# Patient Record
Sex: MALE | Race: ASIAN | Employment: OTHER | URBAN - METROPOLITAN AREA
[De-identification: names, ages, dates, MRNs, and addresses within clinical notes are randomized per-mention and may not be internally consistent; named-entity substitution may affect disease eponyms.]

---

## 2020-06-03 NOTE — PROGRESS NOTES
Spoke with patient's wife. Patient wheelchair bound and on dialysis. Covid testing should be done 6/14/2020, Sunday. No wheelchair transportation available on Sundays. Patient has dialysis on Monday 6/15/2020. Patel Linares at Dr. Hortencia Gant office contacted by T. Lennox Moros NP and voice message left. Will call patient/wife back after speaking with Patel Linares.

## 2020-06-08 NOTE — PERIOP NOTES
TALIA Ibarra at Dr. Yolis Mack office that [place] swab done outside of [facility] would be accepted for this patient due to mobility issues. Noted that swab needed to be done 4 days prior to surgery and result would need to be received prior to surgery.

## 2020-06-09 ENCOUNTER — HOSPITAL ENCOUNTER (OUTPATIENT)
Dept: PREADMISSION TESTING | Age: 60
Discharge: HOME | End: 2020-06-09
Payer: COMMERCIAL

## 2020-06-09 ENCOUNTER — HOSPITAL ENCOUNTER (OUTPATIENT)
Dept: GENERAL RADIOLOGY | Age: 60
Discharge: HOME | End: 2020-06-09
Attending: UROLOGY
Payer: COMMERCIAL

## 2020-06-09 VITALS
RESPIRATION RATE: 16 BRPM | SYSTOLIC BLOOD PRESSURE: 159 MMHG | OXYGEN SATURATION: 100 % | HEIGHT: 70 IN | TEMPERATURE: 97.6 F | HEART RATE: 86 BPM | BODY MASS INDEX: 25.2 KG/M2 | WEIGHT: 176 LBS | DIASTOLIC BLOOD PRESSURE: 80 MMHG

## 2020-06-09 LAB
ALBUMIN SERPL-MCNC: 3.9 G/DL (ref 3.5–5)
ALBUMIN/GLOB SERPL: 0.9 {RATIO} (ref 1.1–2.2)
ALP SERPL-CCNC: 224 U/L (ref 45–117)
ALT SERPL-CCNC: 37 U/L (ref 12–78)
ANION GAP SERPL CALC-SCNC: 6 MMOL/L (ref 5–15)
AST SERPL-CCNC: 28 U/L (ref 15–37)
BASOPHILS # BLD: 0 K/UL (ref 0–0.1)
BASOPHILS NFR BLD: 1 % (ref 0–1)
BILIRUB SERPL-MCNC: 0.8 MG/DL (ref 0.2–1)
BUN SERPL-MCNC: 35 MG/DL (ref 6–20)
BUN/CREAT SERPL: 11 (ref 12–20)
CALCIUM SERPL-MCNC: 9.3 MG/DL (ref 8.5–10.1)
CHLORIDE SERPL-SCNC: 96 MMOL/L (ref 97–108)
CO2 SERPL-SCNC: 29 MMOL/L (ref 21–32)
CREAT SERPL-MCNC: 3.31 MG/DL (ref 0.7–1.3)
DIFFERENTIAL METHOD BLD: ABNORMAL
EOSINOPHIL # BLD: 0.8 K/UL (ref 0–0.4)
EOSINOPHIL NFR BLD: 21 % (ref 0–7)
ERYTHROCYTE [DISTWIDTH] IN BLOOD BY AUTOMATED COUNT: 14.7 % (ref 11.5–14.5)
GLOBULIN SER CALC-MCNC: 4.3 G/DL (ref 2–4)
GLUCOSE SERPL-MCNC: 112 MG/DL (ref 65–100)
HCT VFR BLD AUTO: 36.1 % (ref 36.6–50.3)
HGB BLD-MCNC: 12.2 G/DL (ref 12.1–17)
IMM GRANULOCYTES # BLD AUTO: 0 K/UL (ref 0–0.04)
IMM GRANULOCYTES NFR BLD AUTO: 1 % (ref 0–0.5)
LYMPHOCYTES # BLD: 0.7 K/UL (ref 0.8–3.5)
LYMPHOCYTES NFR BLD: 20 % (ref 12–49)
MCH RBC QN AUTO: 29.8 PG (ref 26–34)
MCHC RBC AUTO-ENTMCNC: 33.8 G/DL (ref 30–36.5)
MCV RBC AUTO: 88.3 FL (ref 80–99)
MONOCYTES # BLD: 0.3 K/UL (ref 0–1)
MONOCYTES NFR BLD: 7 % (ref 5–13)
NEUTS SEG # BLD: 1.8 K/UL (ref 1.8–8)
NEUTS SEG NFR BLD: 50 % (ref 32–75)
NRBC # BLD: 0 K/UL (ref 0–0.01)
NRBC BLD-RTO: 0 PER 100 WBC
PLATELET # BLD AUTO: 118 K/UL (ref 150–400)
PMV BLD AUTO: 9.6 FL (ref 8.9–12.9)
POTASSIUM SERPL-SCNC: 4.2 MMOL/L (ref 3.5–5.1)
PROT SERPL-MCNC: 8.2 G/DL (ref 6.4–8.2)
RBC # BLD AUTO: 4.09 M/UL (ref 4.1–5.7)
RBC MORPH BLD: ABNORMAL
SODIUM SERPL-SCNC: 131 MMOL/L (ref 136–145)
WBC # BLD AUTO: 3.6 K/UL (ref 4.1–11.1)

## 2020-06-09 PROCEDURE — 71046 X-RAY EXAM CHEST 2 VIEWS: CPT

## 2020-06-09 PROCEDURE — 36415 COLL VENOUS BLD VENIPUNCTURE: CPT

## 2020-06-09 PROCEDURE — 80053 COMPREHEN METABOLIC PANEL: CPT

## 2020-06-09 PROCEDURE — 85025 COMPLETE CBC W/AUTO DIFF WBC: CPT

## 2020-06-09 PROCEDURE — 93005 ELECTROCARDIOGRAM TRACING: CPT

## 2020-06-09 RX ORDER — TERAZOSIN 5 MG/1
5 CAPSULE ORAL 2 TIMES DAILY
COMMUNITY

## 2020-06-09 RX ORDER — ISOSORBIDE MONONITRATE 30 MG/1
30 TABLET, EXTENDED RELEASE ORAL DAILY
COMMUNITY

## 2020-06-09 RX ORDER — ETHYL ALCOHOL 70 %
SOLUTION, NON-ORAL TOPICAL AS NEEDED
COMMUNITY

## 2020-06-09 RX ORDER — ONDANSETRON 4 MG/1
4 TABLET, ORALLY DISINTEGRATING ORAL
COMMUNITY

## 2020-06-09 RX ORDER — ALBUTEROL SULFATE 1.25 MG/3ML
1.25 SOLUTION RESPIRATORY (INHALATION)
COMMUNITY

## 2020-06-09 RX ORDER — INSULIN ASPART 100 [IU]/ML
INJECTION, SOLUTION INTRAVENOUS; SUBCUTANEOUS 2 TIMES DAILY WITH MEALS
COMMUNITY

## 2020-06-09 RX ORDER — LOSARTAN POTASSIUM 25 MG/1
25 TABLET ORAL DAILY
COMMUNITY

## 2020-06-09 RX ORDER — AMLODIPINE BESYLATE 10 MG/1
10 TABLET ORAL EVERY EVENING
COMMUNITY

## 2020-06-09 RX ORDER — LACOSAMIDE 10 MG/ML
10 SOLUTION ORAL 2 TIMES DAILY
COMMUNITY

## 2020-06-09 RX ORDER — FACIAL-BODY WIPES
10 EACH TOPICAL
COMMUNITY

## 2020-06-09 RX ORDER — ACETAMINOPHEN 160 MG/5ML
320 LIQUID ORAL
COMMUNITY

## 2020-06-09 RX ORDER — SCOLOPAMINE TRANSDERMAL SYSTEM 1 MG/1
1 PATCH, EXTENDED RELEASE TRANSDERMAL
COMMUNITY

## 2020-06-09 RX ORDER — ASCORBIC ACID 500 MG
500 TABLET ORAL DAILY
COMMUNITY

## 2020-06-09 NOTE — PERIOP NOTES
[address] [address] [facility]  PRE-ADMISSION TESTING    [phone]     Surgery Date:   Thursday, 6/18/20    [facility] staff will call you between 3 and 7pm the day before your surgery with your arrival time. Call [phone] after 7pm Wednesday if you did not receive this call. INSTRUCTIONS BEFORE YOUR SURGERY   When You  Arrive Arrive at the [address]nd [address] [facility] on the day of your surgery  Have your insurance card, photo ID, and any copayment (if needed)   Food   and   Drink NO food or drink after midnight the night before surgery    This means NO water, gum, mints, coffee, juice, etc.  No alcohol (beer, wine, liquor) 24 hours before and after surgery   Medications to   TAKE   Morning of Surgery MEDICATIONS TO TAKE THE MORNING OF SURGERY WITH A SIP OF WATER:    Terazosin, vimpat, guaifen, & isosorbide   Tylenol/acetaminophen products may be taken for pain, if needed   Medications  To  STOP      7 days before surgery  Non-Steroidal anti-inflammatory Drugs (NSAID's): for example, Ibuprofen (Advil, Motrin), Naproxen (Aleve)   Aspirin, if taking for pain    Herbal supplements, vitamins, and fish oil     Financial Assistance  [phone]   Bathing Clothing  Jewelry  Valuables      If you shower the morning of surgery, please do not apply anything to your skin (lotions, powders, deodorant, or makeup, especially mascara)   Do not shave or trim anywhere 24 hours before surgery   Wear loose, comfortable clothes   Leave money, valuables, and jewelry, including body piercings, at home   [address] SAME-DAY SURGERY: You must have a responsible adult drive you home and stay with you 24 hours after surgery   Special Instructions If a situation occurs and you are delayed the day of surgery, call [phone]. If your physical condition changes (like a fever, cold, flu, etc.) call your surgeon.        Home medication(s) reviewed and verified verbally during PAT appointment. The patient and spouse was contacted  in person. The patient verbalizes understanding of all instructions and does not  need reinforcement.

## 2020-06-09 NOTE — PERIOP NOTES
It is now mandated that all surgical patients be tested for COVID-19 prior to surgery. Testing has to be exactly 4 days prior to surgery. Your COVID test date is Sunday, 6/14/20, between 8:00 am and 11:00 am.  
 
 
COVID testing will be performed curbside at the [facility]. There will be signs leading you to the testing site. You will need to bring a photo ID with you to be swabbed. Patients are advised to self-quarantine at home after testing and prior to your surgery date. You will be notified if your results are positive. What to watch for: ? Coronavirus (COVID-19) affects different people in different ways ? It also appears with a wide range of symptoms from mild to severe ? Signs usually appear 2-14 days after exposure ? If you develop any of the following, notify your doctor immediately: 
o Fever 
o Chills, with or without a shiver 
o Muscle pain 
o Headache 
o Sore throat 
o Dry cough 
o New loss of taste or smell 
o Tiredness ? If you develop any of the following, call 911: 
o Shortness of breath 
o Difficulty breathing 
o Chest pain 
o New confusion 
o Blueness of fingers and/or lips

## 2020-06-09 NOTE — H&P
Preoperative Evaluation                     History and Physical with Surgical Risk Stratification     6/9/2020    CC: Urinary retention  Surgery: Prostate ablation     HPI:   Shanta Bo is a 61 y.o. male referred for pre-operative evaluation by Dr. Tabares Reason for surgery on 6/18/20. Mr. Martin Luna comes today with his wife. He had a stroke in March of last year and his non-verbal, on a PEG tube and has a ortiz catheter. 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 He has a right arm fistula that he recently had surgery on for excessive bleeding. His wife answers most of the questions for him. He is non-ambulatory and uses a wheelchair. He also has ESRD and does dialysis three times a week. After his stroke he had urinary retention but is also able to void. He has a ortiz that is present. His wife notes he does not seem to be in pain. The patient was evaluated in the surgeon's office and it was determined that the most appropriate plan of care is to proceed with surgical intervention. Patient's PCP Nicole Louis MD    Review of Systems     Constitutional: Negative for chills and fever  HENT: Negative for congestion and sore throat  Eyes: negative for blurred vision and double vision. Stye on left eyelid  Respiratory: Negative for cough, shortness of breath and wheezing  Mouth: Negative for loose, broken or chipped teeth. Cardiovascular: Negative for chest pain and palpitations  Gastrointestinal: Negative for abdominal pain, constipation, diarrhea and nausea. PEG tube in place  Genitourinary: Ortiz in place  Musculoskeletal: Non-ambulatory  Skin: Negative for rash, open wounds. Negative for bruises easily  Neurological: Negative for dizziness, tremors and headaches  Psychiatric: Negative for depression. The patient is not nervous/anxious.     Inherent Risk of Surgery     Surgical risk: Low  Low:  EIntermediate:   High:    Patient Cardiac Risk Assessment Revised Cardiac Risk Index (RCRI)  DM requiring insulin therapy  Preoperative serum Cr >2.0 mg/dl  Rate if cardiac death, nonfatal MI, nonfatal cardiac arrest by number of risk factor- 2.4%    PATTIE/AHA 2007 Guidelines:   1) Surgery Emergency, Non-cardiac -> to surgery  2) If not, look at clinical predictors    Major Intermediate Minor   Diabetes  Renal Insufficieny  Hx of CVA   Blood Thinner: ASA    METS     Less than 4     Other Risk Factors:   Screening for ETOH use:  Done and low risk  Smoking status:  Never     Personal or FH of bleeding problems:  No  Personal or FH of blood clots:  Yes- both eyes  Personal or FH of anesthesia problems:   No    Pulmonary Risk:  Asthma or COPD:  No  Body mass index is 25.25 kg/m². Known LETI:  No  Albumin abnormal, BUN abnormal    Past Medical, Surgical, Social History     Allergies: Allergies   Allergen Reactions    Bee Venom Protein (Honey Bee) Anaphylaxis    Hydralazine Rash       Medication Documentation Review Audit     Reviewed by Doretha Edwards RN (Registered Nurse) on 06/09/20 at 5334    Medication Sig Documenting Provider Last Dose Status Taking?   acetaminophen (TYLENOL) 160 mg/5 mL liquid Take 15 mg/kg by mouth every six (6) hours as needed for Fever. Provider, Historical  Active Yes   albuterol (ACCUNEB) 1.25 mg/3 mL nebu 1.25 mg by Nebulization route every six (6) hours as needed for Wheezing. Provider, Historical  Active Yes   amLODIPine (NORVASC) 10 mg tablet Take  by mouth every evening. Provider, Historical  Active Yes   ascorbic acid, vitamin C, (VITAMIN C) 500 mg tablet Take  by mouth daily. Provider, Historical  Active Yes   aspirin delayed-release 81 mg tablet Take 1 Tab by mouth daily. Ryan Peterson MD 6/8/2020 1100 Active No   bisacodyL (DULCOLAX) 10 mg suppository Insert 10 mg into rectum daily as needed for Constipation. Provider, Historical  Active Yes   guaifen/dextromethorphan/pe (GUAIFEN DM PO) Take 10 mL by mouth two (2) times a day.  Provider, Historical  Active Yes   insulin aspart U-100 (NovoLOG Flexpen U-100 Insulin) 100 unit/mL (3 mL) inpn by SubCUTAneous route two (2) times a day. Sliding scale Provider, Historical  Active Yes   isosorbide mononitrate ER (IMDUR) 30 mg tablet Take  by mouth daily. Provider, Historical  Active Yes   lacosamide (VIMPAT PO) Take 10 mL by mouth two (2) times a day. Provider, Historical  Active Yes   losartan (COZAAR) 25 mg tablet Take  by mouth daily. Provider, Historical  Active Yes   ondansetron (ZOFRAN ODT) 4 mg disintegrating tablet Take 4 mg by mouth every eight (8) hours as needed for Nausea or Vomiting. Provider, Historical  Active Yes   scopolamine (TRANSDERM-SCOP) 1 mg over 3 days pt3d 1 Patch by TransDERmal route every seventy-two (72) hours. Provider, Historical  Active Yes   terazosin (HYTRIN) 5 mg capsule Take 5 mg by mouth two (2) times a day. Provider, Historical  Active Yes   vitamin a & d (A&D) ointment Apply  to affected area as needed for Skin Irritation.  Provider, Historical  Active Yes                Past Medical History:   Diagnosis Date    Diabetes ([address]     Dysphagia due to old cerebrovascular accident 03/2019    ESRD (end stage renal disease) on dialysis [facility])     MWF    Frias catheter in place     PEG (percutaneous endoscopic gastrostomy) status ([address] 3/2019 to present    NPO- comfort foods only    Right sided weakness 3/2019 to present    Seizure ([address] 03/2019    Stroke ([address] 03/2019    Urinary retention with incomplete bladder emptying      Past Surgical History:   Procedure Laterality Date    HX ARTERIOVENOUS FISTULA Right 03/2019    HX CYST INCISION AND DRAINAGE Right     2nd toe- Cleaned out infection    HX OTHER SURGICAL  03/2019    PEG tube insertion        Social History     Tobacco Use    Smoking status: Never Smoker    Smokeless tobacco: Never Used   Substance Use Topics    Alcohol use: Never     Frequency: Never     Comment: rare    Drug use: Never     Family History Problem Relation Age of Onset    Stroke Father     Stroke Sister 48    Deep Vein Thrombosis Neg Hx        Objective     Vitals:    06/09/20 0818   BP: 159/80   Pulse: 86   Resp: 16   Temp: 97.6 °F (36.4 °C)   SpO2: 100%   Weight: 79.8 kg (176 lb)   Height: 5' 10\" (1.778 m)       Constitutional:  Appears well,  No Acute Distress, Vitals noted  Psychiatric:   Responds to voice    Eyes:   Pupils equally round and reactive, EOMI, conjunctiva clear, eyelids normal. Stye to left eye  ENT:   External ears and nose normal, teeth normal, gums normal, TMs and Orophyarynx normal  Neck:   General inspection and Thyroid normal.  No abnormal cervical or supraclavicular nodes    Lungs:   Clear to auscultation, good respiratory effort  Heart: Ausculation normal.  Regular rhythm. No cardiac murmurs. No carotid bruits or palpable thrills  Chest wall normal  Musculoskeletal: Non-ambulatory  Extremities:   Without edema, good peripheral pulses  Skin:   Warm to palpation, without rashes, bruising, or suspicious lesions     Recent Results (from the past 72 hour(s))   CBC WITH AUTOMATED DIFF    Collection Time: 06/09/20  9:18 AM   Result Value Ref Range    WBC 3.6 (L) 4.1 - 11.1 K/uL    RBC 4.09 (L) 4.10 - 5.70 M/uL    HGB 12.2 12.1 - 17.0 g/dL    HCT 36.1 (L) 36.6 - 50.3 %    MCV 88.3 80.0 - 99.0 FL    MCH 29.8 26.0 - 34.0 PG    MCHC 33.8 30.0 - 36.5 g/dL    RDW 14.7 (H) 11.5 - 14.5 %    PLATELET 666 (L) 381 - 400 K/uL    MPV 9.6 8.9 - 12.9 FL    NRBC 0.0 0  WBC    ABSOLUTE NRBC 0.00 0.00 - 0.01 K/uL    NEUTROPHILS 50 32 - 75 %    LYMPHOCYTES 20 12 - 49 %    MONOCYTES 7 5 - 13 %    EOSINOPHILS 21 (H) 0 - 7 %    BASOPHILS 1 0 - 1 %    IMMATURE GRANULOCYTES 1 (H) 0.0 - 0.5 %    ABS. NEUTROPHILS 1.8 1.8 - 8.0 K/UL    ABS. LYMPHOCYTES 0.7 (L) 0.8 - 3.5 K/UL    ABS. MONOCYTES 0.3 0.0 - 1.0 K/UL    ABS. EOSINOPHILS 0.8 (H) 0.0 - 0.4 K/UL    ABS. BASOPHILS 0.0 0.0 - 0.1 K/UL    ABS. IMM.  GRANS. 0.0 0.00 - 0.04 K/UL    DF SMEAR SCANNED      RBC COMMENTS        RBC agglutination present  NORMOCYTIC, NORMOCHROMIC     METABOLIC PANEL, COMPREHENSIVE    Collection Time: 06/09/20  9:18 AM   Result Value Ref Range    Sodium 131 (L) 136 - 145 mmol/L    Potassium 4.2 3.5 - 5.1 mmol/L    Chloride 96 (L) 97 - 108 mmol/L    CO2 29 21 - 32 mmol/L    Anion gap 6 5 - 15 mmol/L    Glucose 112 (H) 65 - 100 mg/dL    BUN 35 (H) 6 - 20 MG/DL    Creatinine 3.31 (H) 0.70 - 1.30 MG/DL    BUN/Creatinine ratio 11 (L) 12 - 20      GFR est AA 23 (L) >60 ml/min/1.73m2    GFR est non-AA 19 (L) >60 ml/min/1.73m2    Calcium 9.3 8.5 - 10.1 MG/DL    Bilirubin, total 0.8 0.2 - 1.0 MG/DL    ALT (SGPT) 37 12 - 78 U/L    AST (SGOT) 28 15 - 37 U/L    Alk. phosphatase 224 (H) 45 - 117 U/L    Protein, total 8.2 6.4 - 8.2 g/dL    Albumin 3.9 3.5 - 5.0 g/dL    Globulin 4.3 (H) 2.0 - 4.0 g/dL    A-G Ratio 0.9 (L) 1.1 - 2.2     EKG, 12 LEAD, INITIAL    Collection Time: 06/09/20  9:36 AM   Result Value Ref Range    Ventricular Rate 84 BPM    Atrial Rate 84 BPM    P-R Interval 200 ms    QRS Duration 94 ms    Q-T Interval 364 ms    QTC Calculation (Bezet) 430 ms    Calculated P Axis 76 degrees    Calculated R Axis 0 degrees    Calculated T Axis 85 degrees    Diagnosis       Normal sinus rhythm with 1st degree AV block  Otherwise normal ECG  No previous ECGs available  Confirmed by Elmer White MD, [id number] ([id number]) on 6/10/2020 9:53:38 AM         Assessment and Plan     Assessment/Plan:   1) Urinary retention  2) Pre-Operative Evaluation    Labs, CXR and EKG reviewed. Urine could not be obtained r/t ortiz. Obtain DOS    Labs sent to surgeon for review    Preoperative Clearance  Per RCRI, the patient has a 2.4% risk of cardiac death, nonfatal MI, nonfatal cardiac arrest based on two risk factors. Per ACC/AHA guidelines, patient is intermediate risk for a(n) low risk surgery and may proceed to planned surgery with the above noted risk.     Anita Corona NP

## 2020-06-09 NOTE — H&P (VIEW-ONLY)
Preoperative Evaluation History and Physical with Surgical Risk Stratification 6/9/2020 CC: Urinary retention Surgery: Prostate ablation HPI:  
Ericka Oates is a 61 y.o. male referred for pre-operative evaluation by Dr. Елена Purcell for surgery on 6/18/20. Mr. Anna Huertas comes today with his wife. He had a stroke in March of last year and his non-verbal, on a PEG tube and has a ortiz catheter. 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 He has a right arm fistula that he recently had surgery on for excessive bleeding. His wife answers most of the questions for him. He is non-ambulatory and uses a wheelchair. He also has ESRD and does dialysis three times a week. After his stroke he had urinary retention but is also able to void. He has a ortiz that is present. His wife notes he does not seem to be in pain. The patient was evaluated in the surgeon's office and it was determined that the most appropriate plan of care is to proceed with surgical intervention. Patient's PCP Radu Marmolejo MD 
 
Review of Systems Constitutional: Negative for chills and fever HENT: Negative for congestion and sore throat Eyes: negative for blurred vision and double vision. Stye on left eyelid Respiratory: Negative for cough, shortness of breath and wheezing Mouth: Negative for loose, broken or chipped teeth. Cardiovascular: Negative for chest pain and palpitations Gastrointestinal: Negative for abdominal pain, constipation, diarrhea and nausea. PEG tube in place Genitourinary: Ortiz in place Musculoskeletal: Non-ambulatory Skin: Negative for rash, open wounds. Negative for bruises easily Neurological: Negative for dizziness, tremors and headaches Psychiatric: Negative for depression. The patient is not nervous/anxious. Inherent Risk of Surgery Surgical risk: Low Low:  EIntermediate:   High:   
Patient Cardiac Risk Assessment Revised Cardiac Risk Index (RCRI) 
DM requiring insulin therapy Preoperative serum Cr >2.0 mg/dl Rate if cardiac death, nonfatal MI, nonfatal cardiac arrest by number of risk factor- 2.4% PATTIE/AHA 2007 Guidelines:  
1) Surgery Emergency, Non-cardiac -> to surgery 2) If not, look at clinical predictors Major Intermediate Minor Diabetes  Renal Insufficieny  Hx of CVA Blood Thinner: ASA METS Less than 4 Other Risk Factors:  
Screening for ETOH use:  Done and low risk Smoking status:  Never Personal or FH of bleeding problems:  No 
Personal or FH of blood clots:  Yes- both eyes Personal or FH of anesthesia problems:   No 
 
Pulmonary Risk: 
Asthma or COPD:  No 
Body mass index is 25.25 kg/m². Known LETI:  No 
Albumin abnormal, BUN abnormal 
 
Past Medical, Surgical, Social History Allergies: Allergies Allergen Reactions  Bee Venom Protein (Honey Bee) Anaphylaxis  Hydralazine Rash Medication Documentation Review Audit Reviewed by Yung Rodgers RN (Registered Nurse) on 06/09/20 at 4629 Medication Sig Documenting Provider Last Dose Status Taking?  
acetaminophen (TYLENOL) 160 mg/5 mL liquid Take 15 mg/kg by mouth every six (6) hours as needed for Fever. Provider, Historical  Active Yes  
albuterol (ACCUNEB) 1.25 mg/3 mL nebu 1.25 mg by Nebulization route every six (6) hours as needed for Wheezing. Provider, Historical  Active Yes  
amLODIPine (NORVASC) 10 mg tablet Take  by mouth every evening. Provider, Historical  Active Yes  
ascorbic acid, vitamin C, (VITAMIN C) 500 mg tablet Take  by mouth daily. Provider, Historical  Active Yes  
aspirin delayed-release 81 mg tablet Take 1 Tab by mouth daily. Jak Vick MD 6/8/2020 1100 Active No  
bisacodyL (DULCOLAX) 10 mg suppository Insert 10 mg into rectum daily as needed for Constipation.  Provider, Historical  Active Yes  
guaifen/dextromethorphan/pe (GUAIFEN DM PO) Take 10 mL by mouth two (2) times a day. Provider, Historical  Active Yes  
insulin aspart U-100 (NovoLOG Flexpen U-100 Insulin) 100 unit/mL (3 mL) inpn by SubCUTAneous route two (2) times a day. Sliding scale Provider, Historical  Active Yes  
isosorbide mononitrate ER (IMDUR) 30 mg tablet Take  by mouth daily. Provider, Historical  Active Yes  
lacosamide (VIMPAT PO) Take 10 mL by mouth two (2) times a day. Provider, Historical  Active Yes  
losartan (COZAAR) 25 mg tablet Take  by mouth daily. Provider, Historical  Active Yes  
ondansetron (ZOFRAN ODT) 4 mg disintegrating tablet Take 4 mg by mouth every eight (8) hours as needed for Nausea or Vomiting. Provider, Historical  Active Yes  
scopolamine (TRANSDERM-SCOP) 1 mg over 3 days pt3d 1 Patch by TransDERmal route every seventy-two (72) hours. Provider, Historical  Active Yes  
terazosin (HYTRIN) 5 mg capsule Take 5 mg by mouth two (2) times a day. Provider, Historical  Active Yes  
vitamin a & d (A&D) ointment Apply  to affected area as needed for Skin Irritation. Provider, Historical  Active Yes Past Medical History:  
Diagnosis Date  Diabetes ([address]  Dysphagia due to old cerebrovascular accident 03/2019  ESRD (end stage renal disease) on dialysis ([address] MWF  
 Frias catheter in place  PEG (percutaneous endoscopic gastrostomy) status ([address] 3/2019 to present NPO- comfort foods only  Right sided weakness 3/2019 to present  [facility] 03/2019  Stroke [facility]) 03/2019  Urinary retention with incomplete bladder emptying Past Surgical History:  
Procedure Laterality Date  HX ARTERIOVENOUS FISTULA Right 03/2019  HX CYST INCISION AND DRAINAGE Right 2nd toe- Cleaned out infection  HX OTHER SURGICAL  03/2019 PEG tube insertion Social History Tobacco Use  Smoking status: Never Smoker  Smokeless tobacco: Never Used Substance Use Topics  Alcohol use: Never Frequency: Never Comment: rare  Drug use: Never Family History Problem Relation Age of Onset  Stroke Father  Stroke Sister 48  Deep Vein Thrombosis Neg Hx Objective Vitals:  
 06/09/20 0818 BP: 159/80 Pulse: 86 Resp: 16 Temp: 97.6 °F (36.4 °C) SpO2: 100% Weight: 79.8 kg (176 lb) Height: 5' 10\" (1.778 m) Constitutional:  Appears well,  No Acute Distress, Vitals noted Psychiatric:   Responds to voice Eyes:   Pupils equally round and reactive, EOMI, conjunctiva clear, eyelids normal. Stye to left eye ENT:   External ears and nose normal, teeth normal, gums normal, TMs and Orophyarynx normal 
Neck:   General inspection and Thyroid normal.  No abnormal cervical or supraclavicular nodes Lungs:   Clear to auscultation, good respiratory effort Heart: Ausculation normal.  Regular rhythm. No cardiac murmurs. No carotid bruits or palpable thrills Chest wall normal 
Musculoskeletal: Non-ambulatory Extremities:   Without edema, good peripheral pulses Skin:   Warm to palpation, without rashes, bruising, or suspicious lesions Recent Results (from the past 72 hour(s)) CBC WITH AUTOMATED DIFF Collection Time: 06/09/20  9:18 AM  
Result Value Ref Range WBC 3.6 (L) 4.1 - 11.1 K/uL  
 RBC 4.09 (L) 4.10 - 5.70 M/uL  
 HGB 12.2 12.1 - 17.0 g/dL HCT 36.1 (L) 36.6 - 50.3 % MCV 88.3 80.0 - 99.0 FL  
 MCH 29.8 26.0 - 34.0 PG  
 MCHC 33.8 30.0 - 36.5 g/dL  
 RDW 14.7 (H) 11.5 - 14.5 % PLATELET 047 (L) 848 - 400 K/uL MPV 9.6 8.9 - 12.9 FL  
 NRBC 0.0 0  WBC ABSOLUTE NRBC 0.00 0.00 - 0.01 K/uL NEUTROPHILS 50 32 - 75 % LYMPHOCYTES 20 12 - 49 % MONOCYTES 7 5 - 13 % EOSINOPHILS 21 (H) 0 - 7 % BASOPHILS 1 0 - 1 % IMMATURE GRANULOCYTES 1 (H) 0.0 - 0.5 % ABS. NEUTROPHILS 1.8 1.8 - 8.0 K/UL  
 ABS. LYMPHOCYTES 0.7 (L) 0.8 - 3.5 K/UL  
 ABS. MONOCYTES 0.3 0.0 - 1.0 K/UL  
 ABS. EOSINOPHILS 0.8 (H) 0.0 - 0.4 K/UL  
 ABS. BASOPHILS 0.0 0.0 - 0.1 K/UL ABS. IMM. GRANS. 0.0 0.00 - 0.04 K/UL  
 DF SMEAR SCANNED    
 RBC COMMENTS     
  RBC agglutination present NORMOCYTIC, NORMOCHROMIC METABOLIC PANEL, COMPREHENSIVE Collection Time: 06/09/20  9:18 AM  
Result Value Ref Range Sodium 131 (L) 136 - 145 mmol/L Potassium 4.2 3.5 - 5.1 mmol/L Chloride 96 (L) 97 - 108 mmol/L  
 CO2 29 21 - 32 mmol/L Anion gap 6 5 - 15 mmol/L Glucose 112 (H) 65 - 100 mg/dL BUN 35 (H) 6 - 20 MG/DL Creatinine 3.31 (H) 0.70 - 1.30 MG/DL  
 BUN/Creatinine ratio 11 (L) 12 - 20 GFR est AA 23 (L) >60 ml/min/1.73m2 GFR est non-AA 19 (L) >60 ml/min/1.73m2 Calcium 9.3 8.5 - 10.1 MG/DL Bilirubin, total 0.8 0.2 - 1.0 MG/DL  
 ALT (SGPT) 37 12 - 78 U/L  
 AST (SGOT) 28 15 - 37 U/L Alk. phosphatase 224 (H) 45 - 117 U/L Protein, total 8.2 6.4 - 8.2 g/dL Albumin 3.9 3.5 - 5.0 g/dL Globulin 4.3 (H) 2.0 - 4.0 g/dL A-G Ratio 0.9 (L) 1.1 - 2.2 EKG, 12 LEAD, INITIAL Collection Time: 06/09/20  9:36 AM  
Result Value Ref Range Ventricular Rate 84 BPM  
 Atrial Rate 84 BPM  
 P-R Interval 200 ms QRS Duration 94 ms Q-T Interval 364 ms QTC Calculation (Bezet) 430 ms Calculated P Axis 76 degrees Calculated R Axis 0 degrees Calculated T Axis 85 degrees Diagnosis Normal sinus rhythm with 1st degree AV block Otherwise normal ECG No previous ECGs available Confirmed by Anand Morales MD, [id number] ([id number]) on 6/10/2020 9:53:38 AM 
  
 
 
Assessment and Plan Assessment/Plan:  
1) Urinary retention 2) Pre-Operative Evaluation Labs, CXR and EKG reviewed. Urine could not be obtained r/t ortiz. Obtain DOS Labs sent to surgeon for review Preoperative Clearance Per RCRI, the patient has a 2.4% risk of cardiac death, nonfatal MI, nonfatal cardiac arrest based on two risk factors. Per ACC/AHA guidelines, patient is intermediate risk for a(n) low risk surgery and may proceed to planned surgery with the above noted risk. Harsha Mata, NP

## 2020-06-09 NOTE — PROGRESS NOTES
Spoke with Margaret Schumacher, Mr. Matilde Buenrostro caretaker at [phone]. Mr. Matilde Buenrostro will come to [facility] after dialysis on Friday, June 12, 2020 to get his COVID testing. She has arranged transport for him to arrive at [facility].

## 2020-06-09 NOTE — PERIOP NOTES
Called EKG secondary to EKG showing active, not completed for 4hrs. Left VM requesting they look into it & notify supervisor @ [phone] if the EKG was not completed.

## 2020-06-10 LAB
ATRIAL RATE: 84 BPM
CALCULATED P AXIS, ECG09: 76 DEGREES
CALCULATED R AXIS, ECG10: 0 DEGREES
CALCULATED T AXIS, ECG11: 85 DEGREES
DIAGNOSIS, 93000: NORMAL
P-R INTERVAL, ECG05: 200 MS
Q-T INTERVAL, ECG07: 364 MS
QRS DURATION, ECG06: 94 MS
QTC CALCULATION (BEZET), ECG08: 430 MS
VENTRICULAR RATE, ECG03: 84 BPM

## 2020-06-12 ENCOUNTER — HOSPITAL ENCOUNTER (OUTPATIENT)
Dept: PREADMISSION TESTING | Age: 60
Discharge: HOME | End: 2020-06-12
Payer: COMMERCIAL

## 2020-06-12 PROCEDURE — 87635 SARS-COV-2 COVID-19 AMP PRB: CPT

## 2020-06-13 LAB — SARS-COV-2, COV2NT: NOT DETECTED

## 2020-06-17 ENCOUNTER — ANESTHESIA EVENT (OUTPATIENT)
Dept: SURGERY | Age: 60
End: 2020-06-17
Payer: COMMERCIAL

## 2020-06-17 NOTE — PERIOP NOTES
Left a VM for Rosalinda Walker at Dr. Torey Forrest office requesting that the corrected pre-op antibiotic orders be faxed to the Main pre-op.   DOS: 6/18/2020

## 2020-06-17 NOTE — PROGRESS NOTES
Received call from Andreina Luna @ 11am re: verbal order to add gentamycin to pre-op orders and pharmacy to dose due to being a dialysis patient. Informed komal unable to take verbal orders in PAT dept. Advise to have MD place new order in computer with sign and hold or re-fax updated orders  with med change to [phone] or main pre-op [phone]. Facilitator jayne EVANS/ragini OLIVERA/lois EVANS informed or conversation with office.   DOS 6/18/20

## 2020-06-18 ENCOUNTER — ANESTHESIA (OUTPATIENT)
Dept: SURGERY | Age: 60
End: 2020-06-18
Payer: COMMERCIAL

## 2020-06-18 ENCOUNTER — HOSPITAL ENCOUNTER (OUTPATIENT)
Age: 60
Setting detail: OUTPATIENT SURGERY
Discharge: HOME | End: 2020-06-18
Attending: UROLOGY | Admitting: UROLOGY
Payer: COMMERCIAL

## 2020-06-18 VITALS
TEMPERATURE: 98.7 F | DIASTOLIC BLOOD PRESSURE: 75 MMHG | HEIGHT: 66 IN | OXYGEN SATURATION: 96 % | HEART RATE: 81 BPM | BODY MASS INDEX: 27.48 KG/M2 | SYSTOLIC BLOOD PRESSURE: 156 MMHG | WEIGHT: 171 LBS | RESPIRATION RATE: 14 BRPM

## 2020-06-18 LAB
ANION GAP SERPL CALC-SCNC: 6 MMOL/L (ref 5–15)
BUN SERPL-MCNC: 36 MG/DL (ref 6–20)
BUN/CREAT SERPL: 11 (ref 12–20)
CALCIUM SERPL-MCNC: 8.8 MG/DL (ref 8.5–10.1)
CHLORIDE SERPL-SCNC: 99 MMOL/L (ref 97–108)
CO2 SERPL-SCNC: 29 MMOL/L (ref 21–32)
CREAT SERPL-MCNC: 3.19 MG/DL (ref 0.7–1.3)
ERYTHROCYTE [DISTWIDTH] IN BLOOD BY AUTOMATED COUNT: 14.2 % (ref 11.5–14.5)
GLUCOSE BLD STRIP.AUTO-MCNC: 104 MG/DL (ref 65–100)
GLUCOSE BLD STRIP.AUTO-MCNC: 105 MG/DL (ref 65–100)
GLUCOSE SERPL-MCNC: 97 MG/DL (ref 65–100)
HCT VFR BLD AUTO: 29.5 % (ref 36.6–50.3)
HGB BLD-MCNC: 10.9 G/DL (ref 12.1–17)
MCH RBC QN AUTO: 33.5 PG (ref 26–34)
MCHC RBC AUTO-ENTMCNC: 36.9 G/DL (ref 30–36.5)
MCV RBC AUTO: 90.8 FL (ref 80–99)
NRBC # BLD: 0 K/UL (ref 0–0.01)
NRBC BLD-RTO: 0 PER 100 WBC
PLATELET # BLD AUTO: 147 K/UL (ref 150–400)
PMV BLD AUTO: 8.9 FL (ref 8.9–12.9)
POTASSIUM SERPL-SCNC: 4.3 MMOL/L (ref 3.5–5.1)
RBC # BLD AUTO: 3.25 M/UL (ref 4.1–5.7)
SERVICE CMNT-IMP: ABNORMAL
SERVICE CMNT-IMP: ABNORMAL
SODIUM SERPL-SCNC: 134 MMOL/L (ref 136–145)
WBC # BLD AUTO: 4.5 K/UL (ref 4.1–11.1)

## 2020-06-18 PROCEDURE — 77030040922 HC BLNKT HYPOTHRM STRY -A

## 2020-06-18 PROCEDURE — 74011250636 HC RX REV CODE- 250/636: Performed by: NURSE ANESTHETIST, CERTIFIED REGISTERED

## 2020-06-18 PROCEDURE — 77030008771 HC TU NG SALEM SUMP -A: Performed by: ANESTHESIOLOGY

## 2020-06-18 PROCEDURE — 77030040831 HC BAG URINE DRNG MDII -A: Performed by: UROLOGY

## 2020-06-18 PROCEDURE — 36415 COLL VENOUS BLD VENIPUNCTURE: CPT

## 2020-06-18 PROCEDURE — 82962 GLUCOSE BLOOD TEST: CPT

## 2020-06-18 PROCEDURE — 74011250636 HC RX REV CODE- 250/636: Performed by: ANESTHESIOLOGY

## 2020-06-18 PROCEDURE — 77030021163 HC TUBE CYSTO IRR ICUM -A: Performed by: UROLOGY

## 2020-06-18 PROCEDURE — 74011000250 HC RX REV CODE- 250: Performed by: NURSE ANESTHETIST, CERTIFIED REGISTERED

## 2020-06-18 PROCEDURE — 77030026438 HC STYL ET INTUB CARD -A: Performed by: NURSE ANESTHETIST, CERTIFIED REGISTERED

## 2020-06-18 PROCEDURE — 74011000258 HC RX REV CODE- 258: Performed by: UROLOGY

## 2020-06-18 PROCEDURE — 76060000033 HC ANESTHESIA 1 TO 1.5 HR: Performed by: UROLOGY

## 2020-06-18 PROCEDURE — 77030008684 HC TU ET CUF COVD -B: Performed by: NURSE ANESTHETIST, CERTIFIED REGISTERED

## 2020-06-18 PROCEDURE — 76010000161 HC OR TIME 1 TO 1.5 HR INTENSV-TIER 1: Performed by: UROLOGY

## 2020-06-18 PROCEDURE — 85027 COMPLETE CBC AUTOMATED: CPT

## 2020-06-18 PROCEDURE — 74011250636 HC RX REV CODE- 250/636: Performed by: UROLOGY

## 2020-06-18 PROCEDURE — 76210000006 HC OR PH I REC 0.5 TO 1 HR: Performed by: UROLOGY

## 2020-06-18 PROCEDURE — 80048 BASIC METABOLIC PNL TOTAL CA: CPT

## 2020-06-18 PROCEDURE — 77030018836 HC SOL IRR NACL ICUM -A: Performed by: UROLOGY

## 2020-06-18 PROCEDURE — 77030019908 HC STETH ESOPH SIMS -A: Performed by: NURSE ANESTHETIST, CERTIFIED REGISTERED

## 2020-06-18 PROCEDURE — 76210000021 HC REC RM PH II 0.5 TO 1 HR: Performed by: UROLOGY

## 2020-06-18 PROCEDURE — 77030040361 HC SLV COMPR DVT MDII -B

## 2020-06-18 PROCEDURE — 74011000250 HC RX REV CODE- 250: Performed by: UROLOGY

## 2020-06-18 RX ORDER — FLUMAZENIL 0.1 MG/ML
0.2 INJECTION INTRAVENOUS
Status: DISCONTINUED | OUTPATIENT
Start: 2020-06-18 | End: 2020-06-18 | Stop reason: HOSPADM

## 2020-06-18 RX ORDER — SODIUM CHLORIDE 0.9 % (FLUSH) 0.9 %
5-40 SYRINGE (ML) INJECTION AS NEEDED
Status: DISCONTINUED | OUTPATIENT
Start: 2020-06-18 | End: 2020-06-18 | Stop reason: HOSPADM

## 2020-06-18 RX ORDER — SODIUM CHLORIDE, SODIUM LACTATE, POTASSIUM CHLORIDE, CALCIUM CHLORIDE 600; 310; 30; 20 MG/100ML; MG/100ML; MG/100ML; MG/100ML
100 INJECTION, SOLUTION INTRAVENOUS CONTINUOUS
Status: DISCONTINUED | OUTPATIENT
Start: 2020-06-18 | End: 2020-06-18 | Stop reason: HOSPADM

## 2020-06-18 RX ORDER — ROCURONIUM BROMIDE 10 MG/ML
INJECTION, SOLUTION INTRAVENOUS AS NEEDED
Status: DISCONTINUED | OUTPATIENT
Start: 2020-06-18 | End: 2020-06-18 | Stop reason: HOSPADM

## 2020-06-18 RX ORDER — SODIUM CHLORIDE, SODIUM LACTATE, POTASSIUM CHLORIDE, CALCIUM CHLORIDE 600; 310; 30; 20 MG/100ML; MG/100ML; MG/100ML; MG/100ML
125 INJECTION, SOLUTION INTRAVENOUS CONTINUOUS
Status: DISCONTINUED | OUTPATIENT
Start: 2020-06-18 | End: 2020-06-18 | Stop reason: HOSPADM

## 2020-06-18 RX ORDER — EPHEDRINE SULFATE/0.9% NACL/PF 50 MG/5 ML
SYRINGE (ML) INTRAVENOUS AS NEEDED
Status: DISCONTINUED | OUTPATIENT
Start: 2020-06-18 | End: 2020-06-18 | Stop reason: HOSPADM

## 2020-06-18 RX ORDER — NALOXONE HYDROCHLORIDE 0.4 MG/ML
0.2 INJECTION, SOLUTION INTRAMUSCULAR; INTRAVENOUS; SUBCUTANEOUS
Status: DISCONTINUED | OUTPATIENT
Start: 2020-06-18 | End: 2020-06-18 | Stop reason: HOSPADM

## 2020-06-18 RX ORDER — CEPHALEXIN 500 MG/1
500 CAPSULE ORAL 4 TIMES DAILY
Qty: 12 CAP | Refills: 0 | Status: SHIPPED | OUTPATIENT
Start: 2020-06-18 | End: 2020-06-21

## 2020-06-18 RX ORDER — FENTANYL CITRATE 50 UG/ML
INJECTION, SOLUTION INTRAMUSCULAR; INTRAVENOUS AS NEEDED
Status: DISCONTINUED | OUTPATIENT
Start: 2020-06-18 | End: 2020-06-18 | Stop reason: HOSPADM

## 2020-06-18 RX ORDER — SODIUM CHLORIDE 9 MG/ML
25 INJECTION, SOLUTION INTRAVENOUS CONTINUOUS
Status: DISCONTINUED | OUTPATIENT
Start: 2020-06-18 | End: 2020-06-18 | Stop reason: HOSPADM

## 2020-06-18 RX ORDER — LIDOCAINE HYDROCHLORIDE 10 MG/ML
0.1 INJECTION, SOLUTION EPIDURAL; INFILTRATION; INTRACAUDAL; PERINEURAL AS NEEDED
Status: DISCONTINUED | OUTPATIENT
Start: 2020-06-18 | End: 2020-06-18 | Stop reason: HOSPADM

## 2020-06-18 RX ORDER — DEXAMETHASONE SODIUM PHOSPHATE 4 MG/ML
INJECTION, SOLUTION INTRA-ARTICULAR; INTRALESIONAL; INTRAMUSCULAR; INTRAVENOUS; SOFT TISSUE AS NEEDED
Status: DISCONTINUED | OUTPATIENT
Start: 2020-06-18 | End: 2020-06-18 | Stop reason: HOSPADM

## 2020-06-18 RX ORDER — PROPOFOL 10 MG/ML
INJECTION, EMULSION INTRAVENOUS AS NEEDED
Status: DISCONTINUED | OUTPATIENT
Start: 2020-06-18 | End: 2020-06-18 | Stop reason: HOSPADM

## 2020-06-18 RX ORDER — LIDOCAINE HCL/PF 100 MG/5ML
SYRINGE (ML) INTRAVENOUS AS NEEDED
Status: DISCONTINUED | OUTPATIENT
Start: 2020-06-18 | End: 2020-06-18 | Stop reason: HOSPADM

## 2020-06-18 RX ORDER — SODIUM CHLORIDE 0.9 % (FLUSH) 0.9 %
5-40 SYRINGE (ML) INJECTION EVERY 8 HOURS
Status: DISCONTINUED | OUTPATIENT
Start: 2020-06-18 | End: 2020-06-18 | Stop reason: HOSPADM

## 2020-06-18 RX ORDER — HYDROMORPHONE HYDROCHLORIDE 1 MG/ML
.5-1 INJECTION, SOLUTION INTRAMUSCULAR; INTRAVENOUS; SUBCUTANEOUS
Status: DISCONTINUED | OUTPATIENT
Start: 2020-06-18 | End: 2020-06-18 | Stop reason: HOSPADM

## 2020-06-18 RX ORDER — ONDANSETRON 2 MG/ML
4 INJECTION INTRAMUSCULAR; INTRAVENOUS AS NEEDED
Status: DISCONTINUED | OUTPATIENT
Start: 2020-06-18 | End: 2020-06-18 | Stop reason: HOSPADM

## 2020-06-18 RX ORDER — ONDANSETRON 2 MG/ML
INJECTION INTRAMUSCULAR; INTRAVENOUS AS NEEDED
Status: DISCONTINUED | OUTPATIENT
Start: 2020-06-18 | End: 2020-06-18 | Stop reason: HOSPADM

## 2020-06-18 RX ORDER — LIDOCAINE HYDROCHLORIDE 20 MG/ML
INJECTION, SOLUTION EPIDURAL; INFILTRATION; INTRACAUDAL; PERINEURAL AS NEEDED
Status: DISCONTINUED | OUTPATIENT
Start: 2020-06-18 | End: 2020-06-18 | Stop reason: HOSPADM

## 2020-06-18 RX ADMIN — SUGAMMADEX 200 MG: 100 INJECTION, SOLUTION INTRAVENOUS at 09:41

## 2020-06-18 RX ADMIN — SODIUM CHLORIDE 25 ML/HR: 9 INJECTION, SOLUTION INTRAVENOUS at 07:52

## 2020-06-18 RX ADMIN — PROPOFOL 20 MG: 10 INJECTION, EMULSION INTRAVENOUS at 08:45

## 2020-06-18 RX ADMIN — ONDANSETRON HYDROCHLORIDE 4 MG: 2 SOLUTION INTRAMUSCULAR; INTRAVENOUS at 09:36

## 2020-06-18 RX ADMIN — DEXAMETHASONE SODIUM PHOSPHATE 4 MG: 4 INJECTION, SOLUTION INTRAMUSCULAR; INTRAVENOUS at 08:57

## 2020-06-18 RX ADMIN — PROPOFOL 100 MG: 10 INJECTION, EMULSION INTRAVENOUS at 08:37

## 2020-06-18 RX ADMIN — FENTANYL CITRATE 50 MCG: 0.05 INJECTION, SOLUTION INTRAMUSCULAR; INTRAVENOUS at 08:40

## 2020-06-18 RX ADMIN — PROPOFOL 50 MG: 10 INJECTION, EMULSION INTRAVENOUS at 08:39

## 2020-06-18 RX ADMIN — ROCURONIUM BROMIDE 20 MG: 10 INJECTION, SOLUTION INTRAVENOUS at 08:38

## 2020-06-18 RX ADMIN — ROCURONIUM BROMIDE 10 MG: 10 INJECTION, SOLUTION INTRAVENOUS at 08:42

## 2020-06-18 RX ADMIN — CEFAZOLIN SODIUM 2 G: 1 POWDER, FOR SOLUTION INTRAMUSCULAR; INTRAVENOUS at 08:50

## 2020-06-18 RX ADMIN — FENTANYL CITRATE 25 MCG: 0.05 INJECTION, SOLUTION INTRAMUSCULAR; INTRAVENOUS at 08:32

## 2020-06-18 RX ADMIN — LIDOCAINE HYDROCHLORIDE 60 MG: 20 INJECTION, SOLUTION INTRAVENOUS at 08:37

## 2020-06-18 RX ADMIN — PROPOFOL 30 MG: 10 INJECTION, EMULSION INTRAVENOUS at 08:43

## 2020-06-18 RX ADMIN — ROCURONIUM BROMIDE 10 MG: 10 INJECTION, SOLUTION INTRAVENOUS at 09:03

## 2020-06-18 RX ADMIN — GENTAMICIN SULFATE 319.2 MG: 40 INJECTION, SOLUTION INTRAMUSCULAR; INTRAVENOUS at 08:48

## 2020-06-18 RX ADMIN — FLUCONAZOLE 50 MG: 200 INJECTION, SOLUTION INTRAVENOUS at 09:35

## 2020-06-18 RX ADMIN — Medication 10 MG: at 09:27

## 2020-06-18 RX ADMIN — Medication 10 MG: at 09:22

## 2020-06-18 RX ADMIN — SODIUM CHLORIDE: 9 INJECTION, SOLUTION INTRAVENOUS at 08:05

## 2020-06-18 RX ADMIN — FENTANYL CITRATE 50 MCG: 0.05 INJECTION, SOLUTION INTRAMUSCULAR; INTRAVENOUS at 08:37

## 2020-06-18 RX ADMIN — FENTANYL CITRATE 25 MCG: 0.05 INJECTION, SOLUTION INTRAMUSCULAR; INTRAVENOUS at 08:28

## 2020-06-18 NOTE — ANESTHESIA PREPROCEDURE EVALUATION
Anesthetic History   No history of anesthetic complications            Review of Systems / Medical History  Patient summary reviewed and pertinent labs reviewed    Pulmonary  Within defined limits                 Neuro/Psych       CVA      Comments: Stroke in 2019 with Right hemiparesis, dysphagia and dysarthria Cardiovascular    Hypertension                   GI/Hepatic/Renal         Renal disease: ESRD and dialysis       Endo/Other    Diabetes: poorly controlled         Other Findings            Physical Exam    Airway  Mallampati: II    Neck ROM: normal range of motion   Mouth opening: Normal     Cardiovascular    Rhythm: regular  Rate: normal         Dental    Dentition: Lower dentition intact and Upper dentition intact     Pulmonary  Breath sounds clear to auscultation               Abdominal  GI exam deferred       Other Findings            Anesthetic Plan    ASA: 4  Anesthesia type: general          Induction: Intravenous  Anesthetic plan and risks discussed with: Patient

## 2020-06-18 NOTE — DISCHARGE INSTRUCTIONS
DISCHARGE SUMMARY from your Nurse    The following personal items collected during your admission are returned to you:   Dental Appliance:    Vision: Visual Aid: Glasses, Other (comment)(with wife)  Hearing Aid:    Jewelry: Jewelry: None  Clothing: Clothing: Other (comment)(street clothes)  Other Valuables: Other Valuables: Eyeglasses  Valuables sent to safe:      PATIENT INSTRUCTIONS:    After general anesthesia or intravenous sedation, for 24 hours or while taking prescription Narcotics:  · Limit your activities  · Do not drive and operate hazardous machinery  · Do not make important personal or business decisions  · Do  not drink alcoholic beverages  · If you have not urinated within 8 hours after discharge, please contact your surgeon on call. Report the following to your surgeon:  · Excessive pain, swelling, redness or odor of or around the surgical area  · Temperature over 100.5  · Nausea and vomiting lasting longer than 4 hours or if unable to take medications  · Any signs of decreased circulation or nerve impairment to extremity: change in color, persistent  numbness, tingling, coldness or increase pain  · Any questions    COUGH AND DEEP BREATHE    Breathing deep and coughing are very important exercises to do after surgery. Deep breathing and coughing open the little air tubes and air sacks in your lungs. You take deep breaths every day. You may not even notice - it is just something you do when you sigh or yawn. It is a natural exercise you do to keep these air passages open. After surgery, take deep breaths and cough, on purpose. Coughing and deep breathing help prevent bronchitis and pneumonia after surgery. If you had chest or belly surgery, use a pillow as a \"hug buddy\" and hold it tightly to your chest or belly when you cough. DIRECTIONS:  · Take 10 to 15 slow deep breaths every hour while awake. · Breathe in deeply, and hold it for 2 seconds.   · Exhale slowly through puckered lips, like blowing up a balloon. · After every 4th or 5th deep breath, hug your pillow to your chest or belly and give a hard, deep cough. Yes, it will probably hurt. But doing this exercise is very important part of healing after surgery. Take your pain medicine to help you do this exercise without too much pain. IF YOU HAVE BEEN DIAGNOSED WITH SLEEP APNEA, PLEASE USE YOUR SLEEP APNEA DEVICE OR CPAP MACHINE WHEN YOU INTEND TO NAP AFTER TAKING PAIN MEDICATION. Ankle Pumps    Ankle pumps increase the circulation of oxygenated blood to your lower extremities and decrease your risk for circulation problems such as blood clots. They also stretch the muscles, tendons and ligaments in your foot and ankle, and prevent joint contracture in the ankle and foot, especially after surgeries on the legs. It is important to do ankle pump exercises regularly after surgery because immobility increases your risk for developing a blood clot. Your doctor may also have you take an Aspirin for the next few days as well. If your doctor did not ask you to take an Aspirin, consult with him before starting Aspirin therapy on your own. Slowly point your foot forward, feeling the muscles on the top of your lower leg stretch, and hold this position for 5 seconds. Next, pull your foot back toward you as far as possible, stretching the calf muscles, and hold that position for 5 seconds. Repeat with the other foot. Perform 10 repetitions every hour while awake for both ankles if possible (down and then up with the foot once is one repetition). You should feel gentle stretching of the muscles in your lower leg when doing this exercise. If you feel pain, or your range of motion is limited, don't  Push too hard. Only go the limit your joint and muscles will let you go.   If you have increasing pain, progressively worsening leg warmth or swelling, STOP the exercise and call your doctor. Below is information about the medications your doctor is prescribing after your visit:    Other information in your discharge envelope:  []     PRESCRIPTIONS  []     SCHOOL/WORK NOTE  []     INFECTION PREVENTION  []     [address]  []     REGIONAL NERVE BLOCK ON QUE PAMPHLET   []     EXPAREL  []     HANDICAP APPLICATION         These are general instructions for a healthy lifestyle:    *  Please give a list of your current medications to your Primary Care Provider. *  Please update this list whenever your medications are discontinued, doses are      changed, or new medications (including over-the-counter products) are added. *  Please carry medication information at all times in case of emergency situations. About Smoking  No smoking / No tobacco products / Avoid exposure to second hand smoke    Surgeon General's Warning:  Quitting smoking now greatly reduces serious risk to your health. Obesity, smoking, and sedentary lifestyle greatly increases your risk for illness and disease. A healthy diet, regular physical exercise & weight monitoring are important for maintaining a healthy lifestyle. Congestive Heart Failure  You may be retaining fluid if you have a history of heart failure or if you experience any of the following symptoms:  Weight gain of 3 pounds or more overnight or 5 pounds in a week, increased swelling in our hands or feet or shortness of breath while lying flat in bed. Please call your doctor as soon as you notice any of these symptoms; do not wait until your next office visit. Recognize signs and symptoms of STROKE:  F - face looks uneven  A - arms unable to move or move even  S - speech slurred or non-existent  T - time-call 911 as soon as signs and symptoms begin-DO NOT go         Back to bed or wait to see if you get better-TIME IS BRAIN. Warning signs of HEART ATTACK  Call 911 if you have these symptoms    · Chest discomfort.   Most heart attacks involve discomfort in the center of the chest that lasts more than a few minutes, or that goes away and comes back. It can feel like uncomfortable pressure, squeezing, fullness, or pain. · Discomfort in other areas of the upper body. Symptoms can include pain or discomfort in one or both        Arms, the back, neck, jaw, or stomach. ·  Shortness of breath with or without chest discomfort. · Other signs may include breaking out in a cold sweat, nausea, or lightheadedness    Don't wait more than five minutes to call 911 - MINUTES MATTER! Fast action can save your life. Calling 911 is almost always the fastest way to get lifesaving treatment. Emergency Medical Services staff can begin treatment when they arrive - up to an hour sooner than if someone gets to the hospital by car. CAROLINE HEART MEDICATION AND SIDE EFFECT GUIDE    The [employer] MEDICATION AND SIDE EFFECT GUIDE was provided to the PATIENT AND CARE PROVIDER.   Information provided includes instruction about drug purpose and common side effects for the following medications:    · Keflex

## 2020-06-18 NOTE — OP NOTES
[facility] Pr[facility] 79  OPERATIVE REPORT    Name:  Kate Jarrett  MR#:  [id number]  :  1960  ACCOUNT #:  [de-identified]  DATE OF SERVICE:  2020    PREOPERATIVE DIAGNOSES:  Urinary retention and history of a stroke and partial neurogenic bladder. POSTOPERATIVE DIAGNOSES:  Urinary retention and history of a stroke and partial neurogenic bladder. PROCEDURE PERFORMED:  Cystoscopy, ProTouch laser ablation of the prostate. SURGEON:  Eric Byrne MD    ASSISTANT:  None. ANESTHESIA:  General.    COMPLICATIONS:  None. SPECIMENS REMOVED:  None. IMPLANTS:  None. ESTIMATED BLOOD LOSS:  Minimal.    INDICATION:  A 75-year-old gentleman with urinary retention and failed voiding trials. He does have function of his bladder per urodynamics. He has an obstructive pattern and visual obstruction of his prostate on cystoscopy. Risks and benefits of laser ablation of the prostate were discussed and accepted. He wished to proceed. FINDINGS AT THE TIME OF THE PROCEDURE:  1. Some debris consistent with potential fungal infection in the bladder. 2.  Bilobar BPH and high bladder neck. 3.  Adequate resection. PROCEDURE:  After the patient was identified, proper time-out performed ,and consent obtained, he was taken to the operating room. After adequate anesthesia was obtained, he was prepped and draped in lithotomy position. His previous indwelling catheter was removed. He was then prepped and draped. Lidocaine jelly was instilled within the urethra. Then using the small resectoscope and the sheath, I entered the bladder under direct vision. We then drained out the bladder. There was some debris that looked like it could be a fungus ball. I irrigated all debris out of the bladder. We had given the patient Ancef, gentamicin, 50 mg of Diflucan. Again, all the debris was irrigated out of the bladder. No lesions noted in the bladder.   There is inflammation consistent with the Frias. I then began to ablate the prostate using the ProTouch laser. I started at the bladder neck and took that down to the level of the trigone as well as some of the lateral lobes out to the level of the veru but not beyond that. I did that bilaterally. I then ablated the intervening median tissue which was quite small. I started at 80 segura and increased to 100 segura after the first layer of the prostate had been taken down. I then began to ablate from the bladder neck all the way out to the veru on 100 segura taking the prostate down to the capsule on the lateral lobes as well as the anterior lobe as well. Meticulous hemostasis was obtained throughout. At the end of the case, we watched under low pressure. There was a little bit of anterior pillar tissue that was noted and that was ablated as well but not beyond the veru. At the end of the resection, I watched under low pressure. The prostate was wide open with no bleeding seen. I then reinspected the bladder. No injury to the trigone or ureteral orifices, both of which effluxed clear urine. The bladder was then left full and the scope withdrawn. Then using the catheter guide, I placed a 22-Chadian catheter. I placed 30 mL in the balloon and urine ran clear. He was then awakened from anesthesia and taken to recovery room in stable condition. PLAN:  We will leave his catheter in for several days. It is okay for him to restart his aspirin since he has history of stroke. We will give him a voiding trial early next week. We will keep him on some suppressive Keflex until then.       Andreina Keller MD [id number]/  D:  06/18/2020 9:52  T:  06/18/2020 10:57  JOB #:  [id number]

## 2020-06-18 NOTE — ANESTHESIA POSTPROCEDURE EVALUATION
Procedure(s):  PROTOUCH LASER ABLATION OF PROSTATE.    general    Anesthesia Post Evaluation      Multimodal analgesia: multimodal analgesia not used between 6 hours prior to anesthesia start to PACU discharge  Patient location during evaluation: PACU  Patient participation: complete - patient participated  Level of consciousness: awake  Pain management: adequate  Airway patency: patent  Anesthetic complications: no  Cardiovascular status: acceptable, blood pressure returned to baseline and hemodynamically stable  Respiratory status: acceptable  Hydration status: acceptable  Post anesthesia nausea and vomiting:  controlled      INITIAL Post-op Vital signs:   Vitals Value Taken Time   /75 6/18/2020 10:20 AM   Temp 36.4 °C (97.5 °F) 6/18/2020 10:01 AM   Pulse 80 6/18/2020 10:30 AM   Resp 10 6/18/2020 10:30 AM   SpO2 98 % 6/18/2020 10:30 AM   Vitals shown include unvalidated device data.

## 2020-06-18 NOTE — BRIEF OP NOTE
Brief Postoperative Note    Patient: Serena Blanchard  YOB: 1960  MRN: [id number]    Date of Procedure: 6/18/2020     Pre-Op Diagnosis: NEUROGENIC BLADDER, URINARY RETENTION    Post-Op Diagnosis: Same as preoperative diagnosis.       Procedure(s):  PROTOUCH LASER ABLATION OF PROSTATE    Surgeon(s):  Esha Guerrero MD    Surgical Assistant: None    Anesthesia: General     Estimated Blood Loss (mL): Minimal    Complications: None    Specimens: * No specimens in log *     Implants: * No implants in log *    Drains:   PEG/Gastrostomy Tube 03/18/19 (Active)   Site Assessment Clean, dry, & intact 6/18/2020  7:16 AM   Dressing Status Clean, dry, & intact 6/18/2020  7:16 AM       Findings: bph    Electronically Signed by Eric Byrne MD on 6/18/2020 at 9:45 AM

## 2020-06-18 NOTE — PERIOP NOTES
BMP drawn from pt's left wrist IV. 10ccs drawn and discarded prior to specimen collection and sent to lab.

## 2020-06-18 NOTE — INTERVAL H&P NOTE
Update History & Physical    The Patient's History and Physical of June 9, 2020 was reviewed with the patient and I examined the patient. There was no change. The surgical site was confirmed by the patient and me. Plan:  The risk, benefits, expected outcome, and alternative to the recommended procedure have been discussed with the patient. Patient understands and wants to proceed with the procedure.     K ok this am    Ancef and single dose gent ordered    Electronically signed by Andrey Vargas MD on 6/18/2020 at 8:16 AM

## 2020-06-30 ENCOUNTER — HOSPITAL ENCOUNTER (INPATIENT)
Age: 60
LOS: 3 days | Discharge: HOME | DRG: 696 | End: 2020-07-03
Attending: EMERGENCY MEDICINE | Admitting: INTERNAL MEDICINE
Payer: COMMERCIAL

## 2020-06-30 DIAGNOSIS — D62 ACUTE BLOOD LOSS ANEMIA: ICD-10-CM

## 2020-06-30 DIAGNOSIS — I10 ESSENTIAL HYPERTENSION: ICD-10-CM

## 2020-06-30 DIAGNOSIS — Z99.2 END STAGE RENAL DISEASE ON DIALYSIS (HCC): ICD-10-CM

## 2020-06-30 DIAGNOSIS — R31.0 GROSS HEMATURIA: ICD-10-CM

## 2020-06-30 DIAGNOSIS — R33.8 ACUTE URINARY RETENTION: Primary | ICD-10-CM

## 2020-06-30 DIAGNOSIS — N18.6 END STAGE RENAL DISEASE ON DIALYSIS (HCC): ICD-10-CM

## 2020-06-30 PROBLEM — R33.9 URINARY RETENTION: Status: ACTIVE | Noted: 2020-06-30

## 2020-06-30 PROBLEM — R31.9 HEMATURIA: Status: ACTIVE | Noted: 2020-06-30

## 2020-06-30 PROBLEM — I63.9 STROKE (CEREBRUM) (HCC): Status: ACTIVE | Noted: 2020-06-30

## 2020-06-30 LAB
ABO + RH BLD: NORMAL
ALBUMIN SERPL-MCNC: 3.5 G/DL (ref 3.5–5)
ALBUMIN/GLOB SERPL: 0.8 {RATIO} (ref 1.1–2.2)
ALP SERPL-CCNC: 181 U/L (ref 45–117)
ALT SERPL-CCNC: 20 U/L (ref 12–78)
ANION GAP SERPL CALC-SCNC: 7 MMOL/L (ref 5–15)
APTT PPP: 29.5 SEC (ref 22.1–32)
AST SERPL-CCNC: 24 U/L (ref 15–37)
BASOPHILS # BLD: 0.1 K/UL (ref 0–0.1)
BASOPHILS NFR BLD: 1 % (ref 0–1)
BILIRUB SERPL-MCNC: 0.6 MG/DL (ref 0.2–1)
BLOOD GROUP ANTIBODIES SERPL: NORMAL
BUN SERPL-MCNC: 40 MG/DL (ref 6–20)
BUN/CREAT SERPL: 11 (ref 12–20)
CALCIUM SERPL-MCNC: 8.8 MG/DL (ref 8.5–10.1)
CHLORIDE SERPL-SCNC: 99 MMOL/L (ref 97–108)
CO2 SERPL-SCNC: 28 MMOL/L (ref 21–32)
COMMENT, HOLDF: NORMAL
CREAT SERPL-MCNC: 3.48 MG/DL (ref 0.7–1.3)
DIFFERENTIAL METHOD BLD: ABNORMAL
EOSINOPHIL # BLD: 0.2 K/UL (ref 0–0.4)
EOSINOPHIL NFR BLD: 4 % (ref 0–7)
ERYTHROCYTE [DISTWIDTH] IN BLOOD BY AUTOMATED COUNT: 13.4 % (ref 11.5–14.5)
GLOBULIN SER CALC-MCNC: 4.2 G/DL (ref 2–4)
GLUCOSE BLD STRIP.AUTO-MCNC: 139 MG/DL (ref 65–100)
GLUCOSE BLD STRIP.AUTO-MCNC: 160 MG/DL (ref 65–100)
GLUCOSE SERPL-MCNC: 99 MG/DL (ref 65–100)
HCT VFR BLD AUTO: 22.6 % (ref 36.6–50.3)
HGB BLD-MCNC: 8.4 G/DL (ref 12.1–17)
HGB BLD-MCNC: 8.6 G/DL (ref 12.1–17)
IMM GRANULOCYTES # BLD AUTO: 0 K/UL (ref 0–0.04)
IMM GRANULOCYTES NFR BLD AUTO: 0 % (ref 0–0.5)
INR PPP: 1.1 (ref 0.9–1.1)
LYMPHOCYTES # BLD: 0.4 K/UL (ref 0.8–3.5)
LYMPHOCYTES NFR BLD: 6 % (ref 12–49)
MCH RBC QN AUTO: 33.9 PG (ref 26–34)
MCHC RBC AUTO-ENTMCNC: 37.2 G/DL (ref 30–36.5)
MCV RBC AUTO: 91.1 FL (ref 80–99)
MONOCYTES # BLD: 0.4 K/UL (ref 0–1)
MONOCYTES NFR BLD: 7 % (ref 5–13)
NEUTS SEG # BLD: 4.8 K/UL (ref 1.8–8)
NEUTS SEG NFR BLD: 82 % (ref 32–75)
NRBC # BLD: 0 K/UL (ref 0–0.01)
NRBC BLD-RTO: 0 PER 100 WBC
PLATELET # BLD AUTO: 140 K/UL (ref 150–400)
PMV BLD AUTO: 9.4 FL (ref 8.9–12.9)
POTASSIUM SERPL-SCNC: 3.9 MMOL/L (ref 3.5–5.1)
PROT SERPL-MCNC: 7.7 G/DL (ref 6.4–8.2)
PROTHROMBIN TIME: 11.4 SEC (ref 9–11.1)
RBC # BLD AUTO: 2.48 M/UL (ref 4.1–5.7)
RBC MORPH BLD: ABNORMAL
SAMPLES BEING HELD,HOLD: NORMAL
SERVICE CMNT-IMP: ABNORMAL
SERVICE CMNT-IMP: ABNORMAL
SODIUM SERPL-SCNC: 134 MMOL/L (ref 136–145)
SPECIMEN EXP DATE BLD: NORMAL
THERAPEUTIC RANGE,PTTT: NORMAL SECS (ref 58–77)
WBC # BLD AUTO: 5.9 K/UL (ref 4.1–11.1)

## 2020-06-30 PROCEDURE — 51700 IRRIGATION OF BLADDER: CPT

## 2020-06-30 PROCEDURE — 36415 COLL VENOUS BLD VENIPUNCTURE: CPT

## 2020-06-30 PROCEDURE — 85610 PROTHROMBIN TIME: CPT

## 2020-06-30 PROCEDURE — 85730 THROMBOPLASTIN TIME PARTIAL: CPT

## 2020-06-30 PROCEDURE — 82962 GLUCOSE BLOOD TEST: CPT

## 2020-06-30 PROCEDURE — 86900 BLOOD TYPING SEROLOGIC ABO: CPT

## 2020-06-30 PROCEDURE — 77030018836 HC SOL IRR NACL ICUM -A

## 2020-06-30 PROCEDURE — 51798 US URINE CAPACITY MEASURE: CPT

## 2020-06-30 PROCEDURE — 85025 COMPLETE CBC W/AUTO DIFF WBC: CPT

## 2020-06-30 PROCEDURE — 77030021163 HC TUBE CYSTO IRR ICUM -A

## 2020-06-30 PROCEDURE — 77030018798 HC PMP KT ENTRL FED COVD -A

## 2020-06-30 PROCEDURE — 85018 HEMOGLOBIN: CPT

## 2020-06-30 PROCEDURE — 74011636637 HC RX REV CODE- 636/637: Performed by: INTERNAL MEDICINE

## 2020-06-30 PROCEDURE — 80053 COMPREHEN METABOLIC PANEL: CPT

## 2020-06-30 PROCEDURE — 77030019905 HC CATH URETH INTMIT MDII -A

## 2020-06-30 PROCEDURE — 74011250636 HC RX REV CODE- 250/636: Performed by: INTERNAL MEDICINE

## 2020-06-30 PROCEDURE — 77030005549 HC CATH URETH FOL 3W COVD -A

## 2020-06-30 PROCEDURE — 94762 N-INVAS EAR/PLS OXIMTRY CONT: CPT

## 2020-06-30 PROCEDURE — 65270000029 HC RM PRIVATE

## 2020-06-30 PROCEDURE — 74011250637 HC RX REV CODE- 250/637: Performed by: INTERNAL MEDICINE

## 2020-06-30 PROCEDURE — 74011250636 HC RX REV CODE- 250/636: Performed by: UROLOGY

## 2020-06-30 PROCEDURE — 77030005513 HC CATH URETH FOL11 MDII -B

## 2020-06-30 PROCEDURE — 99285 EMERGENCY DEPT VISIT HI MDM: CPT

## 2020-06-30 RX ORDER — AMLODIPINE BESYLATE 5 MG/1
10 TABLET ORAL EVERY EVENING
Status: DISCONTINUED | OUTPATIENT
Start: 2020-06-30 | End: 2020-07-01

## 2020-06-30 RX ORDER — ACETAMINOPHEN 325 MG/1
650 TABLET ORAL
Status: DISCONTINUED | OUTPATIENT
Start: 2020-06-30 | End: 2020-07-03 | Stop reason: HOSPADM

## 2020-06-30 RX ORDER — SODIUM CHLORIDE 0.9 % (FLUSH) 0.9 %
5-40 SYRINGE (ML) INJECTION AS NEEDED
Status: DISCONTINUED | OUTPATIENT
Start: 2020-06-30 | End: 2020-07-03 | Stop reason: HOSPADM

## 2020-06-30 RX ORDER — LACOSAMIDE 10 MG/ML
100 SOLUTION ORAL 2 TIMES DAILY
Status: DISCONTINUED | OUTPATIENT
Start: 2020-06-30 | End: 2020-06-30 | Stop reason: CLARIF

## 2020-06-30 RX ORDER — MAGNESIUM SULFATE 100 %
4 CRYSTALS MISCELLANEOUS AS NEEDED
Status: DISCONTINUED | OUTPATIENT
Start: 2020-06-30 | End: 2020-07-03 | Stop reason: HOSPADM

## 2020-06-30 RX ORDER — ASCORBIC ACID 500 MG
500 TABLET ORAL DAILY
Status: DISCONTINUED | OUTPATIENT
Start: 2020-07-01 | End: 2020-07-01

## 2020-06-30 RX ORDER — ONDANSETRON 2 MG/ML
4 INJECTION INTRAMUSCULAR; INTRAVENOUS
Status: DISCONTINUED | OUTPATIENT
Start: 2020-06-30 | End: 2020-07-03 | Stop reason: HOSPADM

## 2020-06-30 RX ORDER — SODIUM CHLORIDE 9 MG/ML
250 INJECTION, SOLUTION INTRAVENOUS AS NEEDED
Status: DISCONTINUED | OUTPATIENT
Start: 2020-06-30 | End: 2020-07-03 | Stop reason: HOSPADM

## 2020-06-30 RX ORDER — NALOXONE HYDROCHLORIDE 0.4 MG/ML
0.4 INJECTION, SOLUTION INTRAMUSCULAR; INTRAVENOUS; SUBCUTANEOUS AS NEEDED
Status: DISCONTINUED | OUTPATIENT
Start: 2020-06-30 | End: 2020-07-03 | Stop reason: HOSPADM

## 2020-06-30 RX ORDER — LACOSAMIDE 100 MG/1
100 TABLET ORAL EVERY 12 HOURS
Status: DISCONTINUED | OUTPATIENT
Start: 2020-06-30 | End: 2020-07-01

## 2020-06-30 RX ORDER — SODIUM CHLORIDE 9 MG/ML
75 INJECTION, SOLUTION INTRAVENOUS CONTINUOUS
Status: DISCONTINUED | OUTPATIENT
Start: 2020-06-30 | End: 2020-06-30

## 2020-06-30 RX ORDER — INSULIN LISPRO 100 [IU]/ML
INJECTION, SOLUTION INTRAVENOUS; SUBCUTANEOUS
Status: DISCONTINUED | OUTPATIENT
Start: 2020-06-30 | End: 2020-07-03 | Stop reason: HOSPADM

## 2020-06-30 RX ORDER — TERAZOSIN 5 MG/1
5 CAPSULE ORAL 2 TIMES DAILY
Status: DISCONTINUED | OUTPATIENT
Start: 2020-06-30 | End: 2020-07-01

## 2020-06-30 RX ORDER — ISOSORBIDE MONONITRATE 30 MG/1
30 TABLET, EXTENDED RELEASE ORAL DAILY
Status: DISCONTINUED | OUTPATIENT
Start: 2020-07-01 | End: 2020-07-02

## 2020-06-30 RX ORDER — GUAIFENESIN/DEXTROMETHORPHAN 100-10MG/5
10 SYRUP ORAL
Status: DISCONTINUED | OUTPATIENT
Start: 2020-06-30 | End: 2020-07-03 | Stop reason: HOSPADM

## 2020-06-30 RX ORDER — SODIUM CHLORIDE 0.9 % (FLUSH) 0.9 %
5-40 SYRINGE (ML) INJECTION EVERY 8 HOURS
Status: DISCONTINUED | OUTPATIENT
Start: 2020-06-30 | End: 2020-07-03 | Stop reason: HOSPADM

## 2020-06-30 RX ORDER — ALBUTEROL SULFATE 1.25 MG/3ML
1.25 SOLUTION RESPIRATORY (INHALATION)
Status: DISCONTINUED | OUTPATIENT
Start: 2020-06-30 | End: 2020-07-03 | Stop reason: HOSPADM

## 2020-06-30 RX ORDER — LOSARTAN POTASSIUM 50 MG/1
25 TABLET ORAL DAILY
Status: DISCONTINUED | OUTPATIENT
Start: 2020-07-01 | End: 2020-07-01

## 2020-06-30 RX ORDER — FACIAL-BODY WIPES
10 EACH TOPICAL
Status: DISCONTINUED | OUTPATIENT
Start: 2020-06-30 | End: 2020-07-03 | Stop reason: HOSPADM

## 2020-06-30 RX ORDER — GENTAMICIN SULFATE 80 MG/100ML
80 INJECTION, SOLUTION INTRAVENOUS
Status: COMPLETED | OUTPATIENT
Start: 2020-06-30 | End: 2020-06-30

## 2020-06-30 RX ORDER — DEXTROSE 50 % IN WATER (D50W) INTRAVENOUS SYRINGE
25-50 AS NEEDED
Status: DISCONTINUED | OUTPATIENT
Start: 2020-06-30 | End: 2020-07-03 | Stop reason: HOSPADM

## 2020-06-30 RX ADMIN — Medication 10 ML: at 22:33

## 2020-06-30 RX ADMIN — GENTAMICIN SULFATE 80 MG: 80 INJECTION, SOLUTION INTRAVENOUS at 16:36

## 2020-06-30 RX ADMIN — Medication 10 ML: at 16:37

## 2020-06-30 RX ADMIN — LACOSAMIDE 100 MG: 100 TABLET, FILM COATED ORAL at 22:33

## 2020-06-30 RX ADMIN — TERAZOSIN HYDROCHLORIDE 5 MG: 5 CAPSULE ORAL at 22:33

## 2020-06-30 RX ADMIN — AMLODIPINE BESYLATE 10 MG: 5 TABLET ORAL at 18:04

## 2020-06-30 RX ADMIN — INSULIN LISPRO 2 UNITS: 100 INJECTION, SOLUTION INTRAVENOUS; SUBCUTANEOUS at 16:30

## 2020-06-30 RX ADMIN — SODIUM CHLORIDE 75 ML/HR: 900 INJECTION, SOLUTION INTRAVENOUS at 16:35

## 2020-06-30 NOTE — ED NOTES
Carlos Manuel Mitchell NP, of urology regarding patient's CBI. Awaiting call back. Patient manually irrigated with dark red output, and small clots. Patient placed on third bag of continual CBI.

## 2020-06-30 NOTE — ED PROVIDER NOTES
The patient is a 35-year-old male with a past medical history significant for diabetes, end-stage renal disease on hemodialysis dependent on Monday Wednesdays and Fridays, percutaneous endoscopic gastrotomy, seizure disorder, stroke, status post prostate surgery on June 18, 2010 with insertion of indwelling Frias catheter that was changed a week ago now presents to the ED by EMS for evaluation for hematuria and bleeding around his penis. Wife states that the patient reports approximately a liter of urine daily. However since yesterday he has had blood-tinged urine and the last time his water was approximately 12 hours ago. He has had increased pain and swelling with bleeding around his catheter that was controlled with pressure in the pedal area applied by the wife. The patient is a difficult historian due to the prior history of stroke with an expressive aphasia. Wife states that the patient is not on anticoagulant at this time. Last dialysis was performed yesterday.            Past Medical History:   Diagnosis Date    Diabetes ([address]     Dysphagia due to old cerebrovascular accident 03/2019    ESRD (end stage renal disease) on dialysis [facility])     MWF    Frias catheter in place     PEG (percutaneous endoscopic gastrostomy) status ([address] 3/2019 to present    NPO- comfort foods only    Right sided weakness 3/2019 to present    Seizure ([address] 03/2019    Stroke [address] 03/2019    Urinary retention with incomplete bladder emptying        Past Surgical History:   Procedure Laterality Date    HX ARTERIOVENOUS FISTULA Right 03/2019    HX CYST INCISION AND DRAINAGE Right     2nd toe- Cleaned out infection    HX OTHER SURGICAL  03/2019    PEG tube insertion         Family History:   Problem Relation Age of Onset    Stroke Father     Stroke Sister 48    Deep Vein Thrombosis Neg Hx        Social History     Socioeconomic History    Marital status:      Spouse name: Not on file    Number of children: Not on file    Years of education: Not on file    Highest education level: Not on file   Occupational History    Not on file   Social Needs    Financial resource strain: Not on file    Food insecurity     Worry: Not on file     Inability: Not on file    Transportation needs     Medical: Not on file     Non-medical: Not on file   Tobacco Use    Smoking status: Never Smoker    Smokeless tobacco: Never Used   Substance and Sexual Activity    Alcohol use: Never     Frequency: Never     Comment: rare    Drug use: Never    Sexual activity: Not on file   Lifestyle    Physical activity     Days per week: Not on file     Minutes per session: Not on file    Stress: Not on file   Relationships    Social connections     Talks on phone: Not on file     Gets together: Not on file     Attends [employer] service: Not on file     Active member of club or organization: Not on file     Attends meetings of clubs or organizations: Not on file     Relationship status: Not on file    Intimate partner violence     Fear of current or ex partner: Not on file     Emotionally abused: Not on file     Physically abused: Not on file     Forced sexual activity: Not on file   Other Topics Concern    Not on file   Social History Narrative    Not on file         ALLERGIES: Bee venom protein (honey bee) and Hydralazine    Review of Systems   All other systems reviewed and are negative. Vitals:    06/30/20 0540   BP: 168/82   Pulse: 96   Resp: 18   Temp: 97.8 °F (36.6 °C)   SpO2: 99%   Weight: 77.6 kg (171 lb)   Height: 5' 6\" (1.676 m)            Physical Exam  Vitals signs and nursing note reviewed. Exam conducted with a chaperone present. CONSTITUTIONAL: Well-appearing; well-nourished; in no apparent distress  HEAD: Normocephalic; atraumatic  EYES: PERRL; EOM intact; conjunctiva and sclera are clear bilaterally.   ENT: No rhinorrhea; normal pharynx with no tonsillar hypertrophy; mucous membranes pink/moist, no erythema, no exudate. NECK: Supple; non-tender; no cervical lymphadenopathy  CARD: Normal S1, S2; no murmurs, rubs, or gallops. Regular rate and rhythm. RESP: Normal respiratory effort; breath sounds clear and equal bilaterally; no wheezes, rhonchi, or rales. ABD: Normal bowel sounds; non-distended; suprapubic tenderness with bladder fullness; no palpable organomegaly, no masses, no bruits. Back Exam: Normal inspection; no vertebral point tenderness, no CVA tenderness. Normal range of motion. EXT: Normal ROM in all four extremities; non-tender to palpation; no swelling or deformity; distal pulses are normal, no edema.  exam: Circumcised with 12 Yi indwelling Frias catheter inserted and bloody return in the Frias bag that is empty. SKIN: Warm; dry; no rash. NEURO:Alert and oriented x self. , coherent, SAGE-XII grossly intact, sensory and motor are non-focal.        MDM  Number of Diagnoses or Management Options  Acute blood loss anemia:   Acute urinary retention:   End stage renal disease on dialysis [facility]):   Essential hypertension:   Gross hematuria:   Diagnosis management comments: Assessment: Acute urinary retention and hematuria status post prostate surgery. The patient had a bladder scan performed that revealed a void residual approximately 700 cc in his bladder. The patient remained hemodynamically stable although he is mildly hypertensive and in mild distress at this time. Plan: Manual irrigation of the bladder/lab/IV fluid/analgesia/serial exam/ Monitor and Reevaluate.          Amount and/or Complexity of Data Reviewed  Clinical lab tests: ordered and reviewed  Tests in the radiology section of CPT®: ordered and reviewed  Tests in the medicine section of CPT®: reviewed and ordered  Discussion of test results with the performing providers: yes  Decide to obtain previous medical records or to obtain history from someone other than the patient: yes  Obtain history from someone other than the patient: yes  Review and summarize past medical records: yes  Discuss the patient with other providers: yes  Independent visualization of images, tracings, or specimens: yes    Risk of Complications, Morbidity, and/or Mortality  Presenting problems: moderate  Diagnostic procedures: moderate  Management options: moderate    Critical Care  Total time providing critical care: (Total critical care time spent exclusive of procedures: 45 minutes)    Patient Progress  Patient progress: stable         Procedures    PROGRESS NOTE:    Pt has been reexamined by Noah Her MD the patient's bladder was manually irrigated with approximately 300 cc of saline. Several clots were removed and the patient was able to eventually emptied his bladder he had approximately 1200 cc of bloody urine. Will obtain lab reevaluate then irrigate bladder manually once more to clear. Written by Meredith Wyatt MD,  6:10 AM    PROGRESS NOTE:  Pt has been reexamined by Noah Her MD additional manner irrigation was performed by me with approximately 1500 cc of and bloody return with clots obtained equally and unable to clear urine. Will consult urology and change Frias catheter to a three-way [address] and initiate continue bladder irrigation  Written by Meredith Wyatt MD,  7:00 AM    .     CONSULT NOTE:  Noah Her MD spoke with Dr. Lucia Noriega of [facility] urology discussed patient's presentation, history, physical assessment, and available diagnostic results. Recommend stable continue irrigation and make sure that was going urine is coming out. Will have 1 of his partners constipation in the ED 07:10 AM    PROGRESS NOTE:  Pt has been reexamined by Noah Her MD all available results have been reviewed with pt and any available family. Pt understands sx, dx, and tx in ED. Care plan has been outlined and questions have been answered.  Pt and any available family understands and agrees to need for admission to hospital for further tx not available in ED. Pt is ready for admission. Written by Nabeel Jiménez MD,  7:35 AM    Hospitalist Perfect Serve for Admission  7:39 AM    ED Room Number: ER10/10  Patient Name and age: Cheyenne Campbell 61 y.o.  male  Working Diagnosis:   1. Acute urinary retention    2. Gross hematuria    3. Acute blood loss anemia    4. End stage renal disease on dialysis ([address]    5. Essential hypertension        COVID-19 Suspicion:  no    Code Status:  Full Code  Readmission: no  Isolation Requirements:  no  Recommended Level of Care:  med/surg  [facility] - [phone]  Other: The patient is currently on continuous bladder irrigation and awaiting urology evaluation this morning. He is hemodynamically stable. Last hemodialysis was performed yesterday and nephrology has been notified. CONSULT NOTE:  Catrina Cano MD spoke with Dr. Coty Severino of the adult hospitalist team. Discussed patient's presentation, history, physical assessment, and available diagnostic results. He will evaluate, write orders and admit the patient to the hospital. 7:41 AM        .   .

## 2020-06-30 NOTE — PROGRESS NOTES
6/30/2020  12:07 PM  Reason for Admission:   Hematuria                   RUR Score:          12% low           Plan for utilizing home health:      Patient has had through [facility] in the past. She liked their RN and SLP but was displeased with their PT department as they did not help her with what she felt she really needed help with. PCP: First and Last name:  Drew Barbosa MD   Name of Practice:    Are you a current patient: Yes/No:  Yes   Approximate date of last visit:  Telehealth at end of May   Can you participate in a virtual visit with your PCP: Yes                    Current Advanced Directive/Advance Care Plan:  Full code, no AD on file                         Transition of Care Plan:                    I completed the assessment with the patient's wife in the patient's room. Myself, the patient, and his wife were all wearing masks. Patient lives with his wife in a single story apartment with no steps to enter. Patient is dependent on wife for all ADLs following stroke last year. He does not drive and they depend on transportation for him (usually [facility]). He has DME at home including a [facility] lift, hospital bed, and a borrowed wheelchair. They have had home health through [facility] in the past but patient's wife was displeased with the PT aspect--she felt they were not helping her with what she felt she needed. She states that she was happy with the skilled nursing aspect though. Patient's PCP is Dr. Danielito Henderson and he has seen him last on May 27 for a telehealth visit. They usually get their prescriptions at [place] [address]. Patient's wife expressed many concerns:   Patient's wheelchair is only borrowed; the one they had before was not what they needed  They may need a new copy of a POA document. Patient probably could benefit from home health but patient's wife needs to be realistic about what they could help with.  Patient was at [address] [place] [address] for inpatient rehab following the stroke and it seems she expects the same level but at home. Patient's wife is willing to go outpatient for therapy as well. They have been approved for an aide through Medicaid but have not been able to get one yet due to most places not taking medicaid patients. Transition of Care Plan  1. PT/OT evals to help determine needs for equipment  2. May benefit from spiritual care consult to do a new POA? 3. May benefit from a home health list so that patient's wife can call around and find which service fits them best  4. May benefit from getting the list of home care aid providers (or at least the names of ones who take medicaid)  5. Will need transportation likely [facility] or [facility] to get home  6. Patient will need to follow up with PCP  7. CM will continue to follow    Care Management Interventions  PCP Verified by CM: Yes(Dr. Vinicius Kaur)  Mode of Transport at Discharge:  Other (see comment)(TBD)  Transition of Care Consult (CM Consult): Discharge Planning  MyChart Signup: No  Discharge Durable Medical Equipment: No  Physical Therapy Consult: Yes  Occupational Therapy Consult: Yes  Speech Therapy Consult: No  Current Support Network: Lives with Spouse  Confirm Follow Up Transport: Wheelchair Joanne Palmer  Discharge Location  Discharge Placement: Unable to determine at this time    VARUN Blackmon

## 2020-06-30 NOTE — ED NOTES
Dr. Arnel Foy at bedside to irrigate bladder. Total of 1500 mL irrigated. Blood clots present. Drainage is red tinged. Frias catheter draining at this time.

## 2020-06-30 NOTE — CONSULTS
..                              [address] [facility]  YOB: 1960          Assessment & Plan:   1. ESRD M/W/F  - hd in am  - no acute need at this time  2. Anemia  - drop  - hematuria  - Watch  - DAMIR on HD  3. Hematuria  4. DM  Other issues noted       Subjective:   CC: ESRD M/W/F  HPI: Patient seen in the ED. Called for provision for dialysis. Goes to [facility]. Last HD was yest. Sd Castro is his wife. Pt has an expressive aphasia and didn't provide any hx. Pt came in for hematuria. He is now on CBI. Normally he makes 1L per day per chart. ROS: Unable to obtain  Current Facility-Administered Medications   Medication Dose Route Frequency    0.9% sodium chloride infusion 250 mL  250 mL IntraVENous PRN     Current Outpatient Medications   Medication Sig    scopolamine (TRANSDERM-SCOP) 1 mg over 3 days pt3d 1 Patch by TransDERmal route every seventy-two (72) hours.  insulin aspart U-100 (NovoLOG Flexpen U-100 Insulin) 100 unit/mL (3 mL) inpn by SubCUTAneous route two (2) times a day. Sliding scale    vitamin a & d (A&D) ointment Apply  to affected area as needed for Skin Irritation.  terazosin (HYTRIN) 5 mg capsule Take 5 mg by mouth two (2) times a day.  ondansetron (ZOFRAN ODT) 4 mg disintegrating tablet Take 4 mg by mouth every eight (8) hours as needed for Nausea or Vomiting.  lacosamide (VIMPAT PO) Take 10 mL by mouth two (2) times a day.  isosorbide mononitrate ER (IMDUR) 30 mg tablet Take  by mouth daily.  guaifen/dextromethorphan/pe (GUAIFEN DM PO) Take 10 mL by mouth two (2) times a day.  bisacodyL (DULCOLAX) 10 mg suppository Insert 10 mg into rectum daily as needed for Constipation.  ascorbic acid, vitamin C, (VITAMIN C) 500 mg tablet Take  by mouth daily.  amLODIPine (NORVASC) 10 mg tablet Take  by mouth every evening.     albuterol (ACCUNEB) 1.25 mg/3 mL nebu 1.25 mg by Nebulization route every six (6) hours as needed for Wheezing.  acetaminophen (TYLENOL) 160 mg/5 mL liquid Take 15 mg/kg by mouth every six (6) hours as needed for Fever.  losartan (COZAAR) 25 mg tablet Take  by mouth daily.  aspirin delayed-release 81 mg tablet Take 1 Tab by mouth daily. Objective:     Vitals:  Blood pressure 164/72, pulse 87, temperature 97.8 °F (36.6 °C), resp. rate 14, height 5' 6\" (1.676 m), weight 77.6 kg (171 lb), SpO2 98 %. Temp (24hrs), Av.8 °F (36.6 °C), Min:97.8 °F (36.6 °C), Max:97.8 °F (36.6 °C)      Intake and Output:   07 -  1900  In: 40   Out: 40 [Urine:40]  No intake/output data recorded. Physical Exam:                Patient is intubated:  NO    Physical Examination:   GENERAL ASSESSMENT: NAD  HEENT:NC  CHEST: SYMMETRIC EXPANSION   HEART: RATE IN THE 80S  ABDOMEN: Soft,NT,  :Frias: Y  EXTREMITY: EDEMA -N  NEURO: AWAKE          ECG/rhythm:    Data Review      No results for input(s): TNIPOC in the last 72 hours. No lab exists for component: ITNL   No results for input(s): CPK, CKMB, TROIQ in the last 72 hours. Recent Labs     20  0626   *   K 3.9   CL 99   CO2 28   BUN 40*   CREA 3.48*   GLU 99   CA 8.8   ALB 3.5   WBC 5.9   HGB 8.4*   HCT 22.6*   *      Recent Labs     20  0626   INR 1.1   PTP 11.4*   APTT 29.5     Needs: urine analysis, urine sodium, protein and creatinine  No results found for: MARK ANDERSON      Discussed with:  PT'S WIFE, PT, DR. Donna Rodarte MD  2020        [facility]:  [email or web address]  Rajinder Rangel office:  [address] [place] [address], [address] [place] [address] [address]  [place], [address] [place] [address]  Phone: [phone]  Fax :     [phone]    [place] office:  [address] [facility]  [place], [address]  Phone - [phone]  Fax - [phone]

## 2020-06-30 NOTE — ED NOTES
Patient updated on plan of care, and given opportunity to ask questions. Patient is currently resting, no needs expressed at this time. Bed is in the low position, wheels are locked, and call bell is within reach. Will continue to assess and monitor.

## 2020-06-30 NOTE — ED NOTES
Verbal shift change report given to Curtis Rnagel (oncoming nurse) by Leticia Martinez RN  (offgoing nurse). Report included the following information SBAR, Kardex, ED Summary, [facility] and Recent Results.

## 2020-06-30 NOTE — PROGRESS NOTES
Bedside shift change report given to NATHAN Cade (oncoming nurse) by Raven Zimmer RN (offgoing nurse). Report included the following information SBAR, Kardex, MAR, Accordion, Recent Results and Med Rec Status.

## 2020-06-30 NOTE — ED NOTES
TRANSFER - OUT REPORT:    Verbal report given to Clavin Adame RN(name) on Chari Shipley  being transferred to 5th floor(unit) for routine progression of care       Report consisted of patients Situation, Background, Assessment and   Recommendations(SBAR). Information from the following report(s) SBAR, Kardex, ED Summary, [facility] and Recent Results was reviewed with the receiving nurse. Lines:   Peripheral IV 06/30/20 Left Antecubital (Active)   Site Assessment Clean, dry, & intact 6/30/2020  6:28 AM   Phlebitis Assessment 0 6/30/2020  6:28 AM   Infiltration Assessment 0 6/30/2020  6:28 AM   Dressing Status Clean, dry, & intact 6/30/2020  6:28 AM   Dressing Type Transparent;Tape 6/30/2020  6:28 AM   Hub Color/Line Status Patent; Flushed 6/30/2020  6:28 AM        Opportunity for questions and clarification was provided. RN updated on need for blood sugar check.      Patient transported with:   Monitor  Registered Nurse

## 2020-06-30 NOTE — PROGRESS NOTES
Admission Medication Reconciliation:     Information obtained from:    Spouse via phone call to ER room 10  RxQuery data available¹:  No    Comments/Recommendations:   Spouse denies any recent seizure. Patient able to confirm name, , allergies, and preferred pharmacy  Updated PTA medication list  Verified preferred pharmacy  Reviewed patient's allergies     ¹RxQuery pharmacy benefit data reflects medications filled and processed through the patient's insurance, however   this data does NOT capture whether the medication was picked up or is currently being taken by the patient. Prior to Admission Medications   Prescriptions Last Dose Informant Taking?   acetaminophen (TYLENOL) 160 mg/5 mL liquid 2020 at Unknown time  Yes   Sig: Take 320 mg by mouth every six (6) hours as needed for Fever. albuterol (ACCUNEB) 1.25 mg/3 mL nebu 2020 at Unknown time  Yes   Si.25 mg by Nebulization route every six (6) hours as needed for Wheezing. amLODIPine (NORVASC) 10 mg tablet 2020 at Unknown time  Yes   Sig: Take 10 mg by mouth every evening. ascorbic acid, vitamin C, (VITAMIN C) 500 mg tablet 2020 at Unknown time  Yes   Sig: Take 500 mg by mouth daily. aspirin delayed-release 81 mg tablet 2020 at Unknown time  Yes   Sig: Take 1 Tab by mouth daily. bisacodyL (DULCOLAX) 10 mg suppository   Yes   Sig: Insert 10 mg into rectum daily as needed for Constipation. guaifen/dextromethorphan/pe (GUAIFEN DM PO) 2020 at Unknown time  Yes   Sig: Take 10 mL by mouth two (2) times daily as needed (cough). insulin aspart U-100 (NovoLOG Flexpen U-100 Insulin) 100 unit/mL (3 mL) inpn 2020 at Unknown time  Yes   Sig: by SubCUTAneous route two (2) times daily (with meals). Sliding scale    isosorbide mononitrate ER (IMDUR) 30 mg tablet 2020 at Unknown time  Yes   Sig: Take 30 mg by mouth daily.    lacosamide (Vimpat) 10 mg/mL soln oral solution 2020 at Unknown time  Yes   Sig: Take 10 mL by mouth two (2) times a day. 10 ml = 100 mg   losartan (COZAAR) 25 mg tablet 2020 at Unknown time  Yes   Sig: Take 25 mg by mouth daily. ondansetron (ZOFRAN ODT) 4 mg disintegrating tablet 2020 at Unknown time  Yes   Sig: Take 4 mg by mouth every eight (8) hours as needed for Nausea or Vomiting.   scopolamine (TRANSDERM-SCOP) 1 mg over 3 days pt3d 2020  Yes   Si Patch by TransDERmal route every seventy-two (72) hours. terazosin (HYTRIN) 5 mg capsule 2020 at Unknown time  Yes   Sig: Take 5 mg by mouth two (2) times a day. vitamin a & d (A&D) ointment 2020 at Unknown time  Yes   Sig: Apply  to affected area as needed for Skin Irritation. Facility-Administered Medications: None         Please contact the main inpatient pharmacy with any questions or concerns at [phone] and we will direct you to the clinical pharmacist covering this patient's care while in-house.    Johnathan Finley, PharmD, BCPS

## 2020-06-30 NOTE — PROGRESS NOTES
[facility] Dosing Services: Antimicrobial Stewardship Progress Note    Consult for antibiotic dosing of gentamicin x 1 dose by Dr. Clif Wilcox (urology)  Pharmacist reviewed antibiotic appropriateness for 61year old , male  for indication of UTI in the setting of ESRD (HD on MWF)  Day of Therapy 1 of 1    Plan:  Gentamicin therapy:  Give gentamicin 80 mg IV (~1 mg/kg per protocol) x 1 dose per request by MD  Pharmacy to follow daily and will assess the need for further dosing if necessary    Other Antimicrobial  (not dosed by pharmacist)   none   Cultures     none   Serum Creatinine     Lab Results   Component Value Date/Time    Creatinine 3.48 (H) 06/30/2020 06:26 AM       Creatinine Clearance Estimated Creatinine Clearance: 22.1 mL/min (A) (based on SCr of 3.48 mg/dL (H)). Procalcitonin  No results found for: PCT     Temp   98.3 °F (36.8 °C)    WBC   Lab Results   Component Value Date/Time    WBC 5.9 06/30/2020 06:26 AM       H/H   Lab Results   Component Value Date/Time    HGB 8.4 (L) 06/30/2020 06:26 AM      Platelets Lab Results   Component Value Date/Time    PLATELET 214 (L) 09/27/2238 06:26 AM          Thank you for the consult,  Giorgi Potter

## 2020-06-30 NOTE — ED NOTES
Patient 3-way ortiz slowed, still continuous. Ortiz output pink. Patient updated on plan of care, and given opportunity to ask questions. Patient is currently resting, no needs expressed at this time. Bed is in the low position, wheels are locked, and call bell is within reach. Will continue to assess and monitor.

## 2020-06-30 NOTE — CONSULTS
New Urology Consult Note    Patient: Mary Red MRN: [id number]  SSN: [id number]    YOB: 1960  Age: 61 y.o. Sex: male            Assessment:     Mary Red is a 61 y.o. male with PMH diabetes, end-stage renal disease on hemodialysis dependent on Monday Wednesdays and Fridays, percutaneous endoscopic gastrotomy, seizure disorder, stroke. S/p TURP by Dr. Lonnie Gutierrez on 6/18/2020. He had office follow up on 6/23/2020 at which time he failed voiding trial and catheter was replaced. He was doing well until dialysis yesterday at which time his wife noted hematuria with clots and decreased urine output. Recommendations:     1. Ok to eat. No surgical intervention planned for today. 2. Continue CBI and manually titrate to keep clear to light pink. 3. Manually irrigate as needed. Thank you for this consult. Please contact [facility] with any further questions/concerns. Jovanna Jimenez NP [phone]    History of Present Illness:     Reason for Consult:  hematuria    Mary Red is seen in consultation for reasons noted above at the request of Heather Wayne MD.    This is a 61 y.o. male with a history of diabetes, end-stage renal disease on hemodialysis dependent on Monday Wednesdays and Fridays, percutaneous endoscopic gastrotomy, seizure disorder, stroke. S/p TURP by Dr. Lonnie Gutierrez on 6/18/2020. He had office follow up on 6/23/2020 at which time he failed voiding trial and catheter was replaced. He was doing well until dialysis yesterday at which time his wife noted hematuria with clots and decreased urine output. History obtained by wife. Hematuria noted yesterday during dialysis. Likely due to heparin. Had decreased UO, bleeding around penis, and abdominal pain and presented to ED. Manually irrigated for significant clot by ED staff. At the time of my exam, nursing placed 20fr 3-way and starting CBI. I irrigated for a small amount of clot.  Urine clear to light pink on CBI. Hgb 8.4 today from 10.9. VSS. Subjective     Past Medical History  Past Medical History:   Diagnosis Date    Diabetes ([facility]     Dysphagia due to old cerebrovascular accident 03/2019    ESRD (end stage renal disease) on dialysis [facility])     MWF    Frias catheter in place     PEG (percutaneous endoscopic gastrostomy) status ([facility] 3/2019 to present    NPO- comfort foods only    Right sided weakness 3/2019 to present    Seizure ([facility] 03/2019    Stroke ([facility] 03/2019    Urinary retention with incomplete bladder emptying        Past Surgical History:   Past Surgical History:   Procedure Laterality Date    HX ARTERIOVENOUS FISTULA Right 03/2019    HX CYST INCISION AND DRAINAGE Right     2nd toe- Cleaned out infection    HX OTHER SURGICAL  03/2019    PEG tube insertion       Medication:  Current Facility-Administered Medications   Medication Dose Route Frequency Provider Last Rate Last Dose    0.9% sodium chloride infusion 250 mL  250 mL IntraVENous PRN Mayra Stone MD         Current Outpatient Medications   Medication Sig Dispense Refill    scopolamine (TRANSDERM-SCOP) 1 mg over 3 days pt3d 1 Patch by TransDERmal route every seventy-two (72) hours.  insulin aspart U-100 (NovoLOG Flexpen U-100 Insulin) 100 unit/mL (3 mL) inpn by SubCUTAneous route two (2) times a day. Sliding scale      vitamin a & d (A&D) ointment Apply  to affected area as needed for Skin Irritation.  terazosin (HYTRIN) 5 mg capsule Take 5 mg by mouth two (2) times a day.  ondansetron (ZOFRAN ODT) 4 mg disintegrating tablet Take 4 mg by mouth every eight (8) hours as needed for Nausea or Vomiting.  lacosamide (VIMPAT PO) Take 10 mL by mouth two (2) times a day.  isosorbide mononitrate ER (IMDUR) 30 mg tablet Take  by mouth daily.  guaifen/dextromethorphan/pe (GUAIFEN DM PO) Take 10 mL by mouth two (2) times a day.       bisacodyL (DULCOLAX) 10 mg suppository Insert 10 mg into rectum daily as needed for Constipation.  ascorbic acid, vitamin C, (VITAMIN C) 500 mg tablet Take  by mouth daily.  amLODIPine (NORVASC) 10 mg tablet Take  by mouth every evening.  albuterol (ACCUNEB) 1.25 mg/3 mL nebu 1.25 mg by Nebulization route every six (6) hours as needed for Wheezing.  acetaminophen (TYLENOL) 160 mg/5 mL liquid Take 15 mg/kg by mouth every six (6) hours as needed for Fever.  losartan (COZAAR) 25 mg tablet Take  by mouth daily.  aspirin delayed-release 81 mg tablet Take 1 Tab by mouth daily. 30 Tab 1       Allergies:   Allergies   Allergen Reactions    Bee Venom Protein (Honey Bee) Anaphylaxis    Hydralazine Rash       Social History:  Social History     Tobacco Use    Smoking status: Never Smoker    Smokeless tobacco: Never Used   Substance Use Topics    Alcohol use: Never     Frequency: Never     Comment: rare    Drug use: Never       Family History  Family History   Problem Relation Age of Onset    Stroke Father     Stroke Sister 48    Deep Vein Thrombosis Neg Hx        Review of Systems  Unable to obtain due to aphasia    Objective:     Vital signs in last 24 hours:  Visit Vitals  /69   Pulse 87   Temp 97.8 °F (36.6 °C)   Resp 20   Ht 5' 6\" (1.676 m)   Wt 77.6 kg (171 lb)   SpO2 97%   BMI 27.60 kg/m²       Intake/Output last 3 shifts:  Date 06/29/20 0700 - 06/30/20 0659(Not Admitted) 06/30/20 0700 - 07/01/20 0659   Shift 6657-7236 0142-3004 24 Hour Total 0640-2824 3913-0633 24 Hour Total   INTAKE   Other    40  40     Irrigation Volume Input (mL) (Urinary Catheter 06/30/20 3- way)    40  40   Shift Total(mL/kg)    40(0.5)  40(0.5)   OUTPUT   Urine    40  40     Urine Output (mL) (Urinary Catheter 06/30/20 3- way)    40  40   Shift Total(mL/kg)    40(0.5)  40(0.5)   NET    0  0   Weight (kg)  77.6 77.6 77.6 77.6 77.6         Physical Exam  General: NAD    HEENT: atraumatic  Pulmonary: Normal work of breathing   Abdomen: soft, NTTP, nondistended  : currently on CBI with clear to light pink urine  Gait: not observed    Lab/Imaging Review:       Most Recent Labs:  Lab Results   Component Value Date/Time    WBC 5.9 06/30/2020 06:26 AM    HGB 8.4 (L) 06/30/2020 06:26 AM    HCT 22.6 (L) 06/30/2020 06:26 AM    PLATELET 710 (L) 33/01/3898 06:26 AM    MCV 91.1 06/30/2020 06:26 AM        Lab Results   Component Value Date/Time    Sodium 134 (L) 06/30/2020 06:26 AM    Potassium 3.9 06/30/2020 06:26 AM    Chloride 99 06/30/2020 06:26 AM    CO2 28 06/30/2020 06:26 AM    Anion gap 7 06/30/2020 06:26 AM    Glucose 99 06/30/2020 06:26 AM    BUN 40 (H) 06/30/2020 06:26 AM    Creatinine 3.48 (H) 06/30/2020 06:26 AM    BUN/Creatinine ratio 11 (L) 06/30/2020 06:26 AM    GFR est AA 22 (L) 06/30/2020 06:26 AM    GFR est non-AA 18 (L) 06/30/2020 06:26 AM    Calcium 8.8 06/30/2020 06:26 AM    Bilirubin, total 0.6 06/30/2020 06:26 AM    Alk. phosphatase 181 (H) 06/30/2020 06:26 AM    Protein, total 7.7 06/30/2020 06:26 AM    Albumin 3.5 06/30/2020 06:26 AM    Globulin 4.2 (H) 06/30/2020 06:26 AM    A-G Ratio 0.8 (L) 06/30/2020 06:26 AM    ALT (SGPT) 20 06/30/2020 06:26 AM        No results found for: PSA, PSA2, Vannie Ring, PSAR3, [id number], [id number], PSALT, 00484, PSAEXT     COAGS:    Lab Results   Component Value Date/Time    APTT 29.5 06/30/2020 06:26 AM    PTP 11.4 (H) 06/30/2020 06:26 AM    INR 1.1 06/30/2020 06:26 AM       Lab Results   Component Value Date/Time    Hemoglobin A1c 11.2 (H) 06/29/2016 03:53 PM        No results found for: CPK, RCK1, RCK2, RCK3, RCK4, CKMB, CKNDX, CKND1, TROPT, TROIQ, BNPP, BNP       Urine/Blood Cultures:  Results     ** No results found for the last 336 hours. **             IMAGING:  No results found.         Signed By: Clinton Ricci NP  - June 30, 2020

## 2020-07-01 LAB
ANION GAP SERPL CALC-SCNC: 5 MMOL/L (ref 5–15)
APPEARANCE UR: ABNORMAL
BACTERIA URNS QL MICRO: NEGATIVE /HPF
BASOPHILS # BLD: 0.1 K/UL (ref 0–0.1)
BASOPHILS NFR BLD: 1 % (ref 0–1)
BILIRUB UR QL CFM: ABNORMAL
BUN SERPL-MCNC: 54 MG/DL (ref 6–20)
BUN/CREAT SERPL: 12 (ref 12–20)
CALCIUM SERPL-MCNC: 8.7 MG/DL (ref 8.5–10.1)
CHLORIDE SERPL-SCNC: 101 MMOL/L (ref 97–108)
CO2 SERPL-SCNC: 29 MMOL/L (ref 21–32)
COLOR UR: ABNORMAL
CREAT SERPL-MCNC: 4.42 MG/DL (ref 0.7–1.3)
DIFFERENTIAL METHOD BLD: ABNORMAL
EOSINOPHIL # BLD: 0.7 K/UL (ref 0–0.4)
EOSINOPHIL NFR BLD: 14 % (ref 0–7)
EPITH CASTS URNS QL MICRO: ABNORMAL /LPF
ERYTHROCYTE [DISTWIDTH] IN BLOOD BY AUTOMATED COUNT: 13.3 % (ref 11.5–14.5)
GLUCOSE BLD STRIP.AUTO-MCNC: 113 MG/DL (ref 65–100)
GLUCOSE BLD STRIP.AUTO-MCNC: 126 MG/DL (ref 65–100)
GLUCOSE BLD STRIP.AUTO-MCNC: 157 MG/DL (ref 65–100)
GLUCOSE BLD STRIP.AUTO-MCNC: 189 MG/DL (ref 65–100)
GLUCOSE SERPL-MCNC: 157 MG/DL (ref 65–100)
GLUCOSE UR STRIP.AUTO-MCNC: 100 MG/DL
HBV SURFACE AB SER QL: NONREACTIVE
HBV SURFACE AB SER-ACNC: <3.1 MIU/ML
HBV SURFACE AG SER QL: <0.1 INDEX
HBV SURFACE AG SER QL: NEGATIVE
HCT VFR BLD AUTO: 23.8 % (ref 36.6–50.3)
HGB BLD-MCNC: 8 G/DL (ref 12.1–17)
HGB UR QL STRIP: ABNORMAL
IMM GRANULOCYTES # BLD AUTO: 0 K/UL (ref 0–0.04)
IMM GRANULOCYTES NFR BLD AUTO: 0 % (ref 0–0.5)
KETONES UR QL STRIP.AUTO: 15 MG/DL
LEUKOCYTE ESTERASE UR QL STRIP.AUTO: ABNORMAL
LYMPHOCYTES # BLD: 1.1 K/UL (ref 0.8–3.5)
LYMPHOCYTES NFR BLD: 23 % (ref 12–49)
MAGNESIUM SERPL-MCNC: 2.8 MG/DL (ref 1.6–2.4)
MCH RBC QN AUTO: 30.4 PG (ref 26–34)
MCHC RBC AUTO-ENTMCNC: 33.6 G/DL (ref 30–36.5)
MCV RBC AUTO: 90.5 FL (ref 80–99)
MONOCYTES # BLD: 0.4 K/UL (ref 0–1)
MONOCYTES NFR BLD: 8 % (ref 5–13)
NEUTS SEG # BLD: 2.7 K/UL (ref 1.8–8)
NEUTS SEG NFR BLD: 54 % (ref 32–75)
NITRITE UR QL STRIP.AUTO: POSITIVE
NRBC # BLD: 0 K/UL (ref 0–0.01)
NRBC BLD-RTO: 0 PER 100 WBC
PH UR STRIP: 7 [PH] (ref 5–8)
PLATELET # BLD AUTO: 147 K/UL (ref 150–400)
PMV BLD AUTO: 8.6 FL (ref 8.9–12.9)
POTASSIUM SERPL-SCNC: 4.2 MMOL/L (ref 3.5–5.1)
PROT UR STRIP-MCNC: >300 MG/DL
RBC # BLD AUTO: 2.63 M/UL (ref 4.1–5.7)
RBC #/AREA URNS HPF: >100 /HPF (ref 0–5)
SERVICE CMNT-IMP: ABNORMAL
SODIUM SERPL-SCNC: 135 MMOL/L (ref 136–145)
SP GR UR REFRACTOMETRY: 1.01 (ref 1–1.03)
UA: UC IF INDICATED,UAUC: ABNORMAL
UROBILINOGEN UR QL STRIP.AUTO: >8 EU/DL (ref 0.2–1)
WBC # BLD AUTO: 4.9 K/UL (ref 4.1–11.1)
WBC URNS QL MICRO: ABNORMAL /HPF (ref 0–4)

## 2020-07-01 PROCEDURE — 5A1D70Z PERFORMANCE OF URINARY FILTRATION, INTERMITTENT, LESS THAN 6 HOURS PER DAY: ICD-10-PCS | Performed by: INTERNAL MEDICINE

## 2020-07-01 PROCEDURE — 74011250636 HC RX REV CODE- 250/636: Performed by: INTERNAL MEDICINE

## 2020-07-01 PROCEDURE — 36415 COLL VENOUS BLD VENIPUNCTURE: CPT

## 2020-07-01 PROCEDURE — 82962 GLUCOSE BLOOD TEST: CPT

## 2020-07-01 PROCEDURE — 97165 OT EVAL LOW COMPLEX 30 MIN: CPT

## 2020-07-01 PROCEDURE — 74011636637 HC RX REV CODE- 636/637: Performed by: INTERNAL MEDICINE

## 2020-07-01 PROCEDURE — 80048 BASIC METABOLIC PNL TOTAL CA: CPT

## 2020-07-01 PROCEDURE — 74011250637 HC RX REV CODE- 250/637: Performed by: INTERNAL MEDICINE

## 2020-07-01 PROCEDURE — 90935 HEMODIALYSIS ONE EVALUATION: CPT

## 2020-07-01 PROCEDURE — 85025 COMPLETE CBC W/AUTO DIFF WBC: CPT

## 2020-07-01 PROCEDURE — 86706 HEP B SURFACE ANTIBODY: CPT

## 2020-07-01 PROCEDURE — 83735 ASSAY OF MAGNESIUM: CPT

## 2020-07-01 PROCEDURE — 82728 ASSAY OF FERRITIN: CPT

## 2020-07-01 PROCEDURE — 97535 SELF CARE MNGMENT TRAINING: CPT

## 2020-07-01 PROCEDURE — 97530 THERAPEUTIC ACTIVITIES: CPT

## 2020-07-01 PROCEDURE — 65270000029 HC RM PRIVATE

## 2020-07-01 PROCEDURE — 97163 PT EVAL HIGH COMPLEX 45 MIN: CPT

## 2020-07-01 PROCEDURE — 87340 HEPATITIS B SURFACE AG IA: CPT

## 2020-07-01 PROCEDURE — 81001 URINALYSIS AUTO W/SCOPE: CPT

## 2020-07-01 RX ORDER — TERAZOSIN 5 MG/1
5 CAPSULE ORAL 2 TIMES DAILY
Status: DISCONTINUED | OUTPATIENT
Start: 2020-07-01 | End: 2020-07-03 | Stop reason: HOSPADM

## 2020-07-01 RX ORDER — TERAZOSIN 5 MG/1
5 CAPSULE ORAL 2 TIMES DAILY
Status: DISCONTINUED | OUTPATIENT
Start: 2020-07-01 | End: 2020-07-01

## 2020-07-01 RX ORDER — LACOSAMIDE 10 MG/ML
100 SOLUTION ORAL 2 TIMES DAILY
Status: DISCONTINUED | OUTPATIENT
Start: 2020-07-01 | End: 2020-07-01

## 2020-07-01 RX ORDER — ASCORBIC ACID 500 MG
500 TABLET ORAL DAILY
Status: DISCONTINUED | OUTPATIENT
Start: 2020-07-01 | End: 2020-07-03 | Stop reason: HOSPADM

## 2020-07-01 RX ORDER — LOSARTAN POTASSIUM 50 MG/1
25 TABLET ORAL DAILY
Status: DISCONTINUED | OUTPATIENT
Start: 2020-07-02 | End: 2020-07-01

## 2020-07-01 RX ORDER — LACOSAMIDE 10 MG/ML
100 SOLUTION ORAL 2 TIMES DAILY
Status: DISCONTINUED | OUTPATIENT
Start: 2020-07-01 | End: 2020-07-03 | Stop reason: HOSPADM

## 2020-07-01 RX ORDER — AMLODIPINE BESYLATE 5 MG/1
10 TABLET ORAL EVERY EVENING
Status: DISCONTINUED | OUTPATIENT
Start: 2020-07-01 | End: 2020-07-03 | Stop reason: HOSPADM

## 2020-07-01 RX ORDER — LOSARTAN POTASSIUM 50 MG/1
25 TABLET ORAL DAILY
Status: DISCONTINUED | OUTPATIENT
Start: 2020-07-01 | End: 2020-07-03 | Stop reason: HOSPADM

## 2020-07-01 RX ORDER — ASCORBIC ACID 500 MG
500 TABLET ORAL DAILY
Status: DISCONTINUED | OUTPATIENT
Start: 2020-07-02 | End: 2020-07-01

## 2020-07-01 RX ADMIN — LACOSAMIDE 100 MG: 10 SOLUTION ORAL at 20:29

## 2020-07-01 RX ADMIN — IRON SUCROSE 100 MG: 20 INJECTION, SOLUTION INTRAVENOUS at 08:30

## 2020-07-01 RX ADMIN — TERAZOSIN HYDROCHLORIDE 5 MG: 5 CAPSULE ORAL at 08:29

## 2020-07-01 RX ADMIN — AMLODIPINE BESYLATE 10 MG: 5 TABLET ORAL at 17:39

## 2020-07-01 RX ADMIN — OXYCODONE HYDROCHLORIDE AND ACETAMINOPHEN 500 MG: 500 TABLET ORAL at 08:28

## 2020-07-01 RX ADMIN — ISOSORBIDE MONONITRATE 30 MG: 30 TABLET, EXTENDED RELEASE ORAL at 08:29

## 2020-07-01 RX ADMIN — EPOETIN ALFA-EPBX 10000 UNITS: 10000 INJECTION, SOLUTION INTRAVENOUS; SUBCUTANEOUS at 12:55

## 2020-07-01 RX ADMIN — Medication 10 ML: at 22:00

## 2020-07-01 RX ADMIN — TERAZOSIN HYDROCHLORIDE 5 MG: 5 CAPSULE ORAL at 20:29

## 2020-07-01 RX ADMIN — INSULIN LISPRO 2 UNITS: 100 INJECTION, SOLUTION INTRAVENOUS; SUBCUTANEOUS at 07:30

## 2020-07-01 RX ADMIN — LOSARTAN POTASSIUM 25 MG: 50 TABLET, FILM COATED ORAL at 08:29

## 2020-07-01 RX ADMIN — LACOSAMIDE 100 MG: 100 TABLET, FILM COATED ORAL at 08:29

## 2020-07-01 RX ADMIN — INSULIN LISPRO 2 UNITS: 100 INJECTION, SOLUTION INTRAVENOUS; SUBCUTANEOUS at 17:38

## 2020-07-01 RX ADMIN — Medication 20 ML: at 14:00

## 2020-07-01 NOTE — PROGRESS NOTES
Occupational therapy note:  Orders received, chart reviewed. Patient currently receiving dialysis at bedside. Will follow up with patient at later time for OT evaluation. Aurora Benitez MS OTR/L

## 2020-07-01 NOTE — PROGRESS NOTES
[facility]  YOB: 1960          Assessment & Plan:     1. ESRD : MWF,[place], Under my care  - RT AVG  - HD today  2. HTN  - Amlodipine, Losartan, Imdur  3. Anemia  - add epo  4. S/p TURP by Dr. Shireen Lo on   - Failed voiding trials 2020   - CBI gross hematuria        Subjective:   CC:esrd  HPI: Patient seen   HD starting  Hematuria: CBI  dw wife  ROS:no cps/ob  Current Facility-Administered Medications   Medication Dose Route Frequency    amLODIPine (NORVASC) tablet 10 mg  10 mg Per G Tube QPM    ascorbic acid (vitamin C) (VITAMIN C) tablet 500 mg  500 mg Per G Tube DAILY    losartan (COZAAR) tablet 25 mg  25 mg Per G Tube DAILY    terazosin (HYTRIN) capsule 5 mg  5 mg Per G Tube BID    0.9% sodium chloride infusion 250 mL  250 mL IntraVENous PRN    sodium chloride (NS) flush 5-40 mL  5-40 mL IntraVENous Q8H    sodium chloride (NS) flush 5-40 mL  5-40 mL IntraVENous PRN    acetaminophen (TYLENOL) tablet 650 mg  650 mg Oral Q4H PRN    naloxone (NARCAN) injection 0.4 mg  0.4 mg IntraVENous PRN    ondansetron (ZOFRAN) injection 4 mg  4 mg IntraVENous Q4H PRN    glucose chewable tablet 16 g  4 Tab Oral PRN    dextrose (D50W) injection syrg 12.5-25 g  25-50 mL IntraVENous PRN    glucagon (GLUCAGEN) injection 1 mg  1 mg IntraMUSCular PRN    insulin lispro (HUMALOG) injection   SubCUTAneous AC&HS    albuterol (ACCUNEB) nebulizer solution 1.25 mg  1.25 mg Nebulization Q6H PRN    bisacodyL (DULCOLAX) suppository 10 mg  10 mg Rectal DAILY PRN    isosorbide mononitrate ER (IMDUR) tablet 30 mg  30 mg Oral DAILY    guaiFENesin-dextromethorphan (ROBITUSSIN DM) 100-10 mg/5 mL syrup 10 mL  10 mL Oral BID PRN          Objective:     Vitals:  Blood pressure 145/79, pulse 80, temperature 98.3 °F (36.8 °C), resp. rate 16, height 5' 6\" (1.676 m), weight 77.6 kg (171 lb), SpO2 100 %.   Temp (24hrs), Av.3 °F (36.8 °C), Min:97.9 °F (36.6 °C), Max:99.1 °F (37.3 °C)      Intake and Output:  07/01 0701 - 07/01 1900  In: 3000   Out: 3100 [Urine:3100]  06/29 1901 - 07/01 0700  In: 18080 [P.O.:100]  Out: 25008 [Urine:75418]    Physical Exam:                Patient is intubated:  no    Physical Examination:   GENERAL ASSESSMENT: NAD  HEENT:Nontraumatic   CHEST: CTA  HEART: S1S2  ABDOMEN: Soft,NT,  :Frias:  yes  EXTREMITY: EDEMA  NEURO:old cva          ECG/rhythm:    Data Review      No results for input(s): TNIPOC in the last 72 hours. No lab exists for component: ITNL   No results for input(s): CPK, CKMB, TROIQ in the last 72 hours. Recent Labs     07/01/20  0403 06/30/20  1729 06/30/20  0626   *  --  134*   K 4.2  --  3.9     --  99   CO2 29  --  28   BUN 54*  --  40*   CREA 4.42*  --  3.48*   *  --  99   MG 2.8*  --   --    CA 8.7  --  8.8   ALB  --   --  3.5   WBC 4.9  --  5.9   HGB 8.0* 8.6* 8.4*   HCT 23.8*  --  22.6*   *  --  140*      Recent Labs     06/30/20  0626   INR 1.1   PTP 11.4*   APTT 29.5     Needs: urine analysis, urine sodium, protein and creatinine  No results found for: MARK ANDERSON      Discussed with:  Family    : oM Byrd MD  7/1/2020        [facility]:  [email or web address] [employer]  Electa Min[place] office:  [address] [place] [address] [address]  [place], [address] [facility]  Phone: [phone]  Fax :     [phone]    [place] office:  [address] [facility]  Phone - [phone]  Fax - [phone]

## 2020-07-01 NOTE — DIALYSIS
[facility] Acutes  [phone]    Vitals   Pre   Post   Assessment   Pre   Post     Temp  98.3      98.3 LOC  A&Ox3 A&Ox3   HR   78 78 Lungs   Clear, diminished, room air  clear, diminished, room air   B/P  143/76 171/90 Cardiac   Regular, denies chest pain  regular, denies chest pain   Resp   16 16 Skin   Warm/dry Warm, dry   Pain level  0 0 Edema  None noted     None noted   Orders:    Duration:   Start:   10:30 End:    1400 Total:   3.5 hours   Dialyzer:   Dialyzer/Set Up Inspection: Revaclear (07/01/20 1030)   K Bath:   Dialysate K (mEq/L): 3 (07/01/20 1030)   Ca Bath:   Dialysate CA (mEq/L): 2.5 (07/01/20 1030)   Na/Bicarb:   Dialysate NA (mEq/L): 138 (07/01/20 1030)   Target Fluid Removal:   Goal/Amount of Fluid to Remove (mL): 1000 mL (07/01/20 1030)   Access     Type & Location:   SARAH fistula. Cannulated with 15g needles without difficulty. +bruit, +thrill pre and post HD. Labs     Obtained/Reviewed   Critical Results Called   Date when labs were drawn-  Hgb-    HGB   Date Value Ref Range Status   07/01/2020 8.0 (L) 12.1 - 17.0 g/dL Final     K-    Potassium   Date Value Ref Range Status   07/01/2020 4.2 3.5 - 5.1 mmol/L Final     Ca-   Calcium   Date Value Ref Range Status   07/01/2020 8.7 8.5 - 10.1 MG/DL Final     Bun-   BUN   Date Value Ref Range Status   07/01/2020 54 (H) 6 - 20 MG/DL Final     Creat-   Creatinine   Date Value Ref Range Status   07/01/2020 4.42 (H) 0.70 - 1.30 MG/DL Final     Comment:     INVESTIGATED PER DELTA CHECK PROTOCOL        Medications/ Blood Products Given     Name   Dose   Route and Time     Retacrit 10,000 units IV 1300             Blood Volume Processed (BVP):    87 liters Net Fluid   Removed:  1,000 ml   Comments   Time Out Done: 10:25 Orders, consent, patient, hepatitis status and equipment verified. Primary Nurse Rpt Pre: Taniya Pitts  Primary Nurse Rpt Post:  Pt Education: Access care, diet, fluid intake, medications  Care Plan:   Tx Summary: Tolerated 3.5 hours HD without difficulty. Labs, consent, patient, hepatitis status and equipment verified. Removed 1 kg per order. After HD, needle sites held, hemostasis achieved < 10 minutes. Pt stable. Report given to RN at bedside. Admiting Diagnosis:  Pt's previous clinic- [facility]  Consent signed - Informed Consent Verified: Yes (07/01/20 1030)  [place] Consent - Obtained  Hepatitis Status- HBSAG negative 7/1/20  Machine #- Machine Number: [id number] (07/01/20 1030)  Telemetry status-None present  Pre-dialysis wt. -

## 2020-07-01 NOTE — PROGRESS NOTES
Problem: Self Care Deficits Care Plan (Adult)  Goal: *Acute Goals and Plan of Care (Insert Text)  Description:   FUNCTIONAL STATUS PRIOR TO ADMISSION: At baseline patient uses layne for OOB, and wife assists with all ADLs      HOME SUPPORT: The patient lived with spouse. Occupational Therapy Goals  Initiated 7/1/2020  1. Patient will perform grooming with minimal assistance/contact guard assist within 7 day(s). 2.  Patient will perform  upper body dressing with minimal assistance/contact guard assist within 7 day(s). 3.  Patient will perform lower body dressing with moderate assistance  within 7 day(s). 4.  Patient will participate in upper extremity therapeutic exercise/activities with minimal assistance/contact guard assist for 10 minutes within 7 day(s). Outcome: Progressing Towards Goal   OCCUPATIONAL THERAPY EVALUATION  Patient: Joshua Winn (20 y.o. male)  Date: 7/1/2020  Primary Diagnosis: Hematuria [R31.9]        Precautions: fall       ASSESSMENT  Based on the objective data described below, the patient presents with hospital admission secondary to hematuria. Patient with 2 CVAs in 2019 and has required assist of spouse for ADLs since that time. Patient has been home with wife and limited improvement in tasks secondary to difficulty with [employer]. Patient wife present and with extended stories regarding care. Patient agreeable to activity and able to roll with min assist and noted with bowel movement. Total assist for clean up. Patient moves into sitting with mod x 2. Once sitting patient able to maintain midline. R side weakness noted, increased tone to R arm, though therapist able to extend digits, elbow limited. Patient attempted stand and limited by flexion contractures to bilateral knees and ankles. Max assist x 2 for standing bed replaced with chair. Layne pad in chair.       Current Level of Function Impacting Discharge (ADLs/self-care): max assist for ADLs    Functional Outcome Measure: The patient scored 10/100 on the Barthel Index  outcome measure . Other factors to consider for discharge: lives with spouse     Patient will benefit from skilled therapy intervention to address the above noted impairments. PLAN :  Recommendations and Planned Interventions: self care training, functional mobility training, therapeutic exercise, balance training, therapeutic activities, endurance activities, patient education, home safety training, and family training/education    Frequency/Duration: Patient will be followed by occupational therapy 5 times a week to address goals. Recommendation for discharge: (in order for the patient to meet his/her long term goals)  To be determined: outpatient vs intensive HH      This discharge recommendation:  Has not yet been discussed the attending provider and/or case management    IF patient discharges home will need the following DME: TBD, patient wife with list of items needed       SUBJECTIVE:   Patient stated no.   When asked if dizzy  OBJECTIVE DATA SUMMARY:   HISTORY:   Past Medical History:   Diagnosis Date    Diabetes ([address]     Dysphagia due to old cerebrovascular accident 03/2019    ESRD (end stage renal disease) on dialysis [facility])     MWF    Frias catheter in place     PEG (percutaneous endoscopic gastrostomy) status ([address] 3/2019 to present    NPO- comfort foods only    Right sided weakness 3/2019 to present    Seizure ([address] 03/2019    Stroke ([address] 03/2019    Urinary retention with incomplete bladder emptying      Past Surgical History:   Procedure Laterality Date    HX ARTERIOVENOUS FISTULA Right 03/2019    HX CYST INCISION AND DRAINAGE Right     2nd toe- Cleaned out infection    HX OTHER SURGICAL  03/2019    PEG tube insertion       Expanded or extensive additional review of patient history:     Home Situation  Home Environment: Private residence  Current DME Used/Available at Home: [address]    Hand dominance: Right    EXAMINATION OF PERFORMANCE DEFICITS:  Cognitive/Behavioral Status:  Neurologic State: Alert  Orientation Level: Unable to verbalize  Cognition: Follows commands     Perseveration: No perseveration noted       Skin: breakdown to bottom, noted with hygine    Edema: none noted     Hearing:       Vision/Perceptual:                                     Range of Motion:  AROM: Generally decreased, functional(L >R)  PROM: Generally decreased, functional                      Strength:  Strength: Generally decreased, functional                Coordination:  Coordination: Generally decreased, functional  Fine Motor Skills-Upper: Left Intact; Right Impaired         Tone & Sensation:    Tone: Abnormal  Sensation: Intact                      Balance:  Sitting: High guard  Standing: Impaired; With support  Standing - Static: Constant support    Functional Mobility and Transfers for ADLs:  Bed Mobility:  Rolling: Minimum assistance  Supine to Sit: Assist x2; Moderate assistance  Scooting: Moderate assistance;Assist x2    Transfers:  Sit to Stand: Maximum assistance;Assist x2  Stand to Sit: Moderate assistance;Assist x2  Bed to Chair: Total assistance;Maximum assistance(move bed, replace with chair )    ADL Assessment:  Feeding: Moderate assistance(and has tube feedings)    Oral Facial Hygiene/Grooming: Maximum assistance    Bathing: Total assistance    Upper Body Dressing: Maximum assistance    Lower Body Dressing: Total assistance    Toileting: Total assistance                ADL Intervention and task modifications:                                          Therapeutic Exercise:     Functional Measure:  Barthel Index:    Bathin  Bladder: 0  Bowels: 0  Groomin  Dressin  Feedin  Mobility: 0  Stairs: 0  Toilet Use: 0  Transfer (Bed to Chair and Back): 5  Total: 10/100        The Barthel ADL Index: Guidelines  1.  The index should be used as a record of what a patient does, not as a record of what a patient could do. 2. The main aim is to establish degree of independence from any help, physical or verbal, however minor and for whatever reason. 3. The need for supervision renders the patient not independent. 4. A patient's performance should be established using the best available evidence. Asking the patient, friends/relatives and nurses are the usual sources, but direct observation and common sense are also important. However direct testing is not needed. 5. Usually the patient's performance over the preceding 24-48 hours is important, but occasionally longer periods will be relevant. 6. Middle categories imply that the patient supplies over 50 per cent of the effort. 7. Use of aids to be independent is allowed. Star Goss., Barthel, D.W. (1506). Functional evaluation: the Barthel Index. [facility] (14)2. RADHA Joe, Fracisco Lund., Tia Lopez., [place], [address] [place] [address] (1999). Measuring the change indisability after inpatient rehabilitation; comparison of the responsiveness of the Barthel Index and Functional [place] Measure. Journal of Neurology, Neurosurgery, and Psychiatry, 66(4), 106-118. Vito Ruiz, N.J.A, LENY Aponte, & Cora Wiseman, MKyA. (2004.) Assessment of post-stroke quality of life in cost-effectiveness studies: The usefulness of the Barthel Index and the EuroQoL-5D.  Quality of Life Research, 15, 051-43         Occupational Therapy Evaluation Charge Determination   History Examination Decision-Making   LOW Complexity : Brief history review  LOW Complexity : 1-3 performance deficits relating to physical, cognitive , or psychosocial skils that result in activity limitations and / or participation restrictions  LOW Complexity : No comorbidities that affect functional and no verbal or physical assistance needed to complete eval tasks       Based on the above components, the patient evaluation is determined to be of the following complexity level: LOW   Pain Rating:      Activity Tolerance:   Good  Please refer to the flowsheet for vital signs taken during this treatment. After treatment patient left in no apparent distress:    Sitting in chair, Call bell within reach, and Caregiver / family present alarm set    COMMUNICATION/EDUCATION:   The patients plan of care was discussed with: Physical therapist and Registered nurse. Home safety education was provided and the patient/caregiver indicated understanding., Patient/family have participated as able in goal setting and plan of care. , and Patient/family agree to work toward stated goals and plan of care. This patients plan of care is appropriate for delegation to [facility].     Thank you for this referral.  Girma Cano, OTR/L  Time Calculation: 62 mins

## 2020-07-01 NOTE — H&P
[place] [address]  [phone]    Admission History and Physical      NAME:  Ramila Alcantara   :   1960   MRN:  [id number]     PCP:  Katrinka Sever, MD     Date/Time:  2020         Subjective:     CHIEF COMPLAINT: \"I'm okay\"     HISTORY OF PRESENT ILLNESS:     Mr. Abigail Amanda is a 61 y.o. male from [facility] with PMH of DM, ESRD on HD MWF, CVA leading to dysphagia and PEG tube placement, seizures, urinary retention leading to indwelling ortiz placement and recent TURP on  with failed voiding trial and replacement of ortiz catheter. Pt's daughter noted in HD that he had bloody urine output with clots prompting presentation to the ED. Past Medical History:   Diagnosis Date    Diabetes ([address]     Dysphagia due to old cerebrovascular accident 2019    ESRD (end stage renal disease) on dialysis [facility])     MWF    Ortiz catheter in place     PEG (percutaneous endoscopic gastrostomy) status ([address] 3/2019 to present    NPO- comfort foods only    Right sided weakness 3/2019 to present    Seizure ([address] 2019    Stroke ([facility] 2019    Urinary retention with incomplete bladder emptying         Past Surgical History:   Procedure Laterality Date    HX ARTERIOVENOUS FISTULA Right 2019    HX CYST INCISION AND DRAINAGE Right     2nd toe- Cleaned out infection    HX OTHER SURGICAL  2019    PEG tube insertion       Social History     Tobacco Use    Smoking status: Never Smoker    Smokeless tobacco: Never Used   Substance Use Topics    Alcohol use: Never     Frequency: Never     Comment: rare        Family History   Problem Relation Age of Onset    Stroke Father     Stroke Sister 48    Deep Vein Thrombosis Neg Hx         Allergies   Allergen Reactions    Bee Venom Protein (Honey Bee) Anaphylaxis    Hydralazine Rash        Prior to Admission medications    Medication Sig Start Date End Date Taking?  Authorizing Provider   scopolamine (TRANSDERM-SCOP) 1 mg over 3 days pt3d 1 Patch by TransDERmal route every seventy-two (72) hours. Yes Provider, Historical   insulin aspart U-100 (NovoLOG Flexpen U-100 Insulin) 100 unit/mL (3 mL) inpn by SubCUTAneous route two (2) times daily (with meals). Sliding scale    Yes Provider, Historical   vitamin a & d (A&D) ointment Apply  to affected area as needed for Skin Irritation. Yes Provider, Historical   terazosin (HYTRIN) 5 mg capsule Take 5 mg by mouth two (2) times a day. Yes Provider, Historical   ondansetron (ZOFRAN ODT) 4 mg disintegrating tablet Take 4 mg by mouth every eight (8) hours as needed for Nausea or Vomiting. Yes Provider, Historical   lacosamide (Vimpat) 10 mg/mL soln oral solution Take 10 mL by mouth two (2) times a day. 10 ml = 100 mg   Yes Provider, Historical   isosorbide mononitrate ER (IMDUR) 30 mg tablet Take 30 mg by mouth daily. Yes Provider, Historical   guaifen/dextromethorphan/pe (GUAIFEN DM PO) Take 10 mL by mouth two (2) times daily as needed (cough). Yes Provider, Historical   bisacodyL (DULCOLAX) 10 mg suppository Insert 10 mg into rectum daily as needed for Constipation. Yes Provider, Historical   ascorbic acid, vitamin C, (VITAMIN C) 500 mg tablet Take 500 mg by mouth daily. Yes Provider, Historical   amLODIPine (NORVASC) 10 mg tablet Take 10 mg by mouth every evening. Yes Provider, Historical   albuterol (ACCUNEB) 1.25 mg/3 mL nebu 1.25 mg by Nebulization route every six (6) hours as needed for Wheezing. Yes Provider, Historical   acetaminophen (TYLENOL) 160 mg/5 mL liquid Take 320 mg by mouth every six (6) hours as needed for Fever. Yes Provider, Historical   losartan (COZAAR) 25 mg tablet Take 25 mg by mouth daily. Yes Provider, Historical   aspirin delayed-release 81 mg tablet Take 1 Tab by mouth daily.  7/1/16  Yes Marycarmen Hilliard MD         Review of Systems:  (bold if positive, if negative)    Gen:  Eyes:  ENT:  CVS:  Pulm:  GI:    :    MS:  Skin: Psych:  Endo:    Hem:  Renal:    Neuro:     Hematuria       Objective:      VITALS:    Vital signs reviewed; most recent are:    Visit Vitals  /75 (BP 1 Location: Left arm, BP Patient Position: At rest)   Pulse 82   Temp 99.1 °F (37.3 °C)   Resp 16   Ht 5' 6\" (1.676 m)   Wt 77.6 kg (171 lb)   SpO2 98%   BMI 27.60 kg/m²     SpO2 Readings from Last 6 Encounters:   06/30/20 98%   06/18/20 96%   06/09/20 100%   07/01/16 98%            Intake/Output Summary (Last 24 hours) at 6/30/2020 2033  Last data filed at 6/30/2020 1809  Gross per 24 hour   Intake 03132 ml   Output 09289 ml   Net -1100 ml            Exam:     Physical Exam:    Gen:  Well-developed, well-nourished, in no acute distress  HEENT:  Pink conjunctivae, PERRL, hearing intact to voice, moist mucous membranes  Neck:  Supple, without masses, thyroid non-tender  Resp:  No accessory muscle use, clear breath sounds without wheezes rales or rhonchi  Card:  No murmurs, normal S1, S2 without thrills, bruits or peripheral edema  Abd:  Soft, non-tender, non-distended, normoactive bowel sounds are present, no palpable organomegaly  Lymph:  No cervical adenopathy  Musc:  No cyanosis or clubbing  Skin:  No rashes or ulcers, skin turgor is good  Neuro: See below   Psych:  Alert with good insight. Oriented to person, place, and time  R sided hemiparesis. Very subtle facial asymmetry. CBI present with red tinged urine in ortiz  PEG tube C/D/I     Labs:    Recent Labs     06/30/20  1729 06/30/20  0626   WBC  --  5.9   HGB 8.6* 8.4*   HCT  --  22.6*   PLT  --  140*     Recent Labs     06/30/20  0626   *   K 3.9   CL 99   CO2 28   GLU 99   BUN 40*   CREA 3.48*   CA 8.8   ALB 3.5   ALT 20     No components found for: GLPOC  No results for input(s): PH, PCO2, PO2, HCO3, FIO2 in the last 72 hours.   Recent Labs     06/30/20  0626   INR 1.1          Assessment/Plan:      Urinary retention (6/30/2020)/hematuria - likely 2/2 clots leading to urinary obstruction -continue CBI as per urology   -unfortunately no UA obtained prior to initiation of CBI  -monitor hemoglobin   -urology on board   -hold ASA       Hematuria (6/30/2020)  -as above       Acute blood loss anemia (6/30/2020)  -serial CBC  -type and screen'ed in the event of transfusion      Stroke (cerebrum) ([facility] (6/30/2020)   -hold ASA for right now considering #1, 2 and 3.  Resume as soon as ok with urology  -PT/OT while in house   -comfort feeds, PEG tube feeding ordered       ESRD (end stage renal disease) ([facility] (6/30/2020) - on HD MWF   -nephrology consulted for HD       HTN (hypertension) (6/30/2020)  -continue amlodipine and losartan    Surrogate decision maker: Daughter     Total time spent with patient:  70 minutes                  Care Plan discussed with:  Patient, patient's daughter     Discussed: Care plan     Prophylaxis:  SCD's     Probable Disposition:  Home       ___________________________________________________    Attending Physician: Makenzie Godwin MD

## 2020-07-01 NOTE — PROGRESS NOTES
Urology Progress Note    Patient: Chari Shipley MRN: [id number]  SSN: [id number]    YOB: 1960  Age: 61 y.o. Sex: male            Assessment:     Chari Shipley is a 61 y.o. male with a history of  PMH diabetes, end-stage renal disease on hemodialysis dependent on Monday Wednesdays and Fridays, percutaneous endoscopic gastrotomy, seizure disorder, stroke. S/p TURP by Dr. Kathy Stahl on 6/18/2020. He had office follow up on 6/23/2020 at which time he failed voiding trial and catheter was replaced. Currently admitted for gross hematuria and anemia. Plan:     1. Stop CBI and voiding trial this morning. 2. Hold ASA for now. ** 9:54am - Patient now with merlot colored urine. Manually irrigated with 1000cc for moderate amount of clot until light pink. Resumed CBI. Continue CBI and manual irrigation as needed. NPO after midnight tonight. Subjective:     CBI currently off, light pink urine. Hgb down a little, 8.0 from 8.6 yesterday afternoon. Gent given yesterday. Plan for VT today.      Objective:     Visit Vitals  /79 (BP 1 Location: Left arm, BP Patient Position: At rest)   Pulse 80   Temp 98.3 °F (36.8 °C)   Resp 16   Ht 5' 6\" (1.676 m)   Wt 77.6 kg (171 lb)   SpO2 100%   BMI 27.60 kg/m²         Intake/Output Summary (Last 24 hours) at 7/1/2020 5897  Last data filed at 7/1/2020 0750  Gross per 24 hour   Intake 74814 ml   Output 72199 ml   Net -2650 ml       Physical Exam  General: NAD  HEENT: atraumatic  Respiratory: No distress  Abdomen: soft, NTTP, nondistended  : 3 way ortiz with clear, yellow urine      Recent Results (from the past 24 hour(s))   TYPE & SCREEN    Collection Time: 06/30/20  9:06 AM   Result Value Ref Range    Crossmatch Expiration 07/03/2020     ABO/Rh(D) B POSITIVE     Antibody screen NEG    GLUCOSE, POC    Collection Time: 06/30/20  4:45 PM   Result Value Ref Range    Glucose (POC) 160 (H) 65 - 100 mg/dL    Performed by [employer], [employer] and [employer] Lucrecia (PCT)    HEMOGLOBIN    Collection Time: 06/30/20  5:29 PM   Result Value Ref Range    HGB 8.6 (L) 12.1 - 17.0 g/dL   GLUCOSE, POC    Collection Time: 06/30/20  9:29 PM   Result Value Ref Range    Glucose (POC) 139 (H) 65 - 100 mg/dL    Performed by Mendoza Rodriguez (PCT)    URINALYSIS W/ REFLEX CULTURE    Collection Time: 07/01/20  4:03 AM   Result Value Ref Range    Color RED      Appearance BLOODY (A) CLEAR      Specific gravity 1.010 1.003 - 1.030      pH (UA) 7.0 5.0 - 8.0      Protein >300 (A) NEG mg/dL    Glucose 100 (A) NEG mg/dL    Ketone 15 (A) NEG mg/dL    Blood LARGE (A) NEG      Urobilinogen >8.0 (H) 0.2 - 1.0 EU/dL    Nitrites Positive (A) NEG      Leukocyte Esterase LARGE (A) NEG      Bilirubin UA, confirm INDETERMINATE DUE TO COLOR INTERFERENCE (A) NEG      WBC 0-4 0 - 4 /hpf    RBC >100 (H) 0 - 5 /hpf    Epithelial cells FEW FEW /lpf    Bacteria Negative NEG /hpf    UA:UC IF INDICATED CULTURE NOT INDICATED BY UA RESULT CNI     MAGNESIUM    Collection Time: 07/01/20  4:03 AM   Result Value Ref Range    Magnesium 2.8 (H) 1.6 - 2.4 mg/dL   METABOLIC PANEL, BASIC    Collection Time: 07/01/20  4:03 AM   Result Value Ref Range    Sodium 135 (L) 136 - 145 mmol/L    Potassium 4.2 3.5 - 5.1 mmol/L    Chloride 101 97 - 108 mmol/L    CO2 29 21 - 32 mmol/L    Anion gap 5 5 - 15 mmol/L    Glucose 157 (H) 65 - 100 mg/dL    BUN 54 (H) 6 - 20 MG/DL    Creatinine 4.42 (H) 0.70 - 1.30 MG/DL    BUN/Creatinine ratio 12 12 - 20      GFR est AA 17 (L) >60 ml/min/1.73m2    GFR est non-AA 14 (L) >60 ml/min/1.73m2    Calcium 8.7 8.5 - 10.1 MG/DL   CBC WITH AUTOMATED DIFF    Collection Time: 07/01/20  4:03 AM   Result Value Ref Range    WBC 4.9 4.1 - 11.1 K/uL    RBC 2.63 (L) 4.10 - 5.70 M/uL    HGB 8.0 (L) 12.1 - 17.0 g/dL    HCT 23.8 (L) 36.6 - 50.3 %    MCV 90.5 80.0 - 99.0 FL    MCH 30.4 26.0 - 34.0 PG    MCHC 33.6 30.0 - 36.5 g/dL    RDW 13.3 11.5 - 14.5 %    PLATELET 716 (L) 025 - 400 K/uL    MPV 8.6 (L) 8.9 - 12.9 FL    NRBC 0.0 0  WBC    ABSOLUTE NRBC 0.00 0.00 - 0.01 K/uL    NEUTROPHILS 54 32 - 75 %    LYMPHOCYTES 23 12 - 49 %    MONOCYTES 8 5 - 13 %    EOSINOPHILS 14 (H) 0 - 7 %    BASOPHILS 1 0 - 1 %    IMMATURE GRANULOCYTES 0 0.0 - 0.5 %    ABS. NEUTROPHILS 2.7 1.8 - 8.0 K/UL    ABS. LYMPHOCYTES 1.1 0.8 - 3.5 K/UL    ABS. MONOCYTES 0.4 0.0 - 1.0 K/UL    ABS. EOSINOPHILS 0.7 (H) 0.0 - 0.4 K/UL    ABS. BASOPHILS 0.1 0.0 - 0.1 K/UL    ABS. IMM.  GRANS. 0.0 0.00 - 0.04 K/UL    DF AUTOMATED     GLUCOSE, POC    Collection Time: 07/01/20  6:52 AM   Result Value Ref Range    Glucose (POC) 189 (H) 65 - 100 mg/dL    Performed by Jeevan Manzo (PCT)            Signed By: Edmond Maguire NP - July 1, 2020

## 2020-07-01 NOTE — PROGRESS NOTES
Darius Prasad [facility] [address]  [address] [facility], [place], [address] [place] [address]  [phone]      Medical Progress Note      NAME: [place] Pham   :  1960  MRM:  [id number]    Date/Time: 2020  11:12 AM         Subjective:     Chief Complaint:  \"I'm okay\"     Pt seen and examined. No complaints. CBI stopped but maroon urine noted. CBI now resumed     ROS:  (bold if positive, if negative)             Objective:       Vitals:          Last 24hrs VS reviewed since prior progress note. Most recent are:    Visit Vitals  /70   Pulse 80   Temp 98.4 °F (36.9 °C)   Resp 16   Ht 5' 6\" (1.676 m)   Wt 77.6 kg (171 lb)   SpO2 100%   BMI 27.60 kg/m²     SpO2 Readings from Last 6 Encounters:   20 100%   20 96%   20 100%   16 98%            Intake/Output Summary (Last 24 hours) at 2020 1112  Last data filed at 2020 1030  Gross per 24 hour   Intake 17006 ml   Output 74390 ml   Net -4850 ml          Exam:     Physical Exam:    Gen:  Well-developed, well-nourished, in no acute distress  HEENT:  Pink conjunctivae, PERRL, hearing intact to voice, moist mucous membranes  Neck:  Supple, without masses, thyroid non-tender  Resp:  No accessory muscle use, clear breath sounds without wheezes rales or rhonchi  Card:  No murmurs, normal S1, S2 without thrills, bruits or peripheral edema  Abd: PEG tube in place and C/D/I   Musc:  No cyanosis or clubbing   Skin:  No rashes or ulcers, skin turgor is good  Neuro: R sided hemiparesis. Facial asymmetry with droop R>L   Psych:  Moderate insight     Medications Reviewed: (see below)    Lab Data Reviewed: (see below)    ______________________________________________________________________    Medications:     Current Facility-Administered Medications   Medication Dose Route Frequency    amLODIPine (NORVASC) tablet 10 mg  10 mg Per G Tube QPM    ascorbic acid (vitamin C) (VITAMIN C) tablet 500 mg  500 mg Per G Tube DAILY    losartan (COZAAR) tablet 25 mg  25 mg Per G Tube DAILY    terazosin (HYTRIN) capsule 5 mg  5 mg Per G Tube BID    epoetin laura-epbx (RETACRIT) injection 10,000 Units  10,000 Units IntraVENous DIALYSIS MON, WED & FRI    0.9% sodium chloride infusion 250 mL  250 mL IntraVENous PRN    sodium chloride (NS) flush 5-40 mL  5-40 mL IntraVENous Q8H    sodium chloride (NS) flush 5-40 mL  5-40 mL IntraVENous PRN    acetaminophen (TYLENOL) tablet 650 mg  650 mg Oral Q4H PRN    naloxone (NARCAN) injection 0.4 mg  0.4 mg IntraVENous PRN    ondansetron (ZOFRAN) injection 4 mg  4 mg IntraVENous Q4H PRN    glucose chewable tablet 16 g  4 Tab Oral PRN    dextrose (D50W) injection syrg 12.5-25 g  25-50 mL IntraVENous PRN    glucagon (GLUCAGEN) injection 1 mg  1 mg IntraMUSCular PRN    insulin lispro (HUMALOG) injection   SubCUTAneous AC&HS    albuterol (ACCUNEB) nebulizer solution 1.25 mg  1.25 mg Nebulization Q6H PRN    bisacodyL (DULCOLAX) suppository 10 mg  10 mg Rectal DAILY PRN    isosorbide mononitrate ER (IMDUR) tablet 30 mg  30 mg Oral DAILY    guaiFENesin-dextromethorphan (ROBITUSSIN DM) 100-10 mg/5 mL syrup 10 mL  10 mL Oral BID PRN            Lab Review:     Recent Labs     07/01/20  0403 06/30/20  1729 06/30/20  0626   WBC 4.9  --  5.9   HGB 8.0* 8.6* 8.4*   HCT 23.8*  --  22.6*   *  --  140*     Recent Labs     07/01/20  0403 06/30/20  0626   * 134*   K 4.2 3.9    99   CO2 29 28   * 99   BUN 54* 40*   CREA 4.42* 3.48*   CA 8.7 8.8   MG 2.8*  --    ALB  --  3.5   ALT  --  20   INR  --  1.1     No components found for: [place]         Assessment / Plan:   Urinary retention (6/30/2020) - ortiz in place.  S/p recent TURP   -urology on board       Hematuria (6/30/2020)  -CBI as per urology  -hold ASA for now   -may need cysto if persists       ESRD (end stage renal disease) ([facility] [address] (6/30/2020)  -HD MWF      HTN (hypertension) (6/30/2020)  -continue ARB, Imdur and amlodipine as BP's tolerate      Acute blood loss anemia (6/30/2020) - 2/2 #2   -monitor   -type and screened in the event of transfusion      Stroke (cerebrum) ([facility] [address] (6/30/2020) - with R sided hemiparesis   -PT/OT eval   -continue G tube feeds. Comfort feeds PO.  Daughter   -resume ASA as soon as able to from a urology standpoint       DM (diabetes mellitus) ([facility] [address] (6/29/2016)  -ISS   -BG checks AC TID and qHS     Total time spent with patient: [address] discussed with: Patient, Consultant/Specialist and >50% of time spent in counseling and coordination of care    Discussed:  Code Status, Care Plan and D/C Planning    Prophylaxis:  SCD's    Disposition:  Home w/Family           ___________________________________________________    Attending Physician: Dino Alvarez MD

## 2020-07-01 NOTE — PROGRESS NOTES
Bedside and Verbal shift change report given to Fabian Giang rn  (oncoming nurse) by Vilma Dooley  (offgoing nurse). Report included the following information SBAR and Kardex.

## 2020-07-01 NOTE — PROGRESS NOTES
Problem: Mobility Impaired (Adult and Pediatric)  Goal: *Acute Goals and Plan of Care (Insert Text)  Description: FUNCTIONAL STATUS PRIOR TO ADMISSION: At baseline patient uses layne lift and used wheelchair. HOME SUPPORT PRIOR TO ADMISSION: The patient lived with wife who is sole caregiver. Physical Therapy Goals  Initiated 7/1/2020  1. Patient will move from supine to sit and sit to supine , scoot up and down and roll side to side in bed with maximal assistance within 7 day(s). 2.  Patient will transfer from bed to chair and chair to bed with maximal assistance using squat pivot method within 7 day(s). 3.  Patient will perform sit < > stand with moderate assistance x 2 within 7 day(s). Note:   PHYSICAL THERAPY EVALUATION  Patient: Zohreh Wilcox (43 y.o. male)  Date: 7/1/2020  Primary Diagnosis: Hematuria [R31.9]        Precautions: fall, expressive aphasia        ASSESSMENT  Based on the objective data described below, the patient presents with residual deficits from multiple CVAs in 2019. Patient has medically complex history and was admitted yesterday with hematuria. He has a ortiz catheter, PEG tube, and PIV lines. The ortiz fills quickly as he has a bladder flush running continuously. Patient with expressive aphasia, questionable receptive, RUE/LE impaired ROM and strength, with grossly 2/5 strength throughout. He has flexion contractures of R shoulder,elbow ,wrist, fingers, knee, and ankle. Patient with functional ROM and strength LUE/LE. Patient with trunk control for sitting balance -static and dynamic -fair unsupported within small excursions and small perturbations. Max x 2 to block feet and maintain standing balance with RW. He can only tolerate standing nearly a minute. He has a posterior lean due to R knee and R ankle PF contracture. Impaired anterior weight shift and coordination with increased tone throughout R side.      Wife gives detailed history but some aspects give PT/OT pause that perhaps there are missing components to her accounts abelardo related to patient's DME. Patient is a year out from inpatient rehab at [facility] and has no true wheelchair at home. She has a borrowed manual W/C. According to his wife he received used DME from [employer] but used insurance benefits? He has a used layne lift at home. Wife prefers a L[place] lift or EZ stand instead. He had received an electric wheelchair that did not fit through their doorways of their home and that patient fell out of multiple times as unsafe per wife. Wife stated she returned the chair to [address] [place] [address] but it is still billed through their insurance. She now states patient has medicaid as well as private ins. She reports being overwhelmed and shared with therapists her multiple dilemmas regarding patient's care. She wants him to use sliding board for transfers yet patient has decubitus sore. PT informed her that sliding board not advised due to risk of further breakdown from friction. Recommended layne for safety and work on squat pivot with A x 2 available. Patient was able to maintain sitting balance and needed max/mod x 2 to maintain standing with RW while blocking at feet. Hospital bed rolled away and chair placed behind. VSS with sitting up in chair and layne pad under patient for lift team to transfer back to bed later this pm. RN and PCT informed. Wife and patient are in need of assistance to find and set up a co evaluation by PT and OT in outpatient or [facility] setting that will do wheelchair prescription and a LMN and then follow through with attaining DME. Why this has not yet occurred a year since IPR stay, according to his wife, is due to lack of due diligence from previous therapists. Suspect something is amiss that she perhaps is not revealing. She stated she fired previous HHPT and OT because they were \"not doing anything\" meaningful with patient.      Suggested to wife to have insurance sort out previous claim of electric chair and she must get assistance for care of patient so that she can follow through and get this taken care of. Wife seems determined and wanting to be active advocate for patient however she seems to have unrealistic expectations and wants certain tasks done for her. Suggested that while sorting insurance regarding first electric wheelchair she also needs to work with a new DME vendor and Home health agency (since she fired previous [facility]). Wife mentioned their son helping occasionally and suggested she bring in either hired assistance to unburden her or son's assistance so that she can see to DME needs. Wife talks of an obstacle for every suggestion made by therapists. Will continue to attempt to work with patient and progress mobility as usama as well as assist his wife with accessing further PT/OT beyond hospital acute care. Recommend HH therapy or Outpatient follow up but needs to be as co-treatment with PT and OT as patient requires A x 2 for stance and transfers      Current Level of Function Impacting Discharge (mobility/balance): layne lift, max x 2, non ambulatory >year    Functional Outcome Measure: The patient scored 10/100 on the Barthel Index outcome measure. .      Other factors to consider for discharge: DME needs and CM assistance with insurance and finding vendor and therapy follow up     Patient will benefit from skilled therapy intervention to address the above noted impairments. PLAN :  Recommendations and Planned Interventions: bed mobility training, transfer training, gait training, therapeutic exercises, patient and family training/education, and therapeutic activities      Frequency/Duration: Patient will be followed by physical therapy:  5 times a week to address goals. Recommendation for discharge: (in order for the patient to meet his/her long term goals)  See above - Outpatient or HH for power wheelchair prescription and follow through.  Patient is assist x 2 - need PT clinic where A x 2 possible and experience with power wheelchair assessments or an even better option OT and PT co - evaluation for wheelchair assessment- either outpatient or [facility] with vendor present.     This discharge recommendation:  Has not yet been discussed the attending provider and/or case management    IF patient discharges home will need the following DME: to be determined (TBD) : POWER WHEELCHAIR         SUBJECTIVE:   Patient stated     OBJECTIVE DATA SUMMARY:   HISTORY:    Past Medical History:   Diagnosis Date    Diabetes ([facility] [address]     Dysphagia due to old cerebrovascular accident 03/2019    ESRD (end stage renal disease) on dialysis [facility])     MWF    Frias catheter in place     PEG (percutaneous endoscopic gastrostomy) status ([facility] [address] 3/2019 to present    NPO- comfort foods only    Right sided weakness 3/2019 to present    Seizure ([facility] [address] 03/2019    Stroke ([facility] [address] 03/2019    Urinary retention with incomplete bladder emptying      Past Surgical History:   Procedure Laterality Date    HX ARTERIOVENOUS FISTULA Right 03/2019    HX CYST INCISION AND DRAINAGE Right     2nd toe- Cleaned out infection    HX OTHER SURGICAL  03/2019    PEG tube insertion       Personal factors and/or comorbidities impacting plan of care:     Home Situation  Home Environment: Private residence  Current DME Used/Available at Home: [address]    EXAMINATION/PRESENTATION/DECISION MAKING:   Critical Behavior:  Neurologic State: Alert  Orientation Level: Unable to verbalize  Cognition: Follows commands    Range Of Motion:  AROM: Generally decreased, functional(L >R)           PROM: Generally decreased, functional           Strength:    Strength: Generally decreased, functional                    Tone & Sensation:   Tone: Abnormal              Sensation: Intact               Coordination:  Coordination: Generally decreased, functional  Vision:      Functional Mobility:  Bed Mobility:  Rolling: Minimum assistance  Supine to Sit: Assist x2; Moderate assistance     Scooting: Moderate assistance;Assist x2  Transfers:  Sit to Stand: Maximum assistance;Assist x2  Stand to Sit: Moderate assistance;Assist x2        Bed to Chair: Total assistance;Maximum assistance(move bed, replace with chair )              Balance:   Sitting: High guard  Standing: Impaired; With support  Standing - Static: Constant support  Ambulation/Gait Training:              Gait Description (WDL): (non ambulatory)      Functional Measure:  Barthel Index:    Bathin  Bladder: 0  Bowels: 0  Groomin  Dressin  Feedin  Mobility: 0  Stairs: 0  Toilet Use: 0  Transfer (Bed to Chair and Back): 5  Total: 10/100       The Barthel ADL Index: Guidelines  1. The index should be used as a record of what a patient does, not as a record of what a patient could do. 2. The main aim is to establish degree of independence from any help, physical or verbal, however minor and for whatever reason. 3. The need for supervision renders the patient not independent. 4. A patient's performance should be established using the best available evidence. Asking the patient, friends/relatives and nurses are the usual sources, but direct observation and common sense are also important. However direct testing is not needed. 5. Usually the patient's performance over the preceding 24-48 hours is important, but occasionally longer periods will be relevant. 6. Middle categories imply that the patient supplies over 50 per cent of the effort. 7. Use of aids to be independent is allowed. Shea Carrasco., Barthel, D.W. (0754). Functional evaluation: the Barthel Index. [address] [facility] (14)2. RADHA Jain, Karla Mariscal., [facility], [address] [place] [address] Ave (). Measuring the change indisability after inpatient rehabilitation; comparison of the responsiveness of the Barthel Index and Functional [place] Measure. Journal of Neurology, Neurosurgery, and Psychiatry, 66(4), 372-264.   Susan Ruiz YVETTE, LENY Aponte, & Lacho Cage M.A. (2004.) Assessment of post-stroke quality of life in cost-effectiveness studies: The usefulness of the Barthel Index and the EuroQoL-5D. Quality of Life Research, 15, 610-30           Physical Therapy Evaluation Charge Determination   History Examination Presentation Decision-Making   HIGH Complexity :3+ comorbidities / personal factors will impact the outcome/ POC  HIGH Complexity : 4+ Standardized tests and measures addressing body structure, function, activity limitation and / or participation in recreation  HIGH Complexity : Unstable and unpredictable characteristics  Other outcome measures Barthel Index  HIGH       Based on the above components, the patient evaluation is determined to be of the following complexity level: HIGH     Pain Rating:  NT    Activity Tolerance:   Poor and requires frequent rest breaks  Please refer to the flowsheet for vital signs taken during this treatment. After treatment patient left in no apparent distress:   Sitting in chair, Call bell within reach, Bed / chair alarm activated, and Caregiver / family present    COMMUNICATION/EDUCATION:   The patients plan of care was discussed with: Occupational therapist and Registered nurse. Fall prevention education was provided and the patient/caregiver indicated understanding. and Patient/family have participated as able in goal setting and plan of care.     Thank you for this referral.  Lenny Beasley, PT, DPT   Time Calculation: 47 mins

## 2020-07-01 NOTE — PROGRESS NOTES
7/1/2020   CARE MANAGEMENT NOTE:  CM reviewed EMR and handoff received from ER . Pt was admitted with hematuria, DM, sz, CVA, and s/p TURP on 6/18. Also ESRD on HD M,W,F at [facility]. Reportedly, pt resides with his wife, Adam Pierce ([phone]). RUR 14%    Transition Plan of Care:  1. Pt will return home with [facility] if needed (had [facility] in the past  2. Continuation of HD M,W,F at [facility]  3. [facility] w/c [facility]um[facility] ([phone]) or [facility] at discharge. CM will continue to follow pt until discharged.   Willem

## 2020-07-02 LAB
ALBUMIN SERPL-MCNC: 3.3 G/DL (ref 3.5–5)
ANION GAP SERPL CALC-SCNC: 3 MMOL/L (ref 5–15)
BUN SERPL-MCNC: 35 MG/DL (ref 6–20)
BUN/CREAT SERPL: 11 (ref 12–20)
CALCIUM SERPL-MCNC: 8.8 MG/DL (ref 8.5–10.1)
CHLORIDE SERPL-SCNC: 101 MMOL/L (ref 97–108)
CO2 SERPL-SCNC: 31 MMOL/L (ref 21–32)
CREAT SERPL-MCNC: 3.25 MG/DL (ref 0.7–1.3)
ERYTHROCYTE [DISTWIDTH] IN BLOOD BY AUTOMATED COUNT: 13.4 % (ref 11.5–14.5)
FERRITIN SERPL-MCNC: 130 NG/ML (ref 26–388)
GLUCOSE BLD STRIP.AUTO-MCNC: 121 MG/DL (ref 65–100)
GLUCOSE BLD STRIP.AUTO-MCNC: 128 MG/DL (ref 65–100)
GLUCOSE BLD STRIP.AUTO-MCNC: 144 MG/DL (ref 65–100)
GLUCOSE BLD STRIP.AUTO-MCNC: 157 MG/DL (ref 65–100)
GLUCOSE SERPL-MCNC: 124 MG/DL (ref 65–100)
HCT VFR BLD AUTO: 27.9 % (ref 36.6–50.3)
HGB BLD-MCNC: 9.1 G/DL (ref 12.1–17)
MCH RBC QN AUTO: 29.1 PG (ref 26–34)
MCHC RBC AUTO-ENTMCNC: 32.6 G/DL (ref 30–36.5)
MCV RBC AUTO: 89.1 FL (ref 80–99)
NRBC # BLD: 0 K/UL (ref 0–0.01)
NRBC BLD-RTO: 0 PER 100 WBC
PHOSPHATE SERPL-MCNC: 3.1 MG/DL (ref 2.6–4.7)
PLATELET # BLD AUTO: 173 K/UL (ref 150–400)
PMV BLD AUTO: 8.7 FL (ref 8.9–12.9)
POTASSIUM SERPL-SCNC: 4.1 MMOL/L (ref 3.5–5.1)
RBC # BLD AUTO: 3.13 M/UL (ref 4.1–5.7)
SERVICE CMNT-IMP: ABNORMAL
SODIUM SERPL-SCNC: 135 MMOL/L (ref 136–145)
WBC # BLD AUTO: 5.4 K/UL (ref 4.1–11.1)

## 2020-07-02 PROCEDURE — 77030018798 HC PMP KT ENTRL FED COVD -A

## 2020-07-02 PROCEDURE — 77030005513 HC CATH URETH FOL11 MDII -B

## 2020-07-02 PROCEDURE — 80069 RENAL FUNCTION PANEL: CPT

## 2020-07-02 PROCEDURE — 85027 COMPLETE CBC AUTOMATED: CPT

## 2020-07-02 PROCEDURE — 65270000029 HC RM PRIVATE

## 2020-07-02 PROCEDURE — 97110 THERAPEUTIC EXERCISES: CPT

## 2020-07-02 PROCEDURE — 97530 THERAPEUTIC ACTIVITIES: CPT

## 2020-07-02 PROCEDURE — 74011250637 HC RX REV CODE- 250/637: Performed by: INTERNAL MEDICINE

## 2020-07-02 PROCEDURE — 97535 SELF CARE MNGMENT TRAINING: CPT

## 2020-07-02 PROCEDURE — 51798 US URINE CAPACITY MEASURE: CPT

## 2020-07-02 PROCEDURE — 74011636637 HC RX REV CODE- 636/637: Performed by: INTERNAL MEDICINE

## 2020-07-02 PROCEDURE — 36415 COLL VENOUS BLD VENIPUNCTURE: CPT

## 2020-07-02 PROCEDURE — 82962 GLUCOSE BLOOD TEST: CPT

## 2020-07-02 RX ORDER — ISOSORBIDE MONONITRATE 30 MG/1
60 TABLET, EXTENDED RELEASE ORAL DAILY
Status: DISCONTINUED | OUTPATIENT
Start: 2020-07-03 | End: 2020-07-03

## 2020-07-02 RX ORDER — LABETALOL HCL 20 MG/4 ML
10 SYRINGE (ML) INTRAVENOUS
Status: DISCONTINUED | OUTPATIENT
Start: 2020-07-02 | End: 2020-07-03 | Stop reason: HOSPADM

## 2020-07-02 RX ADMIN — AMLODIPINE BESYLATE 10 MG: 5 TABLET ORAL at 17:33

## 2020-07-02 RX ADMIN — Medication 10 ML: at 14:21

## 2020-07-02 RX ADMIN — INSULIN LISPRO 2 UNITS: 100 INJECTION, SOLUTION INTRAVENOUS; SUBCUTANEOUS at 17:43

## 2020-07-02 RX ADMIN — LACOSAMIDE 100 MG: 10 SOLUTION ORAL at 22:24

## 2020-07-02 RX ADMIN — Medication 10 ML: at 22:25

## 2020-07-02 RX ADMIN — Medication 500 MG: at 08:58

## 2020-07-02 RX ADMIN — Medication 10 ML: at 05:46

## 2020-07-02 RX ADMIN — LACOSAMIDE 100 MG: 10 SOLUTION ORAL at 08:57

## 2020-07-02 RX ADMIN — LOSARTAN POTASSIUM 25 MG: 50 TABLET, FILM COATED ORAL at 08:58

## 2020-07-02 RX ADMIN — INSULIN LISPRO 2 UNITS: 100 INJECTION, SOLUTION INTRAVENOUS; SUBCUTANEOUS at 08:57

## 2020-07-02 RX ADMIN — TERAZOSIN HYDROCHLORIDE 5 MG: 5 CAPSULE ORAL at 22:25

## 2020-07-02 RX ADMIN — ISOSORBIDE MONONITRATE 30 MG: 30 TABLET, EXTENDED RELEASE ORAL at 08:58

## 2020-07-02 RX ADMIN — TERAZOSIN HYDROCHLORIDE 5 MG: 5 CAPSULE ORAL at 08:58

## 2020-07-02 NOTE — PROGRESS NOTES
[id number]  YOB: 1960          Assessment & Plan:     1. ESRD : MWF,[place], Under my care  - RT AVG  - HD tomorrow  2. HTN  - Amlodipine, Losartan, Imdur  3. Anemia  -   epo  4.  S/p TURP by Dr. Bakari Mandel on 6/18/202  - Failed voiding trials 6/23/2020   - CBI gross hematuria :better,s/p ortiz removal       Subjective:   CC:esrd  HPI: Patient seen   HD went ok  Ortiz removed  ROS:no cps/ob  Current Facility-Administered Medications   Medication Dose Route Frequency    amLODIPine (NORVASC) tablet 10 mg  10 mg Per G Tube QPM    ascorbic acid (vitamin C) (VITAMIN C) tablet 500 mg  500 mg Per G Tube DAILY    losartan (COZAAR) tablet 25 mg  25 mg Per G Tube DAILY    terazosin (HYTRIN) capsule 5 mg  5 mg Per G Tube BID    epoetin laura-epbx (RETACRIT) injection 10,000 Units  10,000 Units IntraVENous DIALYSIS MON, WED & FRI    lacosamide (VIMPAT) oral solution 100 mg  100 mg Per G Tube BID    0.9% sodium chloride infusion 250 mL  250 mL IntraVENous PRN    sodium chloride (NS) flush 5-40 mL  5-40 mL IntraVENous Q8H    sodium chloride (NS) flush 5-40 mL  5-40 mL IntraVENous PRN    acetaminophen (TYLENOL) tablet 650 mg  650 mg Oral Q4H PRN    naloxone (NARCAN) injection 0.4 mg  0.4 mg IntraVENous PRN    ondansetron (ZOFRAN) injection 4 mg  4 mg IntraVENous Q4H PRN    glucose chewable tablet 16 g  4 Tab Oral PRN    dextrose (D50W) injection syrg 12.5-25 g  25-50 mL IntraVENous PRN    glucagon (GLUCAGEN) injection 1 mg  1 mg IntraMUSCular PRN    insulin lispro (HUMALOG) injection   SubCUTAneous AC&HS    albuterol (ACCUNEB) nebulizer solution 1.25 mg  1.25 mg Nebulization Q6H PRN    bisacodyL (DULCOLAX) suppository 10 mg  10 mg Rectal DAILY PRN    isosorbide mononitrate ER (IMDUR) tablet 30 mg  30 mg Oral DAILY    guaiFENesin-dextromethorphan (ROBITUSSIN DM) 100-10 mg/5 mL syrup 10 mL  10 mL Oral BID PRN          Objective: Vitals:  Blood pressure 163/81, pulse 88, temperature 97.9 °F (36.6 °C), resp. rate 18, height 5' 6\" (1.676 m), weight 71.9 kg (158 lb 8.2 oz), SpO2 100 %. Temp (24hrs), Av.4 °F (36.9 °C), Min:97.9 °F (36.6 °C), Max:98.6 °F (37 °C)      Intake and Output:  701 - 1900  In: 3000   Out: 4283 [Urine:5850]  1901 -  07  In: 80 [P.O.:300]  Out: 95836 [Urine:77106]    Physical Exam:                Patient is intubated:  no    Physical Examination:   GENERAL ASSESSMENT: NAD  HEENT:Nontraumatic   CHEST: CTA  HEART: S1S2  ABDOMEN: Soft,NT,  :Frias:  yes  EXTREMITY: EDEMA  NEURO:old cva          ECG/rhythm:    Data Review      No results for input(s): TNIPOC in the last 72 hours. No lab exists for component: ITNL   No results for input(s): CPK, CKMB, TROIQ in the last 72 hours. Recent Labs     20  0938 20  0403 20  1729 20  0626   * 135*  --  134*   K 4.1 4.2  --  3.9    101  --  99   CO2 31 29  --  28   BUN 35* 54*  --  40*   CREA 3.25* 4.42*  --  3.48*   * 157*  --  99   PHOS 3.1  --   --   --    MG  --  2.8*  --   --    CA 8.8 8.7  --  8.8   ALB 3.3*  --   --  3.5   WBC 5.4 4.9  --  5.9   HGB 9.1* 8.0* 8.6* 8.4*   HCT 27.9* 23.8*  --  22.6*    147*  --  140*      Recent Labs     20  0626   INR 1.1   PTP 11.4*   APTT 29.5     Needs: urine analysis, urine sodium, protein and creatinine  No results found for: MONICA, CREAU      Discussed with:  Family    : Anahy Aviles MD  2020        [facility]:  [email or web address]  Constantine Dominguez office:  [address] [place] [address], [address] [place] [address],[address]  [place], [address] [place] [address]  Phone: [phone]  Fax :     [phone]    [place] office:  [address] [facility], [address] [place] [address]  Phone - [phone]  Fax - [phone]

## 2020-07-02 NOTE — PROGRESS NOTES
Nutrition Assessment:    RECOMMENDATIONS/INTERVENTION(S):   1. Continue comfort feeds with cardiac, renal diet. 2. Continue TF via PEG. Recommend:    Nepro 240 mL bolus 5x/day + 125 mL H2O flush before and after each bolus. Recommend TF regimen provides 2160 kcals, 97 g protein, 2122 mL H2O- meeting 100% estimated energy/protein needs. 3. Continue to monitor for tolerance of TF, intakes, wt changes, labs. ASSESSMENT:   7/2: Pt assessed for LOS. Admitted with urinary retention. PMH includes DM, ESRD on HD, HTN, hx of CVA with dysphagia and chronic PEG. BMI 25.6, c/w overweight. Lunch tray in room at time of visit, all of salad and peaches eaten. TF currently running. Current regimen ordered: Nepro 240mL bolus 6x/day + 200mL H2O flush after each bolus. New TF recs provided to better meet needs. Unable to get pt's at home TF regimen as there was no family in room and pt was very difficult to understand. He did shake his head \"no\" when asked if he normally uses Nepro formula at home. Shook head \"no\" when asked if he has had any N/V with this current formula. Pt nodded \"yes\" to continuing current formula as long as he continues tolerating it. Will continue to monitor tolerance and po intakes. Labs- Na 135, Mg 2.8, -[phone]. Meds- Vit. C, insulin. Diet Order: Cardiac, Renal  % Eaten:    Patient Vitals for the past 72 hrs:   % Diet Eaten   07/02/20 0241 0 %   07/01/20 0750 25 %   06/30/20 2015 0 %       Pertinent Medications: [x] Reviewed    Labs: [x] Reviewed    Anthropometrics: Height: 5' 6\" (167.6 cm) Weight: 71.9 kg (158 lb 8.2 oz)    IBW (%IBW):   ( ) UBW (%UBW):   (  %)      BMI: Body mass index is 25.58 kg/m². This BMI is indicative of:   [] Underweight    [] Normal    [x] Overweight    []  Obesity    []  Extreme Obesity (BMI>40)  Estimated Nutrition Needs (Based on): 1908 Kcals/day(1467 x 1.3 AF) , 86 g(1.2 g/kg) Protein  Carbohydrate:  At Least 130 g/day  Fluids: 1908 mL/day (1 ml/kcal)    Last BM: 6/30   [x]Active     []Hyperactive  []Hypoactive       [] Absent   BS  Skin:    [x] Intact   [] Incision  [] Breakdown   [] DTI   [] Tears/Excoriation/Abrasion  []Edema [] Other:      Wt Readings from Last 30 Encounters:   07/01/20 71.9 kg (158 lb 8.2 oz)   06/18/20 77.6 kg (171 lb)   06/09/20 79.8 kg (176 lb)   07/01/16 89.9 kg (198 lb 4.8 oz)      NUTRITION DIAGNOSES:   Problem:  Swallowing difficulty     Etiology: related to dysphagia     Signs/Symptoms: as evidenced by hx of CVA, chronic PEG, comfort feeds      NUTRITION INTERVENTIONS:  Meals/Snacks: General/healthful diet Enteral/Parenteral Nutrition: Initiate enteral nutrition                GOAL:   PO intake of meals + tube feeds meeting EENs next 3-5 days    Cultural, Samaritan, or Ethnic Dietary Needs: None     EDUCATION & DISCHARGE NEEDS:    [x] None Identified   [] Identified and Education Provided/Documented   [] Identified and Pt declined/was not appropriate      [] Interdisciplinary Care Plan Reviewed/Documented    [x] Discharge Needs:comfort feeds, TF via PEG   [] No Nutrition Related Discharge Needs    NUTRITION RISK:   Pt Is At Nutrition Risk  [x]     No Nutrition Risk Identified  []       PT SEEN FOR:    []  MD Consult: []Calorie Count      []Diabetic Diet Education        []Diet Education     []Electrolyte Management     []General Nutrition Management and Supplements     []Management of Tube Feeding     []TPN Recommendations    []  RN Referral:  []MST score >=2     []Enteral/Parenteral Nutrition PTA     []Pregnant: Gestational DM or Multigestation                 [] Pressure Ulcer    []  Low BMI      [x]  Length of Stay       [] Dysphagia Diet         [] Ventilator  []  Follow-up     Previous Recommendations:   [] Implemented          [] Not Implemented          [x] Not Applicable    Previous Goal:   [] Met              [] Progressing Towards Goal              [] Not Progressing Towards Goal   [x] Not Applicable Leonides Carney RDN  Pager [phone]  Phone [phone]

## 2020-07-02 NOTE — PROGRESS NOTES
Problem: Self Care Deficits Care Plan (Adult)  Goal: *Acute Goals and Plan of Care (Insert Text)  Description:   FUNCTIONAL STATUS PRIOR TO ADMISSION: At baseline patient uses layne for OOB, and wife assists with all ADLs      HOME SUPPORT: The patient lived with spouse. Occupational Therapy Goals  Initiated 7/1/2020  1. Patient will perform grooming with minimal assistance/contact guard assist within 7 day(s). 2.  Patient will perform  upper body dressing with minimal assistance/contact guard assist within 7 day(s). 3.  Patient will perform lower body dressing with moderate assistance  within 7 day(s). 4.  Patient will participate in upper extremity therapeutic exercise/activities with minimal assistance/contact guard assist for 10 minutes within 7 day(s). Outcome: Progressing Towards Goal   OCCUPATIONAL THERAPY TREATMENT  Patient: Jenelle Burns (55 y.o. male)  Date: 7/2/2020  Diagnosis: Hematuria [R31.9]   Urinary retention       Precautions:    Chart, occupational therapy assessment, plan of care, and goals were reviewed. ASSESSMENT  Patient continues with skilled OT services and is progressing towards goals. Pt assisted to sitting edge of bed, impaired sitting balance, assist to comb hair without LOB. Pt doffed/donned gown with min assist. Pt instructed to engage with shoulder flex/ext, elbow flex/ext however limited as to elbow extension, digit extension. Pt rolls with min assist x 2 for dry pads. Current Level of Function Impacting Discharge (ADLs): Min assist Ub dressing and grooming seated edge of bed    Other factors to consider for discharge:          PLAN :  Patient continues to benefit from skilled intervention to address the above impairments. Continue treatment per established plan of care. to address goals.     Recommend with staff: ADl's seated edge of bed with supervision vs bed in chair position    Recommend next OT session: cont towards goals    Recommendation for discharge: (in order for the patient to meet his/her long term goals)  Occupational therapy at least 2 days/week in the home AND ensure assist and/or supervision for safety with Adl's     This discharge recommendation:  Has not yet been discussed the attending provider and/or case management    IF patient discharges home will need the following DME:       SUBJECTIVE:   Patient stated Thank you    OBJECTIVE DATA SUMMARY:   Cognitive/Behavioral Status:  Neurologic State: Appropriate for age  Orientation Level: Oriented X4  Cognition: Follows commands             Functional Mobility and Transfers for ADLs:  Bed Mobility:   Min assist x 2    Transfers:      Not tested       Balance:Impaired sitting balance       ADL Intervention:       Grooming  Brushing/Combing Hair: Minimum assistance(seated edge of bed)              Upper Body Dressing Assistance  Dressing Assistance: [address]: Minimum  assistance       Therapeutic Exercises:     EXERCISE   Sets   Reps   Active Active Assist   Passive   Comments   Shoulder flex/ext 1 10 []           [x]           []              Elbow flex/ext 1 10 []           [x]           []                 []           []           []                 []           []           []                 []           []           []                 []           []           []                 []           []           []                 []           []           []                 []           []           []                 []           []           []                 []           []           []                   Activity Tolerance:   Fair  Please refer to the flowsheet for vital signs taken during this treatment. After treatment patient left in no apparent distress:   Supine in bed    COMMUNICATION/COLLABORATION:   The patients plan of care was discussed with: Physical therapy assistant, Occupational therapist, and Registered nurse.      JJ Anaya/TYRON Mejias Calculation: 25 mins

## 2020-07-02 NOTE — PROGRESS NOTES
Spiritual Care Assessment/Progress Note  Sarah Beth Dumas      NAME: Ramila Alcantara      MRN: [id number]  AGE: 61 y.o.  SEX: male  Jew Affiliation: Sikhism   Language: English     7/2/2020     Total Time (in minutes): 5     Spiritual Assessment begun in [facility] 5M1 MED SURG 1 through conversation with:         [x]Patient        [] Family    [] Friend(s)        Reason for Consult: [facility]     Spiritual beliefs: (Please include comment if needed)     [x] Identifies with a emerald tradition: Sikhism        [x] Supported by a emerald community:            [] Claims no spiritual orientation:           [] Seeking spiritual identity:                [] Adheres to an individual form of spirituality:           [] Not able to assess:                           Identified resources for coping:      [x] Prayer                               [x] Music                  [] Guided Imagery     [x] Family/friends                 [] Pet visits     [] Devotional reading                         [] Unknown     [] Other:                                               Interventions offered during this visit: (See comments for more details)    Patient Interventions: Affirmation of emotions/emotional suffering, Affirmation of emerald, Communion (Sikhism), Initial/Spiritual assessment, patient floor, Normalization of emotional/spiritual concerns, Prayer (actual), Prayer (assurance of)           Plan of Care:     [x] Support spiritual and/or cultural needs    [] Support AMD and/or advance care planning process      [] Support grieving process   [] Coordinate Rites and/or Rituals    [] Coordination with community clergy   [] No spiritual needs identified at this time   [] Detailed Plan of Care below (See Comments)  [] Make referral to Music Therapy  [] Make referral to Pet Therapy     [] Make referral to Addiction services  [] Make referral to [facility]  [] Make referral to Spiritual Care Partner  [] No future visits requested        [] Follow up visits as needed     Comments: Mr. Amaris Ramos understands some English. His lunch tray was to the side and eaten. Prayer and communion offered. During the prayer and at the [facility] prayer, Mr. Amaris Ramos became teary-eyed. Support offered and held the space for him to express his feelings in the moment. Will plan to visit again next Tuesday.     Lois Flores, SBS, RN, ACSW, LCSW   Page:  [phone]

## 2020-07-02 NOTE — PROGRESS NOTES
07/02/20 1253   Urine Assessment   $$ Bladder Scan (mL of urine) 496 mL   Patient has not urinated post ortiz removal. Bladder scan completed and at 496 mL. Genora Crews, NP made aware. Patient states he \"wants to go but it won't come out. \" Will continue to monitor.

## 2020-07-02 NOTE — PROGRESS NOTES
7/2/2020   CARE MANAGEMENT NOTE:  CM reviewed EMR. Pt was admitted with hematuria, DM, sz, CVA, and s/p TURP on 6/18. Also ESRD on HD M,W,F at [facility]. Reportedly, pt resides with his wife, Mikki Acevedo ([phone]).    RUR 14%     Transition Plan of Care:  1. Pt will return home with his wife. She requested a RX for outpt PT (declined using [facility])  2.  CM provided DME companies [facility] and [facility]). Wife returned initial w/c from [facility] and she will pursue a different model to her liking. She was instructed to f/u with PCP for additional orders. 3   Continuation of HD M,W,F at [facility]  4. [facility] w/c [facility] ([phone]) or [facility] at discharge.     CM will continue to follow pt until discharged.   Willem

## 2020-07-02 NOTE — PROGRESS NOTES
Urology Progress Note    Patient: Nallely Kathleen MRN: [id number]  SSN: [id number]    YOB: 1960  Age: 61 y.o. Sex: male            Assessment:     Nallely Kathleen is a 61 y.o. male with a history of  PMH diabetes, end-stage renal disease on hemodialysis dependent on Monday Wednesdays and Fridays, percutaneous endoscopic gastrotomy, seizure disorder, stroke. S/p TURP by Dr. Emily Ferrara on 6/18/2020. He had office follow up on 6/23/2020 at which time he failed voiding trial and catheter was replaced. Currently admitted for gross hematuria and anemia. Plan:     1. Attempted voiding trial this am, no urge to void. Monitor PVR. Hopefully will be able to void later this morning. 2. If voiding okay and urine clear can resume ASA tomorrow. Would like to monitor over night to make sure hematuria does not recur. 3. Will arrange outpatient follow up. Subjective:     Urine cleared up overnight. Light pink on CBI this morning. No labs. Attempted voiding trial but no urge to void. Catheter removed. Will monitor PVR this morning and hopefully he will be able to go.     Objective:     Visit Vitals  /81 (BP 1 Location: Left arm, BP Patient Position: At rest)   Pulse 88   Temp 97.9 °F (36.6 °C)   Resp 18   Ht 5' 6\" (1.676 m)   Wt 71.9 kg (158 lb 8.2 oz)   SpO2 100%   BMI 25.58 kg/m²         Intake/Output Summary (Last 24 hours) at 7/2/2020 7030  Last data filed at 7/2/2020 0755  Gross per 24 hour   Intake 6000 ml   Output 55284 ml   Net -44269 ml       Physical Exam  General: NAD  HEENT: atraumatic  Respiratory: No distress  Abdomen: soft, NTTP, nondistended  : 3 way ortiz with clear urine      Recent Results (from the past 24 hour(s))   GLUCOSE, POC    Collection Time: 07/01/20 10:52 AM   Result Value Ref Range    Glucose (POC) 113 (H) 65 - 100 mg/dL    Performed by Ryder Limon (PCT)    GLUCOSE, POC    Collection Time: 07/01/20  4:27 PM   Result Value Ref Range Glucose (POC) 157 (H) 65 - 100 mg/dL    Performed by Sanya Reed (PCT)    GLUCOSE, POC    Collection Time: 07/01/20  9:25 PM   Result Value Ref Range    Glucose (POC) 126 (H) 65 - 100 mg/dL    Performed by Ray Woodall (PCT)    GLUCOSE, POC    Collection Time: 07/02/20  7:06 AM   Result Value Ref Range    Glucose (POC) 144 (H) 65 - 100 mg/dL    Performed by Ray Woodall (PCT)            Signed By: Jasmin Nogueira, JAROD - July 2, 2020

## 2020-07-02 NOTE — PROGRESS NOTES
Problem: Mobility Impaired (Adult and Pediatric)  Goal: *Acute Goals and Plan of Care (Insert Text)  Description: FUNCTIONAL STATUS PRIOR TO ADMISSION: At baseline patient uses layne lift and used wheelchair. HOME SUPPORT PRIOR TO ADMISSION: The patient lived with wife who is sole caregiver. Physical Therapy Goals  Initiated 7/1/2020  1. Patient will move from supine to sit and sit to supine , scoot up and down and roll side to side in bed with maximal assistance within 7 day(s). 2.  Patient will transfer from bed to chair and chair to bed with maximal assistance using squat pivot method within 7 day(s). 3.  Patient will perform sit < > stand with moderate assistance x 2 within 7 day(s). Note:   PHYSICAL THERAPY TREATMENT  Patient: Senthil Mcgee (66 y.o. male)  Date: 7/2/2020  Diagnosis: Hematuria [R31.9]   Urinary retention       Precautions:    Chart, physical therapy assessment, plan of care and goals were reviewed. ASSESSMENT  Patient continues with skilled PT services. Pt supine to sit with mod assist of 2. Pt CGA sitting on EOB. Pt declined mobilizing out of bed to chair. Pt agreed to and performed LE exercise with cues. Pt performed some ADLs sitting. Pt returned to supine after 6 minutes with max assist.Pt performed rolling with min to mod assist to allow for change of wet pads. Pt repositioned to head of bed and left sitting at 60 degrees. PT will continue to follow. Current Level of Function Impacting Discharge (mobility/balance): Pt mod to max of 2 for mobility. PLAN :  Patient continues to benefit from skilled intervention to address the above impairments. Continue treatment per established plan of care. to address goals.     Recommendation for discharge: (in order for the patient to meet his/her long term goals)  Out pt PT or Home health PT with assistance    This discharge recommendation:  Has been made in collaboration with the attending provider and/or case management    IF patient discharges home will need the following DME:        SUBJECTIVE:       OBJECTIVE DATA SUMMARY:   Critical Behavior:  Neurologic State: Appropriate for age  Orientation Level: Oriented X4  Cognition: Follows commands     Functional Mobility Training:  Bed Mobility:     Supine to Sit: Moderate assistance;Assist x2     Scooting: Moderate assistance;Maximum assistance          Balance:  Sitting: Intact           Activity Tolerance:   Fair to good  Please refer to the flowsheet for vital signs taken during this treatment.     After treatment patient left in no apparent distress:   Supine in bed    COMMUNICATION/COLLABORATION:   The patients plan of care was discussed with: Physical therapist.     Alisa Smith PTA   Time Calculation: 23 mins

## 2020-07-02 NOTE — PROGRESS NOTES
Darius Prasad [facility] [address]  [address] [address]  [phone]      Medical Progress Note      NAME: Nallely Kathleen   :  1960  MRM:  [id number]    Date/Time: 2020        Assessment / Plan:     Urinary retention / hematuria: S/p recent TURP. Mgmt per urology. Voiding trial, if fails then ortiz re-placement. Advised to monitor overnight. ASA on hold    Acute blood loss anemia: due to hematuria. Follow Hg     ESRD (end stage renal disease): on HD MWF. Per nephrology. Follow BMP     HTN (hypertension): BP high. Increase Imdur. Add IV labetalol PRN. Continue Norvasc and losartan.      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 PT/OT to determine home health needs including DME. Continue tube feeds. Resume ASA when safe. Not on statin; check FLP     DM (diabetes mellitus): type 2. Appears controlled. Continue SSI. Check A1c      Total time spent: 35 minutes  Time spent in the care of this patient including reviewing records, discussing with nursing and/or other providers on the treatment team, obtaining history and examining the patient, and discussing treatment plans. Care Plan discussed with: Patient, Nursing Staff and >50% of time spent in counseling and coordination of care    Discussed:  Care Plan and D/C Planning    Prophylaxis:  SCD's    Disposition:  Home w/Family         Subjective:     Chief Complaint:  Follow up hematurai    Chart/notes/labs/studies reviewed, patient examined. Ortiz removed however still no urine yet. No fevers            Objective:       Vitals:        Last 24hrs VS reviewed since prior progress note.  Most recent are:    Visit Vitals  /84 (BP 1 Location: Left arm, BP Patient Position: At rest)   Pulse 81   Temp 98.5 °F (36.9 °C)   Resp 16   Ht 5' 6\" (1.676 m)   Wt 71.9 kg (158 lb 8.2 oz)   SpO2 98%   BMI 25.58 kg/m²     SpO2 Readings from Last 6 Encounters: 07/02/20 98%   06/18/20 96%   06/09/20 100%   07/01/16 98%            Intake/Output Summary (Last 24 hours) at 7/2/2020 1818  Last data filed at 7/2/2020 0755  Gross per 24 hour   Intake 3000 ml   Output 37918 ml   Net -95262 ml          Exam:     Physical Exam:    Gen: chronically ill-appearing. NAD  HEENT:  Sclerae nonicteric, hearing intact to voice, mucous membranes moist  Neck:  Supple, without masses. Resp:  No accessory muscle use, CTAB without wheezes, rales, or rhonchi  Card: RRR, without m/r/g. No LE edema. Abd:  +bowel sounds, soft, NTTP, nondistended. No HSM. Neuro: Face symmetric, speech dysarthric. R hemiparesis  Psych:  Alert, oriented x 3.  Fair insight     Medications Reviewed: (see below)    Lab Data Reviewed: (see below)    ______________________________________________________________________    Medications:     Current Facility-Administered Medications   Medication Dose Route Frequency    amLODIPine (NORVASC) tablet 10 mg  10 mg Per G Tube QPM    ascorbic acid (vitamin C) (VITAMIN C) tablet 500 mg  500 mg Per G Tube DAILY    losartan (COZAAR) tablet 25 mg  25 mg Per G Tube DAILY    terazosin (HYTRIN) capsule 5 mg  5 mg Per G Tube BID    epoetin laura-epbx (RETACRIT) injection 10,000 Units  10,000 Units IntraVENous DIALYSIS MON, WED & FRI    lacosamide (VIMPAT) oral solution 100 mg  100 mg Per G Tube BID    0.9% sodium chloride infusion 250 mL  250 mL IntraVENous PRN    sodium chloride (NS) flush 5-40 mL  5-40 mL IntraVENous Q8H    sodium chloride (NS) flush 5-40 mL  5-40 mL IntraVENous PRN    acetaminophen (TYLENOL) tablet 650 mg  650 mg Oral Q4H PRN    naloxone (NARCAN) injection 0.4 mg  0.4 mg IntraVENous PRN    ondansetron (ZOFRAN) injection 4 mg  4 mg IntraVENous Q4H PRN    glucose chewable tablet 16 g  4 Tab Oral PRN    dextrose (D50W) injection syrg 12.5-25 g  25-50 mL IntraVENous PRN    glucagon (GLUCAGEN) injection 1 mg  1 mg IntraMUSCular PRN    insulin lispro (HUMALOG) injection   SubCUTAneous AC&HS    albuterol (ACCUNEB) nebulizer solution 1.25 mg  1.25 mg Nebulization Q6H PRN    bisacodyL (DULCOLAX) suppository 10 mg  10 mg Rectal DAILY PRN    isosorbide mononitrate ER (IMDUR) tablet 30 mg  30 mg Oral DAILY    guaiFENesin-dextromethorphan (ROBITUSSIN DM) 100-10 mg/5 mL syrup 10 mL  10 mL Oral BID PRN            Lab Review:     Recent Labs     07/02/20  0938 07/01/20  0403 06/30/20  1729 06/30/20  0626   WBC 5.4 4.9  --  5.9   HGB 9.1* 8.0* 8.6* 8.4*   HCT 27.9* 23.8*  --  22.6*    147*  --  140*     Recent Labs     07/02/20  0938 07/01/20  0403 06/30/20  0626   * 135* 134*   K 4.1 4.2 3.9    101 99   CO2 31 29 28   * 157* 99   BUN 35* 54* 40*   CREA 3.25* 4.42* 3.48*   CA 8.8 8.7 8.8   MG  --  2.8*  --    PHOS 3.1  --   --    ALB 3.3*  --  3.5   ALT  --   --  20   INR  --   --  1.1     No components found for: GLPOC  No results for input(s): PH, PCO2, PO2, HCO3, FIO2 in the last 72 hours.   Recent Labs     06/30/20  0626   INR 1.1     No results found for: SDES  Lab Results   Component Value Date/Time    Culture result: NO GROWTH 6 DAYS 06/29/2016 03:53 PM              ___________________________________________________    Attending Physician: Mayra Sanchez MD

## 2020-07-02 NOTE — PROGRESS NOTES
Bedside shift change report given to [facility] nurse) by Alyssia (offgoing nurse).  Report included the following information SBAR, Kardex, ED Summary, Intake/Output, MAR and Recent Results.

## 2020-07-03 VITALS
RESPIRATION RATE: 17 BRPM | HEIGHT: 66 IN | WEIGHT: 150.79 LBS | BODY MASS INDEX: 24.23 KG/M2 | OXYGEN SATURATION: 100 % | SYSTOLIC BLOOD PRESSURE: 163 MMHG | DIASTOLIC BLOOD PRESSURE: 82 MMHG | HEART RATE: 80 BPM | TEMPERATURE: 98.3 F

## 2020-07-03 LAB
ALBUMIN SERPL-MCNC: 3 G/DL (ref 3.5–5)
ANION GAP SERPL CALC-SCNC: 7 MMOL/L (ref 5–15)
APPEARANCE UR: ABNORMAL
BACTERIA URNS QL MICRO: NEGATIVE /HPF
BASOPHILS # BLD: 0.1 K/UL (ref 0–0.1)
BASOPHILS NFR BLD: 1 % (ref 0–1)
BILIRUB UR QL CFM: ABNORMAL
BUN SERPL-MCNC: 49 MG/DL (ref 6–20)
BUN/CREAT SERPL: 12 (ref 12–20)
CALCIUM SERPL-MCNC: 8.8 MG/DL (ref 8.5–10.1)
CHLORIDE SERPL-SCNC: 99 MMOL/L (ref 97–108)
CHOLEST SERPL-MCNC: 119 MG/DL
CO2 SERPL-SCNC: 30 MMOL/L (ref 21–32)
COLOR UR: ABNORMAL
CREAT SERPL-MCNC: 4.02 MG/DL (ref 0.7–1.3)
DIFFERENTIAL METHOD BLD: ABNORMAL
EOSINOPHIL # BLD: 0.9 K/UL (ref 0–0.4)
EOSINOPHIL NFR BLD: 17 % (ref 0–7)
EPITH CASTS URNS QL MICRO: ABNORMAL /LPF
ERYTHROCYTE [DISTWIDTH] IN BLOOD BY AUTOMATED COUNT: 13.2 % (ref 11.5–14.5)
EST. AVERAGE GLUCOSE BLD GHB EST-MCNC: 105 MG/DL
GLUCOSE BLD STRIP.AUTO-MCNC: 118 MG/DL (ref 65–100)
GLUCOSE BLD STRIP.AUTO-MCNC: 176 MG/DL (ref 65–100)
GLUCOSE SERPL-MCNC: 167 MG/DL (ref 65–100)
GLUCOSE UR STRIP.AUTO-MCNC: NEGATIVE MG/DL
HBA1C MFR BLD: 5.3 % (ref 4–5.6)
HCT VFR BLD AUTO: 25.4 % (ref 36.6–50.3)
HDLC SERPL-MCNC: 60 MG/DL
HDLC SERPL: 2 {RATIO} (ref 0–5)
HGB BLD-MCNC: 8.4 G/DL (ref 12.1–17)
HGB UR QL STRIP: ABNORMAL
HYALINE CASTS URNS QL MICRO: ABNORMAL /LPF (ref 0–5)
IMM GRANULOCYTES # BLD AUTO: 0 K/UL (ref 0–0.04)
IMM GRANULOCYTES NFR BLD AUTO: 0 % (ref 0–0.5)
KETONES UR QL STRIP.AUTO: 15 MG/DL
LDLC SERPL CALC-MCNC: 45.4 MG/DL (ref 0–100)
LEUKOCYTE ESTERASE UR QL STRIP.AUTO: ABNORMAL
LIPID PROFILE,FLP: NORMAL
LYMPHOCYTES # BLD: 1.4 K/UL (ref 0.8–3.5)
LYMPHOCYTES NFR BLD: 27 % (ref 12–49)
MAGNESIUM SERPL-MCNC: 2.9 MG/DL (ref 1.6–2.4)
MCH RBC QN AUTO: 29.8 PG (ref 26–34)
MCHC RBC AUTO-ENTMCNC: 33.1 G/DL (ref 30–36.5)
MCV RBC AUTO: 90.1 FL (ref 80–99)
MONOCYTES # BLD: 0.5 K/UL (ref 0–1)
MONOCYTES NFR BLD: 9 % (ref 5–13)
NEUTS SEG # BLD: 2.4 K/UL (ref 1.8–8)
NEUTS SEG NFR BLD: 46 % (ref 32–75)
NITRITE UR QL STRIP.AUTO: NEGATIVE
NRBC # BLD: 0 K/UL (ref 0–0.01)
NRBC BLD-RTO: 0 PER 100 WBC
PH UR STRIP: 7.5 [PH] (ref 5–8)
PHOSPHATE SERPL-MCNC: 3.5 MG/DL (ref 2.6–4.7)
PLATELET # BLD AUTO: 165 K/UL (ref 150–400)
PMV BLD AUTO: 8.7 FL (ref 8.9–12.9)
POTASSIUM SERPL-SCNC: 4.1 MMOL/L (ref 3.5–5.1)
PROT UR STRIP-MCNC: 300 MG/DL
RBC # BLD AUTO: 2.82 M/UL (ref 4.1–5.7)
RBC #/AREA URNS HPF: >100 /HPF (ref 0–5)
RBC MORPH BLD: ABNORMAL
SERVICE CMNT-IMP: ABNORMAL
SERVICE CMNT-IMP: ABNORMAL
SODIUM SERPL-SCNC: 136 MMOL/L (ref 136–145)
SP GR UR REFRACTOMETRY: 1.01 (ref 1–1.03)
TRIGL SERPL-MCNC: 68 MG/DL (ref ?–150)
UA: UC IF INDICATED,UAUC: ABNORMAL
UROBILINOGEN UR QL STRIP.AUTO: 1 EU/DL (ref 0.2–1)
VLDLC SERPL CALC-MCNC: 13.6 MG/DL
WBC # BLD AUTO: 5.3 K/UL (ref 4.1–11.1)
WBC URNS QL MICRO: ABNORMAL /HPF (ref 0–4)

## 2020-07-03 PROCEDURE — 74011250636 HC RX REV CODE- 250/636: Performed by: INTERNAL MEDICINE

## 2020-07-03 PROCEDURE — 83735 ASSAY OF MAGNESIUM: CPT

## 2020-07-03 PROCEDURE — 80061 LIPID PANEL: CPT

## 2020-07-03 PROCEDURE — 74011636637 HC RX REV CODE- 636/637: Performed by: INTERNAL MEDICINE

## 2020-07-03 PROCEDURE — 74011250637 HC RX REV CODE- 250/637: Performed by: INTERNAL MEDICINE

## 2020-07-03 PROCEDURE — 83036 HEMOGLOBIN GLYCOSYLATED A1C: CPT

## 2020-07-03 PROCEDURE — 87086 URINE CULTURE/COLONY COUNT: CPT

## 2020-07-03 PROCEDURE — 74011000258 HC RX REV CODE- 258: Performed by: INTERNAL MEDICINE

## 2020-07-03 PROCEDURE — 80069 RENAL FUNCTION PANEL: CPT

## 2020-07-03 PROCEDURE — 90935 HEMODIALYSIS ONE EVALUATION: CPT

## 2020-07-03 PROCEDURE — 81001 URINALYSIS AUTO W/SCOPE: CPT

## 2020-07-03 PROCEDURE — 36415 COLL VENOUS BLD VENIPUNCTURE: CPT

## 2020-07-03 PROCEDURE — 5A1D70Z PERFORMANCE OF URINARY FILTRATION, INTERMITTENT, LESS THAN 6 HOURS PER DAY: ICD-10-PCS | Performed by: INTERNAL MEDICINE

## 2020-07-03 PROCEDURE — 85025 COMPLETE CBC W/AUTO DIFF WBC: CPT

## 2020-07-03 PROCEDURE — 87077 CULTURE AEROBIC IDENTIFY: CPT

## 2020-07-03 PROCEDURE — 87186 SC STD MICRODIL/AGAR DIL: CPT

## 2020-07-03 PROCEDURE — 82962 GLUCOSE BLOOD TEST: CPT

## 2020-07-03 RX ORDER — CEFDINIR 300 MG/1
300 CAPSULE ORAL EVERY OTHER DAY
Qty: 3 CAP | Refills: 0 | Status: SHIPPED | OUTPATIENT
Start: 2020-07-04 | End: 2020-07-10

## 2020-07-03 RX ORDER — ISOSORBIDE DINITRATE 20 MG/1
20 TABLET ORAL 2 TIMES DAILY
Status: DISCONTINUED | OUTPATIENT
Start: 2020-07-03 | End: 2020-07-03

## 2020-07-03 RX ORDER — ISOSORBIDE DINITRATE 20 MG/1
20 TABLET ORAL 2 TIMES DAILY
Status: DISCONTINUED | OUTPATIENT
Start: 2020-07-03 | End: 2020-07-03 | Stop reason: HOSPADM

## 2020-07-03 RX ORDER — ASPIRIN 81 MG/1
81 TABLET ORAL DAILY
Qty: 30 TAB | Refills: 1 | Status: SHIPPED
Start: 2020-07-03

## 2020-07-03 RX ORDER — ISOSORBIDE MONONITRATE 10 MG/1
20 TABLET ORAL 2 TIMES DAILY
Status: DISCONTINUED | OUTPATIENT
Start: 2020-07-03 | End: 2020-07-03

## 2020-07-03 RX ADMIN — Medication 10 ML: at 05:30

## 2020-07-03 RX ADMIN — EPOETIN ALFA-EPBX 10000 UNITS: 10000 INJECTION, SOLUTION INTRAVENOUS; SUBCUTANEOUS at 08:50

## 2020-07-03 RX ADMIN — ISOSORBIDE DINITRATE 20 MG: 20 TABLET ORAL at 12:13

## 2020-07-03 RX ADMIN — CEFTRIAXONE SODIUM 1 G: 1 INJECTION, POWDER, FOR SOLUTION INTRAMUSCULAR; INTRAVENOUS at 09:00

## 2020-07-03 RX ADMIN — INSULIN LISPRO 2 UNITS: 100 INJECTION, SOLUTION INTRAVENOUS; SUBCUTANEOUS at 12:13

## 2020-07-03 RX ADMIN — Medication 500 MG: at 09:56

## 2020-07-03 RX ADMIN — TERAZOSIN HYDROCHLORIDE 5 MG: 5 CAPSULE ORAL at 09:56

## 2020-07-03 RX ADMIN — LACOSAMIDE 100 MG: 10 SOLUTION ORAL at 09:56

## 2020-07-03 RX ADMIN — LOSARTAN POTASSIUM 25 MG: 50 TABLET, FILM COATED ORAL at 09:56

## 2020-07-03 NOTE — PROGRESS NOTES
[address] [facility]  YOB: 1960          Assessment & Plan:     1. ESRD : MWF,Alexis, Under my care  - RT AVG  - HD today  2. HTN  - Amlodipine, Losartan, Imdur  3. Anemia  -   epo  4.  S/p TURP by Dr. Rob Del Castillo on 6/18/202  - Failed voiding trials 6/23/2020 ,7 2 2020: now back with ortiz  - CBI gross hematuria :        Subjective:   CC:esrd  HPI: Patient seen on HD  ,1 kg removal  Ortiz back  ROS:no cps/ob  Current Facility-Administered Medications   Medication Dose Route Frequency    cefTRIAXone (ROCEPHIN) 1 g in 0.9% sodium chloride (MBP/ADV) 50 mL  1 g IntraVENous Q24H    isosorbide mononitrate ER (IMDUR) tablet 60 mg  60 mg Oral DAILY    labetaloL (NORMODYNE;TRANDATE) 20 mg/4 mL (5 mg/mL) injection 10 mg  10 mg IntraVENous Q4H PRN    amLODIPine (NORVASC) tablet 10 mg  10 mg Per G Tube QPM    ascorbic acid (vitamin C) (VITAMIN C) tablet 500 mg  500 mg Per G Tube DAILY    losartan (COZAAR) tablet 25 mg  25 mg Per G Tube DAILY    terazosin (HYTRIN) capsule 5 mg  5 mg Per G Tube BID    epoetin laura-epbx (RETACRIT) injection 10,000 Units  10,000 Units IntraVENous DIALYSIS MON, WED & FRI    lacosamide (VIMPAT) oral solution 100 mg  100 mg Per G Tube BID    0.9% sodium chloride infusion 250 mL  250 mL IntraVENous PRN    sodium chloride (NS) flush 5-40 mL  5-40 mL IntraVENous Q8H    sodium chloride (NS) flush 5-40 mL  5-40 mL IntraVENous PRN    acetaminophen (TYLENOL) tablet 650 mg  650 mg Oral Q4H PRN    naloxone (NARCAN) injection 0.4 mg  0.4 mg IntraVENous PRN    ondansetron (ZOFRAN) injection 4 mg  4 mg IntraVENous Q4H PRN    glucose chewable tablet 16 g  4 Tab Oral PRN    dextrose (D50W) injection syrg 12.5-25 g  25-50 mL IntraVENous PRN    glucagon (GLUCAGEN) injection 1 mg  1 mg IntraMUSCular PRN    insulin lispro (HUMALOG) injection   SubCUTAneous AC&HS    albuterol (ACCUNEB) nebulizer solution 1.25 mg  1.25 mg Nebulization Q6H PRN    bisacodyL (DULCOLAX) suppository 10 mg  10 mg Rectal DAILY PRN    guaiFENesin-dextromethorphan (ROBITUSSIN DM) 100-10 mg/5 mL syrup 10 mL  10 mL Oral BID PRN          Objective:     Vitals:  Blood pressure 165/80, pulse 75, temperature 97.2 °F (36.2 °C), temperature source Axillary, resp. rate 18, height 5' 6\" (1.676 m), weight 68.4 kg (150 lb 12.7 oz), SpO2 97 %. Temp (24hrs), Av.3 °F (36.8 °C), Min:97.2 °F (36.2 °C), Max:99.3 °F (37.4 °C)      Intake and Output:  No intake/output data recorded.  1901 -  0700  In: 3710   Out: 96321 [Urine:84546]    Physical Exam:                Patient is intubated:  no    Physical Examination:   GENERAL ASSESSMENT: NAD  HEENT:Nontraumatic   CHEST: CTA  HEART: S1S2  ABDOMEN: Soft,NT,  :Frias:  yes  EXTREMITY: EDEMA  NEURO:old cva          ECG/rhythm:    Data Review      No results for input(s): TNIPOC in the last 72 hours. No lab exists for component: ITNL   No results for input(s): CPK, CKMB, TROIQ in the last 72 hours. Recent Labs     20  0409 20  0938 20  0403    135* 135*   K 4.1 4.1 4.2   CL 99 101 101   CO2 30 31 29   BUN 49* 35* 54*   CREA 4.02* 3.25* 4.42*   * 124* 157*   PHOS 3.5 3.1  --    MG 2.9*  --  2.8*   CA 8.8 8.8 8.7   ALB 3.0* 3.3*  --    WBC 5.3 5.4 4.9   HGB 8.4* 9.1* 8.0*   HCT 25.4* 27.9* 23.8*    173 147*      No results for input(s): INR, PTP, APTT, INREXT, INREXT in the last 72 hours. Needs: urine analysis, urine sodium, protein and creatinine  No results found for: Erickson Taylor      Discussed with:  Family    : Karina Willard MD  7/3/2020        [facility]:  [email or web address]  Christopher Cruzns office:  [address] [address]  [place], [address] [place] [address]  Phone: [phone]  Fax :     [phone]    [place] office:  [address] [facility]  Phone - [phone]  Fax - [phone]

## 2020-07-03 NOTE — PROGRESS NOTES
[address] [address] Pharmacy Dosing Services: 7/3/2020    Consult by Dr. Grace Rodriguez for conversion of IMDUR to equivalent medication which may be crushed and administered via G Tube. Blood Pressure and HR:    Date/Time Pulse BP   07/03/20 0950 75 164/85   07/03/20 0945 74 167/82   07/03/20 0930 75 165/85   07/03/20 0915 75 165/80   07/03/20 0900 77 160/85   07/03/20 0845 79 162/87   07/03/20 0830 73 158/82   07/03/20 0815 76 160/80   07/03/20 0800 75 169/88   07/03/20 0745 76 161/85   07/03/20 0730 73 160/82   07/03/20 0715 74 159/82   07/03/20 0700 73 161/84   07/03/20 0645 74 158/81   07/03/20 0630 73 156/84   07/03/20 0615 75 155/83   07/03/20 0610 77 154/80   07/03/20 0314 77 108/68     Assessment/Plan: Patient was receiving Imdur 30 mg daily. Today, dose was increased to Imdur 60 mg daily (not administered). Per RN, patient unable to swallow tablets/capsules. Medication must be administered via G tube. Imdur changed to Isosorbide dinitrate (immediate release) 20 mg per G tube twice daily.     Pharmacist: Sanjay Ambrosio

## 2020-07-03 NOTE — PROGRESS NOTES
Pharmacist Discharge Medication Reconciliation    Discharge Provider:  Dr. Jonathon Alfred       Discharge Medications:      My Medications        START taking these medications        Instructions Each Dose to Equal Morning Noon Evening Bedtime   cefdinir 300 mg capsule  Commonly known as:  OMNICEF  Start taking on:  July 4, 2020    Your last dose was: Your next dose is: Take 1 Cap by mouth every other day for 6 days. Start first dose tomorrow (7/4)   300 mg                 OTHER(NON-FORMULARY)    Your last dose was: Your next dose is:          PT / OT evaluate and treat                         CHANGE how you take these medications        Instructions Each Dose to Equal Morning Noon Evening Bedtime   aspirin delayed-release 81 mg tablet  What changed:  additional instructions    Your last dose was: Your next dose is: Take 1 Tab by mouth daily. Do not resume this until you have been instructed to do so by your primary care doctor or by your urologist.   Follow up within 3-5 days   81 mg                        CONTINUE taking these medications        Instructions Each Dose to Equal Morning Noon Evening Bedtime   acetaminophen 160 mg/5 mL liquid  Commonly known as:  TYLENOL    Your last dose was: Your next dose is: Take 320 mg by mouth every six (6) hours as needed for Fever. 320 mg                 albuterol 1.25 mg/3 mL Nebu  Commonly known as:  ACCUNEB    Your last dose was: Your next dose is:          1.25 mg by Nebulization route every six (6) hours as needed for Wheezing. 1.25 mg                 amLODIPine 10 mg tablet  Commonly known as:  NORVASC    Your last dose was: Your next dose is: Take 10 mg by mouth every evening. 10 mg                 ascorbic acid (vitamin C) 500 mg tablet  Commonly known as:  VITAMIN C    Your last dose was: Your next dose is: Take 500 mg by mouth daily.    500 mg                 bisacodyL 10 mg Supp  Commonly known as:  DULCOLAX    Your last dose was: Your next dose is: Insert 10 mg into rectum daily as needed for Constipation. 10 mg                 GUAIFEN DM PO    Your last dose was: Your next dose is: Take 10 mL by mouth two (2) times daily as needed (cough). 10 mL                 isosorbide mononitrate ER 30 mg tablet  Commonly known as:  IMDUR    Your last dose was: Your next dose is: Take 30 mg by mouth daily. 30 mg                 losartan 25 mg tablet  Commonly known as:  COZAAR    Your last dose was: Your next dose is: Take 25 mg by mouth daily. 25 mg                 NovoLOG Flexpen U-100 Insulin 100 unit/mL (3 mL) Inpn  Generic drug:  insulin aspart U-100    Your last dose was: Your next dose is:          by SubCUTAneous route two (2) times daily (with meals). Sliding scale                  ondansetron 4 mg disintegrating tablet  Commonly known as:  ZOFRAN ODT    Your last dose was: Your next dose is: Take 4 mg by mouth every eight (8) hours as needed for Nausea or Vomiting. 4 mg                 scopolamine 1 mg over 3 days Pt3d  Commonly known as:  TRANSDERM-SCOP    Your last dose was: Your next dose is:          1 Patch by TransDERmal route every seventy-two (72) hours. 1 Patch                 terazosin 5 mg capsule  Commonly known as:  HYTRIN    Your last dose was: Your next dose is: Take 5 mg by mouth two (2) times a day. 5 mg                 Vimpat 10 mg/mL Soln oral solution  Generic drug:  lacosamide    Your last dose was: Your next dose is: Take 10 mL by mouth two (2) times a day. 10 ml = 100 mg   10 mL                 vitamin a & d ointment  Commonly known as:  A&D    Your last dose was: Your next dose is:          Apply  to affected area as needed for Skin Irritation.                             Where to Get Your Medications        Information on where to get these meds will be given to you by the nurse or doctor.     Ask your nurse or doctor about these medications  aspirin delayed-release 81 mg tablet  cefdinir 300 mg capsule  OTHER(NON-FORMULARY)         Warren Serrano, Pharmacist

## 2020-07-03 NOTE — DISCHARGE SUMMARY
Physician Discharge Summary     Patient ID:  Desiree Ohm  [id number]  18 y.o.  1960    Admit date: 6/30/2020    Discharge date: 7/3/2020    Admission Diagnoses: Hematuria [R31.9]    Principal Discharge Diagnoses:    hematuria    OTHER PROBLEMS ADDRESSEDS  Principal Diagnosis Urinary retention                                            Principal Problem:    Urinary retention (6/30/2020)    Active Problems:    DM (diabetes mellitus) ([facility] (6/29/2016)      Hematuria (6/30/2020)      ESRD (end stage renal disease) ([facility] (6/30/2020)      HTN (hypertension) (6/30/2020)      Acute blood loss anemia (6/30/2020)      Stroke (cerebrum) ([facility] (6/30/2020)       Patient Active Problem List   Diagnosis Code    DM (diabetes mellitus) ([facility] E11.9    Hematuria R31.9    Urinary retention R33.9    ESRD (end stage renal disease) ([facility] N18.6    HTN (hypertension) I10    Acute blood loss anemia D62    Stroke (cerebrum) [facility]) I63.9         Hospital Course:   Mr. Aysha Penaloza is a 60 yo M w/ hx of ESRD on HD, CVA w/ residual R hemiparesis presents with hematuria and urinary retention after recent TURP    Urinary retention / hematuria: S/p recent TURP. Mgmt per urology. Failed voiding trial and ortiz replaced. Evaluated by urology, cleared for discharge. Continue to hold ASA for now as pt still having some hematuria.      Acute blood loss anemia: due to hematuria. Follow Hg which has remained stable     ESRD (end stage renal disease): on HD MWF.       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 PT/OT to determine home health needs including DME. Continue tube feeds. Resume ASA when safe. Not on statin; check FLP     DM (diabetes mellitus): type 2. A1c 5.3 however in the setting of anemia. Treated with SSI     Pt discharged in improved and stable condition.      Procedures performed: ortiz placement. Continuous bladder irrigation    Imaging studies: none      PCP: Shaheed Rodriguez MD    Consults: Urology    Discharge Exam:  Patient Vitals for the past 12 hrs:   Temp Pulse Resp BP SpO2   07/03/20 1616 98.3 °F (36.8 °C) 80 17 163/82 100 %     GEN: NAD  : ortiz with pink-tinged urine    Disposition: home    Patient Instructions:   Current Discharge Medication List      START taking these medications    Details   cefdinir (OMNICEF) 300 mg capsule Take 1 Cap by mouth every other day for 6 days. Start first dose tomorrow (7/4)  Qty: 3 Cap, Refills: 0      OTHER,NON-FORMULARY, PT / OT evaluate and treat  Qty: 1 Each, Refills: 0         CONTINUE these medications which have CHANGED    Details   aspirin delayed-release 81 mg tablet Take 1 Tab by mouth daily. Do not resume this until you have been instructed to do so by your primary care doctor or by your urologist.   Follow up within 3-5 days  Qty: 30 Tab, Refills: 1         CONTINUE these medications which have NOT CHANGED    Details   scopolamine (TRANSDERM-SCOP) 1 mg over 3 days pt3d 1 Patch by TransDERmal route every seventy-two (72) hours. insulin aspart U-100 (NovoLOG Flexpen U-100 Insulin) 100 unit/mL (3 mL) inpn by SubCUTAneous route two (2) times daily (with meals). Sliding scale       vitamin a & d (A&D) ointment Apply  to affected area as needed for Skin Irritation. terazosin (HYTRIN) 5 mg capsule Take 5 mg by mouth two (2) times a day. ondansetron (ZOFRAN ODT) 4 mg disintegrating tablet Take 4 mg by mouth every eight (8) hours as needed for Nausea or Vomiting.      lacosamide (Vimpat) 10 mg/mL soln oral solution Take 10 mL by mouth two (2) times a day. 10 ml = 100 mg      isosorbide mononitrate ER (IMDUR) 30 mg tablet Take 30 mg by mouth daily. guaifen/dextromethorphan/pe (GUAIFEN DM PO) Take 10 mL by mouth two (2) times daily as needed (cough).       bisacodyL (DULCOLAX) 10 mg suppository Insert 10 mg into rectum daily as needed for Constipation. ascorbic acid, vitamin C, (VITAMIN C) 500 mg tablet Take 500 mg by mouth daily. amLODIPine (NORVASC) 10 mg tablet Take 10 mg by mouth every evening. albuterol (ACCUNEB) 1.25 mg/3 mL nebu 1.25 mg by Nebulization route every six (6) hours as needed for Wheezing. acetaminophen (TYLENOL) 160 mg/5 mL liquid Take 320 mg by mouth every six (6) hours as needed for Fever. losartan (COZAAR) 25 mg tablet Take 25 mg by mouth daily. Activity: See discharge instructions  Diet: See discharge instructions  Wound Care: See discharge instructions    Follow-up Information     Follow up With Specialties Details Why Contact Info    Chris Sever, [facility] [facility]   [address] [facility]  [address] [place] [address]      Narciso Yousif MD Urology Schedule an appointment as soon as possible for a visit in 5 days follow up within 5 days of discharge from the hospital [address]            I spent 35 minutes on this discharge.     Signed:  Cally Eduardo MD  7/3/2020  12:01 PM    CC: PCP Dr. Svetlana Mejia, urology Dr. Sunny Gonzalez

## 2020-07-03 NOTE — PROGRESS NOTES
Transition Plan of Care - RUR 16%:  1.  Pt will return home with his wife. She requested a RX for outpt PT (declined using [facility]). Attending MD notified. Wife was provided contact information for [facility] at her request.    2.  Pt's wife has a list of [employer] ([address] [place] [address]). Wife returned initial w/c from [facility] and she will pursue a different model to her liking. She was instructed to f/u with PCP for additional orders. 3   Continuation of HD M,W,F at [facility]  4.  Pt placed on will call with [facility]. Packet has been started.   Will need AVS, Discharge summary and updated MAR added to packet at time of d/c.    5. Pt's wife was provided information on [address] [facility]

## 2020-07-03 NOTE — PROGRESS NOTES
Urology Progress Note    Subjective:     Daily Progress Note: 7/3/2020 11:46 AM    Nathanael Vargas is doing good. He reports pain is absent. He has no complaints. He is tolerating a solid diet. His urine in ortiz has trace blood, but draining well. Objective:     Visit Vitals  /85   Pulse 75   Temp 97.2 °F (36.2 °C) (Axillary)   Resp 16   Ht 5' 6\" (1.676 m)   Wt 68.4 kg (150 lb 12.7 oz)   SpO2 97%   BMI 24.34 kg/m²        Temp (24hrs), Av.2 °F (36.8 °C), Min:97.2 °F (36.2 °C), Max:99.3 °F (37.4 °C)      Intake and Output:  1901 -  07  In: 3710   Out: 27036 [[id number]]  701 -  190  In: 300   Out: 1000     Physical Exam:   General appearance: alert, cooperative, no distress, appears stated age    Lab/Data Review:  BMP:   Lab Results   Component Value Date/Time     2020 04:09 AM    K 4.1 2020 04:09 AM    CL 99 2020 04:09 AM    CO2 30 2020 04:09 AM    AGAP 7 2020 04:09 AM     (H) 2020 04:09 AM    BUN 49 (H) 2020 04:09 AM    CREA 4.02 (H) 2020 04:09 AM    GFRAA 19 (L) 2020 04:09 AM    GFRNA 15 (L) 2020 04:09 AM     CBC:   Lab Results   Component Value Date/Time    WBC 5.3 2020 04:09 AM    HGB 8.4 (L) 2020 04:09 AM    HCT 25.4 (L) 2020 04:09 AM     2020 04:09 AM       Assessment/Plan:     Principal Problem:    Urinary retention (2020)    Active Problems:    DM (diabetes mellitus) ([facility] (2016)      Hematuria (2020)      ESRD (end stage renal disease) ([facility] (2020)      HTN (hypertension) (2020)      Acute blood loss anemia (2020)      Stroke (cerebrum) ([facility] (2020)        Plan:  Doing well. Urine draining with trace blood in ortiz. Reviewed plan with them. Home with ortiz. Monitor for significant bleeding.

## 2020-07-03 NOTE — ROUTINE PROCESS
Bedside and Verbal shift change report given to Erika Nash RN (oncoming nurse) by Elo Barroso RN (offgoing nurse). Report included the following information SBAR and Kardex.

## 2020-07-03 NOTE — PROGRESS NOTES
Discharge instructions and prescription information reviewed and all questions answered. Patient to be discharged home via [facility] at 4:15.

## 2020-07-03 NOTE — ADVANCED PRACTICE NURSE
[facility]  [phone]    Vitals   Pre   Post   Assessment   Pre   Post     Temp  Temp: 97.2 °F (36.2 °C) (07/03/20 0730)  97.2 LOC  SA&O x 2 to person and place A&O x 2 to person and place   HR   Pulse (Heart Rate): 75(resting, eyes closed) (07/03/20 0915) 75 Lungs   clear  clear   B/P   BP: 165/80 (07/03/20 0915) 167/82 Cardiac   regular  regular   Resp   Resp Rate: 18 (07/03/20 0915) 16 Skin   warm  warm   Pain level  Pain Intensity 1: 0 (07/02/20 2359) No pain Edema    generaliozed   No edema   Orders:    Duration:   Start:    0615 End:    0945 Total:   3.5 hours   Dialyzer:   Dialyzer/Set Up Inspection: Mk([id number]) (07/03/20 0610)   K Bath:   Dialysate K (mEq/L): 3 (07/03/20 0610)   Ca Bath:   Dialysate CA (mEq/L): 2.5 (07/03/20 0610)   Na/Bicarb:   Dialysate NA (mEq/L): 140 (07/03/20 0610)   Target Fluid Removal:   Goal/Amount of Fluid to Remove (mL): 1000 mL (07/03/20 0610)   Access     Type & Location:   SARAH AVF patent, B&T positive, cannulated with 15 g needles x 2   Labs     Obtained/Reviewed   Critical Results Called   Date when labs were drawn-  Hgb-    HGB   Date Value Ref Range Status   07/03/2020 8.4 (L) 12.1 - 17.0 g/dL Final     K-    Potassium   Date Value Ref Range Status   07/03/2020 4.1 3.5 - 5.1 mmol/L Final     Ca-   Calcium   Date Value Ref Range Status   07/03/2020 8.8 8.5 - 10.1 MG/DL Final     Bun-   BUN   Date Value Ref Range Status   07/03/2020 49 (H) 6 - 20 MG/DL Final     Creat-   Creatinine   Date Value Ref Range Status   07/03/2020 4.02 (H) 0.70 - 1.30 MG/DL Final        Medications/ Blood Products Given     Name   Dose   Route and Time                     Blood Volume Processed (BVP):    78 L Net Fluid   Removed:  1000 ml   Comments   Time Out Done: yes, 9846  Primary Nurse Rpt Pre:Herb Kimble RN  Primary Nurse Rpt Post:Jamila EVANS  Pt Education: yes, r/t dialysis process  Care Plan: continue HD as ordered  Tx Summary: tolertaed tx well, at end, all blood in circuit returned with 300 ml NS, SARAH AVF patent, B&T positive, bleeding stopped at both sites, pressure dressing in place. Admiting Diagnosis:rtenal failure  Pt's previous clinic-  Consent signed - Informed Consent Verified: Yes (07/03/20 0610)  [facility] Consent - yes  Hepatitis Status- negative  Machine #- Machine Number: [id number] (07/03/20 3597)  Telemetry status-remote  Pre-dialysis wt. - Pre-Dialysis Weight: 71.9 kg (158 lb 8.2 oz) (07/03/20 0610)

## 2020-07-03 NOTE — ROUTINE PROCESS
Bedside and Verbal shift change report given to [address] Brandon Sool RN by Pablo Farias RN. Report included the following information SBAR, Kardex and Quality Measures.

## 2020-07-03 NOTE — DISCHARGE INSTRUCTIONS
HOSPITALIST DISCHARGE INSTRUCTIONS  NAME: Nathaly Godoy   :  1960   MRN:  [id number]     Date/Time:  7/3/2020 12:00 PM    ADMIT DATE: 2020     DISCHARGE DATE: 7/3/2020     PRINCIPAL DISCHARGE DIAGNOSES:  Hematuria (blood in urine)    MEDICATIONS:  · It is important that you take the medication exactly as they are prescribed. Note the changes and additions to your medications. Be sure you understand these changes before you are discharged today. · Keep your medication in the bottles provided by the pharmacist and keep a list of the medication names, dosages, and times to be taken in your wallet. · Do not take other medications without consulting your doctor. Pain Management: per above medications    What to do at Home    Recommended diet:  Tube feeds / previous diet    Recommended activity: PT/OT Eval and Treat    If you experience any of the following symptoms then please call your primary care physician or return to the emergency room if you cannot get hold of your doctor:  Difficulties with urination / ortiz catheter, worsening bleeding, fever, chills, severe abdominal pain, nausea, vomiting, diarrhea, confusion, weakness, falling, bleeding, severe chest pain, shortness of breath, or other severe concerning symptoms    Follow Up: Follow-up Information     Follow up With Specialties Details Why Contact Info    Elliot Mayes MD [facility] [address]      Hiral Logan MD Urology Schedule an appointment as soon as possible for a visit in 5 days follow up within 5 days of discharge from the hospital [address]              Information obtained by :  I understand that if any problems occur once I am at home I am to contact my physician. I understand and acknowledge receipt of the instructions indicated above. Physician's or R.N.'s Signature                                                                  Date/Time                                                                                                                                              Patient or Representative Signature                                                          Date/Time

## 2020-07-06 LAB
BACTERIA SPEC CULT: ABNORMAL
CC UR VC: ABNORMAL
SERVICE CMNT-IMP: ABNORMAL

## 2020-07-16 ENCOUNTER — HOSPITAL ENCOUNTER (EMERGENCY)
Age: 60
Discharge: HOME | End: 2020-07-16
Attending: EMERGENCY MEDICINE
Payer: COMMERCIAL

## 2020-07-16 VITALS
RESPIRATION RATE: 13 BRPM | OXYGEN SATURATION: 99 % | DIASTOLIC BLOOD PRESSURE: 58 MMHG | HEART RATE: 85 BPM | HEIGHT: 66 IN | BODY MASS INDEX: 27.48 KG/M2 | SYSTOLIC BLOOD PRESSURE: 143 MMHG | WEIGHT: 171 LBS | TEMPERATURE: 98.6 F

## 2020-07-16 DIAGNOSIS — R31.9 HEMATURIA, UNSPECIFIED TYPE: Primary | ICD-10-CM

## 2020-07-16 LAB
ALBUMIN SERPL-MCNC: 3.6 G/DL (ref 3.5–5)
ALBUMIN/GLOB SERPL: 0.8 {RATIO} (ref 1.1–2.2)
ALP SERPL-CCNC: 228 U/L (ref 45–117)
ALT SERPL-CCNC: 27 U/L (ref 12–78)
ANION GAP SERPL CALC-SCNC: 8 MMOL/L (ref 5–15)
AST SERPL-CCNC: 24 U/L (ref 15–37)
BASOPHILS # BLD: 0.1 K/UL (ref 0–0.1)
BASOPHILS NFR BLD: 2 % (ref 0–1)
BILIRUB SERPL-MCNC: 0.5 MG/DL (ref 0.2–1)
BUN SERPL-MCNC: 47 MG/DL (ref 6–20)
BUN/CREAT SERPL: 11 (ref 12–20)
CALCIUM SERPL-MCNC: 9.1 MG/DL (ref 8.5–10.1)
CHLORIDE SERPL-SCNC: 95 MMOL/L (ref 97–108)
CO2 SERPL-SCNC: 28 MMOL/L (ref 21–32)
COMMENT, HOLDF: NORMAL
CREAT SERPL-MCNC: 4.28 MG/DL (ref 0.7–1.3)
DIFFERENTIAL METHOD BLD: ABNORMAL
EOSINOPHIL # BLD: 0.7 K/UL (ref 0–0.4)
EOSINOPHIL NFR BLD: 16 % (ref 0–7)
ERYTHROCYTE [DISTWIDTH] IN BLOOD BY AUTOMATED COUNT: 14.8 % (ref 11.5–14.5)
GLOBULIN SER CALC-MCNC: 4.5 G/DL (ref 2–4)
GLUCOSE SERPL-MCNC: 133 MG/DL (ref 65–100)
HCT VFR BLD AUTO: 28.4 % (ref 36.6–50.3)
HGB BLD-MCNC: 9.5 G/DL (ref 12.1–17)
IMM GRANULOCYTES # BLD AUTO: 0 K/UL (ref 0–0.04)
IMM GRANULOCYTES NFR BLD AUTO: 0 % (ref 0–0.5)
LYMPHOCYTES # BLD: 0.7 K/UL (ref 0.8–3.5)
LYMPHOCYTES NFR BLD: 17 % (ref 12–49)
MCH RBC QN AUTO: 30.7 PG (ref 26–34)
MCHC RBC AUTO-ENTMCNC: 33.5 G/DL (ref 30–36.5)
MCV RBC AUTO: 91.9 FL (ref 80–99)
MONOCYTES # BLD: 0.4 K/UL (ref 0–1)
MONOCYTES NFR BLD: 9 % (ref 5–13)
NEUTS SEG # BLD: 2.5 K/UL (ref 1.8–8)
NEUTS SEG NFR BLD: 56 % (ref 32–75)
NRBC # BLD: 0 K/UL (ref 0–0.01)
NRBC BLD-RTO: 0 PER 100 WBC
PLATELET # BLD AUTO: 154 K/UL (ref 150–400)
PMV BLD AUTO: 8.7 FL (ref 8.9–12.9)
POTASSIUM SERPL-SCNC: 4.2 MMOL/L (ref 3.5–5.1)
PROT SERPL-MCNC: 8.1 G/DL (ref 6.4–8.2)
RBC # BLD AUTO: 3.09 M/UL (ref 4.1–5.7)
RBC MORPH BLD: ABNORMAL
SAMPLES BEING HELD,HOLD: NORMAL
SODIUM SERPL-SCNC: 131 MMOL/L (ref 136–145)
WBC # BLD AUTO: 4.4 K/UL (ref 4.1–11.1)

## 2020-07-16 PROCEDURE — 80053 COMPREHEN METABOLIC PANEL: CPT

## 2020-07-16 PROCEDURE — 85025 COMPLETE CBC W/AUTO DIFF WBC: CPT

## 2020-07-16 PROCEDURE — 99285 EMERGENCY DEPT VISIT HI MDM: CPT

## 2020-07-16 PROCEDURE — 77030018836 HC SOL IRR NACL ICUM -A

## 2020-07-16 PROCEDURE — 51700 IRRIGATION OF BLADDER: CPT

## 2020-07-16 PROCEDURE — 36415 COLL VENOUS BLD VENIPUNCTURE: CPT

## 2020-07-16 NOTE — ED TRIAGE NOTES
Patient arrives via EMS from home, was released from hospital 7/3 for prostate surgery. Reports blood in urine since today. Patient has a ortiz and is lethargic, GCS 13 per EMS.  Patient \" makes a grumbling response\" to verbal stimuli, does not open eyes but is following commands     Patient wife at bedside and states \" he is just tired and had his eyes dilated today\" History of CVA with right sided weakness and chronic cough, Peg tube, SARAH fistula, dialysis M,W,F

## 2020-07-17 ENCOUNTER — PATIENT OUTREACH (OUTPATIENT)
Dept: CASE MANAGEMENT | Age: 60
End: 2020-07-17

## 2020-07-17 NOTE — ED PROVIDER NOTES
The patient is a 80-year-old male with a past medical history significant for diabetes, end-stage renal disease and hemodialysis dependent, PEG tube, chronic right-sided weakness secondary to a stroke, seizure disorder, Frias catheter in place and urinary retention with incomplete bladder emptying who presents to the ED via EMS for evaluation after having hematuria this evening. History was told by the patient's wife who is also his primary caregiver at home. She states that she began to notice increased bloody return today. She denies any further discomfort or chief complaint at this time.            Past Medical History:   Diagnosis Date    Diabetes ([address]     Dysphagia due to old cerebrovascular accident 03/2019    ESRD (end stage renal disease) on dialysis [facility])     MWF    Frias catheter in place     PEG (percutaneous endoscopic gastrostomy) status [address] 3/2019 to present    NPO- comfort foods only    Right sided weakness 3/2019 to present    Seizure ([facility] 03/2019    Stroke ([facility] [address] [facility] 03/2019    Urinary retention with incomplete bladder emptying        Past Surgical History:   Procedure Laterality Date    HX ARTERIOVENOUS FISTULA Right 03/2019    HX CYST INCISION AND DRAINAGE Right     2nd toe- Cleaned out infection    HX OTHER SURGICAL  03/2019    PEG tube insertion         Family History:   Problem Relation Age of Onset    Stroke Father     Stroke Sister 48    Deep Vein Thrombosis Neg Hx        Social History     Socioeconomic History    Marital status:      Spouse name: Not on file    Number of children: Not on file    Years of education: Not on file    Highest education level: Not on file   Occupational History    Not on file   Social Needs    Financial resource strain: Not on file    Food insecurity     Worry: Not on file     Inability: Not on file    Transportation needs     Medical: Not on file     Non-medical: Not on file   Tobacco Use    Smoking status: Never Smoker    Smokeless tobacco: Never Used   Substance and Sexual Activity    Alcohol use: Never     Frequency: Never     Comment: rare    Drug use: Never    Sexual activity: Not on file   Lifestyle    Physical activity     Days per week: Not on file     Minutes per session: Not on file    Stress: Not on file   Relationships    Social connections     Talks on phone: Not on file     Gets together: Not on file     Attends Christianity service: Not on file     Active member of club or organization: Not on file     Attends meetings of clubs or organizations: Not on file     Relationship status: Not on file    Intimate partner violence     Fear of current or ex partner: Not on file     Emotionally abused: Not on file     Physically abused: Not on file     Forced sexual activity: Not on file   Other Topics Concern    Not on file   Social History Narrative    Not on file         ALLERGIES: Bee venom protein (honey bee) and Hydralazine    Review of Systems   All other systems reviewed and are negative. Vitals:    07/16/20 2200 07/16/20 2215 07/16/20 2230 07/16/20 2245   BP: 133/41 147/54 136/57 143/58   Pulse: 87 87 85 85   Resp: (!) 0 13 13 13   Temp:    98.6 °F (37 °C)   SpO2: 98% 99% 99% 99%   Weight:       Height:                Physical Exam  Vitals signs and nursing note reviewed. Exam conducted with a chaperone present. CONSTITUTIONAL: Well-appearing; well-nourished; in no apparent distress  HEAD: Normocephalic; atraumatic  EYES: PERRL; EOM intact; conjunctiva and sclera are clear bilaterally. ENT: No rhinorrhea; normal pharynx with no tonsillar hypertrophy; mucous membranes pink/moist, no erythema, no exudate. NECK: Supple; non-tender; no cervical lymphadenopathy  CARD: Normal S1, S2; no murmurs, rubs, or gallops. Regular rate and rhythm. RESP: Normal respiratory effort; breath sounds clear and equal bilaterally; no wheezes, rhonchi, or rales.   ABD: Normal bowel sounds; non-distended; non-tender; no palpable organomegaly, no masses, no bruits. Back Exam: Normal inspection; no vertebral point tenderness, no CVA tenderness. EXT: Non-tender to palpation; no swelling or deformity; distal pulses are normal, no edema. SKIN: Warm; dry; no rash. NEURO: Alert but incoherent and chronically confused chronic right hemiparesis   exam: Indwelling Frias catheter inserted in the shaft of the penis with bloody return in the Frias catheter    MDM  Number of Diagnoses or Management Options  Hematuria, unspecified type:   Diagnosis management comments: Assessment: Hematuria likely secondary to chronic indwelling Frias placement and anticoagulation in a patient with end-stage renal disease and dialysis dependent. He appears hemodynamically stable at this time and looks well. The patient is well-known to me and has a fairly benign exam.  The patient's wife states that his catheter was just changed 2 days ago. Plan: Manually irrigate Frias catheter until clear/education, reassurance, symptomatic treatment/ Monitor and Reevaluate. Amount and/or Complexity of Data Reviewed  Clinical lab tests: reviewed and ordered  Tests in the radiology section of CPT®: ordered and reviewed  Tests in the medicine section of CPT®: reviewed and ordered  Discussion of test results with the performing providers: yes  Decide to obtain previous medical records or to obtain history from someone other than the patient: yes  Obtain history from someone other than the patient: yes  Review and summarize past medical records: yes  Discuss the patient with other providers: yes  Independent visualization of images, tracings, or specimens: yes    Risk of Complications, Morbidity, and/or Mortality  Presenting problems: moderate  Diagnostic procedures: moderate  Management options: moderate           Procedures    Progress Note:   Pt has been reexamined by Kenny Henderson MD. Pt is feeling much better. Symptoms have improved.  All available results have been reviewed with pt and any available family. Frias catheter was manually irrigated till clear pt understands sx, dx, and tx in ED. Care plan has been outlined and questions have been answered. Pt is ready to go home. Will send home on hematuria instruction. . Outpatient referral with PCP/urologist for reevaluation and further treatment as needed. Written by Della Stewart MD,6:33 AM    .   .

## 2020-07-17 NOTE — ED NOTES
Patient discharged from ED by provider. Discharge instructions reviewed with patient's wife and all questions answered. Patient carried via [facility] stretcher from ED in [facility].

## 2020-07-17 NOTE — ED NOTES
Frias irrigated with 500mL of sterile NS. Initially retrieved clots and bloody urine. Now draining clear yellow urine.

## 2020-07-17 NOTE — DISCHARGE INSTRUCTIONS
Patient Education        Blood in the Urine: Care Instructions  Your Care Instructions     Blood in the urine, or hematuria, may make the urine look red, brown, or pink. There may be blood every time you urinate or just from time to time. You cannot always see blood in the urine, but it will show up in a urine test.  Blood in the urine may be serious. It should always be checked by a doctor. Your doctor may recommend more tests, including an X-ray, a CT scan, or a cystoscopy (which lets a doctor look inside the urethra and bladder). Blood in the urine can be a sign of another problem. Common causes are bladder infections and kidney stones. An injury to your groin or your genital area can also cause bleeding in the urinary tract. Very hard exercise--such as running a marathon--can cause blood in the urine. Blood in the urine can also be a sign of kidney disease or cancer in the bladder or kidney. Many cases of blood in the urine are caused by a harmless condition that runs in families. This is called benign familial hematuria. It does not need any treatment. Sometimes your urine may look red or brown even though it does not contain blood. For example, not getting enough fluids (dehydration), taking certain medicines, or having a liver problem can change the color of your urine. Eating foods such as beets, rhubarb, or blackberries or foods with red food coloring can make your urine look red or pink. Follow-up care is a key part of your treatment and safety. Be sure to make and go to all appointments, and call your doctor if you are having problems. It's also a good idea to know your test results and keep a list of the medicines you take. When should you call for help? Call your doctor now or seek immediate medical care if:  · You have symptoms of a urinary infection. For example:  ? You have pus in your urine. ? You have pain in your back just below your rib cage. This is called flank pain. ?  You have a fever, chills, or body aches. ? It hurts to urinate. ? You have groin or belly pain. · You have more blood in your urine. Watch closely for changes in your health, and be sure to contact your doctor if:  · You have new urination problems. · You do not get better as expected. Where can you learn more? Go to http://anjelica-jeny.info/  Enter E108 in the search box to learn more about \"Blood in the Urine: Care Instructions. \"  Current as of: August 22, 2019               Content Version: 12.5  © 1977-2939 Healthwise, [employer]. Care instructions adapted under license by [employer] (which disclaims liability or warranty for this information). If you have questions about a medical condition or this instruction, always ask your healthcare professional. [address] any warranty or liability for your use of this information.

## 2020-07-17 NOTE — PROGRESS NOTES
Patient contacted regarding recent discharge and COVID-19 risk. Discussed COVID-19 related testing which was not done at this time. Test results were not done. Patient informed of results, if available? no    Care Transition Nurse/ Ambulatory Care Manager contacted the family by telephone to perform post discharge assessment. Verified name and  with family as identifiers. Patient has following risk factors of: diabetes and no known risk factors. CTN/ACM reviewed discharge instructions, medical action plan and red flags related to discharge diagnosis. Reviewed and educated them on any new and changed medications related to discharge diagnosis. Advised obtaining a 90-day supply of all daily and as-needed medications. Education provided regarding infection prevention, and signs and symptoms of COVID-19 and when to seek medical attention with family who verbalized understanding. Discussed exposure protocols and quarantine from [address] you at higher risk for severe illness  and given an opportunity for questions and concerns. The family agrees to contact the COVID-19 hotline [phone] or PCP office for questions related to their healthcare. CTN/ACM provided contact information for future reference. From CDC: Are you at higher risk for severe illness?  Wash your hands often.  Avoid close contact (6 feet, which is about two arm lengths) with people who are sick.  Put distance between yourself and other people if COVID-19 is spreading in your community.  Clean and disinfect frequently touched surfaces.  Avoid all cruise travel and non-essential air travel.  Call your healthcare professional if you have concerns about COVID-19 and your underlying condition or if you are sick.     For more information on steps you can take to protect yourself, see CDC's How to Protect Yourself      Patient/family/caregiver given information for [facility] and agrees to enroll no      Plan for follow-up call in 7-14 days based on severity of symptoms and risk factors.

## 2020-07-30 ENCOUNTER — PATIENT OUTREACH (OUTPATIENT)
Dept: CASE MANAGEMENT | Age: 60
End: 2020-07-30

## 2020-07-30 NOTE — PROGRESS NOTES
Patient resolved from Transition of Care episode on 7/30/20  Discussed COVID-19 related testing which was not done at this time. Test results were not done. Patient informed of results, if available? no     Patient/family has been provided the following resources and education related to COVID-19:                         Signs, symptoms and red flags related to COVID-19            [facility] exposure and quarantine guidelines            [employer] exposure contact - [phone]            Contact for their local Department of Health                 Patient currently reports that the following symptoms have improved:  no new symptoms and no worsening symptoms. No further outreach scheduled with this CTN/ACM/LPN/HC/ [place]. Episode of Care resolved. Patient has this CTN/ACM/LPN/HC/MA contact information if future needs arise.

## 2021-06-08 ENCOUNTER — APPOINTMENT (OUTPATIENT)
Dept: GENERAL RADIOLOGY | Age: 61
End: 2021-06-08
Attending: STUDENT IN AN ORGANIZED HEALTH CARE EDUCATION/TRAINING PROGRAM
Payer: COMMERCIAL

## 2021-06-08 ENCOUNTER — HOSPITAL ENCOUNTER (EMERGENCY)
Age: 61
Discharge: NURSING, REHAB OR LONG-TERM CARE | End: 2021-06-08
Attending: STUDENT IN AN ORGANIZED HEALTH CARE EDUCATION/TRAINING PROGRAM
Payer: COMMERCIAL

## 2021-06-08 VITALS
HEIGHT: 67 IN | DIASTOLIC BLOOD PRESSURE: 58 MMHG | HEART RATE: 88 BPM | TEMPERATURE: 98.4 F | WEIGHT: 172 LBS | BODY MASS INDEX: 27 KG/M2 | SYSTOLIC BLOOD PRESSURE: 117 MMHG | OXYGEN SATURATION: 96 % | RESPIRATION RATE: 16 BRPM

## 2021-06-08 DIAGNOSIS — K94.23 PEG TUBE MALFUNCTION (HCC): Primary | ICD-10-CM

## 2021-06-08 PROCEDURE — 99283 EMERGENCY DEPT VISIT LOW MDM: CPT

## 2021-06-08 PROCEDURE — 74018 RADEX ABDOMEN 1 VIEW: CPT

## 2021-06-08 PROCEDURE — 75810000109 HC GASTRO TUBE CHANGE

## 2021-06-08 NOTE — ED NOTES
Pt went to be d/c pt needs [facility] back home to facility; being arranged at this time.

## 2022-01-06 ENCOUNTER — APPOINTMENT (OUTPATIENT)
Dept: GENERAL RADIOLOGY | Age: 62
End: 2022-01-06
Attending: EMERGENCY MEDICINE
Payer: COMMERCIAL

## 2022-01-06 ENCOUNTER — HOSPITAL ENCOUNTER (EMERGENCY)
Age: 62
Discharge: HOME | End: 2022-01-07
Attending: EMERGENCY MEDICINE
Payer: COMMERCIAL

## 2022-01-06 DIAGNOSIS — E83.39 HYPERPHOSPHATEMIA: Primary | ICD-10-CM

## 2022-01-06 DIAGNOSIS — Z99.2 DIALYSIS PATIENT (HCC): ICD-10-CM

## 2022-01-06 DIAGNOSIS — E83.41 HYPERMAGNESEMIA: ICD-10-CM

## 2022-01-06 LAB
ALBUMIN SERPL-MCNC: 3.1 G/DL (ref 3.5–5)
ALBUMIN/GLOB SERPL: 0.7 {RATIO} (ref 1.1–2.2)
ALP SERPL-CCNC: 209 U/L (ref 45–117)
ALT SERPL-CCNC: 39 U/L (ref 12–78)
ANION GAP SERPL CALC-SCNC: 14 MMOL/L (ref 5–15)
AST SERPL-CCNC: 26 U/L (ref 15–37)
BASOPHILS # BLD: 0 K/UL (ref 0–0.1)
BASOPHILS NFR BLD: 1 % (ref 0–1)
BILIRUB SERPL-MCNC: 2.3 MG/DL (ref 0.2–1)
BUN SERPL-MCNC: 74 MG/DL (ref 6–20)
BUN/CREAT SERPL: 9 (ref 12–20)
CALCIUM SERPL-MCNC: 8.8 MG/DL (ref 8.5–10.1)
CHLORIDE SERPL-SCNC: 88 MMOL/L (ref 97–108)
CO2 SERPL-SCNC: 27 MMOL/L (ref 21–32)
COMMENT, HOLDF: NORMAL
CREAT SERPL-MCNC: 7.84 MG/DL (ref 0.7–1.3)
DIFFERENTIAL METHOD BLD: ABNORMAL
EOSINOPHIL # BLD: 0 K/UL (ref 0–0.4)
EOSINOPHIL NFR BLD: 1 % (ref 0–7)
ERYTHROCYTE [DISTWIDTH] IN BLOOD BY AUTOMATED COUNT: 13.3 % (ref 11.5–14.5)
GLOBULIN SER CALC-MCNC: 4.7 G/DL (ref 2–4)
GLUCOSE SERPL-MCNC: 127 MG/DL (ref 65–100)
HCT VFR BLD AUTO: 31.8 % (ref 36.6–50.3)
HGB BLD-MCNC: 10.8 G/DL (ref 12.1–17)
IMM GRANULOCYTES # BLD AUTO: 0 K/UL (ref 0–0.04)
IMM GRANULOCYTES NFR BLD AUTO: 0 % (ref 0–0.5)
LIPASE SERPL-CCNC: 394 U/L (ref 73–393)
LYMPHOCYTES # BLD: 0.9 K/UL (ref 0.8–3.5)
LYMPHOCYTES NFR BLD: 29 % (ref 12–49)
MAGNESIUM SERPL-MCNC: 3.5 MG/DL (ref 1.6–2.4)
MCH RBC QN AUTO: 33.3 PG (ref 26–34)
MCHC RBC AUTO-ENTMCNC: 34 G/DL (ref 30–36.5)
MCV RBC AUTO: 98.1 FL (ref 80–99)
MONOCYTES # BLD: 0.4 K/UL (ref 0–1)
MONOCYTES NFR BLD: 11 % (ref 5–13)
NEUTS SEG # BLD: 1.8 K/UL (ref 1.8–8)
NEUTS SEG NFR BLD: 58 % (ref 32–75)
NRBC # BLD: 0 K/UL (ref 0–0.01)
NRBC BLD-RTO: 0 PER 100 WBC
PHOSPHATE SERPL-MCNC: 6.1 MG/DL (ref 2.6–4.7)
PLATELET # BLD AUTO: 152 K/UL (ref 150–400)
PMV BLD AUTO: 9.4 FL (ref 8.9–12.9)
POTASSIUM SERPL-SCNC: 3.5 MMOL/L (ref 3.5–5.1)
PROT SERPL-MCNC: 7.8 G/DL (ref 6.4–8.2)
RBC # BLD AUTO: 3.24 M/UL (ref 4.1–5.7)
SAMPLES BEING HELD,HOLD: NORMAL
SODIUM SERPL-SCNC: 129 MMOL/L (ref 136–145)
TROPONIN-HIGH SENSITIVITY: 53 NG/L (ref 0–76)
WBC # BLD AUTO: 3.1 K/UL (ref 4.1–11.1)

## 2022-01-06 PROCEDURE — 36415 COLL VENOUS BLD VENIPUNCTURE: CPT

## 2022-01-06 PROCEDURE — 83690 ASSAY OF LIPASE: CPT

## 2022-01-06 PROCEDURE — 93005 ELECTROCARDIOGRAM TRACING: CPT

## 2022-01-06 PROCEDURE — 85025 COMPLETE CBC W/AUTO DIFF WBC: CPT

## 2022-01-06 PROCEDURE — 99285 EMERGENCY DEPT VISIT HI MDM: CPT

## 2022-01-06 PROCEDURE — 84484 ASSAY OF TROPONIN QUANT: CPT

## 2022-01-06 PROCEDURE — 80053 COMPREHEN METABOLIC PANEL: CPT

## 2022-01-06 PROCEDURE — 84100 ASSAY OF PHOSPHORUS: CPT

## 2022-01-06 PROCEDURE — 83735 ASSAY OF MAGNESIUM: CPT

## 2022-01-06 PROCEDURE — 71045 X-RAY EXAM CHEST 1 VIEW: CPT

## 2022-01-06 NOTE — DISCHARGE INSTRUCTIONS
Thank you for allowing us to provide you with medical care today. We realize that you have many choices for your emergency care needs. We thank you for choosing [facility]. Please choose us in the future for any continued health care needs. We hope we addressed all of your medical concerns. We strive to provide excellent quality care in the Emergency Department. Anything less than excellent does not meet our expectations. The exam and treatment you received in the Emergency Department were for an emergent problem and are not intended as complete care. It is important that you follow up with a doctor, nurse practitioner, or [address] [place] [address] assistant for ongoing care. If your symptoms worsen or you do not improve as expected and you are unable to reach your usual health care provider, you should return to the Emergency Department. We are available 24 hours a day. Take this sheet with you when you go to your follow-up visit. If you have any problem arranging the follow-up visit, contact the Emergency Department immediately. Make an appointment your family doctor for follow up of this visit. Return to the ER if you are unable to be seen in a timely manner.

## 2022-01-06 NOTE — ED PROVIDER NOTES
Please note that this dictation was completed with InstantQ, the computer voice recognition software.  Quite often unanticipated grammatical, syntax, homophones, and other interpretive errors are inadvertently transcribed by the computer software.  Please disregard these errors.  Please excuse any errors that have escaped final proofreading. 72-year-old male past medical history markable for PEG tube status, right-sided weakness and aphasia secondary to stroke, dysphagia secondary to stroke, end-stage renal disease Monday Wednesday Friday who is been unable to complete the prior 3 sessions (Dr. Felipe Freeman nephrologist), Frias catheter n.p.o. status, seizures, and previous urinary retention issues presents the ER by EMS complaining of \"unable to complete dialysis times the past 3 sessions. \"  Per the patient's wife who accompanies the patient as he is unable to speak, \"we have been trying to get him dialysis the past 3 sessions and unable to do so. He was diagnosed with COVID on Sunday and since then we have not been able to get his dialysis completed. They had spoken with her nephrologist Dr. Adan Patel. Yolis Cosby M.D. sent them to a another facility and in regular today and once they got there at the facility  realized he was COVID-positive, they did not do dialysis. \"  Patient's wife states she is cold all over does not know what else to do brought him here today for dialysis. She had that the patient had a gradually worsening cough x3 days has not had any vomiting any diarrhea.   Denies any overt fevers or chills    pt denies HA, vison changes, diff swallowing, CP, SOB, Abd pain, F/Ch, N/V, D/Cons or other current systemic complaints    Social/ PSH reviewed in EMR    EMR Chart Reviewed           Past Medical History:   Diagnosis Date    Diabetes ([facility]     Dysphagia due to old cerebrovascular accident 03/2019    ESRD (end stage renal disease) on dialysis ([facility]     MWF    Frias catheter in place     PEG [facility]percutaneous [facility] gastrostomy) status ([facility] 3/2019 to present    NPO- comfort foods only    Right sided weakness 3/2019 to present    Seizure ([facility] 03/2019    Stroke ([facility] 03/2019    Urinary retention with incomplete bladder emptying        Past Surgical History:   Procedure Laterality Date    HX ARTERIOVENOUS FISTULA Right 03/2019    HX CYST INCISION AND DRAINAGE Right     2nd toe- Cleaned out infection    HX OTHER SURGICAL  03/2019    PEG tube insertion         Family History:   Problem Relation Age of Onset    Stroke Father     Stroke Sister 48    Deep Vein Thrombosis Neg Hx        Social History     Socioeconomic History    Marital status:      Spouse name: Not on file    Number of children: Not on file    Years of education: Not on file    Highest education level: Not on file   Occupational History    Not on file   Tobacco Use    Smoking status: Never Smoker    Smokeless tobacco: Never Used   Substance and Sexual Activity    Alcohol use: Never     Comment: rare    Drug use: Never    Sexual activity: Not on file   Other Topics Concern    Not on file   Social History Narrative    Not on file     Social Determinants of Health     Financial Resource Strain:     Difficulty of Paying Living Expenses: Not on file   Food Insecurity:     Worried About Running Out of Food in the Last Year: Not on file    Chuy of Food in the Last Year: Not on file   Transportation Needs:     Lack of Transportation (Medical): Not on file    Lack of Transportation (Non-Medical):  Not on file   Physical Activity:     Days of Exercise per Week: Not on file    Minutes of Exercise per Session: Not on file   Stress:     Feeling of Stress : Not on file   Social Connections:     Frequency of Communication with Friends and Family: Not on file    Frequency of Social Gatherings with Friends and Family: Not on file    Attends Jew Services: Not on file    Active Member of Clubs or Organizations: Not on file   Alison Bunn Attends Club or Organization Meetings: Not on file    Marital Status: Not on file   Intimate Partner Violence:     Fear of Current or Ex-Partner: Not on file    Emotionally Abused: Not on file    Physically Abused: Not on file    Sexually Abused: Not on file   Housing Stability:     Unable to Pay for Housing in the Last Year: Not on file    Number of [place] in the Last Year: Not on file    Unstable Housing in the Last Year: Not on file         ALLERGIES: Bee venom protein (honey bee) and Hydralazine    Review of Systems   Constitutional: Negative for chills and fever. HENT: Negative for drooling, trouble swallowing and voice change. Eyes: Negative for visual disturbance. Respiratory: Positive for cough. Cardiovascular: Positive for leg swelling. Gastrointestinal: Negative for abdominal pain, diarrhea, nausea and vomiting. Genitourinary: Negative for dysuria. Musculoskeletal: Negative for back pain. Neurological: Negative for facial asymmetry and speech difficulty. Psychiatric/Behavioral: Negative for confusion. All other systems reviewed and are negative. Vitals:    01/07/22 0615 01/07/22 0630 01/07/22 0645 01/07/22 0700   BP: (!) 154/69 (!) 161/78 (!) 150/74 (!) 162/72   Pulse: 85 86 84 79   Resp: 21 20 18 20   Temp:       TempSrc:       SpO2:       Weight:       Height:                Physical Exam  Vitals and nursing note reviewed. Constitutional:       General: He is not in acute distress. Appearance: Normal appearance. He is well-developed. Comments: NAD, AxOx4, not speaking but nods, shakes head; R sided paralysis noted;      HENT:      Head: Normocephalic and atraumatic. Right Ear: External ear normal.      Left Ear: External ear normal.      Nose: Nose normal.      Mouth/Throat:      Pharynx: No oropharyngeal exudate. Eyes:      General: No scleral icterus. Right eye: No discharge. Left eye: No discharge.       Extraocular Movements: Extraocular movements intact. Conjunctiva/sclera: Conjunctivae normal.      Pupils: Pupils are equal, round, and reactive to light. Cardiovascular:      Rate and Rhythm: Normal rate and regular rhythm. Pulses: Normal pulses. Heart sounds: Murmur heard. No friction rub. No gallop. Pulmonary:      Effort: Pulmonary effort is normal. No respiratory distress. Breath sounds: Normal breath sounds. No stridor. No wheezing, rhonchi or rales. Chest:      Chest wall: No tenderness. Abdominal:      General: Bowel sounds are normal. There is no distension. Palpations: Abdomen is soft. There is no mass. Tenderness: There is no abdominal tenderness. There is no guarding or rebound. Hernia: No hernia is present. Comments: nttp       Genitourinary:     Comments: Pt denies urinary/ Testicular/ scrotal or penile  complaints  Musculoskeletal:         General: No swelling, tenderness, deformity or signs of injury. Normal range of motion. Cervical back: Normal range of motion and neck supple. Right lower leg: No edema. Left lower leg: No edema. Comments: RUE - noted fistula/ good bruit;    Lymphadenopathy:      Cervical: No cervical adenopathy. Skin:     General: Skin is warm and dry. Capillary Refill: Capillary refill takes less than 2 seconds. Coloration: Skin is not jaundiced or pale. Findings: No bruising, erythema, lesion or rash. Neurological:      Mental Status: He is alert and oriented to person, place, and time. Mental status is at baseline. Cranial Nerves: No cranial nerve deficit. Sensory: Sensory deficit present. Motor: No weakness. Coordination: Coordination normal.      Gait: Gait abnormal.      Deep Tendon Reflexes: Reflexes abnormal.          MDM       Procedures    Chief Complaint   Patient presents with    Other     covid + needed dialysis. Last dialysis was Monday and he missed yesterday.   Each time he tried to go to a center he was declined because of Covid which he was diagnosed on Monday with a home test.       2:31 PM  The patients presenting problems have been discussed, and they are in agreement with the care plan formulated and outlined with them. I have encouraged them to ask questions as they arise throughout their visit. MEDICATIONS GIVEN:  Medications - No data to display    LABS REVIEWED:  Labs Reviewed   CBC WITH AUTOMATED DIFF   LIPASE   SAMPLES BEING HELD   TROPONIN-HIGH SENSITIVITY   METABOLIC PANEL, COMPREHENSIVE   MAGNESIUM   PHOSPHORUS       RADIOLOGY RESULTS:  The following have been ordered and reviewed:  _____________________________________________________________________  _____________________________________________________________________    EKG interpretation:   Rhythm: normal sinus rhythm; and regular . Rate (approx.): 84; Axis: normal; P wave: normal; QRS interval: normal ; ST/T wave: normal; Negative acute significant segmental elevations    PROCEDURES:        CONSULTATIONS:       PROGRESS NOTES:      DIAGNOSIS:    1. Hyperphosphatemia    2. Hypermagnesemia    3. Dialysis patient [facility])              ED COURSE: The patients hospital course has been uncomplicated. 3:43 PM  Notified by nursing Dr. Wojciech Price with nephrology a call back and is arranging nephrology for the patient currently. Patient and her wife have also been told of plans, and agrees. 6:17 PM  Told wife I am not sure when the dialysis team would be arriving but that they would be arriving. 2030   Change of shift. Care of patient signed over to [facility]. Bedside handoff complete. Awaiting Dialysis.

## 2022-01-06 NOTE — ED NOTES
Pt appears well, VSS, hydrated, skin in tact. No complaints from patient. Wife at bedside to assist with translation. Nurse spoke to nephrologist who will contact [facility] and have them come here to give him dialysis. Call bell within reach.

## 2022-01-06 NOTE — PROGRESS NOTES
dicatted  Not seen in person given covid  No labs yet  Hd today then tts  Will likely need [facility] tts cohort unit arrange prior to dc

## 2022-01-07 VITALS
DIASTOLIC BLOOD PRESSURE: 73 MMHG | HEIGHT: 67 IN | TEMPERATURE: 97.8 F | BODY MASS INDEX: 27.47 KG/M2 | HEART RATE: 94 BPM | OXYGEN SATURATION: 100 % | RESPIRATION RATE: 20 BRPM | WEIGHT: 175 LBS | SYSTOLIC BLOOD PRESSURE: 157 MMHG

## 2022-01-07 LAB
ATRIAL RATE: 84 BPM
CALCULATED P AXIS, ECG09: 28 DEGREES
CALCULATED R AXIS, ECG10: -9 DEGREES
CALCULATED T AXIS, ECG11: 59 DEGREES
DIAGNOSIS, 93000: NORMAL
HBV SURFACE AB SER QL: REACTIVE
HBV SURFACE AB SER-ACNC: 642.32 MIU/ML
HBV SURFACE AG SER QL: <0.1 INDEX
HBV SURFACE AG SER QL: NEGATIVE
P-R INTERVAL, ECG05: 180 MS
Q-T INTERVAL, ECG07: 368 MS
QRS DURATION, ECG06: 90 MS
QTC CALCULATION (BEZET), ECG08: 434 MS
VENTRICULAR RATE, ECG03: 84 BPM

## 2022-01-07 PROCEDURE — U0005 INFEC AGEN DETEC AMPLI PROBE: HCPCS

## 2022-01-07 PROCEDURE — 86706 HEP B SURFACE ANTIBODY: CPT

## 2022-01-07 PROCEDURE — 90935 HEMODIALYSIS ONE EVALUATION: CPT

## 2022-01-07 PROCEDURE — 36415 COLL VENOUS BLD VENIPUNCTURE: CPT

## 2022-01-07 PROCEDURE — 87340 HEPATITIS B SURFACE AG IA: CPT

## 2022-01-07 NOTE — PROGRESS NOTES
HD early this am. Nesha well. Will need cohort clinic for dialysis as outpt  He is supposed to go to [facility].  If discharged, he can go to dialysis there tomorrow

## 2022-01-07 NOTE — ED NOTES
11:41 PM  Change of shift. Care of patient taken over from Dr Kristian Carrizales; H&P reviewed, bedside handoff complete. Awaiting dialysis. Nephrology aware. Discussed with charge RN. Pt to be dialyzed at 7 AM. Signed out to Dr Rudy Brower for further management.      Duc Gonzalez MD

## 2022-01-07 NOTE — CONSULTS
[address]     Name:  Radha Briones  MR#:  [id number]  :  1960  ACCOUNT #:  [de-identified]  DATE OF SERVICE:  2022      RENAL CONSULTATION    REASON FOR CONSULTATION:  ESRD. HISTORY OF PRESENT ILLNESS:  This is a 24-year-old male who has not dialyzed for 3 days. He presented to the [facility] emergency room. Apparently, he did a self test for COVID at home, found to be positive. He, per my discussion with the nurse in the emergency room, was unable to dialyze at his primary unit, presented to the [facility] and going to be admitted. He is not evaluated personally. He is COVID positive, the room not entered. I did speak with his nurse, however. His chart has been reviewed. PAST MEDICAL HISTORY:  He has the following past medical history:  1. Status post PEG. 2.  Right-sided weakness. 3.  Aphagia. 4.  Stroke. 5.  ESRD on hemodialysis Monday, Wednesday, Friday. 6.  Status post Frias catheter. 7.  History of seizure disorder. 8.  Urinary retention. CURRENT INPATIENT MEDICATIONS:  None. PHYSICAL EXAMINATION:  VITAL SIGNS:  His blood pressure documented is 140/69, temp 99.1, oxygen saturation 98%, his respiratory rate is 24. The exam is deferred. LABORATORY DATA:  None available (apparently being drawn). ASSESSMENT AND PLAN:  The patient has no dialysis unit to dialyzed at currently, it would appear. I am not sure all the \"social issues,\" there is a [facility] unit on the [place], transportation may be an issue for the patient. Given that he is being admitted to the hospital, we will plan his dialysis today and Tuesday, Thursday, Saturday (to get him in sync with the potential outpatient [address] [place] [address] unit). I have ordered DAMIR therapy with his dialysis, but we do not yet know his hematocrit. My partner will follow up tomorrow.   I alerted Angie to the presence of the patient to the hospital, will plan dialysis today, then Tuesday, Thursday, Saturday as above. Thank you for the consult.       Filiberto Oliver MD      [id number]  D:  01/06/2022 15:17  T:  01/06/2022 19:04  JOB #:  [id number]

## 2022-01-07 NOTE — DIALYSIS
Hemodialysis / [phone]    Vitals Pre Post Assessment Pre Post   BP BP: (!) 154/69 (01/07/22 0615) 157/73 LOC A&O x 3 A&O x 3   HR Pulse (Heart Rate): 85 (resting, eyes closed) (01/07/22 0615) 94 Lungs congestion congestion   Resp Resp Rate: 21 (01/07/22 0615) 20 Cardiac regular regular   Temp Temp: 98.2 °F (36.8 °C) (01/07/22 0440) 97.8 Skin warm warm   Weight Pre-Dialysis Weight: 86.9 kg (191 lb 9.3 oz) (01/07/22 0440) 84.1 kg Edema generalized No edema   Tele status bedside bedside Pain Pain Intensity 1: 0 (01/06/22 1423) No pain     Orders   Duration: Start: 0445 End: 0830 Total: 3.75 hr   Dialyzer: Dialyzer/Set Up Inspection: Clarke Murillo ([id number]) (01/07/22 0440)   K Bath: Dialysate K (mEq/L): 2 (01/07/22 0440)   Ca Bath: Dialysate CA (mEq/L): 2.5 (01/07/22 0440)   Na: Dialysate NA (mEq/L): 139 (01/07/22 0440)   Bicarb: Dialysate HCO3 (mEq/L): 38 (01/07/22 0440)   Target Fluid Removal: Goal/Amount of Fluid to Remove (mL): 2000 mL (01/07/22 0440)     Access   Type & Location: SARAH AVF   Comments:                          Patent, B&T positive, cannulated with 15 g needles x 2              Labs   HBsAg (Antigen) / date:        01/07/22 Negative                                       HBsAb (Antibody) / date: 01/07/22 Immune   Source: [facility]   Obtained/Reviewed  Critical Results Called HGB   Date Value Ref Range Status   01/06/2022 10.8 (L) 12.1 - 17.0 g/dL Final     Potassium   Date Value Ref Range Status   01/06/2022 3.5 3.5 - 5.1 mmol/L Final     Calcium   Date Value Ref Range Status   01/06/2022 8.8 8.5 - 10.1 MG/DL Final     BUN   Date Value Ref Range Status   01/06/2022 74 (H) 6 - 20 MG/DL Final     Creatinine   Date Value Ref Range Status   01/06/2022 7.84 (H) 0.70 - 1.30 MG/DL Final        Meds Given   Name Dose Route                    Adequacy / Fluid    Total Liters Process: 83 L   Net Fluid Removed: 2500 ml      Comments   Time Out Done:   (Time) Yes, 0440   Admitting Diagnosis: Renal failure   Consent obtained/signed:  yes   Machine / RO # Machine Number: [id number] (01/07/22 0610)   Primary Nurse Rpt Pre: Rebel Meade RN   Primary Nurse Rpt Post:    Pt Education: Yes, r/t dialysis process   Care Plan: Continue HD as ordered   Pts outpatient clinic:      Tx Summary   Comments: Tolerated tx well, at end, all blood in circuit returned with 300 ml NS, SARAH AVF patent, B&T positive, bleeding stopped at both sites, pressure dressing in place.

## 2022-01-07 NOTE — ED NOTES
Dialysis nurse at bedside, pt. Resting in stretcher at this time.  Wife is attempting to set up transport at this time for 10am.

## 2022-01-08 LAB
SARS-COV-2, XPLCVT: DETECTED
SOURCE, COVRS: ABNORMAL

## 2022-01-10 ENCOUNTER — PATIENT OUTREACH (OUTPATIENT)
Dept: CASE MANAGEMENT | Age: 62
End: 2022-01-10

## 2022-01-10 NOTE — PROGRESS NOTES
Patient contacted regarding COVID-19 diagnosis. Discussed COVID-19 related testing which was available at this time. Test results were positive. Patient informed of results, if available? yes. Ambulatory Care Manager contacted the family by telephone to perform post discharge assessment. Call within 2 business days of discharge: Yes Verified name and  with family as identifiers. Provided introduction to self, and explanation of the CTN/ACM role, and reason for call due to risk factors for infection and/or exposure to COVID-19. Symptoms reviewed with family who verbalized the following symptoms: fatigue, no new symptoms and no worsening symptoms      Due to no new or worsening symptoms encounter was not routed to provider for escalation. Discussed follow-up appointments. If no appointment was previously scheduled, appointment scheduling offered: They have been in close contact with PCP and specialty doctors. Mando Rosen Dr follow up appointment(s): No future appointments. Interventions to address risk factors: Wife is caring for patient, she is in close contact with specialist and PCP to ensure patient is being taken care of. He is still able to go to [facility]. Advance Care Planning:   Does patient have an Advance Directive: not on file. Educated patient about risk for severe COVID-19 due to risk factors according to CDC guidelines. ACM reviewed discharge instructions, medical action plan and red flag symptoms with the family who verbalized understanding. Discussed COVID vaccination status: yes. Education provided on COVID-19 vaccination as appropriate. Discussed exposure protocols and quarantine with CDC Guidelines. Family was given an opportunity to verbalize any questions and concerns and agrees to contact ACM or health care provider for questions related to their healthcare.     Reviewed and educated family on any new and changed medications related to discharge diagnosis     Was patient discharged with a pulse oximeter? no Discussed and confirmed pulse oximeter discharge instructions and when to notify provider or seek emergency care. ACM provided contact information. Plan for follow-up call in 5-7 days based on severity of symptoms and risk factors.

## 2022-01-17 ENCOUNTER — PATIENT OUTREACH (OUTPATIENT)
Dept: CASE MANAGEMENT | Age: 62
End: 2022-01-17

## 2022-01-17 NOTE — PROGRESS NOTES
Patient resolved from Transition of Care episode on 01/17/22    . ACM/CTN was unsuccessful at contacting this patient today. Patient/family was provided the following resources and education related to COVID-19 during the initial call:                         Signs, symptoms and red flags related to COVID-19            CDC exposure and quarantine guidelines            [facility] exposure contact - [phone]            Contact for their local Department of Health                 Patient has not had any additional ED or hospital visits. No further outreach scheduled with this CTN/ACM. Episode of Care resolved. Patient has this CTN/ACM contact information if future needs arise.

## 2022-03-18 PROBLEM — D62 ACUTE BLOOD LOSS ANEMIA: Status: ACTIVE | Noted: 2020-06-30

## 2022-03-18 PROBLEM — I10 HTN (HYPERTENSION): Status: ACTIVE | Noted: 2020-06-30

## 2022-03-18 PROBLEM — R33.9 URINARY RETENTION: Status: ACTIVE | Noted: 2020-06-30

## 2022-03-19 PROBLEM — I63.9 STROKE (CEREBRUM) (HCC): Status: ACTIVE | Noted: 2020-06-30

## 2022-03-19 PROBLEM — R31.9 HEMATURIA: Status: ACTIVE | Noted: 2020-06-30

## 2022-03-20 PROBLEM — N18.6 ESRD (END STAGE RENAL DISEASE) (HCC): Status: ACTIVE | Noted: 2020-06-30

## 2022-10-29 ENCOUNTER — HOSPITAL ENCOUNTER (EMERGENCY)
Age: 62
Discharge: HOME | End: 2022-10-29
Attending: STUDENT IN AN ORGANIZED HEALTH CARE EDUCATION/TRAINING PROGRAM
Payer: MEDICARE

## 2022-10-29 ENCOUNTER — APPOINTMENT (OUTPATIENT)
Dept: GENERAL RADIOLOGY | Age: 62
End: 2022-10-29
Attending: STUDENT IN AN ORGANIZED HEALTH CARE EDUCATION/TRAINING PROGRAM
Payer: MEDICARE

## 2022-10-29 VITALS
TEMPERATURE: 98.5 F | BODY MASS INDEX: 26.68 KG/M2 | HEIGHT: 67 IN | SYSTOLIC BLOOD PRESSURE: 159 MMHG | HEART RATE: 84 BPM | OXYGEN SATURATION: 100 % | WEIGHT: 169.97 LBS | DIASTOLIC BLOOD PRESSURE: 78 MMHG | RESPIRATION RATE: 16 BRPM

## 2022-10-29 DIAGNOSIS — T85.528A DISLODGED GASTROSTOMY TUBE: Primary | ICD-10-CM

## 2022-10-29 PROCEDURE — 74011000636 HC RX REV CODE- 636: Performed by: STUDENT IN AN ORGANIZED HEALTH CARE EDUCATION/TRAINING PROGRAM

## 2022-10-29 PROCEDURE — 75810000109 HC GASTRO TUBE CHANGE

## 2022-10-29 PROCEDURE — 99283 EMERGENCY DEPT VISIT LOW MDM: CPT

## 2022-10-29 PROCEDURE — 74018 RADEX ABDOMEN 1 VIEW: CPT

## 2022-10-29 PROCEDURE — 49465 FLUORO EXAM OF G/COLON TUBE: CPT

## 2022-10-29 RX ADMIN — DIATRIZOATE MEGLUMINE AND DIATRIZOATE SODIUM 30 ML: 660; 100 LIQUID ORAL; RECTAL at 15:39

## 2022-10-29 NOTE — ED PROVIDER NOTES
EMERGENCY DEPARTMENT HISTORY AND PHYSICAL EXAM      Date: 10/29/2022  Patient Name: Kt Valentine    History of Presenting Illness     HPI: Kt Valentine, 58 y.o. male with past medical history of CVA and subsequent dysphagia status post PEG tube placement in 2019, presents to the ED with cc of PEG tube dislodgement just PTA. Per wife, she suspect the peg tube balloon has been deflating from a small leak for a while as it has been making a \"whistling\" noise over recent past. States today it just came out on its own atraumatically. The dislodged tube did indeed show a completely deflated balloon as wife had suspected. Patient has no associated abdominal pain. No N/V/D. Patient normally uses 20 [place] PEG tube. PCP: Vick Yoo NP    No current facility-administered medications on file prior to encounter. Current Outpatient Medications on File Prior to Encounter   Medication Sig Dispense Refill    aspirin delayed-release 81 mg tablet Take 1 Tab by mouth daily. Do not resume this until you have been instructed to do so by your primary care doctor or by your urologist.   Follow up within 3-5 days 30 Tab 1    OTHER,NON-FORMULARY, PT / OT evaluate and treat 1 Each 0    scopolamine (TRANSDERM-SCOP) 1 mg over 3 days pt3d 1 Patch by TransDERmal route every seventy-two (72) hours. insulin aspart U-100 (NovoLOG Flexpen U-100 Insulin) 100 unit/mL (3 mL) inpn by SubCUTAneous route two (2) times daily (with meals). Sliding scale       vitamin a & d (A&D) ointment Apply  to affected area as needed for Skin Irritation. terazosin (HYTRIN) 5 mg capsule Take 5 mg by mouth two (2) times a day. ondansetron (ZOFRAN ODT) 4 mg disintegrating tablet Take 4 mg by mouth every eight (8) hours as needed for Nausea or Vomiting.      lacosamide (Vimpat) 10 mg/mL soln oral solution Take 10 mL by mouth two (2) times a day. 10 ml = 100 mg      isosorbide mononitrate ER (IMDUR) 30 mg tablet Take 30 mg by mouth daily. guaifen/dextromethorphan/pe (GUAIFEN DM PO) Take 10 mL by mouth two (2) times daily as needed (cough). bisacodyL (DULCOLAX) 10 mg suppository Insert 10 mg into rectum daily as needed for Constipation. ascorbic acid, vitamin C, (VITAMIN C) 500 mg tablet Take 500 mg by mouth daily. amLODIPine (NORVASC) 10 mg tablet Take 10 mg by mouth every evening. albuterol (ACCUNEB) 1.25 mg/3 mL nebu 1.25 mg by Nebulization route every six (6) hours as needed for Wheezing. acetaminophen (TYLENOL) 160 mg/5 mL liquid Take 320 mg by mouth every six (6) hours as needed for Fever. losartan (COZAAR) 25 mg tablet Take 25 mg by mouth daily. Past History     Past Medical History:  Past Medical History:   Diagnosis Date    Diabetes ([facility] [address]     Dysphagia due to old cerebrovascular accident 03/2019    ESRD (end stage renal disease) on dialysis [facility])     MWF    Frias catheter in place     PEG (percutaneous endoscopic gastrostomy) status ([address] 3/2019 to present    NPO- comfort foods only    Right sided weakness 3/2019 to present    Seizure ([facility] [address] 03/2019    Stroke ([facility] [address] 03/2019    Urinary retention with incomplete bladder emptying        Past Surgical History:  Past Surgical History:   Procedure Laterality Date    HX ARTERIOVENOUS FISTULA Right 03/2019    HX CYST INCISION AND DRAINAGE Right     2nd toe- Cleaned out infection    HX OTHER SURGICAL  03/2019    PEG tube insertion       Family History:  Family History   Problem Relation Age of Onset    Stroke Father     Stroke Sister 48    Deep Vein Thrombosis Neg Hx        Social History:  Social History     Tobacco Use    Smoking status: Never    Smokeless tobacco: Never   Substance Use Topics    Alcohol use: Never     Comment: rare    Drug use: Never       Allergies:   Allergies   Allergen Reactions    Bee Venom Protein (Honey Bee) Anaphylaxis    Fentanyl Other (comments)     Hallucinations      Oxycodone Other (comments)     Hallucinations      Hydralazine Rash Review of Systems   Review of Systems   All other systems reviewed and are negative. Physical Exam   Physical Exam  Vitals and nursing note reviewed. Constitutional:       General: He is not in acute distress. Appearance: Normal appearance. He is not ill-appearing or toxic-appearing. HENT:      Head: Normocephalic and atraumatic. Nose: Nose normal.      Mouth/Throat:      Mouth: Mucous membranes are moist.   Eyes:      Extraocular Movements: Extraocular movements intact. Pupils: Pupils are equal, round, and reactive to light. Cardiovascular:      Rate and Rhythm: Normal rate and regular rhythm. Pulses: Normal pulses. Pulmonary:      Effort: Pulmonary effort is normal. No respiratory distress. Breath sounds: Normal breath sounds. No stridor. No wheezing or rhonchi. Abdominal:      General: Abdomen is flat. There is no distension. Palpations: There is no mass. Tenderness: There is no abdominal tenderness. Musculoskeletal:         General: Normal range of motion. Cervical back: Normal range of motion and neck supple. Skin:     General: Skin is warm and dry. Neurological:      General: No focal deficit present. Mental Status: He is alert and oriented to person, place, and time. Mental status is at baseline. Sensory: No sensory deficit. Motor: No weakness. Comments: Baseline neurodeficits including aphasia noted, no new focal deficits. Pt understanding of instructions and can respond to questions via nodding, shaking of head, and simple hand gestures        Diagnostic Study Results     Labs -   No results found for this or any previous visit (from the past 24 hour(s)).     Radiologic Studies -   No orders to display     CT Results  (Last 48 hours)      None          CXR Results  (Last 48 hours)      None            Medical Decision Making   I, Lakeisha Beach MD-- am the first provider for this patient, and I am the attending of record for this patient encounter. I reviewed the vital signs, available nursing notes, past medical history, past surgical history, family history and social history. Vital Signs-Reviewed the patient's vital signs. Patient Vitals for the past 24 hrs:   Temp Pulse Resp BP SpO2   10/29/22 1424 98.5 °F (36.9 °C) 84 16 (!) 158/82 100 %     Records Reviewed: Nursing Notes and Old Medical Records    Provider Notes (Medical Decision Making):   22-year-old male presenting after atraumatic dislodgment of PEG tube at home, likely secondary to deflated cuff balloon. He has a mature tube tract as original PEG tube was placed in 2019. Abdominal exam in the ED today is completely benign. PEG tube stoma without signs of trauma, appearing well vascularized. Will plan to replace PEG tube at bedside in the ED. Procedure Note - PEG Tube Placement  3:39 PM   Performed by: Alex Juárez MD    Patient has a mature PEG tube tract that is amenable to ED bedside tube replacement, with no immediate contraindications identified. Aseptic technique used for new PEG tube insertion. A 20 Fr G-tube was lubricated with water based jelly, and subsequently inserted without difficulty into patient's G tube stoma. Balloon was inflated with 6 cc of saline (which is the quantity of volume needed per manufacture's instructions). PEG tube was tugged gently to ensure good tension against the abdominal wall after insertion. Correct positioning within gastric lumen confirmed with gastrografin KUB study. Estimated blood loss: 0  The procedure took 1-15 minutes, and pt tolerated well. ED Course:   Initial assessment performed. The patient's presenting problems have been discussed, and they are in agreement with the care plan formulated and outlined with them. I have encouraged them to ask questions as they arise throughout their visit. Alex Juárez MD      Disposition:  DC      DISCHARGE PLAN:  1.    Discharge Medication List as of 10/29/2022  4:33 PM        2. Follow-up Information       Follow up With Specialties Details Why Contact Info    Your GI specialist  Schedule an appointment as soon as possible for a visit in 1 week            3. Return to ED if worse     Diagnosis     Clinical Impression:   1. Dislodged gastrostomy tube        Attestations:    Arpita Ybarra MD    Please note that this dictation was completed with [employer], the computer voice recognition software. Quite often unanticipated grammatical, syntax, homophones, and other interpretive errors are inadvertently transcribed by the computer software. Please disregard these errors. Please excuse any errors that have escaped final proofreading. Thank you.

## 2023-02-17 ENCOUNTER — APPOINTMENT (OUTPATIENT)
Dept: ULTRASOUND IMAGING | Age: 63
DRG: 252 | End: 2023-02-17
Attending: EMERGENCY MEDICINE
Payer: MEDICARE

## 2023-02-17 ENCOUNTER — HOSPITAL ENCOUNTER (INPATIENT)
Age: 63
LOS: 4 days | Discharge: HOME HEALTH | DRG: 252 | End: 2023-02-21
Attending: EMERGENCY MEDICINE | Admitting: INTERNAL MEDICINE
Payer: MEDICARE

## 2023-02-17 DIAGNOSIS — I82.621 ACUTE DEEP VEIN THROMBOSIS (DVT) OF OTHER VEIN OF RIGHT UPPER EXTREMITY (HCC): ICD-10-CM

## 2023-02-17 DIAGNOSIS — L98.8 FISTULA: ICD-10-CM

## 2023-02-17 DIAGNOSIS — L03.113 RIGHT ARM CELLULITIS: ICD-10-CM

## 2023-02-17 DIAGNOSIS — N18.6 ESRD (END STAGE RENAL DISEASE) (HCC): Primary | ICD-10-CM

## 2023-02-17 PROBLEM — R60.9 SWELLING: Status: ACTIVE | Noted: 2023-02-17

## 2023-02-17 LAB
ANION GAP SERPL CALC-SCNC: 10 MMOL/L (ref 5–15)
APTT PPP: 27.4 SEC (ref 22.1–31)
BUN SERPL-MCNC: 25 MG/DL (ref 6–20)
BUN/CREAT SERPL: 8 (ref 12–20)
CALCIUM SERPL-MCNC: 8.7 MG/DL (ref 8.5–10.1)
CHLORIDE SERPL-SCNC: 97 MMOL/L (ref 97–108)
CO2 SERPL-SCNC: 31 MMOL/L (ref 21–32)
CREAT SERPL-MCNC: 3.11 MG/DL (ref 0.7–1.3)
GLUCOSE BLD STRIP.AUTO-MCNC: 112 MG/DL (ref 65–117)
GLUCOSE SERPL-MCNC: 119 MG/DL (ref 65–100)
LACTATE SERPL-SCNC: 0.7 MMOL/L (ref 0.4–2)
POTASSIUM SERPL-SCNC: 3.5 MMOL/L (ref 3.5–5.1)
PROCALCITONIN SERPL-MCNC: 1.92 NG/ML
SERVICE CMNT-IMP: NORMAL
SODIUM SERPL-SCNC: 138 MMOL/L (ref 136–145)
THERAPEUTIC RANGE,PTTT: NORMAL SECS (ref 58–77)

## 2023-02-17 PROCEDURE — 74011000250 HC RX REV CODE- 250: Performed by: INTERNAL MEDICINE

## 2023-02-17 PROCEDURE — 74011250636 HC RX REV CODE- 250/636: Performed by: INTERNAL MEDICINE

## 2023-02-17 PROCEDURE — 80048 BASIC METABOLIC PNL TOTAL CA: CPT

## 2023-02-17 PROCEDURE — 36415 COLL VENOUS BLD VENIPUNCTURE: CPT

## 2023-02-17 PROCEDURE — 5A1D70Z PERFORMANCE OF URINARY FILTRATION, INTERMITTENT, LESS THAN 6 HOURS PER DAY: ICD-10-PCS | Performed by: SURGERY

## 2023-02-17 PROCEDURE — 85025 COMPLETE CBC W/AUTO DIFF WBC: CPT

## 2023-02-17 PROCEDURE — 93970 EXTREMITY STUDY: CPT

## 2023-02-17 PROCEDURE — 84145 PROCALCITONIN (PCT): CPT

## 2023-02-17 PROCEDURE — 85730 THROMBOPLASTIN TIME PARTIAL: CPT

## 2023-02-17 PROCEDURE — 83605 ASSAY OF LACTIC ACID: CPT

## 2023-02-17 PROCEDURE — 99285 EMERGENCY DEPT VISIT HI MDM: CPT

## 2023-02-17 PROCEDURE — 65270000029 HC RM PRIVATE

## 2023-02-17 PROCEDURE — 74011250636 HC RX REV CODE- 250/636: Performed by: EMERGENCY MEDICINE

## 2023-02-17 PROCEDURE — 74011000250 HC RX REV CODE- 250: Performed by: EMERGENCY MEDICINE

## 2023-02-17 PROCEDURE — 82962 GLUCOSE BLOOD TEST: CPT

## 2023-02-17 RX ORDER — CODEINE PHOSPHATE AND GUAIFENESIN 10; 100 MG/5ML; MG/5ML
5 SOLUTION ORAL
COMMUNITY

## 2023-02-17 RX ORDER — FAMOTIDINE 20 MG/1
20 TABLET, FILM COATED ORAL 2 TIMES DAILY
COMMUNITY

## 2023-02-17 RX ORDER — ONDANSETRON 2 MG/ML
4 INJECTION INTRAMUSCULAR; INTRAVENOUS
Status: DISCONTINUED | OUTPATIENT
Start: 2023-02-17 | End: 2023-02-21 | Stop reason: HOSPADM

## 2023-02-17 RX ORDER — FAMOTIDINE 20 MG/1
20 TABLET, FILM COATED ORAL
Status: DISCONTINUED | OUTPATIENT
Start: 2023-02-17 | End: 2023-02-17 | Stop reason: SDUPTHER

## 2023-02-17 RX ORDER — HEPARIN SODIUM 1000 [USP'U]/ML
80 INJECTION, SOLUTION INTRAVENOUS; SUBCUTANEOUS ONCE
Status: COMPLETED | OUTPATIENT
Start: 2023-02-17 | End: 2023-02-17

## 2023-02-17 RX ORDER — DEXTROSE MONOHYDRATE 100 MG/ML
0-250 INJECTION, SOLUTION INTRAVENOUS AS NEEDED
Status: DISCONTINUED | OUTPATIENT
Start: 2023-02-17 | End: 2023-02-21 | Stop reason: HOSPADM

## 2023-02-17 RX ORDER — HYDROXYZINE HYDROCHLORIDE 25 MG/ML
25 INJECTION, SOLUTION INTRAMUSCULAR ONCE
Status: COMPLETED | OUTPATIENT
Start: 2023-02-17 | End: 2023-02-17

## 2023-02-17 RX ORDER — LANOLIN ALCOHOL/MO/W.PET/CERES
300 CREAM (GRAM) TOPICAL
COMMUNITY
End: 2023-02-21

## 2023-02-17 RX ORDER — VANCOMYCIN 1.75 GRAM/500 ML IN 0.9 % SODIUM CHLORIDE INTRAVENOUS
1750 ONCE
Status: DISCONTINUED | OUTPATIENT
Start: 2023-02-17 | End: 2023-02-17

## 2023-02-17 RX ORDER — SODIUM CHLORIDE 0.9 % (FLUSH) 0.9 %
5-40 SYRINGE (ML) INJECTION AS NEEDED
Status: DISCONTINUED | OUTPATIENT
Start: 2023-02-17 | End: 2023-02-21 | Stop reason: HOSPADM

## 2023-02-17 RX ORDER — SODIUM CHLORIDE 0.9 % (FLUSH) 0.9 %
5-40 SYRINGE (ML) INJECTION EVERY 8 HOURS
Status: DISCONTINUED | OUTPATIENT
Start: 2023-02-17 | End: 2023-02-21 | Stop reason: HOSPADM

## 2023-02-17 RX ORDER — IBUPROFEN 200 MG
4 TABLET ORAL AS NEEDED
Status: DISCONTINUED | OUTPATIENT
Start: 2023-02-17 | End: 2023-02-21 | Stop reason: HOSPADM

## 2023-02-17 RX ORDER — INSULIN LISPRO 100 [IU]/ML
INJECTION, SOLUTION INTRAVENOUS; SUBCUTANEOUS
Status: DISCONTINUED | OUTPATIENT
Start: 2023-02-17 | End: 2023-02-21 | Stop reason: HOSPADM

## 2023-02-17 RX ORDER — ACETAMINOPHEN 325 MG/1
650 TABLET ORAL
Status: DISCONTINUED | OUTPATIENT
Start: 2023-02-17 | End: 2023-02-21 | Stop reason: HOSPADM

## 2023-02-17 RX ORDER — HYDROXYZINE 25 MG/1
25 TABLET, FILM COATED ORAL
Status: DISCONTINUED | OUTPATIENT
Start: 2023-02-17 | End: 2023-02-17 | Stop reason: SDUPTHER

## 2023-02-17 RX ORDER — NALOXONE HYDROCHLORIDE 0.4 MG/ML
0.4 INJECTION, SOLUTION INTRAMUSCULAR; INTRAVENOUS; SUBCUTANEOUS AS NEEDED
Status: DISCONTINUED | OUTPATIENT
Start: 2023-02-17 | End: 2023-02-21 | Stop reason: HOSPADM

## 2023-02-17 RX ADMIN — FAMOTIDINE 20 MG: 10 INJECTION, SOLUTION INTRAVENOUS at 21:57

## 2023-02-17 RX ADMIN — HYDROXYZINE HYDROCHLORIDE 25 MG: 25 INJECTION, SOLUTION INTRAMUSCULAR at 21:49

## 2023-02-17 RX ADMIN — CEFEPIME 2 G: 20 INJECTION, POWDER, FOR SOLUTION INTRAVENOUS at 22:26

## 2023-02-17 RX ADMIN — SODIUM CHLORIDE, PRESERVATIVE FREE 10 ML: 5 INJECTION INTRAVENOUS at 22:35

## 2023-02-17 RX ADMIN — HEPARIN SODIUM 6160 UNITS: 1000 INJECTION INTRAVENOUS; SUBCUTANEOUS at 22:17

## 2023-02-17 RX ADMIN — VANCOMYCIN HYDROCHLORIDE 1000 MG: 1 INJECTION, POWDER, LYOPHILIZED, FOR SOLUTION INTRAVENOUS at 22:32

## 2023-02-17 NOTE — ED PROVIDER NOTES
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 Pt is nonverbal. Hx per wife. She reports b/l upper arm redness and swelling R>L. Has severe itching but no pain. No new weakness or sensory changes (has residual right sided deficits from prior stroke). No F/C, cough, N/V/D, dyspnea or chest pain. Wife also notes a blistering and skin rash to his arms and legs since 9/2022. Hasn't been able to see PCP or dermatology for this but has appointments w/ both. No oral or  lesions. No new medications except vimpat was increased. Otherwise no new foods, lotions, detergents. Was sent to ED by [facility] health nurse. Last dialysis today.            Past Medical History:   Diagnosis Date    Diabetes ([address]     Dysphagia due to old cerebrovascular accident 03/2019    ESRD (end stage renal disease) on dialysis [facility])     MWF    Frias catheter in place     PEG (percutaneous endoscopic gastrostomy) status ([address] 3/2019 to present    NPO- comfort foods only    Right sided weakness 3/2019 to present    Seizure ([address] 03/2019    Stroke [address] [address] 03/2019    Urinary retention with incomplete bladder emptying        Past Surgical History:   Procedure Laterality Date    HX ARTERIOVENOUS FISTULA Right 03/2019    HX CYST INCISION AND DRAINAGE Right     2nd toe- Cleaned out infection    HX OTHER SURGICAL  03/2019    PEG tube insertion         Family History:   Problem Relation Age of Onset    Stroke Father     Stroke Sister 50    Deep Vein Thrombosis Neg Hx        Social History     Socioeconomic History    Marital status:      Spouse name: Not on file    Number of children: Not on file    Years of education: Not on file    Highest education level: Not on file   Occupational History    Not on file   Tobacco Use    Smoking status: Never    Smokeless tobacco: Never   Substance and Sexual Activity    Alcohol use: Never     Comment: rare    Drug use: Never    Sexual activity: Not on file   Other Topics Concern    Not on file   Social History Narrative    Not on file     Social Determinants of Health     Financial Resource Strain: Not on file   Food Insecurity: Not on file   Transportation Needs: Not on file   Physical Activity: Not on file   Stress: Not on file   Social Connections: Not on file   Intimate Partner Violence: Not on file   Housing Stability: Not on file         ALLERGIES: Bee venom protein (honey bee), Fentanyl, Oxycodone, and Hydralazine    Review of Systems   Unable to perform ROS: Patient nonverbal     Vitals:    02/17/23 1447   BP: 117/88   Pulse: 82   Resp: 16   Temp: 97.9 °F (36.6 °C)   SpO2: 99%            Physical Exam  Vitals and nursing note reviewed. Constitutional:       General: He is not in acute distress. Appearance: Normal appearance. He is not ill-appearing or toxic-appearing. HENT:      Head: Normocephalic and atraumatic. Right Ear: External ear normal.      Left Ear: External ear normal.      Nose: Nose normal.      Mouth/Throat:      Mouth: Mucous membranes are moist.      Pharynx: Oropharynx is clear. No oropharyngeal exudate or posterior oropharyngeal erythema. Eyes:      General: No scleral icterus. Extraocular Movements: Extraocular movements intact. Conjunctiva/sclera: Conjunctivae normal.      Pupils: Pupils are equal, round, and reactive to light. Cardiovascular:      Rate and Rhythm: Normal rate and regular rhythm. Pulses: Normal pulses. Heart sounds: No murmur heard. No friction rub. No gallop. Comments: RUE AV fistula  +bruit/thrill  Pulmonary:      Effort: Pulmonary effort is normal. No respiratory distress. Breath sounds: Normal breath sounds. No stridor. No wheezing, rhonchi or rales. Abdominal:      General: Bowel sounds are normal. There is no distension. Palpations: Abdomen is soft. Tenderness: There is no abdominal tenderness. There is no guarding or rebound.    Musculoskeletal: General: No deformity. Normal range of motion. Cervical back: Normal range of motion and neck supple. No rigidity. Right lower leg: No edema. Left lower leg: No edema. Comments: Diffuse erythema and edema to b/l upper ext     Skin:     General: Skin is warm. Capillary Refill: Capillary refill takes less than 2 seconds. Coloration: Skin is not jaundiced. Comments: Maculopapular rash w/ small blisters to upper ext involving palms  no petechiae, purpura  no vesicles, bullae, urticaria  no intra-oral lesions     Neurological:      General: No focal deficit present. Mental Status: He is alert. Cranial Nerves: No cranial nerve deficit. Sensory: No sensory deficit. Motor: No weakness. Coordination: Coordination normal.   Psychiatric:         Mood and Affect: Mood normal.         Behavior: Behavior normal.         Thought Content: Thought content normal.         Judgment: Judgment normal.      I personally reviewed and independently interpreted EKG, labs and imaging results.     LABORATORY TESTS:  Admission on 02/17/2023   Component Date Value Ref Range Status    WBC 02/17/2023 8.6  4.1 - 11.1 K/uL Final    RBC 02/17/2023 1.92 (A)  4.10 - 5.70 M/uL Final    HGB 02/17/2023 11.4 (A)  12.1 - 17.0 g/dL Final    HCT 02/17/2023 29.4 (A)  36.6 - 50.3 % Final    MCV 02/17/2023 86.0  80.0 - 99.0 FL Final    MCH 02/17/2023 33.3  26.0 - 34.0 PG Final    MCHC 02/17/2023 38.8 (A)  30.0 - 36.5 g/dL Final    RDW 02/17/2023 14.9 (A)  11.5 - 14.5 % Final    PLATELET 70/78/9288 808  150 - 400 K/uL Final    MPV 02/17/2023 9.6  8.9 - 12.9 FL Final    NRBC 02/17/2023 0.7 (A)  0.0  WBC Final    ABSOLUTE NRBC 02/17/2023 0.02 (A)  0.00 - 0.01 K/uL Final    NEUTROPHILS 02/17/2023 32  32 - 75 % Final    LYMPHOCYTES 02/17/2023 19  12 - 49 % Final    MONOCYTES 02/17/2023 7  5 - 13 % Final    EOSINOPHILS 02/17/2023 42 (A)  0 - 7 % Final    BASOPHILS 02/17/2023 0  0 - 1 % Final IMMATURE GRANULOCYTES 02/17/2023 0  % Final    ABS. NEUTROPHILS 02/17/2023 2.8  1.8 - 8.0 K/UL Final    ABS. LYMPHOCYTES 02/17/2023 1.6  0.8 - 3.5 K/UL Final    ABS. MONOCYTES 02/17/2023 0.6  0.0 - 1.0 K/UL Final    ABS. EOSINOPHILS 02/17/2023 3.6 (A)  0.0 - 0.4 K/UL Final    ABS. BASOPHILS 02/17/2023 0.0  0.0 - 0.1 K/UL Final    ABS. IMM. GRANS. 02/17/2023 0.0  K/UL Final    DF 02/17/2023 MANUAL    Final    RBC COMMENTS 02/17/2023     Final                    Value:ANISOCYTOSIS  1+      Sodium 02/17/2023 138  136 - 145 mmol/L Final    Potassium 02/17/2023 3.5  3.5 - 5.1 mmol/L Final    Chloride 02/17/2023 97  97 - 108 mmol/L Final    CO2 02/17/2023 31  21 - 32 mmol/L Final    Anion gap 02/17/2023 10  5 - 15 mmol/L Final    Glucose 02/17/2023 119 (A)  65 - 100 mg/dL Final    BUN 02/17/2023 25 (A)  6 - 20 MG/DL Final    Creatinine 02/17/2023 3.11 (A)  0.70 - 1.30 MG/DL Final    BUN/Creatinine ratio 02/17/2023 8 (A)  12 - 20   Final    eGFR 02/17/2023 22 (A)  >60 ml/min/1.73m2 Final    Comment:      Pediatric calculator link: [email or web address]. org/professionals/kdoqi/gfr_calculatorped       These results are not intended for use in patients <25years of age. eGFR results are calculated without a race factor using  the 2021 CKD-EPI equation. Careful clinical correlation is recommended, particularly when comparing to results calculated using previous equations. The CKD-EPI equation is less accurate in patients with extremes of muscle mass, extra-renal metabolism of creatinine, excessive creatine ingestion, or following therapy that affects renal tubular secretion. Calcium 02/17/2023 8.7  8.5 - 10.1 MG/DL Final    Lactic acid 02/17/2023 0.7  0.4 - 2.0 MMOL/L Final    Procalcitonin 02/17/2023 1.92  ng/mL Final    Comment:      Suspected Sepsis:  <0.50 ng/mL     Low likelihood of sepsis. 0.50-2.00 ng/mL    Increased likelihood of sepsis. Antibiotics encouraged. >2.00 ng/mL  High risk of sepsis/shock. Antibiotics strongly encouraged. Suspected Lower Resp Tract Infections:  <0.24 ng/mL    Low likelihood of bacterial infection. >0.24 ng/mL    Increased likelihood of bacterial infection. Antibiotics encouraged. With successful antibiotic therapy, PCT levels should decrease rapidly. (Half-life of 24 to 36 hours)       Procalcitonin values from samples collected within the first 6 hours of systemic infection may still be low. Retesting may be indicated. Values from day 1 and day 4 can be entered into the Change in Procalcitonin Calculator ([email or web address]. [email or web address]) to determine the patient's Mortality Risk Prognosis. In healthy neonates, plasma Procalcitonin (PCT) concentrations increase gradually after birth, reaching peak values at about 24 hours of age then decrease to normal valu                           es below 0.5 ng/mL by 48-72 hours of age.       aPTT 02/17/2023 27.4  22.1 - 31.0 sec Final    In addition to factor deficiency, monitoring heparin therapy, etc., evaluation of a prolonged aPTT result should include consideration of preanalytic variables such as heparin flush contamination, specimen integrity issues, etc.    aPTT, therapeutic range 02/17/2023      58.0 - 77.0 SECS Final    WBC 02/18/2023 10.4  4.1 - 11.1 K/uL Final    RBC 02/18/2023 3.57 (A)  4.10 - 5.70 M/uL Final    HGB 02/18/2023 11.0 (A)  12.1 - 17.0 g/dL Final    HCT 02/18/2023 33.9 (A)  36.6 - 50.3 % Final    MCV 02/18/2023 95.0  80.0 - 99.0 FL Final    INVESTIGATED PER DELTA CHECK PROTOCOL    MCH 02/18/2023 30.8  26.0 - 34.0 PG Final    MCHC 02/18/2023 32.4  30.0 - 36.5 g/dL Final    RDW 02/18/2023 14.4  11.5 - 14.5 % Final    PLATELET 69/93/6349 933  150 - 400 K/uL Final    MPV 02/18/2023 10.9  8.9 - 12.9 FL Final    NRBC 02/18/2023 0.0  0  WBC Final    ABSOLUTE NRBC 02/18/2023 0.00  0.00 - 0.01 K/uL Final    NEUTROPHILS 02/18/2023 35  32 - 75 % Final    LYMPHOCYTES 02/18/2023 17  12 - 49 % Final MONOCYTES 02/18/2023 6  5 - 13 % Final    EOSINOPHILS 02/18/2023 41 (A)  0 - 7 % Final    BASOPHILS 02/18/2023 1  0 - 1 % Final    IMMATURE GRANULOCYTES 02/18/2023 0  0.0 - 0.5 % Final    ABS. NEUTROPHILS 02/18/2023 3.6  1.8 - 8.0 K/UL Final    ABS. LYMPHOCYTES 02/18/2023 1.8  0.8 - 3.5 K/UL Final    ABS. MONOCYTES 02/18/2023 0.6  0.0 - 1.0 K/UL Final    ABS. EOSINOPHILS 02/18/2023 4.3 (A)  0.0 - 0.4 K/UL Final    ABS. BASOPHILS 02/18/2023 0.1  0.0 - 0.1 K/UL Final    ABS. IMM. GRANS. 02/18/2023 0.0  0.00 - 0.04 K/UL Final    DF 02/18/2023 SMEAR SCANNED    Final    RBC COMMENTS 02/18/2023     Final                    Value:ANISOCYTOSIS  1+      Glucose (POC) 02/17/2023 112  65 - 117 mg/dL Final    Comment: (NOTE)  The FDA has indicated that no capillary point of care blood glucose  monitoring systems are approved for use in \"critically ill\" patients,  however they have not defined this population. The College of  American Pathologists has recommended that these devices should not  be used in cases such as severe hypotension, dehydration, shock, and  hyperglycemic-hyperosmolar state, amongst others. Venous or arterial  collection is the recommended specimen for testing these patients. Performed by 02/17/2023 Kaushal David   Final    Heparin Xa,Unfrac. and LMW 02/18/2023 0.11  IU/mL Final    Comment: Heparin is not expected to be present in individuals unless used therapeutically. Unfractionated Heparin Therapeutic Range: 0.3-0.7 IU/mL  Low Molecular Weight Therapeutic Range: 0.6 - 1.0 IU/mL      Heparin Xa,Unfrac. and LMW 02/18/2023 <0.10  IU/mL Final    Comment: Heparin is not expected to be present in individuals unless used therapeutically.   Unfractionated Heparin Therapeutic Range: 0.3-0.7 IU/mL  Low Molecular Weight Therapeutic Range: 0.6 - 1.0 IU/mL      WBC 02/18/2023 10.4  4.1 - 11.1 K/uL Final    RBC 02/18/2023 4.07 (A)  4.10 - 5.70 M/uL Final    HGB 02/18/2023 12.3  12.1 - 17.0 g/dL Final HCT 02/18/2023 37.9  36.6 - 50.3 % Final    MCV 02/18/2023 93.1  80.0 - 99.0 FL Final    MCH 02/18/2023 30.2  26.0 - 34.0 PG Final    Specimen incubated at 37 degrees to correct for possible cold agglutinin    MCHC 02/18/2023 32.5  30.0 - 36.5 g/dL Final    Specimen incubated at 37 degrees to correct for possible cold agglutinin    RDW 02/18/2023 13.4  11.5 - 14.5 % Final    PLATELET 95/83/9148 233  150 - 400 K/uL Final    MPV 02/18/2023 9.2  8.9 - 12.9 FL Final    NRBC 02/18/2023 0.0  0  WBC Final    ABSOLUTE NRBC 02/18/2023 0.00  0.00 - 0.01 K/uL Final    NEUTROPHILS 02/18/2023 38  32 - 75 % Final    BAND NEUTROPHILS 02/18/2023 10 (A)  0 - 6 % Final    LYMPHOCYTES 02/18/2023 6 (A)  12 - 49 % Final    MONOCYTES 02/18/2023 2 (A)  5 - 13 % Final    EOSINOPHILS 02/18/2023 43 (A)  0 - 7 % Final    BASOPHILS 02/18/2023 0  0 - 1 % Final    METAMYELOCYTES 02/18/2023 1 (A)  0 % Final    IMMATURE GRANULOCYTES 02/18/2023 0  % Final    ABS. NEUTROPHILS 02/18/2023 5.0  1.8 - 8.0 K/UL Final    ABS. LYMPHOCYTES 02/18/2023 0.6 (A)  0.8 - 3.5 K/UL Final    ABS. MONOCYTES 02/18/2023 0.2  0.0 - 1.0 K/UL Final    ABS. EOSINOPHILS 02/18/2023 4.5 (A)  0.0 - 0.4 K/UL Final    ABS. BASOPHILS 02/18/2023 0.0  0.0 - 0.1 K/UL Final    ABS. IMM. GRANS. 02/18/2023 0.0  K/UL Final    DF 02/18/2023 MANUAL    Final    RBC COMMENTS 02/18/2023 NORMOCYTIC, NORMOCHROMIC    Final    Glucose (POC) 02/18/2023 137 (A)  65 - 117 mg/dL Final    Comment: (NOTE)  The FDA has indicated that no capillary point of care blood glucose  monitoring systems are approved for use in \"critically ill\" patients,  however they have not defined this population. The College of  American Pathologists has recommended that these devices should not  be used in cases such as severe hypotension, dehydration, shock, and  hyperglycemic-hyperosmolar state, amongst others. Venous or arterial  collection is the recommended specimen for testing these patients.       Performed by 02/18/2023 [facility] Final    Glucose (POC) 02/18/2023 162 (A)  65 - 117 mg/dL Final    Comment: (NOTE)  The FDA has indicated that no capillary point of care blood glucose  monitoring systems are approved for use in \"critically ill\" patients,  however they have not defined this population. The College of  American Pathologists has recommended that these devices should not  be used in cases such as severe hypotension, dehydration, shock, and  hyperglycemic-hyperosmolar state, amongst others. Venous or arterial  collection is the recommended specimen for testing these patients. Performed by 02/18/2023 GAVIN Coughlin (PCT)   Final    Glucose (POC) 02/18/2023 131 (A)  65 - 117 mg/dL Final    Comment: (NOTE)  The FDA has indicated that no capillary point of care blood glucose  monitoring systems are approved for use in \"critically ill\" patients,  however they have not defined this population. The College of  American Pathologists has recommended that these devices should not  be used in cases such as severe hypotension, dehydration, shock, and  hyperglycemic-hyperosmolar state, amongst others. Venous or arterial  collection is the recommended specimen for testing these patients.       Performed by 02/18/2023 GAVIN Barrazas (PCT)   Final       IMAGING RESULTS:  DUPLEX UPPER EXT VENOUS BILAT   Final Result          MEDICATIONS GIVEN:  Medications   sodium chloride (NS) flush 5-40 mL (10 mL IntraVENous Given 2/18/23 1702)   sodium chloride (NS) flush 5-40 mL (has no administration in time range)   acetaminophen (TYLENOL) tablet 650 mg (has no administration in time range)   naloxone (NARCAN) injection 0.4 mg (has no administration in time range)   ondansetron (ZOFRAN) injection 4 mg (has no administration in time range)   insulin lispro (HUMALOG) injection (0 Units SubCUTAneous Held 2/18/23 1630)   glucose chewable tablet 16 g (has no administration in time range)   glucagon (GLUCAGEN) injection 1 mg (has no administration in time range)   dextrose 10% infusion 0-250 mL (has no administration in time range)   Vancomycin dosing per levels (has no administration in time range)   famotidine (PF) (PEPCID) 20 mg in 0.9% sodium chloride 10 mL injection (20 mg IntraVENous Given 2/17/23 2157)   hydrOXYzine HCL (ATARAX) tablet 10 mg (has no administration in time range)   diphenhydrAMINE (BENADRYL) injection 25 mg (25 mg IntraVENous Given 2/18/23 0327)   heparin 25,000 units in D5W 250 ml infusion (18 Units/kg/hr × 78.2 kg IntraVENous New Bag 2/18/23 0728)   albuterol (ACCUNEB) nebulizer solution 1.25 mg (has no administration in time range)   amLODIPine (NORVASC) tablet 10 mg (10 mg Oral Given 2/18/23 0923)   guaiFENesin-codeine (ROBITUSSIN AC) 100-10 mg/5 mL solution 5 mL (has no administration in time range)   lacosamide (VIMPAT) oral solution 300 mg (300 mg Per G Tube Given 2/18/23 1007)   Vanc random 2/20 0400 - before dialysis  (has no administration in time range)   cefepime (MAXIPIME) 0.5 g in 0.9% sodium chloride 100 mL IVPB (has no administration in time range)   terazosin (HYTRIN) capsule 5 mg (5 mg Oral Given 2/18/23 1701)   isosorbide dinitrate (ISORDIL) tablet 30 mg (30 mg Oral Given 2/18/23 1701)   lidocaine (XYLOCAINE) 10 mg/mL (1 %) injection 20 mL (has no administration in time range)   labetaloL (NORMODYNE;TRANDATE) 20 mg/4 mL (5 mg/mL) injection 10 mg (10 mg IntraVENous Given 2/18/23 1701)   heparin (porcine) 1,000 unit/mL injection 6,170 Units (has no administration in time range)   vancomycin (VANCOCIN) 1,000 mg in 0.9% sodium chloride 250 mL ([id number]) (0 mg IntraVENous IV Completed 2/18/23 0737)     Followed by   vancomycin (VANCOCIN) 750 mg in 0.9% sodium chloride 250 mL ([id number]) (0 mg IntraVENous IV Completed 2/18/23 1084)   heparin (porcine) 1,000 unit/mL injection 6,160 Units (6,160 Units IntraVENous Given 2/17/23 9186)   cefepime (MAXIPIME) 2 g in 0.9% sodium chloride 10 mL IV syringe (2 g IntraVENous Given 2/17/23 2226)   hydrOXYzine (VISTARIL) 25 mg/mL injection 25 mg (25 mg IntraMUSCular Given 2/17/23 2149)       IMPRESSION:  1. ESRD (end stage renal disease) ([facility] [address]    2. Acute deep vein thrombosis (DVT) of other vein of right upper extremity (HCC)    3. 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 Pt is nonverbal from prior stroke, no neuro deficits. Afebrile, hemodynamically stable w/o resp distress or hypoxia. Right forearm and hand diffusely erythematous and edematous. Fistula intact w/o any overlying erythema. RUE duplex US shows RIJ acute DVT. Labs show stable ESRD. Treating for RUE cellulitis and DVT. Not meeting sepsis criteria at this time. Giving vanc/cefepime and starting heparin. Admit. Amount and/or Complexity of Data Reviewed  Independent Historian: spouse  Labs: ordered. Decision-making details documented in ED Course. Radiology: ordered and independent interpretation performed. Decision-making details documented in ED Course. ECG/medicine tests: ordered and independent interpretation performed. Decision-making details documented in ED Course. Risk  Prescription drug management. Decision regarding hospitalization. Procedures        Perfect Serve Consult for Admission  7:10 PM    ED Room Number: WER07/07  Patient Name and age: Mary Espinoza 58 y.o.  male  Working Diagnosis: No diagnosis found.     COVID-19 Suspicion:  no  Sepsis present:  no  Reassessment needed: no  Code Status:  Full Code  Readmission: no  Isolation Requirements:  no  Recommended Level of Care:  med/surg  Department:[facility] - [phone]

## 2023-02-17 NOTE — Clinical Note
TRANSFER - OUT REPORT:     Verbal report given to: Thaddeus Taylor. Report consisted of patient's Situation, Background, Assessment and   Recommendations(SBAR). Opportunity for questions and clarification was provided. Patient transported with a Registered Nurse. Patient transported to: [address] [place] [address].

## 2023-02-17 NOTE — ED TRIAGE NOTES
Per EMS dispatch health sent pt for bilateral arm/hand swelling and to obtain labs;  Pt is aphasic but can respond at times;  pt had dialysis this AM has 3x per week

## 2023-02-18 LAB
BASOPHILS # BLD: 0 K/UL (ref 0–0.1)
BASOPHILS NFR BLD: 0 % (ref 0–1)
DIFFERENTIAL METHOD BLD: ABNORMAL
EOSINOPHIL # BLD: 4.5 K/UL (ref 0–0.4)
EOSINOPHIL NFR BLD: 43 % (ref 0–7)
ERYTHROCYTE [DISTWIDTH] IN BLOOD BY AUTOMATED COUNT: 13.4 % (ref 11.5–14.5)
GLUCOSE BLD STRIP.AUTO-MCNC: 131 MG/DL (ref 65–117)
GLUCOSE BLD STRIP.AUTO-MCNC: 137 MG/DL (ref 65–117)
GLUCOSE BLD STRIP.AUTO-MCNC: 162 MG/DL (ref 65–117)
GLUCOSE BLD STRIP.AUTO-MCNC: 211 MG/DL (ref 65–117)
HCT VFR BLD AUTO: 37.9 % (ref 36.6–50.3)
HGB BLD-MCNC: 12.3 G/DL (ref 12.1–17)
IMM GRANULOCYTES # BLD AUTO: 0 K/UL
IMM GRANULOCYTES NFR BLD AUTO: 0 %
LYMPHOCYTES # BLD: 0.6 K/UL (ref 0.8–3.5)
LYMPHOCYTES NFR BLD: 6 % (ref 12–49)
MCH RBC QN AUTO: 30.2 PG (ref 26–34)
MCHC RBC AUTO-ENTMCNC: 32.5 G/DL (ref 30–36.5)
MCV RBC AUTO: 93.1 FL (ref 80–99)
METAMYELOCYTES NFR BLD MANUAL: 1 %
MONOCYTES # BLD: 0.2 K/UL (ref 0–1)
MONOCYTES NFR BLD: 2 % (ref 5–13)
NEUTS BAND NFR BLD MANUAL: 10 % (ref 0–6)
NEUTS SEG # BLD: 5 K/UL (ref 1.8–8)
NEUTS SEG NFR BLD: 38 % (ref 32–75)
NRBC # BLD: 0 K/UL (ref 0–0.01)
NRBC BLD-RTO: 0 PER 100 WBC
PLATELET # BLD AUTO: 239 K/UL (ref 150–400)
PMV BLD AUTO: 9.2 FL (ref 8.9–12.9)
RBC # BLD AUTO: 4.07 M/UL (ref 4.1–5.7)
RBC MORPH BLD: ABNORMAL
SERVICE CMNT-IMP: ABNORMAL
UFH PPP CHRO-ACNC: 0.11 IU/ML
UFH PPP CHRO-ACNC: <0.1 IU/ML
WBC # BLD AUTO: 10.4 K/UL (ref 4.1–11.1)

## 2023-02-18 PROCEDURE — 74011000258 HC RX REV CODE- 258: Performed by: INTERNAL MEDICINE

## 2023-02-18 PROCEDURE — 74011000250 HC RX REV CODE- 250: Performed by: INTERNAL MEDICINE

## 2023-02-18 PROCEDURE — 74011250636 HC RX REV CODE- 250/636: Performed by: INTERNAL MEDICINE

## 2023-02-18 PROCEDURE — 36415 COLL VENOUS BLD VENIPUNCTURE: CPT

## 2023-02-18 PROCEDURE — 85520 HEPARIN ASSAY: CPT

## 2023-02-18 PROCEDURE — 2709999900 HC NON-CHARGEABLE SUPPLY

## 2023-02-18 PROCEDURE — 77030018798 HC PMP KT ENTRL FED COVD -A

## 2023-02-18 PROCEDURE — 74011636637 HC RX REV CODE- 636/637: Performed by: INTERNAL MEDICINE

## 2023-02-18 PROCEDURE — 74011250637 HC RX REV CODE- 250/637: Performed by: INTERNAL MEDICINE

## 2023-02-18 PROCEDURE — 92610 EVALUATE SWALLOWING FUNCTION: CPT

## 2023-02-18 PROCEDURE — 97116 GAIT TRAINING THERAPY: CPT

## 2023-02-18 PROCEDURE — 85025 COMPLETE CBC W/AUTO DIFF WBC: CPT

## 2023-02-18 PROCEDURE — 82962 GLUCOSE BLOOD TEST: CPT

## 2023-02-18 PROCEDURE — 97530 THERAPEUTIC ACTIVITIES: CPT

## 2023-02-18 PROCEDURE — 65270000029 HC RM PRIVATE

## 2023-02-18 PROCEDURE — 97161 PT EVAL LOW COMPLEX 20 MIN: CPT

## 2023-02-18 PROCEDURE — 74011250636 HC RX REV CODE- 250/636: Performed by: EMERGENCY MEDICINE

## 2023-02-18 RX ORDER — HEPARIN SODIUM 1000 [USP'U]/ML
80 INJECTION, SOLUTION INTRAVENOUS; SUBCUTANEOUS ONCE
Status: COMPLETED | OUTPATIENT
Start: 2023-02-18 | End: 2023-02-18

## 2023-02-18 RX ORDER — LABETALOL HCL 20 MG/4 ML
10 SYRINGE (ML) INTRAVENOUS
Status: DISCONTINUED | OUTPATIENT
Start: 2023-02-18 | End: 2023-02-21 | Stop reason: HOSPADM

## 2023-02-18 RX ORDER — ISOSORBIDE MONONITRATE 30 MG/1
30 TABLET, EXTENDED RELEASE ORAL DAILY
Status: DISCONTINUED | OUTPATIENT
Start: 2023-02-19 | End: 2023-02-18

## 2023-02-18 RX ORDER — LACOSAMIDE 10 MG/ML
300 SOLUTION ORAL 2 TIMES DAILY
Status: DISCONTINUED | OUTPATIENT
Start: 2023-02-18 | End: 2023-02-21 | Stop reason: HOSPADM

## 2023-02-18 RX ORDER — HEPARIN SODIUM 10000 [USP'U]/100ML
18-36 INJECTION, SOLUTION INTRAVENOUS
Status: DISCONTINUED | OUTPATIENT
Start: 2023-02-18 | End: 2023-02-20

## 2023-02-18 RX ORDER — DIPHENHYDRAMINE HYDROCHLORIDE 50 MG/ML
25 INJECTION, SOLUTION INTRAMUSCULAR; INTRAVENOUS
Status: DISCONTINUED | OUTPATIENT
Start: 2023-02-18 | End: 2023-02-21 | Stop reason: HOSPADM

## 2023-02-18 RX ORDER — CODEINE PHOSPHATE AND GUAIFENESIN 10; 100 MG/5ML; MG/5ML
5 SOLUTION ORAL
Status: DISCONTINUED | OUTPATIENT
Start: 2023-02-18 | End: 2023-02-21 | Stop reason: HOSPADM

## 2023-02-18 RX ORDER — TERAZOSIN 5 MG/1
5 CAPSULE ORAL 2 TIMES DAILY
Status: DISCONTINUED | OUTPATIENT
Start: 2023-02-18 | End: 2023-02-21 | Stop reason: HOSPADM

## 2023-02-18 RX ORDER — HYDROXYZINE HYDROCHLORIDE 10 MG/1
10 TABLET, FILM COATED ORAL
Status: DISCONTINUED | OUTPATIENT
Start: 2023-02-18 | End: 2023-02-21 | Stop reason: HOSPADM

## 2023-02-18 RX ORDER — LIDOCAINE HYDROCHLORIDE 10 MG/ML
20 INJECTION INFILTRATION; PERINEURAL ONCE
Status: DISPENSED | OUTPATIENT
Start: 2023-02-18 | End: 2023-02-19

## 2023-02-18 RX ORDER — AMLODIPINE BESYLATE 5 MG/1
10 TABLET ORAL EVERY EVENING
Status: DISCONTINUED | OUTPATIENT
Start: 2023-02-18 | End: 2023-02-21 | Stop reason: HOSPADM

## 2023-02-18 RX ORDER — ALBUTEROL SULFATE 1.25 MG/3ML
1.25 SOLUTION RESPIRATORY (INHALATION)
Status: DISCONTINUED | OUTPATIENT
Start: 2023-02-18 | End: 2023-02-21 | Stop reason: HOSPADM

## 2023-02-18 RX ADMIN — TERAZOSIN 5 MG: 5 CAPSULE ORAL at 17:01

## 2023-02-18 RX ADMIN — HEPARIN SODIUM 18 UNITS/KG/HR: 10000 INJECTION, SOLUTION INTRAVENOUS at 07:28

## 2023-02-18 RX ADMIN — AMLODIPINE BESYLATE 10 MG: 5 TABLET ORAL at 09:23

## 2023-02-18 RX ADMIN — VANCOMYCIN HYDROCHLORIDE 750 MG: 750 INJECTION, POWDER, LYOPHILIZED, FOR SOLUTION INTRAVENOUS at 00:31

## 2023-02-18 RX ADMIN — HEPARIN SODIUM 22 UNITS/KG/HR: 10000 INJECTION, SOLUTION INTRAVENOUS at 17:42

## 2023-02-18 RX ADMIN — Medication 2 UNITS: at 12:32

## 2023-02-18 RX ADMIN — HEPARIN SODIUM 22 UNITS/KG/HR: 10000 INJECTION, SOLUTION INTRAVENOUS at 19:20

## 2023-02-18 RX ADMIN — SODIUM CHLORIDE, PRESERVATIVE FREE 5 ML: 5 INJECTION INTRAVENOUS at 05:16

## 2023-02-18 RX ADMIN — Medication 2 UNITS: at 21:39

## 2023-02-18 RX ADMIN — ISOSORBIDE DINITRATE 30 MG: 20 TABLET ORAL at 17:01

## 2023-02-18 RX ADMIN — CEFEPIME 0.5 G: 1 INJECTION, POWDER, FOR SOLUTION INTRAMUSCULAR; INTRAVENOUS at 19:43

## 2023-02-18 RX ADMIN — DIPHENHYDRAMINE HYDROCHLORIDE 25 MG: 50 INJECTION, SOLUTION INTRAMUSCULAR; INTRAVENOUS at 03:27

## 2023-02-18 RX ADMIN — LACOSAMIDE ORAL SOLUTION 300 MG: 10 SOLUTION ORAL at 18:31

## 2023-02-18 RX ADMIN — SODIUM CHLORIDE, PRESERVATIVE FREE 10 ML: 5 INJECTION INTRAVENOUS at 17:02

## 2023-02-18 RX ADMIN — LACOSAMIDE ORAL SOLUTION 300 MG: 10 SOLUTION ORAL at 10:07

## 2023-02-18 RX ADMIN — LABETALOL HYDROCHLORIDE 10 MG: 5 INJECTION, SOLUTION INTRAVENOUS at 17:01

## 2023-02-18 RX ADMIN — HEPARIN SODIUM 18 UNITS/KG/HR: 10000 INJECTION, SOLUTION INTRAVENOUS at 06:04

## 2023-02-18 RX ADMIN — HEPARIN SODIUM 6170 UNITS: 1000 INJECTION INTRAVENOUS; SUBCUTANEOUS at 17:41

## 2023-02-18 RX ADMIN — SODIUM CHLORIDE, PRESERVATIVE FREE 5 ML: 5 INJECTION INTRAVENOUS at 21:41

## 2023-02-18 NOTE — ED NOTES
Contacted lab regarding anti-XA unfractionated and lmw heparin order. Per lab, ok to give bolus and lab to be drawn prior to infusion.

## 2023-02-18 NOTE — ED NOTES
Verbal shift change report received from Olaf [facility] [address] Ryan (offgoing nurse). Report included the following information SBAR, ED Summary, MAR and Recent Results.

## 2023-02-18 NOTE — PROGRESS NOTES
Bedside and Verbal shift change report given to [address] [place] (oncoming nurse) by Jane Briscoe RN (offgoing nurse). Report included the following information Kardex, Recent Results, and Med Rec Status.

## 2023-02-18 NOTE — ED NOTES
Pt readjusted in stretcher and warm blankets/nonslip socks provided for comfort. Patient requested water. Per wife, pt can use straw when at 45 degrees and can take sips at 90 degrees. Swallow screening performed. Insulin held to pt not meeting criteria through sliding scale. Pt and wife updated with plan of care. Call bell within reach. VSS.

## 2023-02-18 NOTE — ED NOTES
Spoke to pharmacy regarding IV access size/location and medications ordered. Informed pharmacist that pt had peg tube. Pharmacist to change PO meds to IV meds and advised to give IV push meds and cefepime first, then give vanc over 1 hr.

## 2023-02-18 NOTE — PROGRESS NOTES
TRANSFER - IN REPORT:    Verbal report received from Kristine(name) on [facility]  being received from [facility] ED(unit) for routine progression of care      Report consisted of patients Situation, Background, Assessment and   Recommendations(SBAR). Information from the following report(s) SBAR, Kardex, Intake/Output, Recent Results, and Med Rec Status was reviewed with the receiving nurse. Opportunity for questions and clarification was provided. Assessment completed upon patients arrival to unit and care assumed.

## 2023-02-18 NOTE — PROGRESS NOTES
[address] [address] Pharmacy Dosing Services: Antimicrobial Stewardship Daily Doc  Consult for antibiotic dosing of Vancomycin by Dr. Delroy Boston  Indication: SSTI  Day of Therapy: 1  ESRD on HD     Ht Readings from Last 1 Encounters:   10/29/22 170.2 cm (67\")        Wt Readings from Last 1 Encounters:   02/17/23 77.5 kg (170 lb 13.7 oz)      Vancomycin therapy:  Loading dose: Vancomycin 1750 mg IV x1 dose   Current maintenance dose: Vancomycin 750 mg IV after each HD session   Dose calculated to approximate a           a. Target AUC/REGLA of N/A          b. Trough of 15-20 mcg/mL   Last level: none yet  Plan: Continue same. Renal consult pending    Other Antimicrobial   (not dosed by pharmacist) Cefepime 2gm IV x1 dose now   Cultures pending   Serum Creatinine Lab Results   Component Value Date/Time    Creatinine 3.11 (H) 02/17/2023 06:08 PM         Creatinine Clearance On HD     Temp Temp: 97.9 °F (36.6 °C)       WBC Lab Results   Component Value Date/Time    WBC 8.6 02/17/2023 06:08 PM        Procalcitonin No results found for: PCT     For Antifungals, Metronidazole and Nafcillin: Lab Results   Component Value Date/Time    ALT (SGPT) 39 01/06/2022 02:26 PM    AST (SGOT) 26 01/06/2022 02:26 PM    Alk.  phosphatase 209 (H) 01/06/2022 02:26 PM    Bilirubin, total 2.3 (H) 01/06/2022 02:26 PM        Pharmacist Darius Davila

## 2023-02-18 NOTE — PROGRESS NOTES
Problem: Mobility Impaired (Adult and Pediatric)  Goal: *Acute Goals and Plan of Care (Insert Text)  Description: FUNCTIONAL STATUS PRIOR TO ADMISSION: Pt lives with wife who assist with all mobility and ADLs. Juan lift to chair with wife. Has been working on ambulating with PT only(1/week) d/t scheduling conflicts with other appointments. HOME SUPPORT PRIOR TO ADMISSION: The patient lived with wife and required maximal assistance for ADLs and iADLs. Physical Therapy Goals  Initiated 2/18/2023  1. Patient will move from supine to sit and sit to supine , scoot up and down, and roll side to side in bed with supervision/set-up within 7 day(s). 2.  Patient will transfer from bed to chair and chair to bed with moderate assistance  using the least restrictive device within 7 day(s). 3.  Patient will perform sit to stand with moderate assistance  within 7 day(s). 4.  Patient will ambulate with moderate assistance  for 25 feet with the least restrictive device within 7 day(s). Outcome: Progressing Towards Goal   PHYSICAL THERAPY EVALUATION  Patient: Donya Duarte (7 y.o. male)  Date: 2/18/2023  Primary Diagnosis: Swelling [R60.9]       Precautions:   Fall, Aspiration    ASSESSMENT  Based on the objective data described below, the patient presents with baseline deficits(aphasia, dysarthria, RUE deficits and RLE weakness) 2/2 CVA in 2019 with new complaints of B UE swelling leading to this admission. Pt received supine in bed with wife at bedside to provide history. Pt verbal throughout session though difficult to understand. Wife reports pt works on ambulating with rollator(60-80ft) and HHPT but she performs juan lifts to transfer when she is by herself. Pt eager to ambulate and get OOB this day. He requires Min A to EOB with good balance at EOB and UE support. Pt completes SPT to recliner chair with Max A over three attempts(pt reporting gripper socks make it more difficult).  Pt limited by non-functional RUE, impaired MARVIN, poor balance and decreased awareness of midline. Chair brought to hallway to allow for straight path with extra room. Pt completed 4 brief walking trials with Max A to stand, blocking of RLE to maintain wide MARVIN and avoid slipping forward. Max A to facilitated hip extension and clearance of B feet as well as RW management during gait trial. Chair follow for safety as pt fatigues quickly. Pt is likely functioning near his baseline at this time though would benefit from inhouse acute therapy to maintain independence. Current Level of Function Impacting Discharge (mobility/balance): Max A for mobility OOB    Functional Outcome Measure: The patient scored 9/24 on the AM-PAC outcome measure. Other factors to consider for discharge: high fall risk, wife uses layne lift at home     Patient will benefit from skilled therapy intervention to address the above noted impairments. PLAN :  Recommendations and Planned Interventions: bed mobility training, transfer training, gait training, therapeutic exercises, neuromuscular re-education, patient and family training/education, and therapeutic activities      Frequency/Duration: Patient will be followed by physical therapy:  5 times a week to address goals. Recommendation for discharge: (in order for the patient to meet his/her long term goals)  Physical therapy at least 2 days/week in the home AND ensure assist and/or supervision for safety with mobility and ADLs    This discharge recommendation:  Has been made in collaboration with the attending provider and/or case management    IF patient discharges home will need the following DME: patient owns DME required for discharge         SUBJECTIVE:   Patient stated It's the socks. They keep slipping.     OBJECTIVE DATA SUMMARY:   HISTORY:    Past Medical History:   Diagnosis Date    Diabetes ([facility] [address]     Dysphagia due to old cerebrovascular accident 03/2019    ESRD (end stage renal disease) on dialysis [facility])     MWF    Frias catheter in place     PEG (percutaneous endoscopic gastrostomy) status ([facility] [address] 3/2019 to present    NPO- comfort foods only    Right sided weakness 3/2019 to present    Seizure ([facility] [address] 03/2019    Stroke ([facility] [address] 03/2019    Urinary retention with incomplete bladder emptying      Past Surgical History:   Procedure Laterality Date    HX ARTERIOVENOUS FISTULA Right 03/2019    HX CYST INCISION AND DRAINAGE Right     2nd toe- Cleaned out infection    HX OTHER SURGICAL  03/2019    PEG tube insertion       Personal factors and/or comorbidities impacting plan of care: diabetes, ESRD, CVA with deficits    Home Situation  Home Environment: Apartment  # Steps to Enter: 0  One/Two Story Residence:  (elevator)  Living Alone: No  Support Systems: Spouse/Significant Other  Patient Expects to be Discharged to[de-identified] Home with home health  Current DME Used/Available at Home: Wheelchair, Walker, rollator (layne [place])    EXAMINATION/PRESENTATION/DECISION MAKING:   Critical Behavior:  Neurologic State: Alert  Orientation Level: Oriented X4  Cognition: Follows commands     Hearing: Auditory  Auditory Impairment: None  Skin:    Edema:   Range Of Motion:  AROM: Generally decreased, functional (RUE nonfunctional, RLE limited)           PROM: Generally decreased, functional           Strength:    Strength: Generally decreased, functional (RUE nonfunctional, RLE 3/5)                    Tone & Sensation:   Tone: Abnormal (RUE)              Sensation: Impaired               Coordination:  Coordination: Generally decreased, functional  Vision:      Functional Mobility:  Bed Mobility:  Rolling: Minimum assistance  Supine to Sit: Minimum assistance     Scooting: Minimum assistance  Transfers:  Sit to Stand:  Moderate assistance;Assist x1;Maximum assistance  Stand to Sit: Moderate assistance;Assist x1        Bed to Chair: Maximum assistance;Assist x1              Balance:   Sitting: Impaired  Sitting - Static: Good (unsupported)  Sitting - Dynamic: Fair (occasional)  Standing: Impaired  Standing - Static: Poor  Standing - Dynamic : Poor;Constant support  Ambulation/Gait Training:  Distance (ft): 10 Feet (ft) (four walking trials of 5-20ft)  Assistive Device: Gait belt;Walker, rolling (chair follow)  Ambulation - Level of Assistance: Maximum assistance;Assist x1 (chair follow and assist for line management)     Gait Description (WDL): Exceptions to WDL  Gait Abnormalities: Ataxic; Altered arm swing;Decreased step clearance; Step to gait; Shuffling gait;Trunk sway increased; Festinating gait        Base of Support: Narrowed     Speed/Aliya: Pace decreased (<100 feet/min); Shuffled  Step Length: Right shortened;Left shortened  Swing Pattern: Right asymmetrical;Left asymmetrical                  Stairs: Therapeutic Exercises:       Functional Measure:  [address] [facility] [address] [address] AM-PAC®      Basic Mobility Inpatient Short Form (6-Clicks) Version 2  How much HELP from another person do you currently need. .. (If the patient hasn't done an activity recently, how much help from another person do you think they would need if they tried?) Total A Lot A Little None   1. Turning from your back to your side while in a flat bed without using bedrails? []  1 [x]  2 []  3  []  4   2. Moving from lying on your back to sitting on the side of a flat bed without using bedrails? []  1 [x]  2 []  3  []  4   3. Moving to and from a bed to a chair (including a wheelchair)? []  1 [x]  2 []  3  []  4   4. Standing up from a chair using your arms (e.g. wheelchair or bedside chair)? [x]  1 []  2 []  3  []  4   5. Walking in hospital room? [x]  1 []  2 []  3  []  4   6. Climbing 3-5 steps with a railing? [x]  1 []  2 []  3  []  4     Raw Score: 9/24                            Cutoff score ?171,2,3 had higher odds of discharging home with home health or need of SNF/IPR. [address], [address], Oscar Sibley, Lary James. Vivian Chong. Validity of the AM-PAC 6-Clicks Inpatient Daily Activity and Basic Mobility Short Forms. Physical Therapy Mar 2014, [phone]; DOI: 10.2522/ptj.83176866  2. Jenelle Gómez. Association of AM-PAC \"6-Clicks\" Basic Mobility and Daily Activity Scores With Discharge Destination. Phys Ther. 2021 Apr 4;101(4):xwhb216. doi: 10.1093/ptj/ctjg286. PMID: 01145901. V Chiqui Leo, Ronna D, Priyanka Guan, Abida K, Abner S. Activity Measure for Post-Acute Care \"6-Clicks\" Basic Mobility Scores Predict Discharge Destination After Acute Care Hospitalization in Select Patient Groups: A Retrospective, Observational Study. Arch Rehabil Res Clin Transl. 2022 Jul 16;4(3):098104. doi: 10.1016/j.arrct. 9992.680380. PMID: 24718438; PMCID: [id number]. 4. Rose Marie Ta, Kasey JEAN BAPTISTE, Joseline SANTANA. AM-PAC Short Forms Manual 4.0. Revised 2/2020. Physical Therapy Evaluation Charge Determination   History Examination Presentation Decision-Making   HIGH Complexity :3+ comorbidities / personal factors will impact the outcome/ POC  MEDIUM Complexity : 3 Standardized tests and measures addressing body structure, function, activity limitation and / or participation in recreation  LOW Complexity : Stable, uncomplicated  Other outcome measures AM-PAC  LOW       Based on the above components, the patient evaluation is determined to be of the following complexity level: LOW     Pain Rating:  none    Activity Tolerance:   Fair    After treatment patient left in no apparent distress:   Sitting in chair, Call bell within reach, Bed / chair alarm activated, and Caregiver / family present    COMMUNICATION/EDUCATION:   The patients plan of care was discussed with: Registered nurse. Fall prevention education was provided and the patient/caregiver indicated understanding., Patient/family have participated as able in goal setting and plan of care. , and Patient/family agree to work toward stated goals and plan of care.     Thank you for this referral.  Sandip Dimas, PT   Time Calculation: 42 mins

## 2023-02-18 NOTE — PROGRESS NOTES
[facility] Dosing Services: Antimicrobial Stewardship Daily Doc  Consult for antibiotic dosing of Vancomycin/Cefepime by Dr. Sandra Waterman  Indication: SSTI  Day of Therapy: 2  ESRD on HD     Ht Readings from Last 1 Encounters:   10/29/22 170.2 cm (67\")        Wt Readings from Last 1 Encounters:   02/18/23 77.1 kg (170 lb)      Vancomycin therapy:  Loading dose: Vancomycin 1750 mg IV x1 dose   Current maintenance dose: Dosing per levels  Dose calculated to approximate a           a. Target AUC/REGLA of N/A          b. Trough of 15-20 mcg/mL   Last level: none yet    A/Plan: WBC stable. Afebrile. Received LD last night on 2/17. HD on MWF. No vanc dose needed today. Will obtain a level on Monday AM to assess the therapy. Goal level between 15-20 mcg/mL. Plan for Vanc 10 mg/kg post-dialysis if level b/w 15-20. Non-kinetic dosing  Cefepime 2gm IV x1 dose then 1 gm IV q24hr    Other Antimicrobial   (not dosed by pharmacist)    Cultures 2/18 MRSA pending   Serum Creatinine Lab Results   Component Value Date/Time    Creatinine 3.11 (H) 02/17/2023 06:08 PM         Creatinine Clearance On HD     Temp Temp: 97.6 °F (36.4 °C)       WBC Lab Results   Component Value Date/Time    WBC 10.4 02/18/2023 12:49 AM        Procalcitonin Lab Results   Component Value Date/Time    Procalcitonin 1.92 02/17/2023 06:08 PM        For Antifungals, Metronidazole and Nafcillin: Lab Results   Component Value Date/Time    ALT (SGPT) 39 01/06/2022 02:26 PM    AST (SGOT) 26 01/06/2022 02:26 PM    Alk.  phosphatase 209 (H) 01/06/2022 02:26 PM    Bilirubin, total 2.3 (H) 01/06/2022 02:26 PM        Pharmacist MAITE BerriosD

## 2023-02-18 NOTE — PROGRESS NOTES
Darius Prasad [facility]  [address] [facility], [address] [place] [address]  [phone]      Hospitalist Progress Note      NAME: [place] Curling   :  1960  MRM:  [id number]    Date of service: 2023  11:52 AM       Assessment and Plan:   Upper and lower extremities swelling/ skin rash/ chronic ulcers/ blisters. Rash presented for months. No DVT on doppler. On cefepime/vanco empirically, but doubt supper imposed infection. Pt needs to see a dermatologist, but the appointment is not until September. Check MRSA nares. general surgery consulted for possible skin Bx. Wound care consult     2. Internal jugular vein thrombosis: doppler: Acute appearing thrombus noted in the right non-occluded internal jugular vein. Cont heparin gtt    3. H/o CVA with R sided hemiparesis. on ASA. PT/OT      4.  DM. Cont SSI. 5.  Seizure d/o: continue vimpat      6. HTN: continue amlodipine    7. ESRD. HD M/W/F. Evaluated by nephrology          Subjective:     Chief Complaint[de-identified] Patient was seen and examined as a follow up for skin rash. Chart was reviewed. ROS:  (bold if positive, if negative)    Tolerating PT  Tolerating Diet        Objective:     Last 24hrs VS reviewed since prior progress note.  Most recent are:    Visit Vitals  BP (!) 171/86 (BP 1 Location: Left leg, BP Patient Position: Reclining)   Pulse 79   Temp 97.6 °F (36.4 °C)   Resp 20   Wt 77.1 kg (170 lb)   SpO2 97%   BMI 26.63 kg/m²     SpO2 Readings from Last 6 Encounters:   23 97%   10/29/22 100%   22 100%   21 96%   20 99%   20 100%          Intake/Output Summary (Last 24 hours) at 2023 1152  Last data filed at 2023 1008  Gross per 24 hour   Intake 325 ml   Output --   Net 325 ml        Physical Exam:    Gen:  Well-developed, well-nourished, in no acute distress  HEENT:  Pink conjunctivae, PERRL, hearing intact to voice, moist mucous membranes  Neck:  Supple, without masses, thyroid non-tender  Resp:  No accessory muscle use, clear breath sounds without wheezes rales or rhonchi  Card:  No murmurs, normal S1, S2 without thrills, bruits or peripheral edema  Abd:  Soft, non-tender, non-distended, normoactive bowel sounds are present, no palpable organomegaly and no detectable hernias  Lymph:  No cervical or inguinal adenopathy  Musc:  No cyanosis or clubbing  Skin:    rashes, multiple ulcers.    Neuro:  Cranial nerves are grossly intact, RT sidde weakness  Psych:  Good insight, oriented to person, place and time, alert  __________________________________________________________________  Medications Reviewed: (see below)  Medications:     Current Facility-Administered Medications   Medication Dose Route Frequency    hydrOXYzine HCL (ATARAX) tablet 10 mg  10 mg Oral TID PRN    diphenhydrAMINE (BENADRYL) injection 25 mg  25 mg IntraVENous Q6H PRN    heparin 25,000 units in D5W 250 ml infusion  18-36 Units/kg/hr IntraVENous TITRATE    albuterol (ACCUNEB) nebulizer solution 1.25 mg  1.25 mg Nebulization Q6H PRN    amLODIPine (NORVASC) tablet 10 mg  10 mg Oral QPM    guaiFENesin-codeine (ROBITUSSIN AC) 100-10 mg/5 mL solution 5 mL  5 mL Per G Tube TID PRN    lacosamide (VIMPAT) oral solution 300 mg  300 mg Per G Tube BID    sodium chloride (NS) flush 5-40 mL  5-40 mL IntraVENous Q8H    sodium chloride (NS) flush 5-40 mL  5-40 mL IntraVENous PRN    acetaminophen (TYLENOL) tablet 650 mg  650 mg Oral Q4H PRN    naloxone (NARCAN) injection 0.4 mg  0.4 mg IntraVENous PRN    ondansetron (ZOFRAN) injection 4 mg  4 mg IntraVENous Q4H PRN    insulin lispro (HUMALOG) injection   SubCUTAneous AC&HS    glucose chewable tablet 16 g  4 Tablet Oral PRN    glucagon (GLUCAGEN) injection 1 mg  1 mg IntraMUSCular PRN    dextrose 10% infusion 0-250 mL  0-250 mL IntraVENous PRN    Vancomycin dosing per levels   Other Rx Dosing/Monitoring    cefepime (MAXIPIME) 1 g in 0.9% sodium chloride (MBP/ADV) 50 mL MBP  1 g IntraVENous Q24H    famotidine (PF) (PEPCID) 20 mg in 0.9% sodium chloride 10 mL injection  20 mg IntraVENous Q48H        Lab Data Reviewed: (see below)  Lab Review:     Recent Labs     02/18/23  0049 02/17/23  1808   WBC 10.4 8.6   HGB 11.0* 11.4*   HCT 33.9* 29.4*    211     Recent Labs     02/17/23  1808      K 3.5   CL 97   CO2 31   *   BUN 25*   CREA 3.11*   CA 8.7     Lab Results   Component Value Date/Time    Glucose (POC) 162 (H) 02/18/2023 11:17 AM    Glucose (POC) 137 (H) 02/18/2023 07:26 AM    Glucose (POC) 112 02/17/2023 11:03 PM    Glucose (POC) 176 (H) 07/03/2020 11:28 AM    Glucose (POC) 118 (H) 07/03/2020 06:56 AM     No results for input(s): PH, PCO2, PO2, HCO3, FIO2 in the last 72 hours. No results for input(s): INR, INREXT in the last 72 hours. All Micro Results       Procedure Component Value Units Date/Time    CULTURE, MRSA [[id number]] Collected: 02/18/23 0703    Order Status: Sent Specimen: Nasal from Nares Updated: 02/18/23 0707            I have reviewed notes of prior 24hr. Other pertinent lab: Total time spent with patient: 35 minutes I personally reviewed chart, notes, data and current medications in the medical record. I have personally examined and treated the patient at bedside during this period.                  Care Plan discussed with: Patient, Nursing Staff, and >50% of time spent in counseling and coordination of care    Discussed:  Care Plan    Prophylaxis:  Hep SQ    Disposition:  Home w/Family           ___________________________________________________    Attending Physician: Taj Akhtar MD

## 2023-02-18 NOTE — ED NOTES
Informed by Pharmacist that atarax would need to be IM or crushed. Pt ok with IM.  Orders to be changed by pharmacist.

## 2023-02-18 NOTE — PROGRESS NOTES
Cefepime 1g q24h for SSTI    ESRD on HD    Changed to cefepume 500mg q24h extended infusion    Zoila Needs, PharmD

## 2023-02-18 NOTE — H&P
[facility]  [address] [facility], [place] [address]  [phone]    Admission History and Physical      NAME:  Kirill Swain   :   1960   MRN:  [id number]     PCP:  Nancy Molina NP     Date/Time:  2023         Subjective:     CHIEF COMPLAINT: \"My arm\"     HISTORY OF PRESENT ILLNESS:     Mr. Soniya Gomez is a 58 y.o. male with PMH of DM, seizure, CVA with R sided weakness, ESRD on HD MWF admitted for RUE swelling/redness. Per wife also has had a rash that has been present for months; pruritic. Has been unable to get a derm appt for several months. Past Medical History:   Diagnosis Date    Diabetes ([address]     Dysphagia due to old cerebrovascular accident 2019    ESRD (end stage renal disease) on dialysis [facility])     MWF    Frias catheter in place     PEG (percutaneous endoscopic gastrostomy) status ([address] 3/2019 to present    NPO- comfort foods only    Right sided weakness 3/2019 to present    Seizure ([address] 2019    Stroke ([facility] 2019    Urinary retention with incomplete bladder emptying         Past Surgical History:   Procedure Laterality Date    HX ARTERIOVENOUS FISTULA Right 2019    HX CYST INCISION AND DRAINAGE Right     2nd toe- Cleaned out infection    HX OTHER SURGICAL  2019    PEG tube insertion       Social History     Tobacco Use    Smoking status: Never    Smokeless tobacco: Never   Substance Use Topics    Alcohol use: Never     Comment: rare        Family History   Problem Relation Age of Onset    Stroke Father     Stroke Sister 50    Deep Vein Thrombosis Neg Hx         Allergies   Allergen Reactions    Bee Venom Protein (Honey Bee) Anaphylaxis    Fentanyl Other (comments)     Hallucinations      Oxycodone Other (comments)     Hallucinations      Hydralazine Rash        Prior to Admission medications    Medication Sig Start Date End Date Taking?  Authorizing Provider   guaiFENesin-codeine (ROBITUSSIN AC) 100-10 mg/5 mL solution 5 mL three (3) times daily as needed for Cough. Via PEG q 6 hours   Yes Other, MD Fang   famotidine (PEPCID) 20 mg tablet 20 mg two (2) times a day. 20mg/2ml 2.5ml per peg bid   Yes Other, MD Fang   aspirin delayed-release 81 mg tablet Take 1 Tab by mouth daily. Do not resume this until you have been instructed to do so by your primary care doctor or by your urologist.   Follow up within 3-5 days  Patient taking differently: 325 mg daily. Via Peg every other day 7/3/20  Yes Bethany Palmer MD   scopolamine (TRANSDERM-SCOP) 1 mg over 3 days pt3d 1 Patch by TransDERmal route every seventy-two (72) hours. Yes Provider, Historical   lacosamide (VIMPAT) 10 mg/mL soln oral solution Take 30 mL by mouth two (2) times a day. 10 ml = 100 mg; plus 30 ml on MON, WED, FRI after dialysis   Yes Provider, Historical   isosorbide mononitrate ER (IMDUR) 30 mg tablet 30 mg daily. 2 tablet via PEG daily   Yes Provider, Historical   ascorbic acid, vitamin C, (VITAMIN C) 500 mg tablet 500 mg daily. Via peg   Yes Provider, Historical   albuterol (ACCUNEB) 1.25 mg/3 mL nebu 1.25 mg by Nebulization route every six (6) hours as needed for Wheezing. Yes Provider, Historical   losartan (COZAAR) 25 mg tablet 25 mg daily. Via PEG   Yes Provider, Historical   ferrous sulfate 325 mg (65 mg iron) tablet 300 mg Daily (before breakfast). 300 (60 FE)mg/5ml 5 mil per peg qd  Patient not taking: Reported on 2/18/2023    Other, MD Main Headley, PT / OT evaluate and treat 7/3/20   Bethany Palmer MD   insulin aspart U-100 (NOVOLOG) 100 unit/mL (3 mL) inpn by SubCUTAneous route two (2) times daily (with meals). Sliding scale     Provider, Historical   vitamin a & d (A&D) ointment Apply  to affected area as needed for Skin Irritation. Provider, Historical   terazosin (HYTRIN) 5 mg capsule Take 5 mg by mouth two (2) times a day.   Patient not taking: Reported on 2/17/2023    Provider, Historical   ondansetron (ZOFRAN ODT) 4 mg disintegrating tablet 4 mg every eight (8) hours as needed for Nausea or Vomiting. Via PEG    Provider, Historical   guaifen/dextromethorphan/pe (GUAIFEN DM PO) Take 10 mL by mouth two (2) times daily as needed (cough). Provider, Historical   bisacodyL (DULCOLAX) 10 mg suppository Insert 10 mg into rectum daily as needed for Constipation. Provider, Historical   amLODIPine (NORVASC) 10 mg tablet Take 10 mg by mouth every evening. Provider, Historical   acetaminophen (TYLENOL) 160 mg/5 mL liquid Take 320 mg by mouth every six (6) hours as needed for Fever. Provider, Historical         Review of Systems:  (bold if positive, if negative)    Gen:  Eyes:  ENT:  CVS:  Pulm:  GI:  :  MS:  Skin:  Psych:  Endo:  Hem:  Renal:  Neuro:     Swelling        Objective:      VITALS:    Vital signs reviewed; most recent are:    Visit Vitals  BP (!) 147/82 (BP 1 Location: Left leg, BP Patient Position: At rest)   Pulse 81   Temp 97.7 °F (36.5 °C)   Resp 17   Wt 77.1 kg (170 lb)   SpO2 98%   BMI 26.63 kg/m²     SpO2 Readings from Last 6 Encounters:   02/18/23 98%   10/29/22 100%   01/07/22 100%   06/08/21 96%   07/16/20 99%   07/03/20 100%          Intake/Output Summary (Last 24 hours) at 2/18/2023 8380  Last data filed at 2/18/2023 0342  Gross per 24 hour   Intake 175 ml   Output --   Net 175 ml            Exam:     Physical Exam:    Gen: Thin male  HEENT:  Pink conjunctivae, PERRL, hearing intact to voice, moist mucous membranes  Neck:  Supple, without masses, thyroid non-tender  Resp:  No accessory muscle use, clear breath sounds without wheezes rales or rhonchi  Card:  No murmurs, normal S1, S2 without thrills, bruits or peripheral edema  Abd:  Soft, non-tender, non-distended, normoactive bowel sounds are present, no palpable organomegaly  Lymph:  No cervical adenopathy  Musc:  No cyanosis or clubbing  Skin:  No rashes or ulcers, skin turgor is good  Neuro: R sided weakness. Some aphasia/dysphagia   Psych:  Alert with good insight.   Oriented to person, place, and time  RUE swelling and warmth. Fistula with bruit   Dusky appearing bilateral LEs but with bounding pulse   Diffuse rash      Labs:    Recent Labs     02/18/23  0049   WBC 10.4   HGB 11.0*   HCT 33.9*        Recent Labs     02/17/23  1808      K 3.5   CL 97   CO2 31   *   BUN 25*   CREA 3.11*   CA 8.7     No components found for: GLPOC  No results for input(s): PH, PCO2, PO2, HCO3, FIO2 in the last 72 hours. No results for input(s): INR, INREXT in the last 72 hours. Assessment/Plan:    RUE swelling - with e/o DVT on imaging. Also suspect with RUE cellulitis. Bruit appears present with fistula  -heparin gtt  -start cefepime/vanco   -check MRSA nares; if neg stop vanco     H/o CVA with R sided hemiparesis   -on ASA  -PT/OT     DM   -ISS   -BG checks AC TID and qHS     Seizure d/o   -continue vimpat     HTN   -continue amlodipine    Rash - diffuse. Present for months.  Does have peripheral eosinophilia  -will attempt to obtain biopsy while inpt if possible   -benadryl PRN   -H2 blocker to mitigate histamine response     Surrogate decision maker: Wife    Total time spent with patient: [address] discussed with: Patient and Family    Discussed:  Care Plan    Prophylaxis:   Heparin gtt    Probable Disposition:  Home w/Family           ___________________________________________________    Attending Physician: Dallin Delong MD

## 2023-02-18 NOTE — ED NOTES
TRANSFER - OUT REPORT:    Verbal report given to ALIN Rodgers RN(name) on Robby Tyson  being transferred to 5th floor, [facility] ED(unit) for routine progression of care       Report consisted of patients Situation, Background, Assessment and   Recommendations(SBAR). Information from the following report(s) SBAR, ED Summary, [facility] and Recent Results was reviewed with the receiving nurse. Lines:   Peripheral IV 02/17/23 Left Wrist (Active)   Site Assessment Clean, dry, & intact 02/17/23 1957   Phlebitis Assessment 0 02/17/23 1957   Infiltration Assessment 0 02/17/23 1957   Dressing Status New 02/17/23 1957   Dressing Type Transparent 02/17/23 1957   Hub Color/Line Status Yellow 02/17/23 1957        Opportunity for questions and clarification was provided.       Patient transported with:   Monitor

## 2023-02-18 NOTE — PROGRESS NOTES
Dict    Hd mwf to be arranged  Will need IR or vasc eval RUE avf for potential stenosis result in RUE edema

## 2023-02-18 NOTE — CONSULTS
[address]     Name:  Nitesh Yun  MR#:  [id number]  :  1960  ACCOUNT #:  [de-identified]  DATE OF SERVICE:  2023    REASON FOR CONSULTATION:  ESRD. HISTORY OF PRESENT ILLNESS:  This is a 20-year-old  male with end-stage renal disease, dialyzing on a Monday, Wednesday, and Friday schedule. His current dry weight is listed as 72.5 kg and duration of dialysis 210 minutes. He last dialyzed yesterday. I am told that his right upper extremity became edematous either yesterday or times several weeks (family at bedside). He has had an ultrasound performed of his neck (found a clot, placed on anticoagulant therapy); \"the flow to his fistula was fine\", per his family number at the bedside. He has not had a recent angiogram, however. He was admitted to the hospital with \"swelling\" and a rash of many weeks' duration. We are asked to see him to help arrange his dialysis. PAST MEDICAL HISTORY:  Includes,  1. End-stage renal disease. 2.  Diabetes mellitus. 3.  Dysphagia. 4.  Right-sided weakness. 5.  History of seizure. 6.  Urinary retention with incomplete bladder emptying. 7.  Status post right upper extremity arteriovenous fistula. CURRENT MEDICATIONS:  Of note, include:  1. Amlodipine. 2.  Cefepime. 3.  Famotidine. 4.  Insulin. 5.  Vimpat. 6.  Vancomycin. 7.  Intravenous heparin. PHYSICAL EXAMINATION:  GENERAL:  Rather debilitated-appearing elderly  male, nonverbal.  VITAL SIGNS:  Most recent blood pressure 171/86, pulse 79, oxygen saturation 97%. HEENT:  The head is normocephalic. Nose appears normal.  NECK:  Without swelling. PULMONARY:  Without any respiratory distress. NEUROLOGIC:  He is nonverbal.  PSYCHIATRIC:  Unable to be performed. EXTREMITIES:  His right upper extremity is edematous; there is a good bruit noted.     LABORATORY DATA:  Of note, hematocrit 34% yesterday, potassium 3.5, creatinine By reviewing previous records noted that she had clear DNR status. Will confirm with family.   3. 11.    ASSESSMENT AND PLAN:  The patient will be arranged for dialysis tomorrow; Monday, Wednesday, Friday while hospitalized. My partners will need to contact either Interventional Radiology or his Vascular Surgery group (his family does not know who his vascular surgeon is), as he may need some evaluation of the right lower extremity arteriovenous fistula  proximal stenosis is present. I have asked the nurse to contact [facility] and arrange for his dialysis; Monday, Wednesday, Friday, we will check back then.         MD TRIXIE [email or web address]  D:  02/18/2023 12:22  T:  02/18/2023 16:58  JOB #:  [id number]

## 2023-02-18 NOTE — PROGRESS NOTES
Problem: Falls - Risk of  Goal: *Absence of Falls  Description: Document Noel Foreman Fall Risk and appropriate interventions in the flowsheet.   Outcome: Progressing Towards Goal  Note: Fall Risk Interventions:                                Problem: Pain  Goal: *Control of Pain  Outcome: Progressing Towards Goal  Goal: *PALLIATIVE CARE:  Alleviation of Pain  Outcome: Progressing Towards Goal

## 2023-02-18 NOTE — ED NOTES
Pt transferred to [facility] ED in stable condition and with IV Vanc infusing. Spouse following H2H transport. EMTALA completed.

## 2023-02-18 NOTE — PROGRESS NOTES
SPEECH PATHOLOGY BEDSIDE SWALLOW EVALUATION/DISCHARGE  Patient: Bailee Gonzalez (33 y.o. male)  Date: 2/18/2023  Primary Diagnosis: Swelling [R60.9]       Precautions:   Fall, Aspiration    ASSESSMENT :  Based on the objective data described below, the patient presents with functional swallow with some aspiration risk due to h/o CVA. He  needs PEG due to frequent complications of illness for alternative nutrition. ADmitted for swelling in (B) arm/hand, rash, needs biopsy. PMH: aphasic and R side weakness from CVA 2019 with PEG for back up nutrition. Eats regular food unless his mouth hurts due to dental issues. Dialysis with ESRD, DM, sz. .  Skilled acute therapy provided by a speech-language pathologist is not indicated at this time. PLAN :  Recommendations:  Ok for diet as tolerated. PEG use when complications from other medical issues. Upright for all PO, including meds. Discharge Recommendations: None     SUBJECTIVE:   Patient's wife provided extensive PMH.     OBJECTIVE:     Past Medical History:   Diagnosis Date    Diabetes ([address]     Dysphagia due to old cerebrovascular accident 03/2019    ESRD (end stage renal disease) on dialysis [facility])     MWF    Frias catheter in place     PEG (percutaneous endoscopic gastrostomy) status ([address] 3/2019 to present    NPO- comfort foods only    Right sided weakness 3/2019 to present    Seizure ([address] 03/2019    Stroke ([facility] 03/2019    Urinary retention with incomplete bladder emptying      Past Surgical History:   Procedure Laterality Date    HX ARTERIOVENOUS FISTULA Right 03/2019    HX CYST INCISION AND DRAINAGE Right     2nd toe- Cleaned out infection    HX OTHER SURGICAL  03/2019    PEG tube insertion     Prior Level of Function/Home Situation: lives with wife  Home Situation  Home Environment: Apartment  # Steps to Enter: 0  One/Two Story Residence:  (elevator)  Living Alone: No  Support Systems: Spouse/Significant Other  Patient Expects to be Discharged to[de-identified] Home with home health  Current DME Used/Available at Home: Wheelchair, Walker, rollator (layne austin)  Diet prior to admission: regular , thins plus PEG  Current Diet:  regular, thins. Cognitive and Communication Status:  Neurologic State: Alert  Orientation Level: Oriented to person, Oriented to place, Oriented to situation (difficulty verbalizing due to severe fluent aphasia. had fair comprehension)  Cognition: Follows commands  Perception: Appears intact        Oral Assessment:  Oral Assessment  Labial: No impairment  Dentition: Natural;Limited  Oral Hygiene: WFL  Lingual: No impairment  Velum: No impairment  Mandible: No impairment  P.O. Trials:  Patient Position: upright in bed  Vocal quality prior to P.O.: No impairment  Consistency Presented: Thin liquid; Solid  How Presented: Self-fed/presented;Straw;Successive swallows   ORAL PHASE:   Bolus Acceptance: Impaired (little, repetitive sucks most of the time-wife reports this is premorbid)  Bolus Formation/Control: No impairment     Propulsion: No impairment  Oral Residue: None  PHARYNGEAL PHASE:   Initiation of Swallow: No impairment  Laryngeal Elevation: Functional  Aspiration Signs/Symptoms: None                      NOMS:   The NOMS functional outcome measure was used to quantify this patient's level of swallowing impairment. Based on the NOMS, the patient was determined to be at level 7 for swallow function     NOMS Swallowing Levels:  Level 1 (CN): NPO  Level 2 (CM): NPO but takes consistency in therapy  Level 3 (CL): Takes less than 50% of nutrition p.o. and continues with nonoral feedings; and/or safe with mod cues; and/or max diet restriction  Level 4 (CK):  Safe swallow but needs mod cues; and/or mod diet restriction; and/or still requires some nonoral feeding/supplements  Level 5 (CJ): Safe swallow with min diet restriction; and/or needs min cues  Level 6 (CI): Independent with p.o.; rare cues; usually self cues; may need to avoid some foods or needs extra time  Level 7 ([address] [place] [address]): Independent for all p.o.  DERIC. (2003). National Outcomes Measurement System (NOMS): Adult Speech-Language Pathology User's Guide. Pain:  Pain Scale 1: Numeric (0 - 10)  Pain Intensity 1: 0     After treatment:   Patient left in no apparent distress in bed, Nursing notified, and Caregiver / family present    COMMUNICATION/EDUCATION:   Patient was educated regarding his deficit(s) of APHASIA  as this relates to his diagnosis of CVA. He demonstrated Fair understanding as evidenced by APHASIA. Wife present and understood. .    The patient's plan of care including recommendations, planned interventions, and recommended diet changes were discussed with: Registered nurse.      Thank you for this referral.  Claudette Griggs, MELO  Time Calculation: 15 mins

## 2023-02-19 LAB
ANION GAP SERPL CALC-SCNC: 5 MMOL/L (ref 5–15)
BACTERIA SPEC CULT: NORMAL
BACTERIA SPEC CULT: NORMAL
BASOPHILS # BLD: 0.1 K/UL (ref 0–0.1)
BASOPHILS # BLD: 0.1 K/UL (ref 0–0.1)
BASOPHILS NFR BLD: 1 % (ref 0–1)
BASOPHILS NFR BLD: 1 % (ref 0–1)
BUN SERPL-MCNC: 44 MG/DL (ref 6–20)
BUN/CREAT SERPL: 9 (ref 12–20)
CALCIUM SERPL-MCNC: 8.7 MG/DL (ref 8.5–10.1)
CHLORIDE SERPL-SCNC: 99 MMOL/L (ref 97–108)
CO2 SERPL-SCNC: 30 MMOL/L (ref 21–32)
CREAT SERPL-MCNC: 5.09 MG/DL (ref 0.7–1.3)
DIFFERENTIAL METHOD BLD: ABNORMAL
DIFFERENTIAL METHOD BLD: ABNORMAL
EOSINOPHIL # BLD: 4.2 K/UL (ref 0–0.4)
EOSINOPHIL # BLD: 4.3 K/UL (ref 0–0.4)
EOSINOPHIL NFR BLD: 41 % (ref 0–7)
EOSINOPHIL NFR BLD: 49 % (ref 0–7)
ERYTHROCYTE [DISTWIDTH] IN BLOOD BY AUTOMATED COUNT: 13.1 % (ref 11.5–14.5)
ERYTHROCYTE [DISTWIDTH] IN BLOOD BY AUTOMATED COUNT: 14.4 % (ref 11.5–14.5)
GLUCOSE BLD STRIP.AUTO-MCNC: 139 MG/DL (ref 65–117)
GLUCOSE BLD STRIP.AUTO-MCNC: 170 MG/DL (ref 65–117)
GLUCOSE BLD STRIP.AUTO-MCNC: 183 MG/DL (ref 65–117)
GLUCOSE BLD STRIP.AUTO-MCNC: 230 MG/DL (ref 65–117)
GLUCOSE SERPL-MCNC: 157 MG/DL (ref 65–100)
HCT VFR BLD AUTO: 28 % (ref 36.6–50.3)
HCT VFR BLD AUTO: 33.9 % (ref 36.6–50.3)
HGB BLD-MCNC: 11 G/DL (ref 12.1–17)
HGB BLD-MCNC: 9.6 G/DL (ref 12.1–17)
IMM GRANULOCYTES # BLD AUTO: 0 K/UL (ref 0–0.04)
IMM GRANULOCYTES # BLD AUTO: 0 K/UL (ref 0–0.04)
IMM GRANULOCYTES NFR BLD AUTO: 0 % (ref 0–0.5)
IMM GRANULOCYTES NFR BLD AUTO: 0 % (ref 0–0.5)
LYMPHOCYTES # BLD: 0.7 K/UL (ref 0.8–3.5)
LYMPHOCYTES # BLD: 1.8 K/UL (ref 0.8–3.5)
LYMPHOCYTES NFR BLD: 17 % (ref 12–49)
LYMPHOCYTES NFR BLD: 8 % (ref 12–49)
MAGNESIUM SERPL-MCNC: 2.7 MG/DL (ref 1.6–2.4)
MCH RBC QN AUTO: 30.8 PG (ref 26–34)
MCH RBC QN AUTO: 32.5 PG (ref 26–34)
MCHC RBC AUTO-ENTMCNC: 32.4 G/DL (ref 30–36.5)
MCHC RBC AUTO-ENTMCNC: 34.3 G/DL (ref 30–36.5)
MCV RBC AUTO: 94.9 FL (ref 80–99)
MCV RBC AUTO: 95 FL (ref 80–99)
MONOCYTES # BLD: 0.5 K/UL (ref 0–1)
MONOCYTES # BLD: 0.6 K/UL (ref 0–1)
MONOCYTES NFR BLD: 6 % (ref 5–13)
MONOCYTES NFR BLD: 6 % (ref 5–13)
NEUTS SEG # BLD: 3.1 K/UL (ref 1.8–8)
NEUTS SEG # BLD: 3.6 K/UL (ref 1.8–8)
NEUTS SEG NFR BLD: 35 % (ref 32–75)
NEUTS SEG NFR BLD: 36 % (ref 32–75)
NRBC # BLD: 0 K/UL (ref 0–0.01)
NRBC # BLD: 0 K/UL (ref 0–0.01)
NRBC BLD-RTO: 0 PER 100 WBC
NRBC BLD-RTO: 0 PER 100 WBC
PLATELET # BLD AUTO: 201 K/UL (ref 150–400)
PLATELET # BLD AUTO: 245 K/UL (ref 150–400)
PMV BLD AUTO: 10.9 FL (ref 8.9–12.9)
PMV BLD AUTO: 9.3 FL (ref 8.9–12.9)
POTASSIUM SERPL-SCNC: 3.5 MMOL/L (ref 3.5–5.1)
RBC # BLD AUTO: 2.95 M/UL (ref 4.1–5.7)
RBC # BLD AUTO: 3.57 M/UL (ref 4.1–5.7)
RBC MORPH BLD: ABNORMAL
RBC MORPH BLD: ABNORMAL
SERVICE CMNT-IMP: ABNORMAL
SERVICE CMNT-IMP: NORMAL
SODIUM SERPL-SCNC: 134 MMOL/L (ref 136–145)
UFH PPP CHRO-ACNC: 0.63 IU/ML
UFH PPP CHRO-ACNC: 0.65 IU/ML
UFH PPP CHRO-ACNC: 0.76 IU/ML
UFH PPP CHRO-ACNC: 0.99 IU/ML
WBC # BLD AUTO: 10.4 K/UL (ref 4.1–11.1)
WBC # BLD AUTO: 8.6 K/UL (ref 4.1–11.1)

## 2023-02-19 PROCEDURE — 74011636637 HC RX REV CODE- 636/637: Performed by: INTERNAL MEDICINE

## 2023-02-19 PROCEDURE — 74011250637 HC RX REV CODE- 250/637: Performed by: INTERNAL MEDICINE

## 2023-02-19 PROCEDURE — 74011250636 HC RX REV CODE- 250/636: Performed by: INTERNAL MEDICINE

## 2023-02-19 PROCEDURE — 36415 COLL VENOUS BLD VENIPUNCTURE: CPT

## 2023-02-19 PROCEDURE — 0HBBXZX EXCISION OF RIGHT UPPER ARM SKIN, EXTERNAL APPROACH, DIAGNOSTIC: ICD-10-PCS | Performed by: SURGERY

## 2023-02-19 PROCEDURE — 74011000250 HC RX REV CODE- 250: Performed by: INTERNAL MEDICINE

## 2023-02-19 PROCEDURE — 85520 HEPARIN ASSAY: CPT

## 2023-02-19 PROCEDURE — 85025 COMPLETE CBC W/AUTO DIFF WBC: CPT

## 2023-02-19 PROCEDURE — 77030011256 HC DRSG MEPILEX <16IN NO BORD MOLN -A

## 2023-02-19 PROCEDURE — 65270000029 HC RM PRIVATE

## 2023-02-19 PROCEDURE — 2709999900 HC NON-CHARGEABLE SUPPLY

## 2023-02-19 PROCEDURE — 77030018798 HC PMP KT ENTRL FED COVD -A

## 2023-02-19 PROCEDURE — 82962 GLUCOSE BLOOD TEST: CPT

## 2023-02-19 PROCEDURE — 83735 ASSAY OF MAGNESIUM: CPT

## 2023-02-19 PROCEDURE — 80048 BASIC METABOLIC PNL TOTAL CA: CPT

## 2023-02-19 PROCEDURE — 74011000258 HC RX REV CODE- 258: Performed by: INTERNAL MEDICINE

## 2023-02-19 RX ADMIN — AMLODIPINE BESYLATE 10 MG: 5 TABLET ORAL at 19:22

## 2023-02-19 RX ADMIN — DIPHENHYDRAMINE HYDROCHLORIDE 25 MG: 50 INJECTION, SOLUTION INTRAMUSCULAR; INTRAVENOUS at 23:47

## 2023-02-19 RX ADMIN — ISOSORBIDE DINITRATE 30 MG: 20 TABLET ORAL at 19:22

## 2023-02-19 RX ADMIN — Medication 2 UNITS: at 07:55

## 2023-02-19 RX ADMIN — HEPARIN SODIUM 20 UNITS/KG/HR: 10000 INJECTION, SOLUTION INTRAVENOUS at 01:28

## 2023-02-19 RX ADMIN — HEPARIN SODIUM 19 UNITS/KG/HR: 10000 INJECTION, SOLUTION INTRAVENOUS at 19:21

## 2023-02-19 RX ADMIN — ISOSORBIDE DINITRATE 30 MG: 20 TABLET ORAL at 09:56

## 2023-02-19 RX ADMIN — SODIUM CHLORIDE, PRESERVATIVE FREE 5 ML: 5 INJECTION INTRAVENOUS at 22:24

## 2023-02-19 RX ADMIN — TERAZOSIN 5 MG: 5 CAPSULE ORAL at 19:22

## 2023-02-19 RX ADMIN — DIPHENHYDRAMINE HYDROCHLORIDE 25 MG: 50 INJECTION, SOLUTION INTRAMUSCULAR; INTRAVENOUS at 01:59

## 2023-02-19 RX ADMIN — Medication 3 UNITS: at 16:38

## 2023-02-19 RX ADMIN — TERAZOSIN 5 MG: 5 CAPSULE ORAL at 05:46

## 2023-02-19 RX ADMIN — HEPARIN SODIUM 20 UNITS/KG/HR: 10000 INJECTION, SOLUTION INTRAVENOUS at 07:15

## 2023-02-19 RX ADMIN — LACOSAMIDE ORAL SOLUTION 300 MG: 10 SOLUTION ORAL at 19:23

## 2023-02-19 RX ADMIN — FAMOTIDINE 20 MG: 10 INJECTION, SOLUTION INTRAVENOUS at 22:22

## 2023-02-19 RX ADMIN — SODIUM CHLORIDE, PRESERVATIVE FREE 5 ML: 5 INJECTION INTRAVENOUS at 05:51

## 2023-02-19 RX ADMIN — CEFEPIME 0.5 G: 1 INJECTION, POWDER, FOR SOLUTION INTRAMUSCULAR; INTRAVENOUS at 19:29

## 2023-02-19 RX ADMIN — HEPARIN SODIUM 19 UNITS/KG/HR: 10000 INJECTION, SOLUTION INTRAVENOUS at 09:23

## 2023-02-19 RX ADMIN — LACOSAMIDE ORAL SOLUTION 300 MG: 10 SOLUTION ORAL at 09:56

## 2023-02-19 NOTE — PROCEDURES
Biopsy procedure note. After obtaining informed consent from his wife, the right upper extremity and symptomatic lesion area was prepped and draped in the usual fashion. Anesthesia was obtained with 1% lidocaine. A full thickness punch biopsy was obtained with a 5mm punch. Wound closed with two simple vertical mattress sutures of 3-0 Monocryl. Sutures out in 4 days. Wound care discussed. Specimen sent for dermatopathology.   Zo Galo MD

## 2023-02-19 NOTE — PROGRESS NOTES
[facility], [place] [facility]  [address] [facility], [place], [address] [place] [address]  [phone]      Hospitalist Progress Note      NAME: Murray Nelson   :  1960  MRM:  [id number]    Date of service: 2023  11:52 AM       Assessment and Plan:   Skin rash/ chronic ulcers/ blisters/ upper and lower extremities swelling. Rash presented for months. No DVT on doppler. On cefepime/vanco empirically, but doubt supper imposed infection( may need to de escalate). Pt needs to see a dermatologist, but the appointment is not until September. Check MRSA nares. general surgery consulted and plan to do Bx. Wound care consult appreciated     2. Internal jugular vein thrombosis: doppler: Acute appearing thrombus noted in the right non-occluded internal jugular vein. Cont heparin gtt    3. H/o CVA with R sided hemiparesis. on ASA. PT/OT      4.  DM. Cont SSI. 5.  Seizure d/o: continue vimpat      6. HTN: continue amlodipine    7. ESRD. HD M/W/F. Evaluated by nephrology          Subjective:     Chief Complaint[de-identified] Patient was seen and examined as a follow up for skin rash. Chart was reviewed. fatigue     ROS:  (bold if positive, if negative)    Tolerating PT  Tolerating Diet        Objective:     Last 24hrs VS reviewed since prior progress note.  Most recent are:    Visit Vitals  /63 (BP 1 Location: Right leg, BP Patient Position: At rest)   Pulse 76   Temp 97.3 °F (36.3 °C)   Resp 18   Wt 77.1 kg (170 lb)   SpO2 98%   BMI 26.63 kg/m²     SpO2 Readings from Last 6 Encounters:   23 98%   10/29/22 100%   22 100%   21 96%   20 99%   20 100%          Intake/Output Summary (Last 24 hours) at 2023 1101  Last data filed at 2023 0735  Gross per 24 hour   Intake 932.2 ml   Output 125 ml   Net 807.2 ml          Physical Exam:    Gen:  Well-developed, well-nourished, in no acute distress  HEENT:  Pink conjunctivae, PERRL, hearing intact to voice, moist mucous membranes  Neck: Supple, without masses, thyroid non-tender  Resp:  No accessory muscle use, clear breath sounds without wheezes rales or rhonchi  Card:  No murmurs, normal S1, S2 without thrills, bruits or peripheral edema  Abd:  Soft, non-tender, non-distended, normoactive bowel sounds are present, no palpable organomegaly and no detectable hernias  Lymph:  No cervical or inguinal adenopathy  Musc:  No cyanosis or clubbing  Skin:    rashes, multiple ulcers.    Neuro:  Cranial nerves are grossly intact, RT side weakness  Psych:  Good insight, oriented to person, place and time, alert  __________________________________________________________________  Medications Reviewed: (see below)  Medications:     Current Facility-Administered Medications   Medication Dose Route Frequency    hydrOXYzine HCL (ATARAX) tablet 10 mg  10 mg Oral TID PRN    diphenhydrAMINE (BENADRYL) injection 25 mg  25 mg IntraVENous Q6H PRN    heparin 25,000 units in D5W 250 ml infusion  18-36 Units/kg/hr IntraVENous TITRATE    albuterol (ACCUNEB) nebulizer solution 1.25 mg  1.25 mg Nebulization Q6H PRN    amLODIPine (NORVASC) tablet 10 mg  10 mg Oral QPM    guaiFENesin-codeine (ROBITUSSIN AC) 100-10 mg/5 mL solution 5 mL  5 mL Per G Tube TID PRN    lacosamide (VIMPAT) oral solution 300 mg  300 mg Per G Tube BID    [START ON 2/20/2023] Vanc random 2/20 0400 - before dialysis    Other Rx Dosing/Monitoring    cefepime (MAXIPIME) 0.5 g in 0.9% sodium chloride 100 mL IVPB  500 mg IntraVENous Q24H    terazosin (HYTRIN) capsule 5 mg  5 mg Oral BID    isosorbide dinitrate (ISORDIL) tablet 30 mg  30 mg Oral BID    labetaloL (NORMODYNE;TRANDATE) 20 mg/4 mL (5 mg/mL) injection 10 mg  10 mg IntraVENous Q6H PRN    sodium chloride (NS) flush 5-40 mL  5-40 mL IntraVENous Q8H    sodium chloride (NS) flush 5-40 mL  5-40 mL IntraVENous PRN    acetaminophen (TYLENOL) tablet 650 mg  650 mg Oral Q4H PRN    naloxone (NARCAN) injection 0.4 mg  0.4 mg IntraVENous PRN    ondansetron [facility]) injection 4 mg  4 mg IntraVENous Q4H PRN    insulin lispro (HUMALOG) injection   SubCUTAneous AC&HS    glucose chewable tablet 16 g  4 Tablet Oral PRN    glucagon (GLUCAGEN) injection 1 mg  1 mg IntraMUSCular PRN    dextrose 10% infusion 0-250 mL  0-250 mL IntraVENous PRN    Vancomycin dosing per levels   Other Rx Dosing/Monitoring    famotidine (PF) (PEPCID) 20 mg in 0.9% sodium chloride 10 mL injection  20 mg IntraVENous Q48H        Lab Data Reviewed: (see below)  Lab Review:     Recent Labs     02/19/23  0155 02/18/23  1608 02/18/23  0049   WBC 8.6 10.4 10.4   HGB 9.6* 12.3 11.0*   HCT 28.0* 37.9 33.9*    239 245       Recent Labs     02/19/23  0155 02/17/23  1808   * 138   K 3.5 3.5   CL 99 97   CO2 30 31   * 119*   BUN 44* 25*   CREA 5.09* 3.11*   CA 8.7 8.7   MG 2.7*  --        Lab Results   Component Value Date/Time    Glucose (POC) 183 (H) 02/19/2023 07:49 AM    Glucose (POC) 211 (H) 02/18/2023 09:35 PM    Glucose (POC) 131 (H) 02/18/2023 04:31 PM    Glucose (POC) 162 (H) 02/18/2023 11:17 AM    Glucose (POC) 137 (H) 02/18/2023 07:26 AM     No results for input(s): PH, PCO2, PO2, HCO3, FIO2 in the last 72 hours. No results for input(s): INR, INREXT, INREXT in the last 72 hours. All Micro Results       Procedure Component Value Units Date/Time    CULTURE, MRSA [[id number]] Collected: 02/18/23 0703    Order Status: Sent Specimen: Nasal from Nares Updated: 02/18/23 1337            I have reviewed notes of prior 24hr. Other pertinent lab: Total time spent with patient: 35 minutes I personally reviewed chart, notes, data and current medications in the medical record. I have personally examined and treated the patient at bedside during this period.                  Care Plan discussed with: Patient, Nursing Staff, and >50% of time spent in counseling and coordination of care    Discussed:  Care Plan    Prophylaxis:  Hep SQ    Disposition:  Home w/Family           ___________________________________________________    Attending Physician: Peggy Hanna MD

## 2023-02-19 NOTE — PROGRESS NOTES
Problem: Pain  Goal: *Control of Pain  Outcome: Progressing Towards Goal     Problem: Falls - Risk of  Goal: *Absence of Falls  Description: Document Chinmay Fall Risk and appropriate interventions in the flowsheet. Outcome: Progressing Towards Goal  Note: Fall Risk Interventions:                                Problem: Pressure Injury - Risk of  Goal: *Prevention of pressure injury  Description: Document Shaquille Scale and appropriate interventions in the flowsheet.   Outcome: Progressing Towards Goal  Note: Pressure Injury Interventions:       Moisture Interventions: Absorbent underpads, Check for incontinence Q2 hours and as needed    Activity Interventions: Increase time out of bed, PT/OT evaluation    Mobility Interventions: PT/OT evaluation, Pressure redistribution bed/mattress (bed type)    Nutrition Interventions: Document food/fluid/supplement intake, Offer support with meals,snacks and hydration    Friction and Shear Interventions: Apply protective barrier, creams and emollients

## 2023-02-19 NOTE — CONSULTS
[address]     Name:  Keitha Spatz  MR#:  [id number]  :  1960  ACCOUNT #:  [de-identified]  DATE OF SERVICE:  2023    PRIMARY CARE PROVIDER:  ADAN Fierro    HOSPITALIST OF RECORD:  Cuca Gonzalez MD    REASON FOR CONSULTATION:  This is a consult note for multiple skin lesions. HISTORY OF PRESENT ILLNESS:  The patient is a pleasant 70-year-old male who presented to the hospital with persistent pruritic rash and right upper extremity swelling and redness. The patient has DVT on imaging with superimposed cellulitis. The patient was started on heparin, Ceftin, and vancomycin. Most of the lesions have been present for months and are persistently pruritic. General Surgery Service was consulted for tissue biopsy. PAST MEDICAL HISTORY:  1.  Diabetes. 2.  Dysphagia. 3.  End-stage renal disease. 4.  Frias catheter in place. 5.  Percutaneous gastrostomy. 6.  Right-sided weakness. 7.  Seizure. 8.  Stroke. 9.  Urinary retention. 10. PAST SURGICAL HISTORY:  1.  AV fistula. 2.  PEG tube insertion. SOCIAL HISTORY:  Never smoker. Does not drink alcohol. FAMILY HISTORY:  Stroke. Negative history of deep vein thrombosis. ALLERGIES:  BEE VENOM, PROTEIN, FENTANYL, OXYCODONE, HYDRALAZINE. HOME MEDICATIONS:  1. Phenergan. 2.  Codeine. 3.  Famotidine. 4.  Aspirin. 5.  Transdermal scopolamine patch. 6.  Vimpat oral solution. 7.  Isosorbide mononitrate. 8.  Losartan. 9.  Ferrous sulfate. 10.  Insulin injectable. 11.  Terazosin. 12.  Ondansetron. 13.  Dulcolax. 14.  Amlodipine. 15.  Acetaminophen. REVIEW OF SYSTEMS:  Positive for above complaints. Negative, general, HEENT, respiratory, cardiovascular, genitourinary, musculoskeletal, neurologic, psychiatric, endocrine, hematology. PHYSICAL EXAMINATION:  VITAL SIGNS:  Reviewed. CONSTITUTIONAL:  Elderly weak-appearing male, in no apparent distress.   HEENT:  Normocephalic, atraumatic. NECK:  Supple. Trachea is midline. CHEST:  Clear. HEART:  Rate is regular. ABDOMEN:  Soft, nondistended. No rebound or guarding. MUSCULOSKELETAL:  No clubbing, cyanosis, or edema. SKIN:  Multiple upper extremity chronic-appearing lesions with irritation of the skin over which the lesions appear. PSYCHIATRIC:  Appropriate to questioning and pleasant. NEUROLOGIC:  The patient had a gross right-sided weakness. RADIOLOGY:  Reviewed. LABORATORY DATA:  Reviewed. ASSESSMENT:  Multiple skin lesions in the setting of persistent pruritus which has been there for months. PLAN:  Tissue biopsy at bedside. We will perform this tomorrow. The patient and his wife were advised and informed, consented to procedure. All questions were answered to his wife's satisfaction.         Jenelle Sanabria MD      [id number]  D:  02/18/2023 14:53  T:  02/18/2023 22:01  JOB #:  [id number]  CC:  MD Kathleen Jimenes Miranda Semen, MD

## 2023-02-19 NOTE — PROGRESS NOTES
Bedside shift change report given to [address] [place] (oncoming nurse) by Erika Davis RN (offgoing nurse). Report included the following information SBAR, Kardex, Intake/Output, MAR, Recent Results, and Med Rec Status.

## 2023-02-19 NOTE — WOUND CARE
Wound Nurse Note    Initial consult received to see patient regarding generalized blistering. Patient currently resting on a [employer] bed; spouse at bedside who reports intermittent blistering since 9/22. Assessment:  BLE - scattered areas of dried decompressed blisters, hypopigmented areas and intact serous fluid filled blisters. Left arm - dried areas of resolving blisters to include su surface of hand. Right arm - scattered dried areas and one open pink partial thickness skin loss at upper posterior arm. Sacrum - intact; hypopigmentation noted across sacrum. PEG - peritubular skin intact; drain sponge ongoing. Bilateral heels -intact; no redness. Recommendations:  Noted plans for tissue biopsy today per . Blistering history appeared similar to Bullous Pemphigoid? Pending biopsy result and referral to Dermatology, would cleanse open areas with Vashe solution and cover with foam dressings. Turn q2h and float heels; pad between contractures in legs vs.knees. Foam to protect sacrum; change mwf. Plan:  Spoke with Ihsan Alejandra; orders approved and entered in chart. Will follow; reconsult as needed.     Carolina Hernandez RN [facility], [facility].  [facility] Wound Dept  [phone]    Right lateral foot        Left dorsal foot        Left medial foot        Left arm/hand        Right elbow        Right posterior upper arm        sacrum

## 2023-02-19 NOTE — PROGRESS NOTES
Chart reviewed and orders acknowledged. Initiated evaluation by obtaining information from patient's wife. Once history was obtained, reporting therapist attempted to start evaluation with patient, however, patient was unable to sustain arousal for purposeful or active participation. Evaluation was aborted at that time. Sheldon Garcia, OTR/L      **Per patient's wife, he was receiving home health PT 1x/week; they did not have enough availability in their schedule for additional therapies due to community dialysis appointment 3x/week and other medical services. Patient is total care at home for peg tube feeds, enema and bowel management, wound care, and intermittent straight cathing. Patient's wife completes Marlyne Poser transfers at home for out of bed activities. Patient and his wife are interested in participating in OT services at this hospital with emphasis on household transfers and standing tolerance for overall decreased burden of care and for repositioning to assist with maintaining skin integrity.

## 2023-02-19 NOTE — PROGRESS NOTES
Problem: Pain  Goal: *Control of Pain  Outcome: Progressing Towards Goal  Goal: *PALLIATIVE CARE:  Alleviation of Pain  Outcome: Progressing Towards Goal     Problem: Falls - Risk of  Goal: *Absence of Falls  Description: Document Chinmay Fall Risk and appropriate interventions in the flowsheet.   Outcome: Progressing Towards Goal  Note: Fall Risk Interventions:

## 2023-02-20 LAB
ANION GAP SERPL CALC-SCNC: 6 MMOL/L (ref 5–15)
BASOPHILS # BLD: 0 K/UL (ref 0–0.1)
BASOPHILS # BLD: 0.1 K/UL (ref 0–0.1)
BASOPHILS NFR BLD: 0 % (ref 0–1)
BASOPHILS NFR BLD: 1 % (ref 0–1)
BUN SERPL-MCNC: 56 MG/DL (ref 6–20)
BUN/CREAT SERPL: 9 (ref 12–20)
CALCIUM SERPL-MCNC: 10 MG/DL (ref 8.5–10.1)
CALCIUM SERPL-MCNC: 9.3 MG/DL (ref 8.5–10.1)
CHLORIDE SERPL-SCNC: 98 MMOL/L (ref 97–108)
CO2 SERPL-SCNC: 29 MMOL/L (ref 21–32)
COMMENT, HOLDF: NORMAL
CREAT SERPL-MCNC: 6.47 MG/DL (ref 0.7–1.3)
DIFFERENTIAL METHOD BLD: ABNORMAL
DIFFERENTIAL METHOD BLD: ABNORMAL
EOSINOPHIL # BLD: 3.6 K/UL (ref 0–0.4)
EOSINOPHIL # BLD: 4.6 K/UL (ref 0–0.4)
EOSINOPHIL NFR BLD: 42 % (ref 0–7)
EOSINOPHIL NFR BLD: 52 % (ref 0–7)
ERYTHROCYTE [DISTWIDTH] IN BLOOD BY AUTOMATED COUNT: 13.6 % (ref 11.5–14.5)
ERYTHROCYTE [DISTWIDTH] IN BLOOD BY AUTOMATED COUNT: ABNORMAL % (ref 11.5–14.5)
EST. AVERAGE GLUCOSE BLD GHB EST-MCNC: 114 MG/DL
GLUCOSE BLD STRIP.AUTO-MCNC: 148 MG/DL (ref 65–117)
GLUCOSE BLD STRIP.AUTO-MCNC: 151 MG/DL (ref 65–117)
GLUCOSE BLD STRIP.AUTO-MCNC: 192 MG/DL (ref 65–117)
GLUCOSE SERPL-MCNC: 154 MG/DL (ref 65–100)
HBA1C MFR BLD: 5.6 % (ref 4–5.6)
HBV SURFACE AB SER QL: REACTIVE
HBV SURFACE AB SER-ACNC: 198.09 MIU/ML
HBV SURFACE AG SER QL: <0.1 INDEX
HBV SURFACE AG SER QL: NEGATIVE
HCT VFR BLD AUTO: 29.8 % (ref 36.6–50.3)
HCT VFR BLD AUTO: ABNORMAL %
HGB BLD-MCNC: 9.9 G/DL (ref 12.1–17)
HGB BLD-MCNC: ABNORMAL G/DL (ref 13–16)
IMM GRANULOCYTES # BLD AUTO: 0 K/UL
IMM GRANULOCYTES # BLD AUTO: 0 K/UL
IMM GRANULOCYTES NFR BLD AUTO: 0 %
IMM GRANULOCYTES NFR BLD AUTO: 0 %
LYMPHOCYTES # BLD: 1.2 K/UL (ref 0.8–3.5)
LYMPHOCYTES # BLD: 1.6 K/UL (ref 0.8–3.5)
LYMPHOCYTES NFR BLD: 13 % (ref 12–49)
LYMPHOCYTES NFR BLD: 19 % (ref 12–49)
MAGNESIUM SERPL-MCNC: 3 MG/DL (ref 1.6–2.4)
MCH RBC QN AUTO: 30.6 PG (ref 26–34)
MCH RBC QN AUTO: ABNORMAL PG (ref 25–35)
MCHC RBC AUTO-ENTMCNC: 33.2 G/DL (ref 30–36.5)
MCHC RBC AUTO-ENTMCNC: ABNORMAL G/DL (ref 31–37)
MCV RBC AUTO: 92 FL (ref 80–99)
MCV RBC AUTO: ABNORMAL FL (ref 78–98)
MONOCYTES # BLD: 0.5 K/UL (ref 0–1)
MONOCYTES # BLD: 0.6 K/UL (ref 0–1)
MONOCYTES NFR BLD: 5 % (ref 5–13)
MONOCYTES NFR BLD: 7 % (ref 5–13)
NEUTS BAND NFR BLD MANUAL: 1 % (ref 0–6)
NEUTS SEG # BLD: 2.6 K/UL (ref 1.8–8)
NEUTS SEG # BLD: 2.8 K/UL (ref 1.8–8)
NEUTS SEG NFR BLD: 28 % (ref 32–75)
NEUTS SEG NFR BLD: 32 % (ref 32–75)
NRBC # BLD: 0 K/UL (ref 0–0.01)
NRBC # BLD: 0.02 K/UL (ref 0–0.01)
NRBC BLD-RTO: 0 PER 100 WBC
NRBC BLD-RTO: 0.7 PER 100 WBC
PHOSPHATE SERPL-MCNC: 3.8 MG/DL (ref 2.6–4.7)
PLATELET # BLD AUTO: 200 K/UL (ref 150–400)
PLATELET # BLD AUTO: 211 K/UL (ref 150–400)
PMV BLD AUTO: 9.6 FL (ref 8.9–12.9)
PMV BLD AUTO: 9.7 FL (ref 8.9–12.9)
POTASSIUM SERPL-SCNC: 3.7 MMOL/L (ref 3.5–5.1)
PTH-INTACT SERPL-MCNC: 23.7 PG/ML (ref 18.4–88)
RBC # BLD AUTO: 3.24 M/UL (ref 4.1–5.7)
RBC # BLD AUTO: ABNORMAL M/UL
RBC MORPH BLD: ABNORMAL
RBC MORPH BLD: ABNORMAL
SAMPLES BEING HELD,HOLD: NORMAL
SERVICE CMNT-IMP: ABNORMAL
SODIUM SERPL-SCNC: 133 MMOL/L (ref 136–145)
UFH PPP CHRO-ACNC: 0.5 IU/ML
VANCOMYCIN SERPL-MCNC: 18.1 UG/ML
WBC # BLD AUTO: 8.6 K/UL (ref 4.1–11.1)
WBC # BLD AUTO: 9 K/UL (ref 4.1–11.1)

## 2023-02-20 PROCEDURE — C1769 GUIDE WIRE: HCPCS

## 2023-02-20 PROCEDURE — 97530 THERAPEUTIC ACTIVITIES: CPT

## 2023-02-20 PROCEDURE — 77030002996 HC SUT SLK J&J -A

## 2023-02-20 PROCEDURE — 86146 BETA-2 GLYCOPROTEIN ANTIBODY: CPT

## 2023-02-20 PROCEDURE — 97110 THERAPEUTIC EXERCISES: CPT

## 2023-02-20 PROCEDURE — 2709999900 HC NON-CHARGEABLE SUPPLY

## 2023-02-20 PROCEDURE — 85025 COMPLETE CBC W/AUTO DIFF WBC: CPT

## 2023-02-20 PROCEDURE — 83970 ASSAY OF PARATHORMONE: CPT

## 2023-02-20 PROCEDURE — 77030018798 HC PMP KT ENTRL FED COVD -A

## 2023-02-20 PROCEDURE — 65270000029 HC RM PRIVATE

## 2023-02-20 PROCEDURE — 86147 CARDIOLIPIN ANTIBODY EA IG: CPT

## 2023-02-20 PROCEDURE — 83036 HEMOGLOBIN GLYCOSYLATED A1C: CPT

## 2023-02-20 PROCEDURE — 85598 HEXAGNAL PHOSPH PLTLT NEUTRL: CPT

## 2023-02-20 PROCEDURE — 74011000250 HC RX REV CODE- 250: Performed by: INTERNAL MEDICINE

## 2023-02-20 PROCEDURE — 057M3ZZ DILATION OF RIGHT INTERNAL JUGULAR VEIN, PERCUTANEOUS APPROACH: ICD-10-PCS

## 2023-02-20 PROCEDURE — 82962 GLUCOSE BLOOD TEST: CPT

## 2023-02-20 PROCEDURE — 74011250637 HC RX REV CODE- 250/637: Performed by: INTERNAL MEDICINE

## 2023-02-20 PROCEDURE — 87340 HEPATITIS B SURFACE AG IA: CPT

## 2023-02-20 PROCEDURE — 84100 ASSAY OF PHOSPHORUS: CPT

## 2023-02-20 PROCEDURE — C1894 INTRO/SHEATH, NON-LASER: HCPCS

## 2023-02-20 PROCEDURE — 74011636637 HC RX REV CODE- 636/637: Performed by: INTERNAL MEDICINE

## 2023-02-20 PROCEDURE — 77030040393 HC DRSG OPTIFOAM GENT MDII -B

## 2023-02-20 PROCEDURE — B51W1ZZ FLUOROSCOPY OF DIALYSIS SHUNT/FISTULA USING LOW OSMOLAR CONTRAST: ICD-10-PCS

## 2023-02-20 PROCEDURE — 85613 RUSSELL VIPER VENOM DILUTED: CPT

## 2023-02-20 PROCEDURE — 74011250636 HC RX REV CODE- 250/636: Performed by: INTERNAL MEDICINE

## 2023-02-20 PROCEDURE — 99223 1ST HOSP IP/OBS HIGH 75: CPT | Performed by: INTERNAL MEDICINE

## 2023-02-20 PROCEDURE — 85732 THROMBOPLASTIN TIME PARTIAL: CPT

## 2023-02-20 PROCEDURE — 86706 HEP B SURFACE ANTIBODY: CPT

## 2023-02-20 PROCEDURE — 36902 INTRO CATH DIALYSIS CIRCUIT: CPT

## 2023-02-20 PROCEDURE — 90935 HEMODIALYSIS ONE EVALUATION: CPT

## 2023-02-20 PROCEDURE — 36415 COLL VENOUS BLD VENIPUNCTURE: CPT

## 2023-02-20 PROCEDURE — 85520 HEPARIN ASSAY: CPT

## 2023-02-20 PROCEDURE — 86038 ANTINUCLEAR ANTIBODIES: CPT

## 2023-02-20 PROCEDURE — C1725 CATH, TRANSLUMIN NON-LASER: HCPCS

## 2023-02-20 PROCEDURE — 75710 ARTERY X-RAYS ARM/LEG: CPT

## 2023-02-20 PROCEDURE — 80048 BASIC METABOLIC PNL TOTAL CA: CPT

## 2023-02-20 PROCEDURE — 74011000636 HC RX REV CODE- 636

## 2023-02-20 PROCEDURE — 83735 ASSAY OF MAGNESIUM: CPT

## 2023-02-20 PROCEDURE — 80202 ASSAY OF VANCOMYCIN: CPT

## 2023-02-20 RX ORDER — LIDOCAINE HYDROCHLORIDE 10 MG/ML
10-30 INJECTION, SOLUTION EPIDURAL; INFILTRATION; INTRACAUDAL; PERINEURAL
Status: DISCONTINUED | OUTPATIENT
Start: 2023-02-20 | End: 2023-02-20 | Stop reason: HOSPADM

## 2023-02-20 RX ORDER — SCOLOPAMINE TRANSDERMAL SYSTEM 1 MG/1
1 PATCH, EXTENDED RELEASE TRANSDERMAL
Status: DISCONTINUED | OUTPATIENT
Start: 2023-02-20 | End: 2023-02-21 | Stop reason: HOSPADM

## 2023-02-20 RX ADMIN — HYDROXYZINE HYDROCHLORIDE 10 MG: 10 TABLET ORAL at 23:51

## 2023-02-20 RX ADMIN — DIPHENHYDRAMINE HYDROCHLORIDE 25 MG: 50 INJECTION, SOLUTION INTRAMUSCULAR; INTRAVENOUS at 06:21

## 2023-02-20 RX ADMIN — TERAZOSIN 5 MG: 5 CAPSULE ORAL at 21:24

## 2023-02-20 RX ADMIN — SODIUM CHLORIDE, PRESERVATIVE FREE 10 ML: 5 INJECTION INTRAVENOUS at 13:44

## 2023-02-20 RX ADMIN — DIPHENHYDRAMINE HYDROCHLORIDE 25 MG: 50 INJECTION, SOLUTION INTRAMUSCULAR; INTRAVENOUS at 21:35

## 2023-02-20 RX ADMIN — DIPHENHYDRAMINE HYDROCHLORIDE 25 MG: 50 INJECTION, SOLUTION INTRAMUSCULAR; INTRAVENOUS at 13:37

## 2023-02-20 RX ADMIN — Medication 2 UNITS: at 08:45

## 2023-02-20 RX ADMIN — SODIUM CHLORIDE, PRESERVATIVE FREE 5 ML: 5 INJECTION INTRAVENOUS at 21:28

## 2023-02-20 RX ADMIN — ISOSORBIDE DINITRATE 30 MG: 20 TABLET ORAL at 21:37

## 2023-02-20 RX ADMIN — HYDROXYZINE HYDROCHLORIDE 10 MG: 10 TABLET ORAL at 05:41

## 2023-02-20 RX ADMIN — IOPAMIDOL 30 ML: 612 INJECTION, SOLUTION INTRAVENOUS at 12:30

## 2023-02-20 RX ADMIN — SODIUM CHLORIDE, PRESERVATIVE FREE 5 ML: 5 INJECTION INTRAVENOUS at 05:45

## 2023-02-20 RX ADMIN — AMLODIPINE BESYLATE 10 MG: 5 TABLET ORAL at 21:23

## 2023-02-20 RX ADMIN — TERAZOSIN 5 MG: 5 CAPSULE ORAL at 05:41

## 2023-02-20 RX ADMIN — APIXABAN 10 MG: 5 TABLET, FILM COATED ORAL at 21:23

## 2023-02-20 RX ADMIN — Medication 2 UNITS: at 16:39

## 2023-02-20 RX ADMIN — LACOSAMIDE ORAL SOLUTION 300 MG: 10 SOLUTION ORAL at 21:26

## 2023-02-20 NOTE — PROGRESS NOTES
Juan A Urena  YOB: 1960      Assessment & Plan:     ESKD-HD MWF [place] [facility])  HTN  Acute R IJ thrombus  Diffuse Rash in both extremity concern for calciphylaxis vs r/o skin dz > s/p skin biopsy  Malfunction of AVF    -plan for HD today MWF  -Heparin GTT and plan to switch to Eliquis per Hem onc  -Fistulogram plan by vascular surgery  -check PTH level  -plan d/w patient spouse at bedside         Subjective:   CC: F/U ESKD-HD MWF  HPI: Patient seen,sleepy and scratching all extremity, rash in both upper and lower ext  ROS:  Current Facility-Administered Medications   Medication Dose Route Frequency    scopolamine (TRANSDERM-SCOP) 1 mg over 3 days 1 Patch  1 Patch TransDERmal Q72H    hydrOXYzine HCL (ATARAX) tablet 10 mg  10 mg Oral TID PRN    diphenhydrAMINE (BENADRYL) injection 25 mg  25 mg IntraVENous Q6H PRN    heparin 25,000 units in D5W 250 ml infusion  18-36 Units/kg/hr IntraVENous TITRATE    albuterol (ACCUNEB) nebulizer solution 1.25 mg  1.25 mg Nebulization Q6H PRN    amLODIPine (NORVASC) tablet 10 mg  10 mg Oral QPM    guaiFENesin-codeine (ROBITUSSIN AC) 100-10 mg/5 mL solution 5 mL  5 mL Per G Tube TID PRN    lacosamide (VIMPAT) oral solution 300 mg  300 mg Per G Tube BID    Vanc random 2/20 0400 - before dialysis    Other Rx Dosing/Monitoring    cefepime (MAXIPIME) 0.5 g in 0.9% sodium chloride 100 mL IVPB  500 mg IntraVENous Q24H    terazosin (HYTRIN) capsule 5 mg  5 mg Oral BID    isosorbide dinitrate (ISORDIL) tablet 30 mg  30 mg Oral BID    labetaloL (NORMODYNE;TRANDATE) 20 mg/4 mL (5 mg/mL) injection 10 mg  10 mg IntraVENous Q6H PRN    sodium chloride (NS) flush 5-40 mL  5-40 mL IntraVENous Q8H    sodium chloride (NS) flush 5-40 mL  5-40 mL IntraVENous PRN    acetaminophen (TYLENOL) tablet 650 mg  650 mg Oral Q4H PRN    naloxone (NARCAN) injection 0.4 mg  0.4 mg IntraVENous PRN    ondansetron (ZOFRAN) injection 4 mg  4 mg IntraVENous Q4H PRN    insulin lispro (HUMALOG) injection   SubCUTAneous AC&HS    glucose chewable tablet 16 g  4 Tablet Oral PRN    glucagon (GLUCAGEN) injection 1 mg  1 mg IntraMUSCular PRN    dextrose 10% infusion 0-250 mL  0-250 mL IntraVENous PRN    Vancomycin dosing per levels   Other Rx Dosing/Monitoring    famotidine (PF) (PEPCID) 20 mg in 0.9% sodium chloride 10 mL injection  20 mg IntraVENous Q48H          Objective:     Vitals:  Blood pressure (!) 154/67, pulse 85, temperature 98.8 °F (37.1 °C), resp. rate 18, height 5' 7\" (1.702 m), weight 74.9 kg (165 lb 3.2 oz), SpO2 100 %. Temp (24hrs), Av.7 °F (37.1 °C), Min:98 °F (36.7 °C), Max:99.4 °F (37.4 °C)      Intake and Output:  No intake/output data recorded.  1901 -  0700  In: 932.2 [I.V.:332.2]  Out: 125 [Urine:125]    Physical Exam:                   Physical Examination:   GENERAL ASSESSMENT: sleepy but arousal  HEENT:Nontraumatic   CHEST: CTA  HEART: S1S2  ABDOMEN: Soft,NT,  EXTREMITY: no EDEMA  NEURO:sleepy  Vascular access: AVF          ECG/rhythm:    Data Review      No results for input(s): TNIPOC in the last 72 hours. No lab exists for component: ITNL   No results for input(s): CPK, CKMB, TROIQ in the last 72 hours. Recent Labs     23  0404 23  0155 23  1608 23  0049 23  1808   * 134*  --   --  138   K 3.7 3.5  --   --  3.5   CL 98 99  --   --  97   CO2 29 30  --   --  31   BUN 56* 44*  --   --  25*   CREA 6.47* 5.09*  --   --  3.11*   * 157*  --   --  119*   PHOS 3.8  --   --   --   --    MG 3.0* 2.7*  --   --   --    CA 9.3 8.7  --   --  8.7   WBC 9.0 8.6 10.4   < > 8.6   HGB 9.9* 9.6* 12.3   < > 11.4*   HCT 29.8* 28.0* 37.9   < > 29.4*    201 239   < > 211    < > = values in this interval not displayed.       Recent Labs     23   APTT 27.4     Needs: urine analysis, urine sodium, protein and creatinine  No results found for: MARK ANDERSON            Discussed with:  Family    : Kayode Lynch, MD  2/20/2023        [facility]:  www.[email or web address]. [employer]  [place] office:  [address] [place] [address], [address] [place] [address],[address]  [address]  Phone: [phone]  Fax :     [phone]    [place] office:  [address] [facility], [facility]  Phone - [phone]  Fax - [phone]

## 2023-02-20 NOTE — PROGRESS NOTES
[facility] Dosing Services: Antimicrobial Stewardship Daily Doc  Consult for antibiotic dosing of Vancomycin/Cefepime by Dr. Debby Small  Indication: SSTI  Day of Therapy: 4  ESRD on HD MWF    Ht Readings from Last 1 Encounters:   02/20/23 170.2 cm (67\")        Wt Readings from Last 1 Encounters:   02/20/23 74.9 kg (165 lb 3.2 oz)      Vancomycin therapy:  Loading dose: Vancomycin 1750 mg IV x1 dose 2/17  Current maintenance dose: Dosing per levels  Dose calculated to approximate a           a. Target AUC/REGLA of N/A          b. Trough of 15-20 mcg/mL   Last level: none yet    A/Plan: WBC stable. Afebrile. Skin biopsy on 2/19. Plan for HD today on MWF schedule. Vanc random level before dialysis this AM was 18.1. Goal level between 15-20 mcg/mL. Plan for Vanc 10 mg/kg post-dialysis. Duration needs to be determined. Can obtain another level before next dialysis if therapy continues. PS hospitalist about de-escalation plan. Non-kinetic dosing  Cefepime 500mg q24h after dialysis     Other Antimicrobial   (not dosed by pharmacist)    Cultures 2/18 MRSA neg FINAL   Serum Creatinine Lab Results   Component Value Date/Time    Creatinine 6.47 (H) 02/20/2023 04:04 AM         Creatinine Clearance On HD     Temp Temp: 98.8 °F (37.1 °C)       WBC Lab Results   Component Value Date/Time    WBC 9.0 02/20/2023 04:04 AM        Procalcitonin Lab Results   Component Value Date/Time    Procalcitonin 1.92 02/17/2023 06:08 PM        For Antifungals, Metronidazole and Nafcillin: Lab Results   Component Value Date/Time    ALT (SGPT) 39 01/06/2022 02:26 PM    AST (SGOT) 26 01/06/2022 02:26 PM    Alk.  phosphatase 209 (H) 01/06/2022 02:26 PM    Bilirubin, total 2.3 (H) 01/06/2022 02:26 PM        Pharmacist Isrrael Gusman, MAITED

## 2023-02-20 NOTE — PROGRESS NOTES
Called [facility] spoke with Tan Burrell and relayed the notes of Dr Brent Wood about the patient being arranged for dialysis MFW, sh said she will speak with Dr Chelsey Ocampo for the plan today since Dr Brent Wood is not on call, will inform the wife. Messaged Dr Gabby Renteria to ask for order of scopolamine patch, passed on to [facility] to follow up. Bedside and Verbal shift change report given to [facility] (oncoming nurse) by Toby Mac RN (offgoing nurse). Report included the following information SBAR, Kardex, Intake/Output, MAR, Recent Results, and Med Rec Status.

## 2023-02-20 NOTE — PROGRESS NOTES
Darius Prasad [facility] [address]  [address] [place] [address]  [phone]      Hospitalist Progress Note      NAME: Cassandra Cartagena   :  1960  MRM:  [id number]    Date/Time of service: 2023  2:41 PM       Subjective:     Chief Complaint:  Patient was personally seen and examined by me during this time period. Chart reviewed. Still with right arm swelling       Objective:       Vitals:       Last 24hrs VS reviewed since prior progress note.  Most recent are:    Visit Vitals  BP (!) 154/67 (BP 1 Location: Left leg, BP Patient Position: At rest)   Pulse 85   Temp 98.8 °F (37.1 °C)   Resp 18   Ht 5' 7\" (1.702 m)   Wt 74.9 kg (165 lb 3.2 oz)   SpO2 100%   BMI 25.87 kg/m²     SpO2 Readings from Last 6 Encounters:   23 100%   10/29/22 100%   22 100%   21 96%   20 99%   20 100%          Intake/Output Summary (Last 24 hours) at 2023 1441  Last data filed at 2023 0343  Gross per 24 hour   Intake 150 ml   Output --   Net 150 ml        Exam:     Physical Exam:    Gen:  ill-appearing, NAD  HEENT:  Pink conjunctivae, PERRL, hearing intact to voice, moist mucous membranes  Neck:  Supple, without masses, thyroid non-tender  Resp:  No accessory muscle use, clear breath sounds without wheezes rales or rhonchi  Card:  No murmurs, normal S1, S2 without thrills, bruits or peripheral edema  Abd:  Soft, non-tender, non-distended, normoactive bowel sounds are present, has PEG  Musc:  No cyanosis or clubbing, right arm swelling, has fistula  Skin:  blisters of feet  Neuro:  moves all ext, chronic R sided weakness, chronic debility  Psych:  poor insight, oriented to person, place     Medications Reviewed: (see below)    Lab Data Reviewed: (see below)    ______________________________________________________________________    Medications:     Current Facility-Administered Medications   Medication Dose Route Frequency    scopolamine (TRANSDERM-SCOP) 1 mg over 3 days 1 Patch  1 Patch TransDERmal Q72H    apixaban (ELIQUIS) tablet 10 mg  10 mg Oral Q12H    hydrOXYzine HCL (ATARAX) tablet 10 mg  10 mg Oral TID PRN    diphenhydrAMINE (BENADRYL) injection 25 mg  25 mg IntraVENous Q6H PRN    albuterol (ACCUNEB) nebulizer solution 1.25 mg  1.25 mg Nebulization Q6H PRN    amLODIPine (NORVASC) tablet 10 mg  10 mg Oral QPM    guaiFENesin-codeine (ROBITUSSIN AC) 100-10 mg/5 mL solution 5 mL  5 mL Per G Tube TID PRN    lacosamide (VIMPAT) oral solution 300 mg  300 mg Per G Tube BID    Vanc random 2/20 0400 - before dialysis    Other Rx Dosing/Monitoring    terazosin (HYTRIN) capsule 5 mg  5 mg Oral BID    isosorbide dinitrate (ISORDIL) tablet 30 mg  30 mg Oral BID    labetaloL (NORMODYNE;TRANDATE) 20 mg/4 mL (5 mg/mL) injection 10 mg  10 mg IntraVENous Q6H PRN    sodium chloride (NS) flush 5-40 mL  5-40 mL IntraVENous Q8H    sodium chloride (NS) flush 5-40 mL  5-40 mL IntraVENous PRN    acetaminophen (TYLENOL) tablet 650 mg  650 mg Oral Q4H PRN    naloxone (NARCAN) injection 0.4 mg  0.4 mg IntraVENous PRN    ondansetron (ZOFRAN) injection 4 mg  4 mg IntraVENous Q4H PRN    insulin lispro (HUMALOG) injection   SubCUTAneous AC&HS    glucose chewable tablet 16 g  4 Tablet Oral PRN    glucagon (GLUCAGEN) injection 1 mg  1 mg IntraMUSCular PRN    dextrose 10% infusion 0-250 mL  0-250 mL IntraVENous PRN    Vancomycin dosing per levels   Other Rx Dosing/Monitoring    famotidine (PF) (PEPCID) 20 mg in 0.9% sodium chloride 10 mL injection  20 mg IntraVENous Q48H          Lab Review:     Recent Labs     02/20/23  0404 02/19/23  0155 02/18/23  1608   WBC 9.0 8.6 10.4   HGB 9.9* 9.6* 12.3   HCT 29.8* 28.0* 37.9    201 239     Recent Labs     02/20/23  1211 02/20/23  0404 02/19/23  0155 02/17/23  1808   NA  --  133* 134* 138   K  --  3.7 3.5 3.5   CL  --  98 99 97   CO2  --  29 30 31   GLU  --  154* 157* 119*   BUN  --  56* 44* 25*   CREA  --  6.47* 5.09* 3.11*   CA 10.0 9.3 8.7 8.7   MG  --  3.0* 2.7*  --    PHOS  --  3.8  --   --      Lab Results   Component Value Date/Time    Glucose (POC) 192 (H) 02/20/2023 07:46 AM    Glucose (POC) 170 (H) 02/19/2023 10:27 PM    Glucose (POC) 230 (H) 02/19/2023 03:35 PM    Glucose (POC) 139 (H) 02/19/2023 11:13 AM    Glucose (POC) 183 (H) 02/19/2023 07:49 AM          Assessment / Plan:     57 yo hx of HTN, DM, CVA w/ R sided weakness, PEG tube, neurogenic bladder, ESRD on HD, presented w/ RUE swelling, R IJ DVT, chronic rash    1) R IJ DVT/RUE swelling: s/p angioplasty of fistula by Vascular surg on 02/20. Hematology also following, recommended transitioning to Eliquis (could be use in ESRD patients)    2) Rash/blisters: likely bullous pemphigoid vs calciphylaxis. Not infected. S/p skin bx by Gen surg on 02/19. Path pending. Will stop IV Vanc/cefepime. Patient has an appointment with Dermatology in Sept     3) Hx of CVA: has R sided weakness. Cont Eliquis. Will stop ASA due to high risk for bleeding with Eliquis    4) HTN: cont norvasc, isordil     5) DM type 2 w/ renal complications: Y3G pending. Cont SSI    6) Seizure d/o: cont vimpat    7) ESRD: on HD.   Renal following    8) Mod malnutrition: cont PEG tube feeding    9) Chronic debility: care taker is wife     **Prior records, notes, labs, radiology, and medications reviewed in [facility]**    Total time spent with patient care: 45 Minutes **I personally saw and examined the patient during this time period**                 Care Plan discussed with: Patient, nursing, wife, Vascular, Heme    Discussed:  Care Plan    Prophylaxis:   eliquis    Disposition:   PT, OT, RN           ___________________________________________________    Attending Physician: Kenny Angela MD

## 2023-02-20 NOTE — PROGRESS NOTES
2/20/2023  2:05 PM  Case Management note    Reason for Admission:  rash    Patient was sent by dispatch health to ED for rash and discoloration of hand. Patient is , unable to walk. He is aphasic. Patient has history of ERSD, DM, CVA and HTN. Patient goes to dialysis on MWF [place]  They are losing their SUZANNE March 1st and wife is unsure of how she will get him to dialysis  [facility] [address]                       RUR Score:        14%               Plan for utilizing home health:      PT/OT to eval    PCP: First and Last name:  Maryana Platt NP     Name of Practice:    Are you a current patient: Yes/No: yes   Approximate date of last visit: 5 months   Can you participate in a virtual visit with your PCP:                     Current Advanced Directive/Advance Care Plan: Full Code NO [facility] [address] [phone] spouse                               Transition of Care Plan:                      Home with family assistance  PCP follow up  Dialysis  Dermatology appointment  AD follow up  CM to follow for discharge need    Care Management Interventions  PCP Verified by CM:  Yes (Dr. Judi Miller)  Support Systems: Spouse/Significant Other  Confirm Follow Up Transport: Family  The Plan for Transition of Care is Related to the Following Treatment Goals : CVA / rash discoloration of arm  Discharge Location  Patient Expects to be Discharged to[de-identified] Home with family assistance  Sienna Gibson

## 2023-02-20 NOTE — CONSULTS
Comprehensive Nutrition Assessment    Type and Reason for Visit: Initial, Consult    Nutrition Recommendations/Plan:   Recommend Easy To Chew, 4 Carb, EDNA diet order  Continue bolus feeds per below:  Bolus feeds Nepro 230 mL 4x/day [Goal volume 920 mL/day]  FWF 75 mL before and after bolus      Malnutrition Assessment:  Malnutrition Status:  Mild malnutrition (02/20/23 1154)    Context:  Acute illness     Findings of the 6 clinical characteristics of malnutrition:   Energy Intake:  No significant decrease in energy intake  Weight Loss:  No significant weight loss     Body Fat Loss:  No significant body fat loss,     Muscle Mass Loss:  Mild muscle mass loss, Temples (temporalis), Calf, Thigh (quadriceps)  Fluid Accumulation:  No significant fluid accumulation,     Strength:  Not performed     Nutrition Assessment:     Pt is a 58year old male admitted with Swelling [R60.9]. He  has a past medical history of Diabetes ([address] Dysphagia due to old cerebrovascular accident (03/2019), ESRD (end stage renal disease) on dialysis [facility]), Frias catheter in place, PEG (percutaneous endoscopic gastrostomy) status ([address] (3/2019 to present), Right sided weakness (3/2019 to present), Seizure ([address] (03/2019), Stroke ([address] (03/2019), and Urinary retention with incomplete bladder emptying. RD consulted for ONS. Patient with PEG tube, able to take PO but utilizes Nepro bolus feeds (as above) for majority of nutrition. Spoke with wife at bedside. States # and denies recent weight or appetite changes. RD obtained bedscale weight of 165.2#. Admission weight stated, does not appear to have had any clinically significant weight loss. However, current weight may be slightly elevated 2/2 edema in BLE. NKFA. No noted nausea/vomiting/diarrhea. Patient just had dentures re-fitted, per wife he is 'working hard' with regular texture foods. NPO today for skin biopsy per wife. Patient with low Na today.  Wife requesting low Na diet order for patient. Tube feed at goal volume 920 mL/day provides 1628 kcal (89% needs); 147 g carbs; 75 g protein (100% needs). TF + FWF Provides 1268 mL H2O/day. Wt Readings from Last 10 Encounters:   02/20/23 74.9 kg (165 lb 3.2 oz)   10/29/22 77.1 kg (169 lb 15.6 oz)   01/06/22 79.4 kg (175 lb)   06/08/21 78 kg (172 lb)   07/16/20 77.6 kg (171 lb)   07/03/20 68.4 kg (150 lb 12.7 oz)   06/18/20 77.6 kg (171 lb)   06/09/20 79.8 kg (176 lb)   07/01/16 89.9 kg (198 lb 4.8 oz)     Last 3 Recorded Weights in this Encounter    02/18/23 0536 02/18/23 0542 02/20/23 1149   Weight: 78.2 kg (172 lb 8 oz) 77.1 kg (170 lb) 74.9 kg (165 lb 3.2 oz)       Documented Meal intake:  No data found. Nutrition Related Findings:      Wound Type: None  Last Bowel Movement Date: 02/19/23  Abdominal Assessment: Soft, Other (comment) (PEG tube)  Appetite: NPO  Bowel Sounds: Hypoactive   Edema:LLE: No Edema (2/20/2023  8:49 AM)  LUE: Non-pitting; 1+ (2/20/2023  8:49 AM)  RLE: No Edema (2/20/2023  8:49 AM)  RUE: No Edema (2/20/2023  8:49 AM)      Nutr.  Labs:  Lab Results   Component Value Date/Time    GFR est AA 9 (L) 01/06/2022 02:26 PM    GFR est non-AA 7 (L) 01/06/2022 02:26 PM    Creatinine 6.47 (H) 02/20/2023 04:04 AM    BUN 56 (H) 02/20/2023 04:04 AM    Sodium 133 (L) 02/20/2023 04:04 AM    Potassium 3.7 02/20/2023 04:04 AM    Chloride 98 02/20/2023 04:04 AM    CO2 29 02/20/2023 04:04 AM       Lab Results   Component Value Date/Time    Glucose 154 (H) 02/20/2023 04:04 AM    Glucose (POC) 192 (H) 02/20/2023 07:46 AM       Lab Results   Component Value Date/Time    Hemoglobin A1c 5.6 02/20/2023 04:04 AM     Magnesium   Date Value Ref Range Status   02/20/2023 3.0 (H) 1.6 - 2.4 mg/dL Final   02/19/2023 2.7 (H) 1.6 - 2.4 mg/dL Final   01/06/2022 3.5 (H) 1.6 - 2.4 mg/dL Final   07/03/2020 2.9 (H) 1.6 - 2.4 mg/dL Final   07/01/2020 2.8 (H) 1.6 - 2.4 mg/dL Final     Lab Results   Component Value Date/Time    Calcium 9.3 02/20/2023 04:04 AM Phosphorus 3.8 02/20/2023 04:04 AM       Nutr. Meds:  Norvasc, Pepcid, glucagon PRN, heparin, humalog, labetalol PRN, vimpat, zofran PRN, vancomycin       Current Nutrition Intake & Therapies:  Average Meal Intake: NPO  Average Supplement Intake: NPO  ADULT TUBE FEEDING PEG; Other Tube Feeding (Specify); NEPRO CARB STEADY; Delivery Method: Bolus; Bolus Frequency: 4 Times Daily; Bolus Volume (mL): 230; Bolus Method of Infusion: Syringe Push; Water Flush Volume (mL): 75; Water Flush Frequency: Be. .. DIET NPO    Anthropometric Measures:  Height: 5' 7\" (170.2 cm)  Ideal Body Weight (IBW): 148 lbs (67 kg)     Current Body Wt:  74.9 kg (165 lb 2 oz), 111.6 % IBW. Bed scale  Current BMI (kg/m2): 25.9  Usual Body Weight: 73.5 kg (162 lb)  % Weight Change (Calculated): 1.9  Weight Adjustment: No adjustment                 BMI Category: Overweight (BMI 25.0-29. 9)    Estimated Daily Nutrient Needs:  Energy Requirements Based On: Kcal/kg  Weight Used for Energy Requirements: Current  Energy (kcal/day): 1810 (MSJ x 1.2)  Weight Used for Protein Requirements: Current  Protein (g/day): 75-90 (1.0-1.2 g/kg)  Method Used for Fluid Requirements: Standard renal  Fluid (ml/day): 1500 or per MD    Nutrition Diagnosis:   Increased nutrient needs related to renal dysfunction as evidenced by dialysis    Nutrition Interventions:   Food and/or Nutrient Delivery: Start oral diet, Continue tube feeding  Nutrition Education/Counseling: No recommendations at this time  Coordination of Nutrition Care: Continue to monitor while inpatient, Interdisciplinary rounds  Plan of Care discussed with: IDR team    Goals:     Goals:  Tolerate nutrition support at goal rate, by next RD assessment       Nutrition Monitoring and Evaluation:   Behavioral-Environmental Outcomes: None identified  Food/Nutrient Intake Outcomes: Diet advancement/tolerance, Enteral nutrition intake/tolerance  Physical Signs/Symptoms Outcomes: Biochemical data, Weight, GI status, Fluid status or edema    Discharge Planning:    Enteral nutrition    Jamel Perdue MS, RD  Contact: Ext: [phone], or via [employer]

## 2023-02-20 NOTE — PROGRESS NOTES
Problem: Mobility Impaired (Adult and Pediatric)  Goal: *Acute Goals and Plan of Care (Insert Text)  Description: FUNCTIONAL STATUS PRIOR TO ADMISSION: Pt lives with wife who assist with all mobility and ADLs. Juan lift to chair with wife. Has been working on ambulating with PT only(1/week) d/t scheduling conflicts with other appointments. HOME SUPPORT PRIOR TO ADMISSION: The patient lived with wife and required maximal assistance for ADLs and iADLs. Physical Therapy Goals  Initiated 2/18/2023  1. Patient will move from supine to sit and sit to supine , scoot up and down, and roll side to side in bed with supervision/set-up within 7 day(s). 2.  Patient will transfer from bed to chair and chair to bed with moderate assistance  using the least restrictive device within 7 day(s). 3.  Patient will perform sit to stand with moderate assistance  within 7 day(s). 4.  Patient will ambulate with moderate assistance  for 25 feet with the least restrictive device within 7 day(s). Outcome: Progressing Towards Goal   PHYSICAL THERAPY TREATMENT  Patient: Moe Husain (1 y.o. male)  Date: 2/20/2023  Diagnosis: Swelling [R60.9] <principal problem not specified>  Procedure(s) (LRB):  FISTULOGRAM RIGHT (N/A)  ANGIOPLASTY PERIPHERAL VEIN (N/A) Day of Surgery  Precautions: Fall, Aspiration  Chart, physical therapy assessment, plan of care and goals were reviewed. ASSESSMENT  Patient continues with skilled PT services and is progressing towards goals, although progress was slowed with today's presentation. Patient was received in [facility]. Patient was lethargic and minimally responsive to verbal stimuli, opening eyes and attempting eye contact, but not maintaining eye contact or verbally responding to PT questions. Patient's wife states that he is \"out of it\" since he was given medicines this morning that made him drowsy. Patient minimally responsive to commands, although follows with delay.  Patient was max A to EOB, displaying poor sitting balance with a R posterolateral lean and requiring constant support to avoid LOB. Patient attempted multiple times to return to supine without prompting, and was unable to attempt sit to stand due to decreased command following, unable to place feet flat on floor, and increased tone throughout LE's. Patient was min A for sit to supine after sheets were changed, and was left in semi fowlers with pillows positioned for pressure relief of heels and R arm. Patient is not at baseline status at this time and would benefit from continued skilled PT to optimize function with ADLs. Current Level of Function Impacting Discharge (mobility/balance): needs assistance for all mobility, Unable to attempt to stand    Other factors to consider for discharge: patient recently received medications that may have contributed to AMS. PLAN :  Patient continues to benefit from skilled intervention to address the above impairments. Continue treatment per established plan of care. to address goals. Recommendation for discharge: (in order for the patient to meet his/her long term goals)  Therapy up to 5 days/week in SNF setting    This discharge recommendation:  Has not yet been discussed the attending provider and/or case management    IF patient discharges home will need the following DME: to be determined (TBD)       SUBJECTIVE:   Patient stated nothing throughout session. OBJECTIVE DATA SUMMARY:   Critical Behavior:  Neurologic State: Alert  Orientation Level: Unable to verbalize  Cognition: Follows commands     Functional Mobility Training:  Bed Mobility:  Rolling: Moderate assistance  Supine to Sit: Maximum assistance     Scooting:  Moderate assistance        Transfers:       Balance:  Sitting: Impaired;High guard  Sitting - Static: Poor (constant support)  Sitting - Dynamic: Poor (constant support)  Ambulation/Gait Training:      Therapeutic Exercises:   N/a    Pain Rating:  None given    Activity Tolerance:   Poor    After treatment patient left in no apparent distress:   Supine in bed, Heels elevated for pressure relief, Call bell within reach, Caregiver / family present, and Side rails x 3    COMMUNICATION/COLLABORATION:   The patients plan of care was discussed with: Registered nurse.      Vincenzo Seay, [facility]   Time Calculation: 29 mins

## 2023-02-20 NOTE — CONSULTS
[facility] [place] at [place] [address] [facility]  Alberto [phone]  F: [phone]      Reason for Visit:   Breanne Ponce is a 58 y.o. male who is seen for evaluation of  Rt  IJ clot ; hypercoag work up : choice of anticoagulation outpatient     Hematology/Oncology Treatment History:   none    History of Present Illness:   Breanne Ponce is a pleasant 58 y.o. male who was admitted on 23  for RUE swelling/redness and rash present for months. ED labs Hgb 11.4 Creat 3.11   Hx ESRD HD MWF ; hx of CVA/ rt sided hemiparesis / with rt arm contraction. Unable to obtain hx from pt due to pt being somnolent. Fields Ferreira Hx obtained from wife at bedside. Reports had swelling to rt arm since Friday following dialysis. Rash has been present since Sept; initially thought to be Foot/ hand/ mouth disease but has continued; rash started on legs and then to left arm. Having lots of problems with itching. Most of his nutrition is through the PEG tube. He had some dental work done 2 weeks ago so now is able to start eating some. Wife states they pay out of pocket for all transportation and so frequent visits to PCP will be very difficult and she will not be able to afford. Pt was able to eat some this am. Normally ambulates at home; PT had been working with the pt in the home. Family hx of clots: denies   Pt had cardiac stent placed 2021; takes ASA.  41 yrs; have 1 son.        Past Medical History:   Diagnosis Date    Diabetes ([address]     Dysphagia due to old cerebrovascular accident 2019    ESRD (end stage renal disease) on dialysis [facility])     MWF    Frias catheter in place     PEG (percutaneous endoscopic gastrostomy) status ([address] 3/2019 to present    NPO- comfort foods only    Right sided weakness 3/2019 to present    Seizure ([address] 2019    Stroke ([address] 2019    Urinary retention with incomplete bladder emptying        Past Surgical History:   Procedure Laterality Date    HX ARTERIOVENOUS FISTULA Right 03/2019    HX CYST INCISION AND DRAINAGE Right     2nd toe- Cleaned out infection    HX OTHER SURGICAL  03/2019    PEG tube insertion       Social History     Socioeconomic History    Marital status:      Spouse name: Not on file    Number of children: Not on file    Years of education: Not on file    Highest education level: Not on file   Occupational History    Not on file   Tobacco Use    Smoking status: Never    Smokeless tobacco: Never   Substance and Sexual Activity    Alcohol use: Never     Comment: rare    Drug use: Never    Sexual activity: Not on file   Other Topics Concern    Not on file   Social History Narrative    Not on file     Social Determinants of Health     Financial Resource Strain: Not on file   Food Insecurity: Not on file   Transportation Needs: Not on file   Physical Activity: Not on file   Stress: Not on file   Social Connections: Not on file   Intimate Partner Violence: Not on file   Housing Stability: Not on file       Family History   Problem Relation Age of Onset    Stroke Father     Stroke Sister 48    Deep Vein Thrombosis Neg Hx        Current Facility-Administered Medications   Medication Dose Route Frequency    hydrOXYzine HCL (ATARAX) tablet 10 mg  10 mg Oral TID PRN    diphenhydrAMINE (BENADRYL) injection 25 mg  25 mg IntraVENous Q6H PRN    heparin 25,000 units in D5W 250 ml infusion  18-36 Units/kg/hr IntraVENous TITRATE    albuterol (ACCUNEB) nebulizer solution 1.25 mg  1.25 mg Nebulization Q6H PRN    amLODIPine (NORVASC) tablet 10 mg  10 mg Oral QPM    guaiFENesin-codeine (ROBITUSSIN AC) 100-10 mg/5 mL solution 5 mL  5 mL Per G Tube TID PRN    lacosamide (VIMPAT) oral solution 300 mg  300 mg Per G Tube BID    Vanc random 2/20 0400 - before dialysis    Other Rx Dosing/Monitoring    cefepime (MAXIPIME) 0.5 g in 0.9% sodium chloride 100 mL IVPB  500 mg IntraVENous Q24H    terazosin (HYTRIN) capsule 5 mg  5 mg Oral BID isosorbide dinitrate (ISORDIL) tablet 30 mg  30 mg Oral BID    labetaloL (NORMODYNE;TRANDATE) 20 mg/4 mL (5 mg/mL) injection 10 mg  10 mg IntraVENous Q6H PRN    sodium chloride (NS) flush 5-40 mL  5-40 mL IntraVENous Q8H    sodium chloride (NS) flush 5-40 mL  5-40 mL IntraVENous PRN    acetaminophen (TYLENOL) tablet 650 mg  650 mg Oral Q4H PRN    naloxone (NARCAN) injection 0.4 mg  0.4 mg IntraVENous PRN    ondansetron (ZOFRAN) injection 4 mg  4 mg IntraVENous Q4H PRN    insulin lispro (HUMALOG) injection   SubCUTAneous AC&HS    glucose chewable tablet 16 g  4 Tablet Oral PRN    glucagon (GLUCAGEN) injection 1 mg  1 mg IntraMUSCular PRN    dextrose 10% infusion 0-250 mL  0-250 mL IntraVENous PRN    Vancomycin dosing per levels   Other Rx Dosing/Monitoring    famotidine (PF) (PEPCID) 20 mg in 0.9% sodium chloride 10 mL injection  20 mg IntraVENous Q48H       Allergies   Allergen Reactions    Bee Venom Protein (Honey Bee) Anaphylaxis    Fentanyl Other (comments)     Hallucinations      Oxycodone Other (comments)     Hallucinations      Hydralazine Rash          Review of Systems: A complete review of systems was obtained, reviewed. Pertinent findings reviewed above. Physical Exam:   Visit Vitals  BP (!) 154/67 (BP 1 Location: Left leg, BP Patient Position: At rest)   Pulse 85   Temp 98.8 °F (37.1 °C)   Resp 18   Wt 170 lb (77.1 kg)   SpO2 100%   BMI 26.63 kg/m²     ECOG PS: 4  General: somnolent ; no distress  Eyes: anicteric   HENT: atraumatic, oropharynx clear  Neck: supple  Lymphatic: no cervical, supraclavicular, or inguinal adenopathy  Respiratory: anteriorly CTAB, normal respiratory effort  CV: normal rate, regular rhythm, no murmurs, edema noted to rt upper extremity. GI: soft, nontender, nondistended, no masses, no hepatomegaly, no splenomegaly; PEG tube noted with feeding infusing  MS: digits without clubbing or cyanosis. Skin: , no ecchymoses, no petechia. normal temperature, turgor, and texture. Scattered   Psych:  judgment/insight not tested      Results:     Lab Results   Component Value Date/Time    WBC 9.0 02/20/2023 04:04 AM    HGB 9.9 (L) 02/20/2023 04:04 AM    HCT 29.8 (L) 02/20/2023 04:04 AM    PLATELET 214 62/11/8147 04:04 AM    MCV 92.0 02/20/2023 04:04 AM    ABS. NEUTROPHILS 2.6 02/20/2023 04:04 AM     Lab Results   Component Value Date/Time    Sodium 133 (L) 02/20/2023 04:04 AM    Potassium 3.7 02/20/2023 04:04 AM    Chloride 98 02/20/2023 04:04 AM    CO2 29 02/20/2023 04:04 AM    Glucose 154 (H) 02/20/2023 04:04 AM    BUN 56 (H) 02/20/2023 04:04 AM    Creatinine 6.47 (H) 02/20/2023 04:04 AM    GFR est AA 9 (L) 01/06/2022 02:26 PM    GFR est non-AA 7 (L) 01/06/2022 02:26 PM    Calcium 9.3 02/20/2023 04:04 AM    Glucose (POC) 192 (H) 02/20/2023 07:46 AM     Lab Results   Component Value Date/Time    Bilirubin, total 2.3 (H) 01/06/2022 02:26 PM    ALT (SGPT) 39 01/06/2022 02:26 PM    Alk. phosphatase 209 (H) 01/06/2022 02:26 PM    Protein, total 7.8 01/06/2022 02:26 PM    Albumin 3.1 (L) 01/06/2022 02:26 PM    Globulin 4.7 (H) 01/06/2022 02:26 PM     Lab Results   Component Value Date/Time    Ferritin 130 07/01/2020 04:03 AM    Sed rate, automated 49 (H) 06/29/2016 03:53 PM    C-Reactive protein 11.5 (H) 06/29/2016 03:53 PM    Lipase 394 (H) 01/06/2022 02:26 PM       Lab Results   Component Value Date/Time    INR 1.1 06/30/2020 06:26 AM    aPTT 27.4 02/17/2023 07:54 PM      No results found for: CEA, 330426, C199LT, C125, WJIV386, 2729LT, C153LT, PSA, LDH, [id number], HCGTLT, HCGN, AFP, CHRGLT, CHRGA     2/17/23 Duplex UPPER ext   Acute appearing thrombus noted in the right non-occluded internal jugular vein(s). No evidence of acute DVT and chronic DVT in the left upper extremity. Test results above have been reviewed.       Assessment/Recommendations:       Acute RIJ thrombus  Unclear etiology : symptomatic ;RUE with no DVT noted on doppler; side  of AV fistula   With rt arm contracture 2/2 to CVA could consider this provoked but will wait to see eval of fistula  Current tx with Heparin gtt: recommendation with dialysis pt would be anticoagulation with coumadin. ; however FDA has approved Eliquis use cautiously with ESRD; due to transportation and cost issues could consider at time of discharge Eliquis 10 mg bid x 7 days to be followed by 5 mg BID  --will add additional labs to eval   --will await fistula eval by vascular    2. RUE swelling  :   --vascular team to eval     3. Skin lesions:   Abx per primary team   --General surgery consulted: 2/19 bx : path pending     4. ESRD: HD MWF   Nephrology following     5. DM: management per primary team.    6.  Hx CVA with rt sided hemiparesis: management per primary team    Plan reviewed with Dr Rosaura López        Signed By: James Goddard NP

## 2023-02-20 NOTE — PROCEDURES
Hemodialysis / [phone]    Vitals Pre Post Assessment Pre Post   /66 102/64 LOC Alert, cooperative Alert, cooperative   HR 78 82 Lungs No SOB No SOB   Resp 18 18 Cardiac regular regular   Temp 98.6 98.7 Skin dry, warm Dry, warm   Weight    Edema mild mild   Tele status N/A N/A Pain 0 0     Orders   Duration: Start: 5383 End: 9492 Total: 3.5 hrs   Dialyzer: Dialyzer/Set Up Inspection: Christine Caves (02/20/23 1840)   K Bath: Dialysate K (mEq/L): 3 (02/20/23 1840)   Ca Bath: Dialysate CA (mEq/L): 2.5 (02/20/23 1840)   Na: Dialysate NA (mEq/L): 138 (02/20/23 1840)   Bicarb: Dialysate HCO3 (mEq/L): 35 (02/20/23 1840)   Target Fluid Removal: Goal/Amount of Fluid to Remove (mL): 2000 mL (02/20/23 1840)     Access   Type & Location: SARAH AVF: +B&T, no S&S of infection, site cleaned per P&P, cannulated with 15G 1\" needles x2   Comments:                                        Labs   HBsAg (Antigen) / date:  02/20/23 negative                                        HBsAb (Antibody) / date: pending   Source: epic   Obtained/Reviewed  Critical Results Called HGB   Date Value Ref Range Status   02/20/2023 9.9 (L) 12.1 - 17.0 g/dL Final     Comment:     Specimen incubated at 37 degrees to correct for possible cold agglutinin     Potassium   Date Value Ref Range Status   02/20/2023 3.7 3.5 - 5.1 mmol/L Final     Calcium   Date Value Ref Range Status   02/20/2023 10.0 8.5 - 10.1 MG/DL Final     BUN   Date Value Ref Range Status   02/20/2023 56 (H) 6 - 20 MG/DL Final     Creatinine   Date Value Ref Range Status   02/20/2023 6.47 (H) 0.70 - 1.30 MG/DL Final        Meds Given   Name Dose Route                    Adequacy / Fluid    Total Liters Process: 77 L   Net Fluid Removed: 2000 cc      Comments   Time Out Done:   (Time) 1835   Admitting Diagnosis: AMS   Consent obtained/signed:     Ad Oseguera / Theodore Levy # Machine Number: [id number] (02/20/23 1056)   Primary Nurse Rpt Pre: Johana Cloud RN   Primary Nurse Rpt Post: Nikolai Muniz RN   Pt Education: Access care, procedure   Care Plan: Continue HD tx as per MD order   Pts outpatient clinic:      Tx Summary   Comments: Tolerated tx well, at the end of the tx blood rinsed back, cannulas removed x2, hemostasis achieved, dressing applied.

## 2023-02-20 NOTE — PROGRESS NOTES
Occupational Therapy Note  2/20/2023    OT eval order received and acknowledged. Per spouse present, patient has 24/7 care at baseline at home with spouse using layne lift for transfers from hospital bed to [facility]. Per spouse, patient requires assist for all self care tasks (able to help hold small basin when spouse is brushing patients teeth and able to assist with LUE into clothing as well as use LUE to assist with rolling). Patients LUE ROM WFL and strength generally decreased however functional. Patients RUE noted with hypertonicity which is baseline and patient allowing some PROM with increased time. Patient noted to be lethargic however did roll to his right side while OT was discussing patient PLOF with spouse. Patient presents at baseline functional status for self care tasks thus will D/C from skilled OT services after screen. Recommend discharge to home with family care when medically appropriate.      Thank you,  Sharon Grimaldo OTR/L

## 2023-02-21 VITALS
RESPIRATION RATE: 16 BRPM | HEIGHT: 67 IN | HEART RATE: 79 BPM | BODY MASS INDEX: 25.93 KG/M2 | WEIGHT: 165.2 LBS | SYSTOLIC BLOOD PRESSURE: 129 MMHG | DIASTOLIC BLOOD PRESSURE: 76 MMHG | OXYGEN SATURATION: 98 % | TEMPERATURE: 97.8 F

## 2023-02-21 PROBLEM — I82.C19 INTERNAL JUGULAR (IJ) VEIN THROMBOEMBOLISM, ACUTE (HCC): Status: ACTIVE | Noted: 2023-02-21

## 2023-02-21 PROBLEM — T14.8XXA BLISTER: Status: ACTIVE | Noted: 2023-02-21

## 2023-02-21 LAB
ANA SER QL: NEGATIVE
CARDIOLIPIN IGA SER IA-ACNC: <9 APL U/ML (ref 0–11)
CARDIOLIPIN IGG SER IA-ACNC: <9 GPL U/ML (ref 0–14)
CARDIOLIPIN IGM SER IA-ACNC: 14 MPL U/ML (ref 0–12)
GLUCOSE BLD STRIP.AUTO-MCNC: 121 MG/DL (ref 65–117)
GLUCOSE BLD STRIP.AUTO-MCNC: 174 MG/DL (ref 65–117)
SERVICE CMNT-IMP: ABNORMAL
SERVICE CMNT-IMP: ABNORMAL

## 2023-02-21 PROCEDURE — 74011250637 HC RX REV CODE- 250/637: Performed by: INTERNAL MEDICINE

## 2023-02-21 PROCEDURE — 82962 GLUCOSE BLOOD TEST: CPT

## 2023-02-21 PROCEDURE — 74011000250 HC RX REV CODE- 250: Performed by: INTERNAL MEDICINE

## 2023-02-21 PROCEDURE — 74011636637 HC RX REV CODE- 636/637: Performed by: INTERNAL MEDICINE

## 2023-02-21 PROCEDURE — 74011250636 HC RX REV CODE- 250/636: Performed by: INTERNAL MEDICINE

## 2023-02-21 RX ORDER — PREDNISONE 10 MG/1
TABLET ORAL
Qty: 35 TABLET | Refills: 0 | Status: SHIPPED | OUTPATIENT
Start: 2023-02-21

## 2023-02-21 RX ADMIN — APIXABAN 10 MG: 5 TABLET, FILM COATED ORAL at 09:42

## 2023-02-21 RX ADMIN — ISOSORBIDE DINITRATE 30 MG: 20 TABLET ORAL at 09:42

## 2023-02-21 RX ADMIN — SODIUM CHLORIDE, PRESERVATIVE FREE 5 ML: 5 INJECTION INTRAVENOUS at 06:02

## 2023-02-21 RX ADMIN — DIPHENHYDRAMINE HYDROCHLORIDE 25 MG: 50 INJECTION, SOLUTION INTRAMUSCULAR; INTRAVENOUS at 06:01

## 2023-02-21 RX ADMIN — TERAZOSIN 5 MG: 5 CAPSULE ORAL at 06:00

## 2023-02-21 RX ADMIN — LACOSAMIDE ORAL SOLUTION 300 MG: 10 SOLUTION ORAL at 12:12

## 2023-02-21 RX ADMIN — Medication 2 UNITS: at 09:42

## 2023-02-21 NOTE — PROGRESS NOTES
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 Feeling better.  Had HD yesterday  ROS:  Current Facility-Administered Medications   Medication Dose Route Frequency    scopolamine (TRANSDERM-SCOP) 1 mg over 3 days 1 Patch  1 Patch TransDERmal Q72H    apixaban (ELIQUIS) tablet 10 mg  10 mg Oral Q12H    hydrOXYzine HCL (ATARAX) tablet 10 mg  10 mg Oral TID PRN    diphenhydrAMINE (BENADRYL) injection 25 mg  25 mg IntraVENous Q6H PRN    albuterol (ACCUNEB) nebulizer solution 1.25 mg  1.25 mg Nebulization Q6H PRN    amLODIPine (NORVASC) tablet 10 mg  10 mg Oral QPM    guaiFENesin-codeine (ROBITUSSIN AC) 100-10 mg/5 mL solution 5 mL  5 mL Per G Tube TID PRN    lacosamide (VIMPAT) oral solution 300 mg  300 mg Per G Tube BID    terazosin (HYTRIN) capsule 5 mg  5 mg Oral BID    isosorbide dinitrate (ISORDIL) tablet 30 mg  30 mg Oral BID    labetaloL (NORMODYNE;TRANDATE) 20 mg/4 mL (5 mg/mL) injection 10 mg  10 mg IntraVENous Q6H PRN    sodium chloride (NS) flush 5-40 mL  5-40 mL IntraVENous Q8H    sodium chloride (NS) flush 5-40 mL  5-40 mL IntraVENous PRN    acetaminophen (TYLENOL) tablet 650 mg  650 mg Oral Q4H PRN    naloxone (NARCAN) injection 0.4 mg  0.4 mg IntraVENous PRN    ondansetron (ZOFRAN) injection 4 mg  4 mg IntraVENous Q4H PRN    insulin lispro (HUMALOG) injection   SubCUTAneous AC&HS    glucose chewable tablet 16 g  4 Tablet Oral PRN    glucagon (GLUCAGEN) injection 1 mg  1 mg IntraMUSCular PRN    dextrose 10% infusion 0-250 mL  0-250 mL IntraVENous PRN famotidine (PF) (PEPCID) 20 mg in 0.9% sodium chloride 10 mL injection  20 mg IntraVENous Q48H          Objective:     Vitals:  Blood pressure 131/70, pulse 82, temperature 98.2 °F (36.8 °C), resp. rate 16, height 5' 7\" (1.702 m), weight 74.9 kg (165 lb 3.2 oz), SpO2 95 %. Temp (24hrs), Av.2 °F (36.8 °C), Min:97.8 °F (36.6 °C), Max:98.7 °F (37.1 °C)      Intake and Output:  No intake/output data recorded.  1901 -  0700  In: 1228.9 [I.V.:703.9]  Out:      Physical Exam:                   Physical Examination:   GENERAL ASSESSMENT: sleepy but arousal  HEENT:Nontraumatic   CHEST: CTA  HEART: S1S2  ABDOMEN: Soft,NT,  EXTREMITY: no EDEMA  NEURO:sleepy  Vascular access: AVF          ECG/rhythm:    Data Review      No results for input(s): TNIPOC in the last 72 hours. No lab exists for component: ITNL   No results for input(s): CPK, CKMB, TROIQ in the last 72 hours. Recent Labs     23  1211 23  0404 23  0155 23  1608   NA  --  133* 134*  --    K  --  3.7 3.5  --    CL  --  98 99  --    CO2  --  29 30  --    BUN  --  56* 44*  --    CREA  --  6.47* 5.09*  --    GLU  --  154* 157*  --    PHOS  --  3.8  --   --    MG  --  3.0* 2.7*  --    CA 10.0 9.3 8.7  --    WBC  --  9.0 8.6 10.4   HGB  --  9.9* 9.6* 12.3   HCT  --  29.8* 28.0* 37.9   PLT  --  200 201 239      No results for input(s): INR, PTP, APTT, INREXT, INREXT in the last 72 hours. Needs: urine analysis, urine sodium, protein and creatinine  No results found for: Rannie Divers            Discussed with:  Family    : Rea Sosa MD  2023        [address] [facility]:  [email or web address]  Angeles Marquez office:  [address] [address]  [place], [address] [place] [address]  Phone: [phone]  Fax :     [phone]    [address] [place] [address] office:  [address], [address] [facility]  Phone - [phone]  Fax - [phone]

## 2023-02-21 NOTE — DISCHARGE INSTRUCTIONS
HOSPITALIST DISCHARGE INSTRUCTIONS  NAME: Ghazala Larson   :  1960   MRN:  [id number]     Date/Time:  2023 11:22 AM    ADMIT DATE: 2023     DISCHARGE DATE: 2023     ADMITTING DIAGNOSIS:  Right IJ thrombosis, Rash s/p skin biopsy. Might be bullous pemphigoid    DISCHARGE DIAGNOSIS:  same    MEDICATIONS:  See after visit summary       It is important that you take the medication exactly as they are prescribed. Keep your medication in the bottles provided by the pharmacist and keep a list of the medication names, dosages, and times to be taken in your wallet. Do not take other medications without consulting your doctor     Pain Management: per above medications    What to do at [address]:  Resume previous diet    Recommended activity: Activity as tolerated    1) Return to the hospital if you feel worse    2) If you experience any of the following symptoms then please call your primary care physician or return to the emergency room if you cannot get hold of your doctor:  Fever, chills, nausea, vomiting, diarrhea, change in mentation, falling, bleeding, shortness of breath, chest pain, severe headache, severe abdominal pain,     3) Follow up with your doctors as directed    4) Do not take aspirin while on Eliquis    5) Have your doctors follow up with skin biopsy. Start a trial of prednisone     Follow Up:  Karl Martines NP  [address] [facility] [address] [place] [address]  [phone]    Schedule an appointment as soon as possible for a visit in 1 week(s)      Elly Garcia MD  [facility]  [address] [facility]  [phone]    Schedule an appointment as soon as possible for a visit in 4 week(s)      Jorge Martinez MD  [address] [id number]    Schedule an appointment as soon as possible for a visit in 2 week(s)  As needed  .       Information obtained by :  I understand that if any problems occur once I am at home I am to contact my physician. I understand and acknowledge receipt of the instructions indicated above. Physician's or R.N.'s Signature                                                                  Date/Time                                                                                                                                              Patient or Representative Signature                                                          Date/Time          Deep Vein Thrombosis: Care Instructions  Overview     A deep vein thrombosis (DVT) is a blood clot in certain veins, usually in the legs, pelvis, or arms. Blood clots in these veins need to be treated because they can get bigger, break loose, and travel through the bloodstream to the lungs. A blood clot in a lung can be life-threatening. The doctor may have given you a blood thinner (anticoagulant). A blood thinner can stop the blood clot from growing larger and prevent new clots from forming. You will need to take a blood thinner for at least 3 months. The doctor has checked you carefully, but problems can develop later. If you notice any problems or new symptoms, get medical treatment right away. Follow-up care is a key part of your treatment and safety. Be sure to make and go to all appointments, and call your doctor if you are having problems. It's also a good idea to know your test results and keep a list of the medicines you take. How can you care for yourself at home? Take your medicines exactly as prescribed. Call your doctor if you think you are having a problem with your medicine. If you are taking a blood thinner, be sure you get instructions about how to take your medicine safely. Blood thinners can cause serious bleeding problems. Try to walk several times a day.   Wear compression stockings if your doctor recommends them. These stockings are tighter at the feet than on the legs. They may reduce pain and swelling in your legs. But there are different types of stockings, and they need to fit right. So your doctor will recommend what you need. When you sit, use a pillow to raise the arm or leg that has the blood clot. Try to keep it above the level of your heart. When should you call for help? Call 911 anytime you think you may need emergency care. For example, call if:    You passed out (lost consciousness). You have symptoms of a blood clot in your lung (called a pulmonary embolism). These include:  Sudden chest pain. Trouble breathing. Coughing up blood. Call your doctor now or seek immediate medical care if:    You have new or worse trouble breathing. You are dizzy or lightheaded, or you feel like you may faint. You have symptoms of a blood clot in your arm or leg. These may include:  Pain in the arm, calf, back of the knee, thigh, or groin. Redness and swelling in the arm, leg, or groin. Watch closely for changes in your health, and be sure to contact your doctor if:    You do not get better as expected. Where can you learn more? Go to http://www.gray.com/  Enter W808 in the search box to learn more about \"Deep Vein Thrombosis: Care Instructions. \"  Current as of: March 28, 2022               Content Version: 13.4  © 2006-2022 [employer]. Care instructions adapted under license by [employer] (which disclaims liability or warranty for this information). If you have questions about a medical condition or this instruction, always ask your healthcare professional. [place] [address] any warranty or liability for your use of this information.

## 2023-02-21 NOTE — PROGRESS NOTES
Rounded on Spiritism patients and provided Anointing of the Sick at request of patient.     Tammy Anderson

## 2023-02-21 NOTE — PROGRESS NOTES
Problem: Falls - Risk of  Goal: *Absence of Falls  Description: Document Nara Pond Fall Risk and appropriate interventions in the flowsheet. Outcome: Progressing Towards Goal  Note: Fall Risk Interventions:                                Problem: Pain  Goal: *Control of Pain  Outcome: Progressing Towards Goal  Goal: *PALLIATIVE CARE:  Alleviation of Pain  Outcome: Progressing Towards Goal     Problem: Pressure Injury - Risk of  Goal: *Prevention of pressure injury  Description: Document Shaquille Scale and appropriate interventions in the flowsheet.   Outcome: Progressing Towards Goal  Note: Pressure Injury Interventions:  Sensory Interventions: Float heels, Minimize linen layers    Moisture Interventions: Absorbent underpads, Limit adult briefs, Minimize layers    Activity Interventions: Assess need for specialty bed, Increase time out of bed    Mobility Interventions: Assess need for specialty bed, Float heels    Nutrition Interventions: Document food/fluid/supplement intake    Friction and Shear Interventions: Apply protective barrier, creams and emollients, Lift sheet, Minimize layers

## 2023-02-21 NOTE — PROGRESS NOTES
Bedside and Verbal shift change report given to NATHAN Garcia (oncoming nurse) by Marcello Hurt RN (offgoing nurse). Report included the following information SBAR, Kardex, Procedure Summary, Intake/Output, MAR, Accordion, and Quality Measures.

## 2023-02-21 NOTE — PROGRESS NOTES
Met with patients wife after IDR, medically stable for discharge today. Therapy had recommended SNF, but she confirmed that she manages all care at home and is not interested in SNF. He had home care through [facility] at one point, but that is changing as the worker with Medicaid is reviewing all that received Medicaid under the Pandemic waiver. Because he has had an increase in the SS income, he does not qualify this upcoming month, and they have to apply every month, based on this income. She is working with a worker in [employer]. Transporttion arranged by this cm and wife through [facility], with pickup requested at 1pm they have a 3 hour window for same. Stretcher transport. Patient does not have a Home Health at this time, no needs per wife. Care Management Interventions  PCP Verified by CM:  Yes (Dr. Art Scott)  Support Systems: Spouse/Significant Other  Confirm Follow Up Transport: Family  The Plan for Transition of Care is Related to the Following Treatment Goals : CVA / rash discoloration of arm  Discharge Location  Patient Expects to be Discharged to[de-identified] Home with family assistance

## 2023-02-21 NOTE — PROGRESS NOTES
Bedside and Verbal shift change report given to Matilda Austin (oncoming nurse) by Maria Eugenia Raymond RN (offgoing nurse). Report included the following information SBAR, Kardex, Intake/Output, MAR, Recent Results, and Med Rec Status.

## 2023-02-21 NOTE — DISCHARGE SUMMARY
[facility] [place] [facility]  [address]  [phone]    Hospitalist Discharge Summary     Patient ID:  Breanne Ponce  [id number]  58 y.o.  1960    Admit date: 2/17/2023    Discharge date and time: 2/21/2023 11:24 AM    Admission Diagnoses: Swelling [R60.9]    Discharge Diagnoses:  Principal Diagnosis Internal jugular (IJ) vein thromboembolism, acute ([address]                                            Principal Problem:    Internal jugular (IJ) vein thromboembolism, acute ([address] (2/21/2023)    Active Problems:    ESRD (end stage renal disease) ([facility] [address] (6/30/2020)      Swelling (2/17/2023)      Blister (2/21/2023)           Hospital Course:     59 yo hx of HTN, DM, CVA w/ R sided weakness, PEG tube, neurogenic bladder, ESRD on HD, presented w/ RUE swelling, R IJ DVT, chronic rash     1) R IJ DVT/RUE swelling: s/p angioplasty of fistula by Vascular surg on 02/20. Hematology also following, recommended transitioning from heparin to Eliquis (could be use in ESRD patients). Follow up with Dr. Sylvia Kwon     2) Rash/blisters: likely bullous pemphigoid vs calciphylaxis. Not infected. S/p skin bx by Gen surg on 02/19. Path pending. Will stop IV Vanc/cefepime. Will start a trial of systemic prednisone. Patient has an appointment with Dermatology in Sept      3) Hx of CVA: has R sided weakness and aphasia. Cont Eliquis. Will stop ASA due to high risk for bleeding with Eliquis     4) HTN: cont norvasc, isordil      5) DM type 2 w/ renal complications: V0P 7.4%. diet control      6) Seizure d/o: cont vimpat     7) ESRD: on HD. Renal following     8) Mod malnutrition: cont PEG tube feeding     9) Chronic debility: care taker is wife.   Has Juan lift at home    PCP: Jules Grayson NP     Consults:  Vascular surg, Renal, Hematology    Significant Diagnostic Studies: angioplasty of fistula    Discharge Exam:  Physical Exam:    Gen:  ill-appearing, NAD  HEENT:  Pink conjunctivae, PERRL, hearing intact to voice, moist mucous membranes  Neck:  Supple, without masses, thyroid non-tender  Resp:  No accessory muscle use, clear breath sounds without wheezes rales or rhonchi  Card:  No murmurs, normal S1, S2 without thrills, bruits or peripheral edema  Abd:  Soft, non-tender, non-distended, normoactive bowel sounds are present, has PEG  Musc:  No cyanosis or clubbing, right arm swelling, has fistula  Skin:  blisters of feet  Neuro:  moves all ext, chronic R sided weakness, chronic aphasia, chronic debility  Psych:  poor insight, oriented to person, place     Disposition: [facility]  Discharge Condition: Stable    Patient Instructions:   Current Discharge Medication List        START taking these medications    Details   predniSONE (DELTASONE) 10 mg tablet Take 40mg (4 tabs) daily for 5 days, then 20mg (2 tabs) daily for 5 days, then 10mg (1 tab) daily for 5 days  Qty: 35 Tablet, Refills: 0      apixaban (Eliquis) 5 mg tablet Take 10mg (2 tabs) twice daily for 5 days, then 5mg (1 tab) twice daily for 3 months  Qty: 60 Tablet, Refills: 1           CONTINUE these medications which have NOT CHANGED    Details   guaiFENesin-codeine (ROBITUSSIN AC) 100-10 mg/5 mL solution 5 mL three (3) times daily as needed for Cough. Via PEG q 6 hours      famotidine (PEPCID) 20 mg tablet 20 mg two (2) times a day. 20mg/2ml 2.5ml per peg bid      scopolamine (TRANSDERM-SCOP) 1 mg over 3 days pt3d 1 Patch by TransDERmal route every seventy-two (72) hours. lacosamide (VIMPAT) 10 mg/mL soln oral solution Take 30 mL by mouth two (2) times a day. 10 ml = 100 mg; plus 30 ml on MON, WED, FRI after dialysis      isosorbide mononitrate ER (IMDUR) 30 mg tablet 30 mg daily. 2 tablet via PEG daily      ascorbic acid, vitamin C, (VITAMIN C) 500 mg tablet 500 mg daily. Via peg      albuterol (ACCUNEB) 1.25 mg/3 mL nebu 1.25 mg by Nebulization route every six (6) hours as needed for Wheezing.       losartan (COZAAR) 25 mg tablet 25 mg daily. Via PEG      OTHER,NON-FORMULARY, PT / OT evaluate and treat  Qty: 1 Each, Refills: 0      insulin aspart U-100 (NOVOLOG) 100 unit/mL (3 mL) inpn by SubCUTAneous route two (2) times daily (with meals). Sliding scale       vitamin a & d (A&D) ointment Apply  to affected area as needed for Skin Irritation. ondansetron (ZOFRAN ODT) 4 mg disintegrating tablet 4 mg every eight (8) hours as needed for Nausea or Vomiting. Via PEG      guaifen/dextromethorphan/pe (GUAIFEN DM PO) Take 10 mL by mouth two (2) times daily as needed (cough). bisacodyL (DULCOLAX) 10 mg suppository Insert 10 mg into rectum daily as needed for Constipation. amLODIPine (NORVASC) 10 mg tablet Take 10 mg by mouth every evening. acetaminophen (TYLENOL) 160 mg/5 mL liquid Take 320 mg by mouth every six (6) hours as needed for Fever.            STOP taking these medications       aspirin delayed-release 81 mg tablet Comments:   Reason for Stopping:         ferrous sulfate 325 mg (65 mg iron) tablet Comments:   Reason for Stopping:         terazosin (HYTRIN) 5 mg capsule Comments:   Reason for Stopping:             Activity: Activity as tolerated  Diet: Resume previous diet  Wound Care: As directed    Follow-up with  Follow-up Information       Follow up With Specialties Details Why Contact Info    Darrel Cruz NP Nurse Practitioner Schedule an appointment as soon as possible for a visit in 1 week(s)  [address] [facility] 4100 [facility] 74      Oxana Diaz MD Hematology and Oncology, Internal Medicine Physician, Hematology Physician, Oncology Schedule an appointment as soon as possible for a visit in 4 week(s)  169 [facility]  1007 [facility]  [phone]      Del Sullivan MD Nephrology, Hospitalist Schedule an appointment as soon as possible for a visit in 2 week(s) As needed [address] [place] 200  3434 [facility]  [phone] Follow-up tests/labs: Skin Biopsy results     Signed:  Marisol Hunter MD  2/21/2023  11:24 AM  **I personally spent 35 min on discharge**

## 2023-02-22 LAB
B2 GLYCOPROT1 IGA SER-ACNC: <9 GPI IGA UNITS (ref 0–25)
B2 GLYCOPROT1 IGG SER-ACNC: <9 GPI IGG UNITS (ref 0–20)
B2 GLYCOPROT1 IGM SER-ACNC: 28 GPI IGM UNITS (ref 0–32)

## 2023-02-22 NOTE — OP NOTES
[facility] 79  OPERATIVE REPORT    Name:  Ilya Trejo  MR#:  [id number]  :  1960  ACCOUNT #:  [de-identified]  DATE OF SERVICE:  2023    PREOPERATIVE DIAGNOSIS:  Arm swelling. POSTOPERATIVE DIAGNOSIS:  Arm swelling. PROCEDURES PERFORMED:  1. Right arm AV fistula angiogram.  2.  Balloon angioplasty of central venous stenosis. SURGEON:  Spencer Arndt MD    ASSISTANT:  None    ANESTHESIA:  Local anesthesia. COMPLICATIONS:  None. SPECIMENS REMOVED:  None. IMPLANTS:  None. ESTIMATED BLOOD LOSS:  Minimal.    INDICATION FOR PROCEDURE:  The patient is an elderly male with multiple medical problems and a right arm AV fistula. He has some arm swelling. Decision was made to take him to the cath lab for angiographic evaluation. TECHNICAL DETAILS:  The patient was taken to the cath lab. Once a suitable level of anesthesia was introduced, he was prepped and draped in typical sterile fashion. Access to the AV fistula was achieved and a 6-Cameroonian sheath placed. AV graft angiogram showed a patent AV fistula. There was a high-grade central venous stenosis past the previously placed stent, which exceeded 90%. A 10-mm angioplasty balloon was used to treat the stenosis with good technical results. He tolerated the procedure well. Sponge and instruments counts were correct x2. He was awakened and transferred to recovery area in good condition.         Dallie Cabot, MD      [id number]  D:  2023 9:42  T:  2023 17:42  JOB #:  [id number]

## 2023-02-23 LAB
DRVVT RATIO 500585: 1.2 RATIO (ref 0.8–1.2)
HEXAGONAL PHASE PHOSPHOLIPID, 117839: 3 SEC (ref 0–11)
LA 2 SCREEN W REFLEX-IMP: ABNORMAL
MIXING DRVVT: 43.3 SEC (ref 0–40.4)
PTT-LA MIX, LUPR1T: 41.9 SEC (ref 0–40.5)
SCREEN APTT: 46.7 SEC (ref 0–43.5)
SCREEN DRVVT: 53.3 SEC (ref 0–47)

## 2023-06-13 ENCOUNTER — APPOINTMENT (OUTPATIENT)
Facility: HOSPITAL | Age: 63
DRG: 853 | End: 2023-06-13
Payer: MEDICARE

## 2023-06-13 ENCOUNTER — HOSPITAL ENCOUNTER (INPATIENT)
Facility: HOSPITAL | Age: 63
LOS: 5 days | Discharge: HOME | DRG: 853 | End: 2023-06-18
Attending: EMERGENCY MEDICINE | Admitting: INTERNAL MEDICINE
Payer: MEDICARE

## 2023-06-13 DIAGNOSIS — A41.9 SEPTICEMIA (HCC): Primary | ICD-10-CM

## 2023-06-13 LAB
ALBUMIN SERPL-MCNC: 3.3 G/DL (ref 3.5–5)
ALBUMIN/GLOB SERPL: 0.7 (ref 1.1–2.2)
ALP SERPL-CCNC: 251 U/L (ref 45–117)
ALT SERPL-CCNC: 72 U/L (ref 12–78)
ANION GAP BLD CALC-SCNC: 9 (ref 10–20)
ANION GAP SERPL CALC-SCNC: 7 MMOL/L (ref 5–15)
AST SERPL-CCNC: 64 U/L (ref 15–37)
BASE EXCESS BLD CALC-SCNC: 6 MMOL/L
BASOPHILS # BLD: 0 K/UL (ref 0–0.1)
BASOPHILS NFR BLD: 0 % (ref 0–1)
BILIRUB SERPL-MCNC: 0.6 MG/DL (ref 0.2–1)
BUN SERPL-MCNC: 57 MG/DL (ref 6–20)
BUN/CREAT SERPL: 11 (ref 12–20)
CA-I BLD-MCNC: 1.06 MMOL/L (ref 1.12–1.32)
CALCIUM SERPL-MCNC: 9 MG/DL (ref 8.5–10.1)
CHLORIDE BLD-SCNC: 96 MMOL/L (ref 100–108)
CHLORIDE SERPL-SCNC: 96 MMOL/L (ref 97–108)
CO2 BLD-SCNC: 30 MMOL/L (ref 19–24)
CO2 SERPL-SCNC: 31 MMOL/L (ref 21–32)
COMMENT:: NORMAL
CREAT SERPL-MCNC: 5.28 MG/DL (ref 0.7–1.3)
CREAT UR-MCNC: 6.3 MG/DL (ref 0.6–1.3)
DIFFERENTIAL METHOD BLD: ABNORMAL
EOSINOPHIL # BLD: 0 K/UL (ref 0–0.4)
EOSINOPHIL NFR BLD: 0 % (ref 0–7)
ERYTHROCYTE [DISTWIDTH] IN BLOOD BY AUTOMATED COUNT: 11.9 % (ref 11.5–14.5)
GLOBULIN SER CALC-MCNC: 4.9 G/DL (ref 2–4)
GLUCOSE BLD STRIP.AUTO-MCNC: 260 MG/DL (ref 74–106)
GLUCOSE SERPL-MCNC: 247 MG/DL (ref 65–100)
HCO3 BLDA-SCNC: 31 MMOL/L
HCT VFR BLD AUTO: 38 % (ref 36.6–50.3)
HGB BLD-MCNC: 12.6 G/DL (ref 12.1–17)
IMM GRANULOCYTES # BLD AUTO: 0.1 K/UL (ref 0–0.04)
IMM GRANULOCYTES NFR BLD AUTO: 1 % (ref 0–0.5)
LACTATE BLD-SCNC: 1.96 MMOL/L (ref 0.4–2)
LYMPHOCYTES # BLD: 0.5 K/UL (ref 0.8–3.5)
LYMPHOCYTES NFR BLD: 4 % (ref 12–49)
MAGNESIUM SERPL-MCNC: 2.3 MG/DL (ref 1.6–2.4)
MCH RBC QN AUTO: 31.3 PG (ref 26–34)
MCHC RBC AUTO-ENTMCNC: 33.2 G/DL (ref 30–36.5)
MCV RBC AUTO: 94.3 FL (ref 80–99)
MONOCYTES # BLD: 0.7 K/UL (ref 0–1)
MONOCYTES NFR BLD: 5 % (ref 5–13)
NEUTS SEG # BLD: 12.1 K/UL (ref 1.8–8)
NEUTS SEG NFR BLD: 90 % (ref 32–75)
NRBC # BLD: 0 K/UL (ref 0–0.01)
NRBC BLD-RTO: 0 PER 100 WBC
PCO2 BLDV: 43 MMHG (ref 41–51)
PH BLDV: 7.46 (ref 7.32–7.42)
PLATELET # BLD AUTO: 180 K/UL (ref 150–400)
PMV BLD AUTO: 10.8 FL (ref 8.9–12.9)
PO2 BLDV: 33 MMHG (ref 25–40)
POTASSIUM BLD-SCNC: 4.1 MMOL/L (ref 3.5–5.5)
POTASSIUM SERPL-SCNC: 4.2 MMOL/L (ref 3.5–5.1)
PROCALCITONIN SERPL-MCNC: 19.38 NG/ML
PROT SERPL-MCNC: 8.2 G/DL (ref 6.4–8.2)
RBC # BLD AUTO: 4.03 M/UL (ref 4.1–5.7)
RBC MORPH BLD: ABNORMAL
SAO2 % BLD: 66 %
SODIUM BLD-SCNC: 135 MMOL/L (ref 136–145)
SODIUM SERPL-SCNC: 134 MMOL/L (ref 136–145)
SPECIMEN HOLD: NORMAL
SPECIMEN SITE: ABNORMAL
TROPONIN I SERPL HS-MCNC: 19 NG/L (ref 0–76)
WBC # BLD AUTO: 13.4 K/UL (ref 4.1–11.1)
WBC MORPH BLD: ABNORMAL

## 2023-06-13 PROCEDURE — 71260 CT THORAX DX C+: CPT

## 2023-06-13 PROCEDURE — 96365 THER/PROPH/DIAG IV INF INIT: CPT

## 2023-06-13 PROCEDURE — 84132 ASSAY OF SERUM POTASSIUM: CPT

## 2023-06-13 PROCEDURE — 36415 COLL VENOUS BLD VENIPUNCTURE: CPT

## 2023-06-13 PROCEDURE — 71045 X-RAY EXAM CHEST 1 VIEW: CPT

## 2023-06-13 PROCEDURE — 84484 ASSAY OF TROPONIN QUANT: CPT

## 2023-06-13 PROCEDURE — 6360000002 HC RX W HCPCS: Performed by: EMERGENCY MEDICINE

## 2023-06-13 PROCEDURE — 2580000003 HC RX 258: Performed by: EMERGENCY MEDICINE

## 2023-06-13 PROCEDURE — 2580000003 HC RX 258: Performed by: INTERNAL MEDICINE

## 2023-06-13 PROCEDURE — 82803 BLOOD GASES ANY COMBINATION: CPT

## 2023-06-13 PROCEDURE — 84145 PROCALCITONIN (PCT): CPT

## 2023-06-13 PROCEDURE — 84295 ASSAY OF SERUM SODIUM: CPT

## 2023-06-13 PROCEDURE — 96375 TX/PRO/DX INJ NEW DRUG ADDON: CPT

## 2023-06-13 PROCEDURE — 87040 BLOOD CULTURE FOR BACTERIA: CPT

## 2023-06-13 PROCEDURE — 85025 COMPLETE CBC W/AUTO DIFF WBC: CPT

## 2023-06-13 PROCEDURE — 1100000000 HC RM PRIVATE

## 2023-06-13 PROCEDURE — 6370000000 HC RX 637 (ALT 250 FOR IP): Performed by: INTERNAL MEDICINE

## 2023-06-13 PROCEDURE — 6360000004 HC RX CONTRAST MEDICATION: Performed by: EMERGENCY MEDICINE

## 2023-06-13 PROCEDURE — 83735 ASSAY OF MAGNESIUM: CPT

## 2023-06-13 PROCEDURE — 6360000002 HC RX W HCPCS: Performed by: INTERNAL MEDICINE

## 2023-06-13 PROCEDURE — 99285 EMERGENCY DEPT VISIT HI MDM: CPT

## 2023-06-13 PROCEDURE — 82947 ASSAY GLUCOSE BLOOD QUANT: CPT

## 2023-06-13 PROCEDURE — 82330 ASSAY OF CALCIUM: CPT

## 2023-06-13 PROCEDURE — 80053 COMPREHEN METABOLIC PANEL: CPT

## 2023-06-13 RX ORDER — SODIUM CHLORIDE 0.9 % (FLUSH) 0.9 %
5-40 SYRINGE (ML) INJECTION PRN
Status: DISCONTINUED | OUTPATIENT
Start: 2023-06-13 | End: 2023-06-18 | Stop reason: HOSPADM

## 2023-06-13 RX ORDER — ACETAMINOPHEN 160 MG/5ML
650 SOLUTION ORAL EVERY 4 HOURS PRN
Status: DISCONTINUED | OUTPATIENT
Start: 2023-06-13 | End: 2023-06-18 | Stop reason: HOSPADM

## 2023-06-13 RX ORDER — SODIUM CHLORIDE 0.9 % (FLUSH) 0.9 %
5-40 SYRINGE (ML) INJECTION EVERY 12 HOURS SCHEDULED
Status: DISCONTINUED | OUTPATIENT
Start: 2023-06-13 | End: 2023-06-18 | Stop reason: HOSPADM

## 2023-06-13 RX ORDER — HEPARIN SODIUM 5000 [USP'U]/ML
5000 INJECTION, SOLUTION INTRAVENOUS; SUBCUTANEOUS EVERY 8 HOURS SCHEDULED
Status: DISCONTINUED | OUTPATIENT
Start: 2023-06-13 | End: 2023-06-18 | Stop reason: HOSPADM

## 2023-06-13 RX ORDER — POLYETHYLENE GLYCOL 3350 17 G/17G
17 POWDER, FOR SOLUTION ORAL DAILY PRN
Status: DISCONTINUED | OUTPATIENT
Start: 2023-06-13 | End: 2023-06-18 | Stop reason: HOSPADM

## 2023-06-13 RX ORDER — ONDANSETRON 2 MG/ML
4 INJECTION INTRAMUSCULAR; INTRAVENOUS EVERY 6 HOURS PRN
Status: DISCONTINUED | OUTPATIENT
Start: 2023-06-13 | End: 2023-06-18 | Stop reason: HOSPADM

## 2023-06-13 RX ORDER — ONDANSETRON 4 MG/1
4 TABLET, ORALLY DISINTEGRATING ORAL EVERY 8 HOURS PRN
Status: DISCONTINUED | OUTPATIENT
Start: 2023-06-13 | End: 2023-06-18 | Stop reason: HOSPADM

## 2023-06-13 RX ORDER — SODIUM CHLORIDE 9 MG/ML
INJECTION, SOLUTION INTRAVENOUS PRN
Status: DISCONTINUED | OUTPATIENT
Start: 2023-06-13 | End: 2023-06-18 | Stop reason: HOSPADM

## 2023-06-13 RX ADMIN — PIPERACILLIN AND TAZOBACTAM 4500 MG: 4; .5 INJECTION, POWDER, LYOPHILIZED, FOR SOLUTION INTRAVENOUS at 17:11

## 2023-06-13 RX ADMIN — SODIUM CHLORIDE, PRESERVATIVE FREE 5 ML: 5 INJECTION INTRAVENOUS at 21:49

## 2023-06-13 RX ADMIN — HEPARIN SODIUM 5000 UNITS: 5000 INJECTION INTRAVENOUS; SUBCUTANEOUS at 21:50

## 2023-06-13 RX ADMIN — IOPAMIDOL 100 ML: 755 INJECTION, SOLUTION INTRAVENOUS at 16:58

## 2023-06-13 RX ADMIN — ACETAMINOPHEN 650 MG: 650 SOLUTION ORAL at 18:19

## 2023-06-13 RX ADMIN — VANCOMYCIN HYDROCHLORIDE 1750 MG: 10 INJECTION, POWDER, LYOPHILIZED, FOR SOLUTION INTRAVENOUS at 17:43

## 2023-06-13 ASSESSMENT — LIFESTYLE VARIABLES
HOW MANY STANDARD DRINKS CONTAINING ALCOHOL DO YOU HAVE ON A TYPICAL DAY: PATIENT DOES NOT DRINK
HOW OFTEN DO YOU HAVE A DRINK CONTAINING ALCOHOL: NEVER

## 2023-06-14 ENCOUNTER — APPOINTMENT (OUTPATIENT)
Facility: HOSPITAL | Age: 63
DRG: 853 | End: 2023-06-14
Payer: MEDICARE

## 2023-06-14 LAB
ALBUMIN SERPL-MCNC: 2.8 G/DL (ref 3.5–5)
ALBUMIN/GLOB SERPL: 0.7 (ref 1.1–2.2)
ALP SERPL-CCNC: 204 U/L (ref 45–117)
ALT SERPL-CCNC: 57 U/L (ref 12–78)
ANION GAP SERPL CALC-SCNC: 5 MMOL/L (ref 5–15)
AST SERPL-CCNC: 39 U/L (ref 15–37)
BASOPHILS # BLD: 0.1 K/UL (ref 0–0.1)
BASOPHILS NFR BLD: 1 % (ref 0–1)
BILIRUB DIRECT SERPL-MCNC: 0.2 MG/DL (ref 0–0.2)
BILIRUB SERPL-MCNC: 0.6 MG/DL (ref 0.2–1)
BUN SERPL-MCNC: 61 MG/DL (ref 6–20)
BUN/CREAT SERPL: 10 (ref 12–20)
CALCIUM SERPL-MCNC: 8.7 MG/DL (ref 8.5–10.1)
CHLORIDE SERPL-SCNC: 99 MMOL/L (ref 97–108)
CO2 SERPL-SCNC: 29 MMOL/L (ref 21–32)
CREAT SERPL-MCNC: 5.92 MG/DL (ref 0.7–1.3)
DIFFERENTIAL METHOD BLD: ABNORMAL
EOSINOPHIL # BLD: 0.1 K/UL (ref 0–0.4)
EOSINOPHIL NFR BLD: 1 % (ref 0–7)
ERYTHROCYTE [DISTWIDTH] IN BLOOD BY AUTOMATED COUNT: 12.3 % (ref 11.5–14.5)
GLOBULIN SER CALC-MCNC: 4.3 G/DL (ref 2–4)
GLUCOSE SERPL-MCNC: 152 MG/DL (ref 65–100)
HCT VFR BLD AUTO: 37.8 % (ref 36.6–50.3)
HGB BLD-MCNC: 12.8 G/DL (ref 12.1–17)
IMM GRANULOCYTES # BLD AUTO: 0.1 K/UL (ref 0–0.04)
IMM GRANULOCYTES NFR BLD AUTO: 1 % (ref 0–0.5)
LYMPHOCYTES # BLD: 1.6 K/UL (ref 0.8–3.5)
LYMPHOCYTES NFR BLD: 11 % (ref 12–49)
MCH RBC QN AUTO: 31.2 PG (ref 26–34)
MCHC RBC AUTO-ENTMCNC: 33.9 G/DL (ref 30–36.5)
MCV RBC AUTO: 92.2 FL (ref 80–99)
MONOCYTES # BLD: 1.2 K/UL (ref 0–1)
MONOCYTES NFR BLD: 8 % (ref 5–13)
NEUTS SEG # BLD: 11.7 K/UL (ref 1.8–8)
NEUTS SEG NFR BLD: 78 % (ref 32–75)
NRBC # BLD: 0 K/UL (ref 0–0.01)
NRBC BLD-RTO: 0 PER 100 WBC
PHOSPHATE SERPL-MCNC: 3.2 MG/DL (ref 2.6–4.7)
PLATELET # BLD AUTO: 205 K/UL (ref 150–400)
PMV BLD AUTO: 9.8 FL (ref 8.9–12.9)
POTASSIUM SERPL-SCNC: 3.9 MMOL/L (ref 3.5–5.1)
PROT SERPL-MCNC: 7.1 G/DL (ref 6.4–8.2)
RBC # BLD AUTO: 4.1 M/UL (ref 4.1–5.7)
SODIUM SERPL-SCNC: 133 MMOL/L (ref 136–145)
WBC # BLD AUTO: 14.8 K/UL (ref 4.1–11.1)

## 2023-06-14 PROCEDURE — 6360000002 HC RX W HCPCS: Performed by: PHYSICIAN ASSISTANT

## 2023-06-14 PROCEDURE — 2580000003 HC RX 258: Performed by: INTERNAL MEDICINE

## 2023-06-14 PROCEDURE — 80048 BASIC METABOLIC PNL TOTAL CA: CPT

## 2023-06-14 PROCEDURE — 76380 CAT SCAN FOLLOW-UP STUDY: CPT

## 2023-06-14 PROCEDURE — 94761 N-INVAS EAR/PLS OXIMETRY MLT: CPT

## 2023-06-14 PROCEDURE — 5A1D70Z PERFORMANCE OF URINARY FILTRATION, INTERMITTENT, LESS THAN 6 HOURS PER DAY: ICD-10-PCS | Performed by: INTERNAL MEDICINE

## 2023-06-14 PROCEDURE — 1100000000 HC RM PRIVATE

## 2023-06-14 PROCEDURE — 6360000002 HC RX W HCPCS: Performed by: INTERNAL MEDICINE

## 2023-06-14 PROCEDURE — 2580000003 HC RX 258: Performed by: NURSE PRACTITIONER

## 2023-06-14 PROCEDURE — 36415 COLL VENOUS BLD VENIPUNCTURE: CPT

## 2023-06-14 PROCEDURE — 84100 ASSAY OF PHOSPHORUS: CPT

## 2023-06-14 PROCEDURE — 80076 HEPATIC FUNCTION PANEL: CPT

## 2023-06-14 PROCEDURE — 85025 COMPLETE CBC W/AUTO DIFF WBC: CPT

## 2023-06-14 RX ORDER — MIDAZOLAM HYDROCHLORIDE 1 MG/ML
5 INJECTION INTRAMUSCULAR; INTRAVENOUS
Status: DISCONTINUED | OUTPATIENT
Start: 2023-06-14 | End: 2023-06-14

## 2023-06-14 RX ORDER — FENTANYL CITRATE 50 UG/ML
100 INJECTION, SOLUTION INTRAMUSCULAR; INTRAVENOUS
Status: DISCONTINUED | OUTPATIENT
Start: 2023-06-14 | End: 2023-06-14

## 2023-06-14 RX ORDER — SODIUM CHLORIDE 9 MG/ML
INJECTION, SOLUTION INTRAVENOUS CONTINUOUS
Status: DISCONTINUED | OUTPATIENT
Start: 2023-06-14 | End: 2023-06-15

## 2023-06-14 RX ADMIN — PIPERACILLIN AND TAZOBACTAM 3375 MG: 3; .375 INJECTION, POWDER, LYOPHILIZED, FOR SOLUTION INTRAVENOUS at 00:55

## 2023-06-14 RX ADMIN — PIPERACILLIN AND TAZOBACTAM 3375 MG: 3; .375 INJECTION, POWDER, LYOPHILIZED, FOR SOLUTION INTRAVENOUS at 17:05

## 2023-06-14 RX ADMIN — MIDAZOLAM HYDROCHLORIDE 1 MG: 1 INJECTION, SOLUTION INTRAMUSCULAR; INTRAVENOUS at 10:33

## 2023-06-14 RX ADMIN — SODIUM CHLORIDE, PRESERVATIVE FREE 10 ML: 5 INJECTION INTRAVENOUS at 09:34

## 2023-06-14 RX ADMIN — SODIUM CHLORIDE, PRESERVATIVE FREE 5 ML: 5 INJECTION INTRAVENOUS at 22:01

## 2023-06-14 RX ADMIN — HEPARIN SODIUM 5000 UNITS: 5000 INJECTION INTRAVENOUS; SUBCUTANEOUS at 21:37

## 2023-06-14 RX ADMIN — SODIUM CHLORIDE: 9 INJECTION, SOLUTION INTRAVENOUS at 09:33

## 2023-06-14 RX ADMIN — MIDAZOLAM HYDROCHLORIDE 1 MG: 1 INJECTION, SOLUTION INTRAMUSCULAR; INTRAVENOUS at 10:36

## 2023-06-14 RX ADMIN — HEPARIN SODIUM 5000 UNITS: 5000 INJECTION INTRAVENOUS; SUBCUTANEOUS at 06:11

## 2023-06-14 RX ADMIN — MIDAZOLAM HYDROCHLORIDE 1 MG: 1 INJECTION, SOLUTION INTRAMUSCULAR; INTRAVENOUS at 10:39

## 2023-06-15 LAB
ALBUMIN SERPL-MCNC: 2.9 G/DL (ref 3.5–5)
ALBUMIN/GLOB SERPL: 0.6 (ref 1.1–2.2)
ALP SERPL-CCNC: 199 U/L (ref 45–117)
ALT SERPL-CCNC: 56 U/L (ref 12–78)
ANION GAP SERPL CALC-SCNC: 5 MMOL/L (ref 5–15)
APPEARANCE UR: CLEAR
AST SERPL-CCNC: 37 U/L (ref 15–37)
BACTERIA URNS QL MICRO: NEGATIVE /HPF
BASOPHILS # BLD: 0.1 K/UL (ref 0–0.1)
BASOPHILS NFR BLD: 1 % (ref 0–1)
BILIRUB DIRECT SERPL-MCNC: 0.1 MG/DL (ref 0–0.2)
BILIRUB SERPL-MCNC: 0.6 MG/DL (ref 0.2–1)
BILIRUB UR QL: NEGATIVE
BUN SERPL-MCNC: 33 MG/DL (ref 6–20)
BUN/CREAT SERPL: 8 (ref 12–20)
CALCIUM SERPL-MCNC: 9.2 MG/DL (ref 8.5–10.1)
CHLORIDE SERPL-SCNC: 101 MMOL/L (ref 97–108)
CO2 SERPL-SCNC: 31 MMOL/L (ref 21–32)
COLOR UR: ABNORMAL
CREAT SERPL-MCNC: 3.91 MG/DL (ref 0.7–1.3)
DIFFERENTIAL METHOD BLD: ABNORMAL
EOSINOPHIL # BLD: 0.5 K/UL (ref 0–0.4)
EOSINOPHIL NFR BLD: 5 % (ref 0–7)
EPITH CASTS URNS QL MICRO: ABNORMAL /LPF
ERYTHROCYTE [DISTWIDTH] IN BLOOD BY AUTOMATED COUNT: 12.4 % (ref 11.5–14.5)
GLOBULIN SER CALC-MCNC: 4.8 G/DL (ref 2–4)
GLUCOSE BLD STRIP.AUTO-MCNC: 153 MG/DL (ref 65–117)
GLUCOSE BLD STRIP.AUTO-MCNC: 159 MG/DL (ref 65–117)
GLUCOSE SERPL-MCNC: 214 MG/DL (ref 65–100)
GLUCOSE UR STRIP.AUTO-MCNC: NEGATIVE MG/DL
HCT VFR BLD AUTO: 35.2 % (ref 36.6–50.3)
HGB BLD-MCNC: 11.8 G/DL (ref 12.1–17)
HGB UR QL STRIP: ABNORMAL
IMM GRANULOCYTES # BLD AUTO: 0 K/UL (ref 0–0.04)
IMM GRANULOCYTES NFR BLD AUTO: 0 % (ref 0–0.5)
KETONES UR QL STRIP.AUTO: NEGATIVE MG/DL
LEUKOCYTE ESTERASE UR QL STRIP.AUTO: ABNORMAL
LYMPHOCYTES # BLD: 0.9 K/UL (ref 0.8–3.5)
LYMPHOCYTES NFR BLD: 10 % (ref 12–49)
MCH RBC QN AUTO: 30.6 PG (ref 26–34)
MCHC RBC AUTO-ENTMCNC: 33.5 G/DL (ref 30–36.5)
MCV RBC AUTO: 91.4 FL (ref 80–99)
MONOCYTES # BLD: 0.8 K/UL (ref 0–1)
MONOCYTES NFR BLD: 8 % (ref 5–13)
NEUTS SEG # BLD: 7.1 K/UL (ref 1.8–8)
NEUTS SEG NFR BLD: 76 % (ref 32–75)
NITRITE UR QL STRIP.AUTO: NEGATIVE
NRBC # BLD: 0 K/UL (ref 0–0.01)
NRBC BLD-RTO: 0 PER 100 WBC
PH UR STRIP: 5 (ref 5–8)
PHOSPHATE SERPL-MCNC: 3.2 MG/DL (ref 2.6–4.7)
PLATELET # BLD AUTO: 182 K/UL (ref 150–400)
PMV BLD AUTO: 10.7 FL (ref 8.9–12.9)
POTASSIUM SERPL-SCNC: 3.6 MMOL/L (ref 3.5–5.1)
PROT SERPL-MCNC: 7.7 G/DL (ref 6.4–8.2)
PROT UR STRIP-MCNC: 30 MG/DL
RBC # BLD AUTO: 3.85 M/UL (ref 4.1–5.7)
RBC #/AREA URNS HPF: ABNORMAL /HPF (ref 0–5)
SERVICE CMNT-IMP: ABNORMAL
SERVICE CMNT-IMP: ABNORMAL
SODIUM SERPL-SCNC: 137 MMOL/L (ref 136–145)
SP GR UR REFRACTOMETRY: <1.005 (ref 1–1.03)
UROBILINOGEN UR QL STRIP.AUTO: 0.2 EU/DL (ref 0.2–1)
WBC # BLD AUTO: 9.4 K/UL (ref 4.1–11.1)
WBC URNS QL MICRO: ABNORMAL /HPF (ref 0–4)

## 2023-06-15 PROCEDURE — 94761 N-INVAS EAR/PLS OXIMETRY MLT: CPT

## 2023-06-15 PROCEDURE — 88342 IMHCHEM/IMCYTCHM 1ST ANTB: CPT

## 2023-06-15 PROCEDURE — 3600000003 HC SURGERY LEVEL 3 BASE: Performed by: UROLOGY

## 2023-06-15 PROCEDURE — 3700000000 HC ANESTHESIA ATTENDED CARE: Performed by: UROLOGY

## 2023-06-15 PROCEDURE — 80048 BASIC METABOLIC PNL TOTAL CA: CPT

## 2023-06-15 PROCEDURE — 7100000000 HC PACU RECOVERY - FIRST 15 MIN: Performed by: UROLOGY

## 2023-06-15 PROCEDURE — 2709999900 HC NON-CHARGEABLE SUPPLY: Performed by: UROLOGY

## 2023-06-15 PROCEDURE — 84100 ASSAY OF PHOSPHORUS: CPT

## 2023-06-15 PROCEDURE — 1100000000 HC RM PRIVATE

## 2023-06-15 PROCEDURE — 80076 HEPATIC FUNCTION PANEL: CPT

## 2023-06-15 PROCEDURE — 88305 TISSUE EXAM BY PATHOLOGIST: CPT

## 2023-06-15 PROCEDURE — 81001 URINALYSIS AUTO W/SCOPE: CPT

## 2023-06-15 PROCEDURE — 6370000000 HC RX 637 (ALT 250 FOR IP): Performed by: UROLOGY

## 2023-06-15 PROCEDURE — 3600000013 HC SURGERY LEVEL 3 ADDTL 15MIN: Performed by: UROLOGY

## 2023-06-15 PROCEDURE — 2580000003 HC RX 258: Performed by: NURSE PRACTITIONER

## 2023-06-15 PROCEDURE — 6360000002 HC RX W HCPCS: Performed by: INTERNAL MEDICINE

## 2023-06-15 PROCEDURE — 2580000003 HC RX 258: Performed by: INTERNAL MEDICINE

## 2023-06-15 PROCEDURE — 0VB08ZZ EXCISION OF PROSTATE, VIA NATURAL OR ARTIFICIAL OPENING ENDOSCOPIC: ICD-10-PCS | Performed by: UROLOGY

## 2023-06-15 PROCEDURE — 85025 COMPLETE CBC W/AUTO DIFF WBC: CPT

## 2023-06-15 PROCEDURE — 7100000001 HC PACU RECOVERY - ADDTL 15 MIN: Performed by: UROLOGY

## 2023-06-15 PROCEDURE — 82962 GLUCOSE BLOOD TEST: CPT

## 2023-06-15 PROCEDURE — 36415 COLL VENOUS BLD VENIPUNCTURE: CPT

## 2023-06-15 PROCEDURE — 3700000001 HC ADD 15 MINUTES (ANESTHESIA): Performed by: UROLOGY

## 2023-06-15 RX ORDER — MIDAZOLAM HYDROCHLORIDE 2 MG/2ML
2 INJECTION, SOLUTION INTRAMUSCULAR; INTRAVENOUS
Status: DISCONTINUED | OUTPATIENT
Start: 2023-06-15 | End: 2023-06-15 | Stop reason: HOSPADM

## 2023-06-15 RX ORDER — ONDANSETRON 2 MG/ML
4 INJECTION INTRAMUSCULAR; INTRAVENOUS
Status: DISCONTINUED | OUTPATIENT
Start: 2023-06-15 | End: 2023-06-15 | Stop reason: HOSPADM

## 2023-06-15 RX ORDER — LIDOCAINE HYDROCHLORIDE 10 MG/ML
1 INJECTION, SOLUTION EPIDURAL; INFILTRATION; INTRACAUDAL; PERINEURAL
Status: DISCONTINUED | OUTPATIENT
Start: 2023-06-15 | End: 2023-06-15 | Stop reason: HOSPADM

## 2023-06-15 RX ORDER — SODIUM CHLORIDE, SODIUM LACTATE, POTASSIUM CHLORIDE, CALCIUM CHLORIDE 600; 310; 30; 20 MG/100ML; MG/100ML; MG/100ML; MG/100ML
INJECTION, SOLUTION INTRAVENOUS CONTINUOUS
Status: DISCONTINUED | OUTPATIENT
Start: 2023-06-15 | End: 2023-06-15

## 2023-06-15 RX ORDER — DIPHENHYDRAMINE HYDROCHLORIDE 50 MG/ML
12.5 INJECTION INTRAMUSCULAR; INTRAVENOUS
Status: DISCONTINUED | OUTPATIENT
Start: 2023-06-15 | End: 2023-06-15 | Stop reason: HOSPADM

## 2023-06-15 RX ORDER — SODIUM CHLORIDE 9 MG/ML
INJECTION, SOLUTION INTRAVENOUS CONTINUOUS
Status: DISCONTINUED | OUTPATIENT
Start: 2023-06-15 | End: 2023-06-15

## 2023-06-15 RX ORDER — FENTANYL CITRATE 50 UG/ML
100 INJECTION, SOLUTION INTRAMUSCULAR; INTRAVENOUS
Status: DISCONTINUED | OUTPATIENT
Start: 2023-06-15 | End: 2023-06-15 | Stop reason: HOSPADM

## 2023-06-15 RX ORDER — LIDOCAINE HYDROCHLORIDE 20 MG/ML
JELLY TOPICAL PRN
Status: DISCONTINUED | OUTPATIENT
Start: 2023-06-15 | End: 2023-06-15 | Stop reason: ALTCHOICE

## 2023-06-15 RX ADMIN — SODIUM CHLORIDE: 9 INJECTION, SOLUTION INTRAVENOUS at 00:13

## 2023-06-15 RX ADMIN — HEPARIN SODIUM 5000 UNITS: 5000 INJECTION INTRAVENOUS; SUBCUTANEOUS at 21:14

## 2023-06-15 RX ADMIN — PIPERACILLIN AND TAZOBACTAM 3375 MG: 3; .375 INJECTION, POWDER, LYOPHILIZED, FOR SOLUTION INTRAVENOUS at 16:20

## 2023-06-15 RX ADMIN — PIPERACILLIN AND TAZOBACTAM 3375 MG: 3; .375 INJECTION, POWDER, LYOPHILIZED, FOR SOLUTION INTRAVENOUS at 02:25

## 2023-06-15 RX ADMIN — SODIUM CHLORIDE, PRESERVATIVE FREE 10 ML: 5 INJECTION INTRAVENOUS at 21:14

## 2023-06-16 LAB
ALBUMIN SERPL-MCNC: 2.8 G/DL (ref 3.5–5)
ALBUMIN/GLOB SERPL: 0.6 (ref 1.1–2.2)
ALP SERPL-CCNC: 192 U/L (ref 45–117)
ALT SERPL-CCNC: 39 U/L (ref 12–78)
ANION GAP SERPL CALC-SCNC: 8 MMOL/L (ref 5–15)
AST SERPL-CCNC: 22 U/L (ref 15–37)
BASOPHILS # BLD: 0 K/UL (ref 0–0.1)
BASOPHILS NFR BLD: 0 % (ref 0–1)
BILIRUB DIRECT SERPL-MCNC: 0.2 MG/DL (ref 0–0.2)
BILIRUB SERPL-MCNC: 0.4 MG/DL (ref 0.2–1)
BUN SERPL-MCNC: 51 MG/DL (ref 6–20)
BUN/CREAT SERPL: 9 (ref 12–20)
CALCIUM SERPL-MCNC: 9.2 MG/DL (ref 8.5–10.1)
CHLORIDE SERPL-SCNC: 102 MMOL/L (ref 97–108)
CO2 SERPL-SCNC: 28 MMOL/L (ref 21–32)
CREAT SERPL-MCNC: 5.5 MG/DL (ref 0.7–1.3)
DIFFERENTIAL METHOD BLD: ABNORMAL
EOSINOPHIL # BLD: 0.1 K/UL (ref 0–0.4)
EOSINOPHIL NFR BLD: 1 % (ref 0–7)
ERYTHROCYTE [DISTWIDTH] IN BLOOD BY AUTOMATED COUNT: 12.9 % (ref 11.5–14.5)
GLOBULIN SER CALC-MCNC: 4.7 G/DL (ref 2–4)
GLUCOSE SERPL-MCNC: 172 MG/DL (ref 65–100)
HCT VFR BLD AUTO: 34.2 % (ref 36.6–50.3)
HGB BLD-MCNC: 11.4 G/DL (ref 12.1–17)
IMM GRANULOCYTES # BLD AUTO: 0.1 K/UL (ref 0–0.04)
IMM GRANULOCYTES NFR BLD AUTO: 1 % (ref 0–0.5)
LYMPHOCYTES # BLD: 1.5 K/UL (ref 0.8–3.5)
LYMPHOCYTES NFR BLD: 15 % (ref 12–49)
MCH RBC QN AUTO: 30.8 PG (ref 26–34)
MCHC RBC AUTO-ENTMCNC: 33.3 G/DL (ref 30–36.5)
MCV RBC AUTO: 92.4 FL (ref 80–99)
MONOCYTES # BLD: 1 K/UL (ref 0–1)
MONOCYTES NFR BLD: 9 % (ref 5–13)
NEUTS SEG # BLD: 7.7 K/UL (ref 1.8–8)
NEUTS SEG NFR BLD: 74 % (ref 32–75)
NRBC # BLD: 0 K/UL (ref 0–0.01)
NRBC BLD-RTO: 0 PER 100 WBC
PHOSPHATE SERPL-MCNC: 4.1 MG/DL (ref 2.6–4.7)
PLATELET # BLD AUTO: 221 K/UL (ref 150–400)
PMV BLD AUTO: 10 FL (ref 8.9–12.9)
POTASSIUM SERPL-SCNC: 4 MMOL/L (ref 3.5–5.1)
PROT SERPL-MCNC: 7.5 G/DL (ref 6.4–8.2)
RBC # BLD AUTO: 3.7 M/UL (ref 4.1–5.7)
SODIUM SERPL-SCNC: 138 MMOL/L (ref 136–145)
WBC # BLD AUTO: 10.4 K/UL (ref 4.1–11.1)

## 2023-06-16 PROCEDURE — 80048 BASIC METABOLIC PNL TOTAL CA: CPT

## 2023-06-16 PROCEDURE — 84100 ASSAY OF PHOSPHORUS: CPT

## 2023-06-16 PROCEDURE — 90935 HEMODIALYSIS ONE EVALUATION: CPT

## 2023-06-16 PROCEDURE — 2580000003 HC RX 258: Performed by: INTERNAL MEDICINE

## 2023-06-16 PROCEDURE — 6360000002 HC RX W HCPCS: Performed by: INTERNAL MEDICINE

## 2023-06-16 PROCEDURE — 94761 N-INVAS EAR/PLS OXIMETRY MLT: CPT

## 2023-06-16 PROCEDURE — 1100000000 HC RM PRIVATE

## 2023-06-16 PROCEDURE — 85025 COMPLETE CBC W/AUTO DIFF WBC: CPT

## 2023-06-16 PROCEDURE — 80076 HEPATIC FUNCTION PANEL: CPT

## 2023-06-16 PROCEDURE — 36415 COLL VENOUS BLD VENIPUNCTURE: CPT

## 2023-06-16 RX ADMIN — WATER 2000 MG: 1 INJECTION INTRAMUSCULAR; INTRAVENOUS; SUBCUTANEOUS at 10:22

## 2023-06-16 RX ADMIN — PIPERACILLIN AND TAZOBACTAM 3375 MG: 3; .375 INJECTION, POWDER, LYOPHILIZED, FOR SOLUTION INTRAVENOUS at 02:22

## 2023-06-16 RX ADMIN — SODIUM CHLORIDE, PRESERVATIVE FREE 10 ML: 5 INJECTION INTRAVENOUS at 20:37

## 2023-06-16 ASSESSMENT — PAIN DESCRIPTION - PAIN TYPE: TYPE: OTHER (COMMENT)

## 2023-06-16 ASSESSMENT — PAIN SCALES - GENERAL
PAINLEVEL_OUTOF10: 3
PAINLEVEL_OUTOF10: 0
PAINLEVEL_OUTOF10: 0

## 2023-06-17 LAB
ANION GAP SERPL CALC-SCNC: 5 MMOL/L (ref 5–15)
BUN SERPL-MCNC: 35 MG/DL (ref 6–20)
BUN/CREAT SERPL: 8 (ref 12–20)
CALCIUM SERPL-MCNC: 9.6 MG/DL (ref 8.5–10.1)
CHLORIDE SERPL-SCNC: 100 MMOL/L (ref 97–108)
CO2 SERPL-SCNC: 31 MMOL/L (ref 21–32)
CREAT SERPL-MCNC: 4.34 MG/DL (ref 0.7–1.3)
ERYTHROCYTE [DISTWIDTH] IN BLOOD BY AUTOMATED COUNT: 12.8 % (ref 11.5–14.5)
GLUCOSE SERPL-MCNC: 165 MG/DL (ref 65–100)
HCT VFR BLD AUTO: 38.4 % (ref 36.6–50.3)
HGB BLD-MCNC: 12.8 G/DL (ref 12.1–17)
MCH RBC QN AUTO: 30.9 PG (ref 26–34)
MCHC RBC AUTO-ENTMCNC: 33.3 G/DL (ref 30–36.5)
MCV RBC AUTO: 92.8 FL (ref 80–99)
NRBC # BLD: 0 K/UL (ref 0–0.01)
NRBC BLD-RTO: 0 PER 100 WBC
PLATELET # BLD AUTO: 235 K/UL (ref 150–400)
PMV BLD AUTO: 10 FL (ref 8.9–12.9)
POTASSIUM SERPL-SCNC: 3.9 MMOL/L (ref 3.5–5.1)
RBC # BLD AUTO: 4.14 M/UL (ref 4.1–5.7)
SODIUM SERPL-SCNC: 136 MMOL/L (ref 136–145)
WBC # BLD AUTO: 7 K/UL (ref 4.1–11.1)

## 2023-06-17 PROCEDURE — 36415 COLL VENOUS BLD VENIPUNCTURE: CPT

## 2023-06-17 PROCEDURE — 1100000000 HC RM PRIVATE

## 2023-06-17 PROCEDURE — 6360000002 HC RX W HCPCS: Performed by: INTERNAL MEDICINE

## 2023-06-17 PROCEDURE — 2580000003 HC RX 258: Performed by: INTERNAL MEDICINE

## 2023-06-17 PROCEDURE — 94761 N-INVAS EAR/PLS OXIMETRY MLT: CPT

## 2023-06-17 PROCEDURE — 80048 BASIC METABOLIC PNL TOTAL CA: CPT

## 2023-06-17 PROCEDURE — 85027 COMPLETE CBC AUTOMATED: CPT

## 2023-06-17 RX ADMIN — WATER 2000 MG: 1 INJECTION INTRAMUSCULAR; INTRAVENOUS; SUBCUTANEOUS at 09:55

## 2023-06-17 RX ADMIN — SODIUM CHLORIDE, PRESERVATIVE FREE 10 ML: 5 INJECTION INTRAVENOUS at 09:50

## 2023-06-17 RX ADMIN — SODIUM CHLORIDE, PRESERVATIVE FREE 10 ML: 5 INJECTION INTRAVENOUS at 20:23

## 2023-06-18 VITALS
HEART RATE: 85 BPM | WEIGHT: 156.53 LBS | TEMPERATURE: 98.1 F | RESPIRATION RATE: 17 BRPM | BODY MASS INDEX: 24.57 KG/M2 | HEIGHT: 67 IN | SYSTOLIC BLOOD PRESSURE: 122 MMHG | DIASTOLIC BLOOD PRESSURE: 66 MMHG | OXYGEN SATURATION: 98 %

## 2023-06-18 PROBLEM — I10 HTN (HYPERTENSION): Status: ACTIVE | Noted: 2020-06-30

## 2023-06-18 PROBLEM — R31.9 HEMATURIA: Status: ACTIVE | Noted: 2020-06-30

## 2023-06-18 PROBLEM — N18.6 ESRD (END STAGE RENAL DISEASE) (HCC): Status: ACTIVE | Noted: 2020-06-30

## 2023-06-18 PROBLEM — R33.9 URINARY RETENTION: Status: ACTIVE | Noted: 2020-06-30

## 2023-06-18 LAB
ALBUMIN SERPL-MCNC: 2.9 G/DL (ref 3.5–5)
ANION GAP SERPL CALC-SCNC: 7 MMOL/L (ref 5–15)
BASOPHILS # BLD: 0.1 K/UL (ref 0–0.1)
BASOPHILS NFR BLD: 1 % (ref 0–1)
BUN SERPL-MCNC: 47 MG/DL (ref 6–20)
BUN/CREAT SERPL: 8 (ref 12–20)
CALCIUM SERPL-MCNC: 9.3 MG/DL (ref 8.5–10.1)
CHLORIDE SERPL-SCNC: 99 MMOL/L (ref 97–108)
CO2 SERPL-SCNC: 29 MMOL/L (ref 21–32)
CREAT SERPL-MCNC: 5.67 MG/DL (ref 0.7–1.3)
DIFFERENTIAL METHOD BLD: ABNORMAL
EOSINOPHIL # BLD: 0.5 K/UL (ref 0–0.4)
EOSINOPHIL NFR BLD: 7 % (ref 0–7)
ERYTHROCYTE [DISTWIDTH] IN BLOOD BY AUTOMATED COUNT: 12.7 % (ref 11.5–14.5)
GLUCOSE SERPL-MCNC: 161 MG/DL (ref 65–100)
HCT VFR BLD AUTO: 34.7 % (ref 36.6–50.3)
HGB BLD-MCNC: 11.6 G/DL (ref 12.1–17)
IMM GRANULOCYTES # BLD AUTO: 0 K/UL (ref 0–0.04)
IMM GRANULOCYTES NFR BLD AUTO: 1 % (ref 0–0.5)
LYMPHOCYTES # BLD: 1 K/UL (ref 0.8–3.5)
LYMPHOCYTES NFR BLD: 13 % (ref 12–49)
MAGNESIUM SERPL-MCNC: 2.7 MG/DL (ref 1.6–2.4)
MCH RBC QN AUTO: 30.9 PG (ref 26–34)
MCHC RBC AUTO-ENTMCNC: 33.4 G/DL (ref 30–36.5)
MCV RBC AUTO: 92.5 FL (ref 80–99)
MONOCYTES # BLD: 0.7 K/UL (ref 0–1)
MONOCYTES NFR BLD: 9 % (ref 5–13)
NEUTS SEG # BLD: 5.2 K/UL (ref 1.8–8)
NEUTS SEG NFR BLD: 69 % (ref 32–75)
NRBC # BLD: 0 K/UL (ref 0–0.01)
NRBC BLD-RTO: 0 PER 100 WBC
PHOSPHATE SERPL-MCNC: 4.2 MG/DL (ref 2.6–4.7)
PLATELET # BLD AUTO: 230 K/UL (ref 150–400)
PMV BLD AUTO: 9.7 FL (ref 8.9–12.9)
POTASSIUM SERPL-SCNC: 3.5 MMOL/L (ref 3.5–5.1)
RBC # BLD AUTO: 3.75 M/UL (ref 4.1–5.7)
SODIUM SERPL-SCNC: 135 MMOL/L (ref 136–145)
WBC # BLD AUTO: 7.5 K/UL (ref 4.1–11.1)

## 2023-06-18 PROCEDURE — 2580000003 HC RX 258: Performed by: INTERNAL MEDICINE

## 2023-06-18 PROCEDURE — 83735 ASSAY OF MAGNESIUM: CPT

## 2023-06-18 PROCEDURE — 85025 COMPLETE CBC W/AUTO DIFF WBC: CPT

## 2023-06-18 PROCEDURE — 36415 COLL VENOUS BLD VENIPUNCTURE: CPT

## 2023-06-18 PROCEDURE — 80069 RENAL FUNCTION PANEL: CPT

## 2023-06-18 PROCEDURE — 6360000002 HC RX W HCPCS: Performed by: INTERNAL MEDICINE

## 2023-06-18 PROCEDURE — 94761 N-INVAS EAR/PLS OXIMETRY MLT: CPT

## 2023-06-18 RX ORDER — CEPHALEXIN 250 MG/5ML
500 POWDER, FOR SUSPENSION ORAL 2 TIMES DAILY
Qty: 200 ML | Refills: 0 | Status: SHIPPED | OUTPATIENT
Start: 2023-06-18 | End: 2023-06-28

## 2023-06-18 RX ADMIN — WATER 2000 MG: 1 INJECTION INTRAMUSCULAR; INTRAVENOUS; SUBCUTANEOUS at 09:14

## 2023-06-18 RX ADMIN — SODIUM CHLORIDE, PRESERVATIVE FREE 10 ML: 5 INJECTION INTRAVENOUS at 09:20

## 2023-06-18 ASSESSMENT — PAIN SCALES - GENERAL
PAINLEVEL_OUTOF10: 0

## 2023-06-19 LAB
BACTERIA SPEC CULT: NORMAL
BACTERIA SPEC CULT: NORMAL
SERVICE CMNT-IMP: NORMAL
SERVICE CMNT-IMP: NORMAL

## 2023-09-27 ENCOUNTER — HOSPITAL ENCOUNTER (OUTPATIENT)
Facility: HOSPITAL | Age: 63
Discharge: HOME | End: 2023-09-27
Payer: MEDICARE

## 2023-09-27 VITALS
HEIGHT: 67 IN | HEART RATE: 79 BPM | WEIGHT: 168 LBS | DIASTOLIC BLOOD PRESSURE: 56 MMHG | BODY MASS INDEX: 26.37 KG/M2 | SYSTOLIC BLOOD PRESSURE: 80 MMHG | TEMPERATURE: 97.7 F | RESPIRATION RATE: 18 BRPM

## 2023-09-27 DIAGNOSIS — L89.614 PRESSURE ULCER OF RIGHT HEEL, STAGE 4 (HCC): ICD-10-CM

## 2023-09-27 DIAGNOSIS — L98.493 SKIN ULCER OF HAND WITH NECROSIS OF MUSCLE (HCC): ICD-10-CM

## 2023-09-27 DIAGNOSIS — I69.351 HEMIPARESIS AFFECTING RIGHT SIDE AS LATE EFFECT OF CEREBROVASCULAR ACCIDENT (CVA) (HCC): ICD-10-CM

## 2023-09-27 PROCEDURE — 99214 OFFICE O/P EST MOD 30 MIN: CPT

## 2023-09-27 RX ORDER — CLOBETASOL PROPIONATE 0.5 MG/G
0.05 OINTMENT TOPICAL 2 TIMES DAILY
COMMUNITY

## 2023-09-27 RX ORDER — GENTAMICIN SULFATE 1 MG/G
OINTMENT TOPICAL ONCE
OUTPATIENT
Start: 2023-09-27 | End: 2023-09-27

## 2023-09-27 RX ORDER — GINSENG 100 MG
CAPSULE ORAL ONCE
OUTPATIENT
Start: 2023-09-27 | End: 2023-09-27

## 2023-09-27 RX ORDER — BACITRACIN ZINC AND POLYMYXIN B SULFATE 500; 1000 [USP'U]/G; [USP'U]/G
OINTMENT TOPICAL ONCE
OUTPATIENT
Start: 2023-09-27 | End: 2023-09-27

## 2023-09-27 RX ORDER — LIDOCAINE HYDROCHLORIDE 40 MG/ML
SOLUTION TOPICAL ONCE
OUTPATIENT
Start: 2023-09-27 | End: 2023-09-27

## 2023-09-27 RX ORDER — LIDOCAINE HYDROCHLORIDE 20 MG/ML
JELLY TOPICAL ONCE
OUTPATIENT
Start: 2023-09-27 | End: 2023-09-27

## 2023-09-27 RX ORDER — LIDOCAINE 40 MG/G
CREAM TOPICAL ONCE
OUTPATIENT
Start: 2023-09-27 | End: 2023-09-27

## 2023-09-27 RX ORDER — IBUPROFEN 200 MG
TABLET ORAL ONCE
OUTPATIENT
Start: 2023-09-27 | End: 2023-09-27

## 2023-09-27 RX ORDER — CLOBETASOL PROPIONATE 0.5 MG/G
OINTMENT TOPICAL ONCE
OUTPATIENT
Start: 2023-09-27 | End: 2023-09-27

## 2023-09-27 RX ORDER — BETAMETHASONE DIPROPIONATE 0.05 %
OINTMENT (GRAM) TOPICAL ONCE
OUTPATIENT
Start: 2023-09-27 | End: 2023-09-27

## 2023-09-27 RX ORDER — TRIAMCINOLONE ACETONIDE 1 MG/G
OINTMENT TOPICAL ONCE
OUTPATIENT
Start: 2023-09-27 | End: 2023-09-27

## 2023-09-27 RX ORDER — LIDOCAINE 50 MG/G
OINTMENT TOPICAL ONCE
OUTPATIENT
Start: 2023-09-27 | End: 2023-09-27

## 2023-09-27 RX ORDER — SODIUM CHLOR/HYPOCHLOROUS ACID 0.033 %
SOLUTION, IRRIGATION IRRIGATION ONCE
OUTPATIENT
Start: 2023-09-27 | End: 2023-09-27

## 2023-09-27 NOTE — WOUND CARE
Home Health Referral for skilled Nursing wound care and PT/OT evaluation      [address]:     [facility] [phone] fax [phone] phone    [address]:     [address] [facility] [address]  [address] [address]  [phone]  [place] [address] Alex Stewarty I NUMBER [phone]    Patient Information:      Bety Calender  [address]   [phone]   : 1960  AGE: 61 y.o. GENDER: male   TODAYS DATE:  2023    Insurance:      PRIMARY INSURANCE:  [id number] - (Medicare)    Payer/Plan Subscr  Sex Relation Sub. Ins. ID Effective Group Num   1. [address] 1960 Male Self [id number] 10/1/19                                    [address] [address]   2. [address] [place] 1960 Male Self [id number]                                    [address]       Patient Wound Information:      Problem List Items Addressed This Visit          Other    Hemiparesis affecting right side as late effect of cerebrovascular accident (CVA) ([address])    Pressure ulcer of right heel, stage 4 ([address])    Skin ulcer of hand with necrosis of muscle ([address])     Wound Care Documentation:  Wound 23 Buttocks redness and exocoriation to buttocks.  wound care consulted (Active)   Number of days: 105       Wound 23 Heel Right #1 (Active)   Wound Image   23 1330   Dressing Status New dressing applied 23 1450   Wound Cleansed Cleansed with saline 23 1330   Dressing/Treatment Other (comment);Gauze dressing/dressing sponge;ABD;Roll gauze;Tape/Soft cloth adhesive tape 23 1450   Wound Length (cm) 5 cm 23 1330   Wound Width (cm) 7 cm 23 1330   Wound Depth (cm) 0.1 cm 23 1330   Wound Surface Area (cm^2) 35 cm^2 23 1330   Wound Volume (cm^3) 3.5 cm^3 23 1330   Wound Assessment Eschar dry;Slough 23 1330   Drainage Amount Small (< 25%) 23 133   Drainage Description Serosanguinous

## 2023-09-27 NOTE — PROGRESS NOTES
Reflexes: BJ, CBJ, KJ, AJ 1+ Left side only. Vascular:                         Pulses:                                            R                    L                                               Radial                        +.                 +.                                              Femoral                     +.                 +.                                              DP                             +.                 +.                                              PT                             -.                 -.  Extremities: RLE spastic resists full extension, heel pad with black thick eschar fixed to bony calcaneous, not seperating. Other: .R hand dorsal 4th finger ulcer into tendon tissue, and another into subq     Vital signs and data recorded in '[address]' for this patient today is noted, reviewed and considered. Patient notes today: concerned about pain in heel, hurts when touched, . Procedure Note    Ulcer Type: pressure  Consent obtained: Yes  Time out taken: Yes    PreOp diagnosis: pressure ulcerations, .. Post Op Diagnosis, and condition: pressure ulcers heel, R and hand R. .  Follow Up Plan: RTC 2 weeks ,Santyl, daily dressings, home health eval, PT, OT. Diamond Hamman Specimens: 0. Counseled regarding/Discussed: as above, . Clinical considerations: alert to infection, best off loading. Prognosis: rehab will help activity.     Dx Codes: V07.162; .H32.503    Mark Arias MD  9/27/2023

## 2023-09-27 NOTE — PATIENT INSTRUCTIONS
Discharge Instructions for  [address]  Telephone: [phone]    NAME:  Abraham Stallworth  YOB: 1960  ACCOUNT NUMBER :  [de-identified]  DATE:  9/27/2023     : Shai Lagunas, JOHN     HOME HEALTH: [address] for [facility] nursing for wound care, PT and OT evaluation     Wound Cleansing:   Do not scrub or use excessive force. Cleanse wound prior to applying a clean dressing with:      [x] Cleanse wound with: BABY SHAMPOO LATHER  LEAVE on 1-2 MINUTES ON WOUND THEN RINSE WITH WATER OR SALINE    [] May Shower at Discharge     [] Shower on days of dressing change, remove dressing first    [] Keep Wound Dry in Shower (may purchase a cast cover if needed)     Topical Treatments:  Do not apply lotions, creams, or ointments to wound bed unless directed. [x] Other: Santyl ointment to right heel wound     Dressings:           Wound Location Right Heel    Apply Primary Dressing: Santyl nickel thick (Until Santyl is available use Medi honey gel, then Aquacell AG)      Cover and Secure with:    [x] Gauze   [] Sb   [x] Kerlix  [] Ace Wrap     [x] ABD  [] Medipore tape  [x] tape  [] Other:    Avoid contact of tape with skin. Change dressing:   [x] Daily      [] Every Other Day   [] Three times per week  [] Once a week   [] Do Not Change Dressing     [] Other:    Dressings:           Wound Location Right 5th finger     Apply Primary Dressing:       [] MediHoney Gel    [] MediHoney Alginate  [] Alginate     [] Alginate with Silver       [] Collagen   [] Collagen with Silver     [] Hydrocolloid  [] Hydrofera Blue Classic (moisten with saline)  [] Hydrofera Blue Ready  [x] Other: Xeroform gauze       Cover and Secure with:  Wash with baby shampoo    [x] Gauze 2x2 cut in half and small tegaderm    [] Sb   [] Kerlix  [] Ace Wrap     [] ABD  [] Medipore tape  [] tape  [] Other:    Avoid contact of tape with

## 2023-10-11 ENCOUNTER — HOSPITAL ENCOUNTER (OUTPATIENT)
Facility: HOSPITAL | Age: 63
Discharge: HOME | End: 2023-10-11
Attending: EMERGENCY MEDICINE
Payer: MEDICARE

## 2023-10-11 VITALS
HEART RATE: 76 BPM | SYSTOLIC BLOOD PRESSURE: 105 MMHG | TEMPERATURE: 98.4 F | RESPIRATION RATE: 16 BRPM | DIASTOLIC BLOOD PRESSURE: 46 MMHG

## 2023-10-11 DIAGNOSIS — L89.614 PRESSURE ULCER OF RIGHT HEEL, STAGE 4 (HCC): Primary | ICD-10-CM

## 2023-10-11 DIAGNOSIS — L98.493 SKIN ULCER OF HAND WITH NECROSIS OF MUSCLE (HCC): ICD-10-CM

## 2023-10-11 PROCEDURE — 11046 DBRDMT MUSC&/FSCA EA ADDL: CPT

## 2023-10-11 PROCEDURE — 11043 DBRDMT MUSC&/FSCA 1ST 20/<: CPT

## 2023-10-11 RX ORDER — IBUPROFEN 200 MG
TABLET ORAL ONCE
OUTPATIENT
Start: 2023-10-11 | End: 2023-10-11

## 2023-10-11 RX ORDER — LIDOCAINE HYDROCHLORIDE 20 MG/ML
JELLY TOPICAL ONCE
OUTPATIENT
Start: 2023-10-11 | End: 2023-10-11

## 2023-10-11 RX ORDER — BACITRACIN ZINC AND POLYMYXIN B SULFATE 500; 1000 [USP'U]/G; [USP'U]/G
OINTMENT TOPICAL ONCE
OUTPATIENT
Start: 2023-10-11 | End: 2023-10-11

## 2023-10-11 RX ORDER — LIDOCAINE 50 MG/G
OINTMENT TOPICAL ONCE
OUTPATIENT
Start: 2023-10-11 | End: 2023-10-11

## 2023-10-11 RX ORDER — SODIUM CHLOR/HYPOCHLOROUS ACID 0.033 %
SOLUTION, IRRIGATION IRRIGATION ONCE
OUTPATIENT
Start: 2023-10-11 | End: 2023-10-11

## 2023-10-11 RX ORDER — CLOBETASOL PROPIONATE 0.5 MG/G
OINTMENT TOPICAL ONCE
OUTPATIENT
Start: 2023-10-11 | End: 2023-10-11

## 2023-10-11 RX ORDER — GENTAMICIN SULFATE 1 MG/G
OINTMENT TOPICAL ONCE
OUTPATIENT
Start: 2023-10-11 | End: 2023-10-11

## 2023-10-11 RX ORDER — LIDOCAINE 40 MG/G
CREAM TOPICAL ONCE
OUTPATIENT
Start: 2023-10-11 | End: 2023-10-11

## 2023-10-11 RX ORDER — GINSENG 100 MG
CAPSULE ORAL ONCE
OUTPATIENT
Start: 2023-10-11 | End: 2023-10-11

## 2023-10-11 RX ORDER — BETAMETHASONE DIPROPIONATE 0.05 %
OINTMENT (GRAM) TOPICAL ONCE
OUTPATIENT
Start: 2023-10-11 | End: 2023-10-11

## 2023-10-11 RX ORDER — TRIAMCINOLONE ACETONIDE 1 MG/G
OINTMENT TOPICAL ONCE
OUTPATIENT
Start: 2023-10-11 | End: 2023-10-11

## 2023-10-11 RX ORDER — LIDOCAINE HYDROCHLORIDE 40 MG/ML
SOLUTION TOPICAL ONCE
OUTPATIENT
Start: 2023-10-11 | End: 2023-10-11

## 2023-10-11 NOTE — FLOWSHEET NOTE
10/11/23 1118   Wound 09/27/23 Heel Right #1   Date First Assessed/Time First Assessed: 09/27/23 1330   Present on Original Admission: Yes  Wound Approximate Age at First Assessment (Weeks): 16 weeks  Primary Wound Type: Pressure Injury  Location: Heel  Wound Location Orientation: Right  Wound Harry. .. Wound Image   (post debridement)   Dressing/Treatment   (aquacel ag, gauze, kerlix, tape, heel cup foam)   Offloading for Diabetic Foot Ulcers Offloading ordered;Felt or foam;Offloading boot   Wound 09/27/23 Finger (Comment which one) Right #2 5th finger   Date First Assessed/Time First Assessed: 09/27/23 1333   Present on Original Admission: Yes  Location: Finger (Comment which one)  Wound Location Orientation: Right  Wound Description (Comments): #2 5th finger   Dressing/Treatment   (Xeroform gauze, gauze, tape, surgilast)     Discharge Condition: Stable    Pain: 10+    Ambulatory Status: Wheelchair    Discharge Destination: home    Transportation:car    Accompanied by: FAMILY    Discharge instructions reviewed with SELF and copy or written instructions have been provided. All questions/concerns have been addressed at this time.

## 2023-10-11 NOTE — PROGRESS NOTES
Length (cm) 4.5 cm 10/11/23 1103   Post-Procedure Width (cm) 6 cm 10/11/23 1103   Post-Procedure Depth (cm) 0.7 cm 10/11/23 1103   Post-Procedure Surface Area (cm^2) 27 cm^2 10/11/23 1103   Post-Procedure Volume (cm^3) 18.9 cm^3 10/11/23 1103   Wound Assessment Eschar dry;Slough;Pink/red 10/11/23 1016   Drainage Amount Small (< 25%) 10/11/23 1016   Drainage Description Sanguinous 10/11/23 1016   Odor None 10/11/23 1016   Marielena-wound Assessment Blanchable erythema 10/11/23 1016   Margins Epibole (rolled edges); Defined edges 10/11/23 1016   Number of days: 13       Wound 09/27/23 Finger (Comment which one) Right #2 5th finger (Active)   Wound Image    10/11/23 1016   Dressing Status New dressing applied 09/27/23 1450   Wound Cleansed Cleansed with saline 10/11/23 1016   Dressing/Treatment Xeroform;Gauze dressing/dressing sponge;Tegaderm/transparent film dressing 09/27/23 1450   Wound Length (cm) 0.5 cm 10/11/23 1016   Wound Width (cm) 0.5 cm 10/11/23 1016   Wound Depth (cm) 0.1 cm 10/11/23 1016   Wound Surface Area (cm^2) 0.25 cm^2 10/11/23 1016   Change in Wound Size % (l*w) 47.92 10/11/23 1016   Wound Volume (cm^3) 0.025 cm^3 10/11/23 1016   Wound Healing % 48 10/11/23 1016   Wound Assessment Pink/red;Slough; Epithelialization 10/11/23 1016   Drainage Amount Small (< 25%) 10/11/23 1016   Drainage Description Serous 10/11/23 1016   Odor None 10/11/23 1016   Marielena-wound Assessment Blanchable erythema 10/11/23 1016   Margins Flat/open edges 10/11/23 1016   Number of days: 13               I have noted, and reviewed today's data for this patient in [address] and concur with same. The focused physical exam, other physical findings, Medical history, Review of Symptoms and Medications today remains unchanged except as noted below. Patient notes today: new vesicular rash, only using the Santyl, .   Lesion/Wound, focused exam on Presentation today:   A. R heel pad black eschar now , fluctuant beneath with trace

## 2023-10-11 NOTE — PATIENT INSTRUCTIONS
Discharge Instructions for  [address]  Telephone: [phone]    NAME:  Bety Zayas  YOB: 1960  ACCOUNT NUMBER :  [de-identified]  DATE: 10/10/2023     : Philomena Wong RN     HOME HEALTH: [address] for skilled nursing for wound care, PT and OT evaluation     Wound Cleansing:   Do not scrub or use excessive force. Cleanse wound prior to applying a clean dressing with:      [x] Cleanse wound with: BABY SHAMPOO LATHER  LEAVE on 1-2 MINUTES ON WOUND THEN RINSE WITH WATER OR SALINE    [] May Shower at Discharge     [] Shower on days of dressing change, remove dressing first    [] Keep Wound Dry in Shower (may purchase a cast cover if needed)     Topical Treatments:  Do not apply lotions, creams, or ointments to wound bed unless directed. Dressings:           Wound Location Right Heel    Apply Primary Dressing:  Aquacell AG     Cover and Secure with:    [x] Gauze, Heel cup foam   [] Sb   [x] Kerlix  [] Ace Wrap     [] ABD  [] Medipore tape  [x] tape  [] Other:    Avoid contact of tape with skin. Change dressing:   [x] Daily      [] Every Other Day   [] Three times per week  [] Once a week   [] Do Not Change Dressing     [] Other:    Dressings:           Wound Location Right 5th finger     Apply Primary Dressing:       [] MediHoney Gel    [] MediHoney Alginate  [] Alginate     [] Alginate with Silver       [] Collagen   [] Collagen with Silver     [] Hydrocolloid  [] Hydrofera Blue Classic (moisten with saline)  [] Hydrofera Blue Ready  [x] Other: Xeroform gauze       Cover and Secure with: Wash with baby shampoo    [x] Gauze 2x2 cut in half and tape, surgilast     [] Sb   [] Kerlix  [] Ace Wrap     [] ABD  [] Medipore tape  [] tape  [] Other:    Avoid contact of tape with skin.     Change dressing:   [] Daily      [] Every Other Day   [x] Three times per week  [] Once a week   [] Do Not

## 2023-10-18 ENCOUNTER — HOSPITAL ENCOUNTER (OUTPATIENT)
Facility: HOSPITAL | Age: 63
Discharge: HOME | End: 2023-10-18
Attending: EMERGENCY MEDICINE
Payer: MEDICARE

## 2023-10-18 VITALS
SYSTOLIC BLOOD PRESSURE: 95 MMHG | HEART RATE: 87 BPM | DIASTOLIC BLOOD PRESSURE: 61 MMHG | RESPIRATION RATE: 16 BRPM | TEMPERATURE: 97.5 F

## 2023-10-18 DIAGNOSIS — L89.614 PRESSURE ULCER OF RIGHT HEEL, STAGE 4 (HCC): Primary | ICD-10-CM

## 2023-10-18 DIAGNOSIS — L98.493 SKIN ULCER OF HAND WITH NECROSIS OF MUSCLE (HCC): ICD-10-CM

## 2023-10-18 PROCEDURE — 11046 DBRDMT MUSC&/FSCA EA ADDL: CPT

## 2023-10-18 PROCEDURE — 11043 DBRDMT MUSC&/FSCA 1ST 20/<: CPT

## 2023-10-18 RX ORDER — GENTAMICIN SULFATE 1 MG/G
OINTMENT TOPICAL ONCE
OUTPATIENT
Start: 2023-10-18 | End: 2023-10-18

## 2023-10-18 RX ORDER — LIDOCAINE HYDROCHLORIDE 40 MG/ML
SOLUTION TOPICAL ONCE
OUTPATIENT
Start: 2023-10-18 | End: 2023-10-18

## 2023-10-18 RX ORDER — SODIUM CHLOR/HYPOCHLOROUS ACID 0.033 %
SOLUTION, IRRIGATION IRRIGATION ONCE
OUTPATIENT
Start: 2023-10-18 | End: 2023-10-18

## 2023-10-18 RX ORDER — TRIAMCINOLONE ACETONIDE 1 MG/G
OINTMENT TOPICAL ONCE
OUTPATIENT
Start: 2023-10-18 | End: 2023-10-18

## 2023-10-18 RX ORDER — GINSENG 100 MG
CAPSULE ORAL ONCE
OUTPATIENT
Start: 2023-10-18 | End: 2023-10-18

## 2023-10-18 RX ORDER — BETAMETHASONE DIPROPIONATE 0.05 %
OINTMENT (GRAM) TOPICAL ONCE
OUTPATIENT
Start: 2023-10-18 | End: 2023-10-18

## 2023-10-18 RX ORDER — LIDOCAINE HYDROCHLORIDE 20 MG/ML
JELLY TOPICAL ONCE
OUTPATIENT
Start: 2023-10-18 | End: 2023-10-18

## 2023-10-18 RX ORDER — LIDOCAINE 40 MG/G
CREAM TOPICAL ONCE
OUTPATIENT
Start: 2023-10-18 | End: 2023-10-18

## 2023-10-18 RX ORDER — CLOBETASOL PROPIONATE 0.5 MG/G
OINTMENT TOPICAL ONCE
OUTPATIENT
Start: 2023-10-18 | End: 2023-10-18

## 2023-10-18 RX ORDER — IBUPROFEN 200 MG
TABLET ORAL ONCE
OUTPATIENT
Start: 2023-10-18 | End: 2023-10-18

## 2023-10-18 RX ORDER — LIDOCAINE 50 MG/G
OINTMENT TOPICAL ONCE
OUTPATIENT
Start: 2023-10-18 | End: 2023-10-18

## 2023-10-18 RX ORDER — BACITRACIN ZINC AND POLYMYXIN B SULFATE 500; 1000 [USP'U]/G; [USP'U]/G
OINTMENT TOPICAL ONCE
OUTPATIENT
Start: 2023-10-18 | End: 2023-10-18

## 2023-10-18 NOTE — PATIENT INSTRUCTIONS
may bear weight with PT/OT [x] in bed [x] Sitting      Return Appointment:    [x] Return Appointment: With Dr. Yossi Kent  in  1 Week(s) x 3 visits         [address] [place] [address] Information: Should you experience any significant changes in your wound(s) or have questions about your wound care, please contact the [address] at [facility] 8:00 am - 4:30 AND Friday 8:00 AM- 12:00 PM .  If you need help with your wound outside these hours and cannot wait until we are again available, contact your PCP or go to the hospital emergency room. PLEASE NOTE: IF YOU ARE UNABLE TO OBTAIN WOUND SUPPLIES, CONTINUE TO USE THE SUPPLIES YOU HAVE AVAILABLE UNTIL YOU ARE ABLE TO REACH US. IT IS MOST IMPORTANT TO KEEP THE WOUND COVERED AT ALL TIMES.      Physician Signature:_______________________ Dr. Yossi Kent

## 2023-10-18 NOTE — FLOWSHEET NOTE
10/18/23 1104   Wound 09/27/23 Heel Right #1   Date First Assessed/Time First Assessed: 09/27/23 1330   Present on Original Admission: Yes  Wound Approximate Age at First Assessment (Weeks): 16 weeks  Primary Wound Type: Pressure Injury  Location: Heel  Wound Location Orientation: Right  Wound Harry. .. Dressing/Treatment Alginate with Ag;Gauze dressing/dressing sponge;Foam;Roll gauze;Tape/Soft cloth adhesive tape   Offloading for Diabetic Foot Ulcers Offloading ordered     Discharge Condition: Stable    Pain: 9    Ambulatory Status: Wheelchair    Discharge Destination: home    Transportation:handicap accessible transportation    Accompanied by: FAMILY    Discharge instructions reviewed with FAMILY and Patient and copy or written instructions have been provided. All questions/concerns have been addressed at this time.

## 2023-10-19 ENCOUNTER — FOLLOWUP TELEPHONE ENCOUNTER (OUTPATIENT)
Facility: HOSPITAL | Age: 63
End: 2023-10-19

## 2023-10-19 NOTE — TELEPHONE ENCOUNTER
Return call to pt wife about bed frame, mattress that was ordered for her . Order was placed by PT through [facility] [facility] and I asked that she reach back out to them to  speak about any changes that need to be made.

## 2023-10-25 ENCOUNTER — HOSPITAL ENCOUNTER (OUTPATIENT)
Facility: HOSPITAL | Age: 63
Discharge: HOME | End: 2023-10-25
Attending: EMERGENCY MEDICINE
Payer: MEDICARE

## 2023-10-25 VITALS
HEART RATE: 85 BPM | TEMPERATURE: 97.2 F | DIASTOLIC BLOOD PRESSURE: 57 MMHG | SYSTOLIC BLOOD PRESSURE: 118 MMHG | RESPIRATION RATE: 18 BRPM

## 2023-10-25 DIAGNOSIS — L98.493 SKIN ULCER OF HAND WITH NECROSIS OF MUSCLE (HCC): ICD-10-CM

## 2023-10-25 DIAGNOSIS — L89.614 PRESSURE ULCER OF RIGHT HEEL, STAGE 4 (HCC): Primary | ICD-10-CM

## 2023-10-25 PROCEDURE — 11046 DBRDMT MUSC&/FSCA EA ADDL: CPT

## 2023-10-25 PROCEDURE — 11043 DBRDMT MUSC&/FSCA 1ST 20/<: CPT

## 2023-10-25 RX ORDER — GINSENG 100 MG
CAPSULE ORAL ONCE
OUTPATIENT
Start: 2023-10-25 | End: 2023-10-25

## 2023-10-25 RX ORDER — TRIAMCINOLONE ACETONIDE 1 MG/G
OINTMENT TOPICAL ONCE
OUTPATIENT
Start: 2023-10-25 | End: 2023-10-25

## 2023-10-25 RX ORDER — LIDOCAINE 40 MG/G
CREAM TOPICAL ONCE
OUTPATIENT
Start: 2023-10-25 | End: 2023-10-25

## 2023-10-25 RX ORDER — LIDOCAINE 50 MG/G
OINTMENT TOPICAL ONCE
OUTPATIENT
Start: 2023-10-25 | End: 2023-10-25

## 2023-10-25 RX ORDER — LIDOCAINE HYDROCHLORIDE 20 MG/ML
JELLY TOPICAL ONCE
OUTPATIENT
Start: 2023-10-25 | End: 2023-10-25

## 2023-10-25 RX ORDER — GENTAMICIN SULFATE 1 MG/G
OINTMENT TOPICAL ONCE
OUTPATIENT
Start: 2023-10-25 | End: 2023-10-25

## 2023-10-25 RX ORDER — SODIUM CHLOR/HYPOCHLOROUS ACID 0.033 %
SOLUTION, IRRIGATION IRRIGATION ONCE
OUTPATIENT
Start: 2023-10-25 | End: 2023-10-25

## 2023-10-25 RX ORDER — CLOBETASOL PROPIONATE 0.5 MG/G
OINTMENT TOPICAL ONCE
OUTPATIENT
Start: 2023-10-25 | End: 2023-10-25

## 2023-10-25 RX ORDER — BACITRACIN ZINC AND POLYMYXIN B SULFATE 500; 1000 [USP'U]/G; [USP'U]/G
OINTMENT TOPICAL ONCE
OUTPATIENT
Start: 2023-10-25 | End: 2023-10-25

## 2023-10-25 RX ORDER — IBUPROFEN 200 MG
TABLET ORAL ONCE
OUTPATIENT
Start: 2023-10-25 | End: 2023-10-25

## 2023-10-25 RX ORDER — BETAMETHASONE DIPROPIONATE 0.05 %
OINTMENT (GRAM) TOPICAL ONCE
OUTPATIENT
Start: 2023-10-25 | End: 2023-10-25

## 2023-10-25 RX ORDER — LIDOCAINE HYDROCHLORIDE 40 MG/ML
SOLUTION TOPICAL ONCE
OUTPATIENT
Start: 2023-10-25 | End: 2023-10-25

## 2023-10-25 ASSESSMENT — PAIN DESCRIPTION - LOCATION: LOCATION: FOOT

## 2023-10-25 ASSESSMENT — PAIN DESCRIPTION - ORIENTATION: ORIENTATION: RIGHT

## 2023-10-25 ASSESSMENT — PAIN SCALES - GENERAL: PAINLEVEL_OUTOF10: 6

## 2023-10-25 ASSESSMENT — PAIN DESCRIPTION - DESCRIPTORS: DESCRIPTORS: DISCOMFORT

## 2023-10-25 NOTE — PATIENT INSTRUCTIONS
Discharge Instructions for  [facility]  [address] [facility]  Telephone: [phone]    NAME:  Honorio Castrejon  YOB: 1960  ACCOUNT NUMBER :  [de-identified]  DATE: 10/18/2023    Will contact [employer] and work on getting air bed and electric bed frame      : Alem Courtney RN     HOME HEALTH: [address] for skilled nursing for wound care, PT and OT evaluation   [facility] a [facility] for cushion for chair, Erinn will need PT to assist with this. Robert Mai #     Wound Cleansing:   Do not scrub or use excessive force. Cleanse wound prior to applying a clean dressing with:      [x] Cleanse wound with: BABY SHAMPOO LATHER  LEAVE on 1-2 MINUTES ON WOUND THEN RINSE WITH WATER OR SALINE    [] May Shower at Discharge     [] Shower on days of dressing change, remove dressing first    [] Keep Wound Dry in Shower (may purchase a cast cover if needed)     Topical Treatments:  Do not apply lotions, creams, or ointments to wound bed unless directed. Dressings:           Wound Location Right Heel    Apply Primary Dressing:  Aquacell AG     Cover and Secure with:    [x] Gauze, Heel cup foam   [] Sb   [x] Kerlix  [] Ace Wrap     [] ABD  [] Medipore tape  [x] tape  [] Other:    Avoid contact of tape with skin. Change dressing:   [] Daily      [] Every Other Day   [x] Three times per week  [] Once a week   [] Do Not Change Dressing     [] Other:      Pressure Relief:  [x] When sitting, shift position or do seat lifts every 15 minutes. [x] Wheelchair cushion   [] Specialty Bed/Mattress  [x] Turn every 2 hours when in bed. Avoid position directing pressure on wound site. Limit side lying to 30 degree tilt. Limit HOB elevation to 30 degrees.        Off-Loading: [] Surgical shoe    [] Podus Boot(s)   [x] Foam Boot(s) at rest  [] Knee scooter [] Cast Boot [] CROW Boot  [] Other:     [] Total non-weight

## 2023-10-25 NOTE — FLOWSHEET NOTE
10/25/23 1155   Wound 09/27/23 Heel Right #1   Date First Assessed/Time First Assessed: 09/27/23 1330   Present on Original Admission: Yes  Wound Approximate Age at First Assessment (Weeks): 16 weeks  Primary Wound Type: Pressure Injury  Location: Heel  Wound Location Orientation: Right  Wound Harry. .. Dressing/Treatment Alginate with Ag;Gauze dressing/dressing sponge;Roll gauze;Tape/Soft cloth adhesive tape  (Heel cup)   Offloading for Diabetic Foot Ulcers Post op shoe;Felt or foam     Discharge Condition: Stable    Pain: 6    Ambulatory Status: Wheelchair    Discharge Destination: home    Transportation:car    Accompanied by: FAMILY    Discharge instructions reviewed with FAMILY and copy or written instructions have been provided. All questions/concerns have been addressed at this time.

## 2023-11-01 ENCOUNTER — HOSPITAL ENCOUNTER (OUTPATIENT)
Facility: HOSPITAL | Age: 63
Discharge: HOME | End: 2023-11-01
Attending: EMERGENCY MEDICINE
Payer: MEDICARE

## 2023-11-01 VITALS — TEMPERATURE: 96.8 F | DIASTOLIC BLOOD PRESSURE: 65 MMHG | SYSTOLIC BLOOD PRESSURE: 110 MMHG | RESPIRATION RATE: 18 BRPM

## 2023-11-01 DIAGNOSIS — L89.614 PRESSURE ULCER OF RIGHT HEEL, STAGE 4 (HCC): Primary | ICD-10-CM

## 2023-11-01 DIAGNOSIS — L98.493 SKIN ULCER OF HAND WITH NECROSIS OF MUSCLE (HCC): ICD-10-CM

## 2023-11-01 PROCEDURE — 11043 DBRDMT MUSC&/FSCA 1ST 20/<: CPT

## 2023-11-01 RX ORDER — SODIUM CHLOR/HYPOCHLOROUS ACID 0.033 %
SOLUTION, IRRIGATION IRRIGATION ONCE
OUTPATIENT
Start: 2023-11-01 | End: 2023-11-01

## 2023-11-01 RX ORDER — BACITRACIN ZINC AND POLYMYXIN B SULFATE 500; 1000 [USP'U]/G; [USP'U]/G
OINTMENT TOPICAL ONCE
OUTPATIENT
Start: 2023-11-01 | End: 2023-11-01

## 2023-11-01 RX ORDER — LIDOCAINE HYDROCHLORIDE 40 MG/ML
SOLUTION TOPICAL ONCE
OUTPATIENT
Start: 2023-11-01 | End: 2023-11-01

## 2023-11-01 RX ORDER — LIDOCAINE HYDROCHLORIDE 20 MG/ML
JELLY TOPICAL ONCE
OUTPATIENT
Start: 2023-11-01 | End: 2023-11-01

## 2023-11-01 RX ORDER — TRIAMCINOLONE ACETONIDE 1 MG/G
OINTMENT TOPICAL ONCE
OUTPATIENT
Start: 2023-11-01 | End: 2023-11-01

## 2023-11-01 RX ORDER — LIDOCAINE 40 MG/G
CREAM TOPICAL ONCE
OUTPATIENT
Start: 2023-11-01 | End: 2023-11-01

## 2023-11-01 RX ORDER — LIDOCAINE 50 MG/G
OINTMENT TOPICAL ONCE
OUTPATIENT
Start: 2023-11-01 | End: 2023-11-01

## 2023-11-01 RX ORDER — CLOBETASOL PROPIONATE 0.5 MG/G
OINTMENT TOPICAL ONCE
OUTPATIENT
Start: 2023-11-01 | End: 2023-11-01

## 2023-11-01 RX ORDER — IBUPROFEN 200 MG
TABLET ORAL ONCE
OUTPATIENT
Start: 2023-11-01 | End: 2023-11-01

## 2023-11-01 RX ORDER — GINSENG 100 MG
CAPSULE ORAL ONCE
OUTPATIENT
Start: 2023-11-01 | End: 2023-11-01

## 2023-11-01 RX ORDER — DOXYCYCLINE HYCLATE 100 MG/1
100 CAPSULE ORAL 2 TIMES DAILY
COMMUNITY

## 2023-11-01 RX ORDER — BETAMETHASONE DIPROPIONATE 0.05 %
OINTMENT (GRAM) TOPICAL ONCE
OUTPATIENT
Start: 2023-11-01 | End: 2023-11-01

## 2023-11-01 RX ORDER — GENTAMICIN SULFATE 1 MG/G
OINTMENT TOPICAL ONCE
OUTPATIENT
Start: 2023-11-01 | End: 2023-11-01

## 2023-11-01 ASSESSMENT — PAIN DESCRIPTION - LOCATION: LOCATION: FOOT

## 2023-11-01 ASSESSMENT — PAIN SCALES - GENERAL: PAINLEVEL_OUTOF10: 5

## 2023-11-01 ASSESSMENT — PAIN DESCRIPTION - ORIENTATION: ORIENTATION: RIGHT

## 2023-11-01 NOTE — PATIENT INSTRUCTIONS
Discharge Instructions for  [facility]  [address] [facility]  Telephone: [phone]    NAME:  Ewelina Leslie  YOB: 1960  ACCOUNT NUMBER :  [de-identified]  DATE: 11/1/2023    Will contact [employer] and work on getting air bed and electric bed frame      : Giovanna Jaimes, RN     HOME HEALTH: [address] for skilled nursing for wound care, PT and OT evaluation   Will send a referral to [facility] for pressure mapping for cushion for chair, Erinn will need PT to assist with this. Robert Mai #     Wound Cleansing:   Do not scrub or use excessive force. Cleanse wound prior to applying a clean dressing with:      [x] Cleanse wound with: BABY SHAMPOO LATHER  LEAVE on 1-2 MINUTES ON WOUND THEN RINSE WITH WATER OR SALINE    [] May Shower at Discharge     [] Shower on days of dressing change, remove dressing first    [] Keep Wound Dry in Shower (may purchase a cast cover if needed)     Topical Treatments:  Do not apply lotions, creams, or ointments to wound bed unless directed. Dressings:           Wound Location Right Heel    Apply Primary Dressing:  Aquacell AG     Cover and Secure with:    [x] Gauze, Heel cup foam   [] Sb   [x] Kerlix  [] Ace Wrap     [] ABD  [] Medipore tape  [x] tape  [] Other:    Avoid contact of tape with skin. Change dressing:   [] Daily      [] Every Other Day   [x] Three times per week  [] Once a week   [] Do Not Change Dressing     [] Other:      Pressure Relief:  [x] When sitting, shift position or do seat lifts every 15 minutes. [x] Wheelchair cushion   [] Specialty Bed/Mattress  [x] Turn every 2 hours when in bed. Avoid position directing pressure on wound site. Limit side lying to 30 degree tilt. Limit HOB elevation to 30 degrees.        Off-Loading: [] Surgical shoe    [] Podus Boot(s)   [x] Foam Boot(s) at rest  [] Knee scooter [] Cast Boot [] CROW Boot  [] Other:     [] Total non-weight

## 2023-11-01 NOTE — PROGRESS NOTES
1050   Wound Healing % -440 11/01/23 1050   Post-Procedure Length (cm) 4.5 cm 11/01/23 1115   Post-Procedure Width (cm) 6 cm 11/01/23 1115   Post-Procedure Depth (cm) 0.8 cm 11/01/23 1115   Post-Procedure Surface Area (cm^2) 27 cm^2 11/01/23 1115   Post-Procedure Volume (cm^3) 21.6 cm^3 11/01/23 1115   Wound Assessment Slough;Pink/red;Eschar moist 11/01/23 1050   Drainage Amount Moderate (25-50%) 11/01/23 1050   Drainage Description Serosanguinous 11/01/23 1050   Odor None 11/01/23 1050   Marielena-wound Assessment Other (Comment) 11/01/23 1050   Margins Flat/open edges;Epibole (rolled edges) 11/01/23 1050   Wound Thickness Description not for Pressure Injury Full thickness 11/01/23 1050   Number of days: 34               I have noted, and reviewed today's data for this patient in [address] and concur with same. The focused physical exam, other physical findings, Medical history, Review of Symptoms and Medications today remains unchanged except as noted below. Patient notes today: better, no new mattress yet (insurance won't provide until he develops skin breakdown). Lesion/Wound, focused exam on Presentation today:   R heel pad much less necrotic debris, some skin ingrowth, granulation more posterior 1/2. Procedure:   Ulcer Type: pressure  Consent obtained: Yes  Time out taken: Yes    Wound,(s)# R heel. Procedure name: sharp excisional debridement. Anaesthesia: Lidocaine; topical    Description: using a sharp curette I excised all non viable tissue to effect a clean bleeding base. Tissue Level/depth of debridement: fascia. Post debridement dimensions changed as noted:    Depth, add 1.0 mm;    Width, add 0.0 mm   Length, add 0.0 mm. Blood Loss: 4 CCs. Bleeding abated post treatment . Post Procedure Condition/ Diagnosis: some pain with Rx, but much less. Follow up and Future plans today: continue care, RTC 1 week. Specimens: 0.   Patient Counseled regarding/Discussed: as above, not ready for collagen

## 2023-11-01 NOTE — FLOWSHEET NOTE
11/01/23 1121   Wound 09/27/23 Heel Right #1   Date First Assessed/Time First Assessed: 09/27/23 1330   Present on Original Admission: Yes  Wound Approximate Age at First Assessment (Weeks): 16 weeks  Primary Wound Type: Pressure Injury  Location: Heel  Wound Location Orientation: Right  Wound Harry. .. Dressing/Treatment Alginate with Ag;Gauze dressing/dressing sponge;Roll gauze;Tape/Soft cloth adhesive tape; Other (comment)  (heel cup foam)   Offloading for Diabetic Foot Ulcers Post op shoe;Felt or foam     Discharge Condition: Stable    Pain: 4    Ambulatory Status: Wheelchair    Discharge Destination: home    Transportation:handicap accessible transportation    Accompanied by: FAMILY    Discharge instructions reviewed with FAMILY and copy or written instructions have been provided. All questions/concerns have been addressed at this time.

## 2023-11-08 ENCOUNTER — HOSPITAL ENCOUNTER (OUTPATIENT)
Facility: HOSPITAL | Age: 63
Discharge: HOME | End: 2023-11-08
Attending: EMERGENCY MEDICINE
Payer: MEDICARE

## 2023-11-08 VITALS
DIASTOLIC BLOOD PRESSURE: 67 MMHG | TEMPERATURE: 97.9 F | RESPIRATION RATE: 16 BRPM | SYSTOLIC BLOOD PRESSURE: 115 MMHG | HEART RATE: 82 BPM

## 2023-11-08 DIAGNOSIS — L89.614 PRESSURE ULCER OF RIGHT HEEL, STAGE 4 (HCC): Primary | ICD-10-CM

## 2023-11-08 DIAGNOSIS — L98.493 SKIN ULCER OF HAND WITH NECROSIS OF MUSCLE (HCC): ICD-10-CM

## 2023-11-08 PROCEDURE — 11043 DBRDMT MUSC&/FSCA 1ST 20/<: CPT

## 2023-11-08 RX ORDER — SODIUM CHLOR/HYPOCHLOROUS ACID 0.033 %
SOLUTION, IRRIGATION IRRIGATION ONCE
OUTPATIENT
Start: 2023-11-08 | End: 2023-11-08

## 2023-11-08 RX ORDER — IBUPROFEN 200 MG
TABLET ORAL ONCE
OUTPATIENT
Start: 2023-11-08 | End: 2023-11-08

## 2023-11-08 RX ORDER — LIDOCAINE HYDROCHLORIDE 20 MG/ML
JELLY TOPICAL ONCE
OUTPATIENT
Start: 2023-11-08 | End: 2023-11-08

## 2023-11-08 RX ORDER — TRIAMCINOLONE ACETONIDE 1 MG/G
OINTMENT TOPICAL ONCE
OUTPATIENT
Start: 2023-11-08 | End: 2023-11-08

## 2023-11-08 RX ORDER — CLOBETASOL PROPIONATE 0.5 MG/G
OINTMENT TOPICAL ONCE
OUTPATIENT
Start: 2023-11-08 | End: 2023-11-08

## 2023-11-08 RX ORDER — LIDOCAINE 50 MG/G
OINTMENT TOPICAL ONCE
OUTPATIENT
Start: 2023-11-08 | End: 2023-11-08

## 2023-11-08 RX ORDER — LIDOCAINE 40 MG/G
CREAM TOPICAL ONCE
OUTPATIENT
Start: 2023-11-08 | End: 2023-11-08

## 2023-11-08 RX ORDER — BETAMETHASONE DIPROPIONATE 0.05 %
OINTMENT (GRAM) TOPICAL ONCE
OUTPATIENT
Start: 2023-11-08 | End: 2023-11-08

## 2023-11-08 RX ORDER — GENTAMICIN SULFATE 1 MG/G
OINTMENT TOPICAL ONCE
OUTPATIENT
Start: 2023-11-08 | End: 2023-11-08

## 2023-11-08 RX ORDER — LIDOCAINE HYDROCHLORIDE 40 MG/ML
SOLUTION TOPICAL ONCE
OUTPATIENT
Start: 2023-11-08 | End: 2023-11-08

## 2023-11-08 RX ORDER — BACITRACIN ZINC AND POLYMYXIN B SULFATE 500; 1000 [USP'U]/G; [USP'U]/G
OINTMENT TOPICAL ONCE
OUTPATIENT
Start: 2023-11-08 | End: 2023-11-08

## 2023-11-08 RX ORDER — GINSENG 100 MG
CAPSULE ORAL ONCE
OUTPATIENT
Start: 2023-11-08 | End: 2023-11-08

## 2023-11-08 ASSESSMENT — PAIN DESCRIPTION - ORIENTATION: ORIENTATION: RIGHT

## 2023-11-08 ASSESSMENT — PAIN DESCRIPTION - LOCATION: LOCATION: FOOT

## 2023-11-08 ASSESSMENT — PAIN SCALES - GENERAL: PAINLEVEL_OUTOF10: 5

## 2023-11-08 NOTE — PATIENT INSTRUCTIONS
Discharge Instructions for  [address]  Telephone: [phone]    NAME:  Juan Felix  YOB: 1960  ACCOUNT NUMBER :  [de-identified]  DATE: 11/1/2023    Will contact [employer] and work on getting air bed and electric bed frame      : Tammy Conklin RN     HOME HEALTH: [address] for skilled nursing for wound care  Will send a referral to [facility] for pressure mapping for cushion for chair    Valent[place] Powder twice a day for wound healing     Wound Cleansing:   Do not scrub or use excessive force. Cleanse wound prior to applying a clean dressing with:      [x] Cleanse wound with: BABY SHAMPOO LATHER  LEAVE on 1-2 MINUTES ON WOUND THEN RINSE WITH WATER OR SALINE    [] May Shower at Discharge     [] Shower on days of dressing change, remove dressing first    [] Keep Wound Dry in Shower (may purchase a cast cover if needed)     Topical Treatments:  Do not apply lotions, creams, or ointments to wound bed unless directed. Dressings:           Wound Location Right Heel    Apply Primary Dressing:  Jaylene to wound base small amount, then cover with Aquacell AG     Cover and Secure with:    [x] Gauze, Heel cup foam   [] Sb   [x] Kerlix  [] Ace Wrap     [] ABD  [] Medipore tape  [x] tape  [] Other:    Avoid contact of tape with skin. Change dressing:   [] Daily      [x] Every Other Day   [] Three times per week  [] Once a week   [] Do Not Change Dressing     [] Other:      Pressure Relief:  [x] When sitting, shift position or do seat lifts every 15 minutes. [x] Wheelchair cushion   [] Specialty Bed/Mattress  [x] Turn every 2 hours when in bed. Avoid position directing pressure on wound site. Limit side lying to 30 degree tilt. Limit HOB elevation to 30 degrees.        Off-Loading: [] Surgical shoe    [] Podus Boot(s)   [x] Foam Boot(s) at rest  [] Knee scooter [] Cast Boot [] CROW Boot  [x] Other: post op

## 2023-11-13 ENCOUNTER — FOLLOWUP TELEPHONE ENCOUNTER (OUTPATIENT)
Facility: HOSPITAL | Age: 63
End: 2023-11-13

## 2023-11-13 NOTE — TELEPHONE ENCOUNTER
Spoke with Reuben Burkett with [facility], and the family would like the nurse to come once a week and then wife/caregiver will do the dressings on the other days its needed. Informed her that was fine as long as the wife is fine doing EOD wound care.

## 2023-11-22 ENCOUNTER — HOSPITAL ENCOUNTER (OUTPATIENT)
Facility: HOSPITAL | Age: 63
Discharge: HOME | End: 2023-11-22
Attending: EMERGENCY MEDICINE
Payer: MEDICARE

## 2023-11-22 VITALS
RESPIRATION RATE: 16 BRPM | HEART RATE: 82 BPM | DIASTOLIC BLOOD PRESSURE: 55 MMHG | SYSTOLIC BLOOD PRESSURE: 117 MMHG | TEMPERATURE: 97.7 F

## 2023-11-22 DIAGNOSIS — L98.493 SKIN ULCER OF HAND WITH NECROSIS OF MUSCLE (HCC): ICD-10-CM

## 2023-11-22 DIAGNOSIS — L89.614 PRESSURE ULCER OF RIGHT HEEL, STAGE 4 (HCC): Primary | ICD-10-CM

## 2023-11-22 PROCEDURE — 11043 DBRDMT MUSC&/FSCA 1ST 20/<: CPT

## 2023-11-22 RX ORDER — BETAMETHASONE DIPROPIONATE 0.05 %
OINTMENT (GRAM) TOPICAL ONCE
OUTPATIENT
Start: 2023-11-22 | End: 2023-11-22

## 2023-11-22 RX ORDER — GENTAMICIN SULFATE 1 MG/G
OINTMENT TOPICAL ONCE
OUTPATIENT
Start: 2023-11-22 | End: 2023-11-22

## 2023-11-22 RX ORDER — SODIUM CHLOR/HYPOCHLOROUS ACID 0.033 %
SOLUTION, IRRIGATION IRRIGATION ONCE
OUTPATIENT
Start: 2023-11-22 | End: 2023-11-22

## 2023-11-22 RX ORDER — LIDOCAINE HYDROCHLORIDE 20 MG/ML
JELLY TOPICAL ONCE
OUTPATIENT
Start: 2023-11-22 | End: 2023-11-22

## 2023-11-22 RX ORDER — BACITRACIN ZINC AND POLYMYXIN B SULFATE 500; 1000 [USP'U]/G; [USP'U]/G
OINTMENT TOPICAL ONCE
OUTPATIENT
Start: 2023-11-22 | End: 2023-11-22

## 2023-11-22 RX ORDER — TRIAMCINOLONE ACETONIDE 1 MG/G
OINTMENT TOPICAL ONCE
OUTPATIENT
Start: 2023-11-22 | End: 2023-11-22

## 2023-11-22 RX ORDER — LIDOCAINE HYDROCHLORIDE 40 MG/ML
SOLUTION TOPICAL ONCE
OUTPATIENT
Start: 2023-11-22 | End: 2023-11-22

## 2023-11-22 RX ORDER — IBUPROFEN 200 MG
TABLET ORAL ONCE
OUTPATIENT
Start: 2023-11-22 | End: 2023-11-22

## 2023-11-22 RX ORDER — GINSENG 100 MG
CAPSULE ORAL ONCE
OUTPATIENT
Start: 2023-11-22 | End: 2023-11-22

## 2023-11-22 RX ORDER — LIDOCAINE 40 MG/G
CREAM TOPICAL ONCE
OUTPATIENT
Start: 2023-11-22 | End: 2023-11-22

## 2023-11-22 RX ORDER — CLOBETASOL PROPIONATE 0.5 MG/G
OINTMENT TOPICAL ONCE
OUTPATIENT
Start: 2023-11-22 | End: 2023-11-22

## 2023-11-22 RX ORDER — LIDOCAINE 50 MG/G
OINTMENT TOPICAL ONCE
OUTPATIENT
Start: 2023-11-22 | End: 2023-11-22

## 2023-11-22 NOTE — PROGRESS NOTES
[address] [place] [address]   Progress Note and Procedure Note   NO Procedure    Patient Name: Cynthia Hunt  : 1960  MRN: [id number]    Past Medical History:   Diagnosis Date    Diabetes ([address])     Dysphagia due to old cerebrovascular accident 2019    ESRD (end stage renal disease) on dialysis [facility])     MWF    Carlson catheter in place     PEG (percutaneous endoscopic gastrostomy) status ([address]) 3/2019 to present    NPO- comfort foods only    Right sided weakness 3/2019 to present    Seizure ([address]) 2019    Stroke ([address]) 2019    Urinary retention with incomplete bladder emptying      Past Surgical History:   Procedure Laterality Date    AV FISTULA CREATION Right 2019    CYST INCISION AND DRAINAGE Right     2nd toe- Cleaned out infection    CYSTOSCOPY N/A 06/15/2023    TRANSURETHRAL RESECTION OF THE PROSTATE, UNROOFING OF PROSTATE ABSCESS performed by Bouchra Burgos MD at [facility] MAIN OR    OTHER SURGICAL HISTORY  2019    PEG tube insertion    PROSTRATE ULTRASOUND GUIDANCE      abscess romoved     Family History   Problem Relation Age of Onset    Stroke Sister 48    Stroke Father     Deep Vein Thrombosis Neg Hx      Social History     Tobacco Use    Smoking status: Never     Passive exposure: Never    Smokeless tobacco: Never   Substance Use Topics    Alcohol use: Never    Drug use: Never     Allergies   Allergen Reactions    Bee Venom Anaphylaxis    Fentanyl Other (See Comments)     Hallucinations    Oxycodone Other (See Comments)     Hallucinations    Hydralazine Rash    Velphoro [Sucroferric Oxyhydroxide] Diarrhea and Nausea And Vomiting     Current Outpatient Medications on File Prior to Encounter   Medication Sig Dispense Refill    doxycycline hyclate (VIBRAMYCIN) 100 MG capsule Take 1 capsule by mouth 2 times daily (Patient not taking: Reported on 2023)      clobetasol (TEMOVATE) 0.05 % ointment Apply 0.05 each topically 2 times daily Apply topically 2 times daily.  Rash/sore (Patient

## 2023-11-22 NOTE — PATIENT INSTRUCTIONS
Discharge Instructions for  [address]  Telephone: [phone]    NAME:  Oanh Chanel  YOB: 1960  ACCOUNT NUMBER :  [de-identified]  DATE: 11/22/2023       : Mando Hackett RN     HOME HEALTH: [address] for skilled nursing for wound care    Wound Cleansing:   Do not scrub or use excessive force. Cleanse wound prior to applying a clean dressing with:      [x] Cleanse wound with: BABY SHAMPOO LATHER  LEAVE on 1-2 MINUTES ON WOUND THEN RINSE WITH WATER OR SALINE    [] May Shower at Discharge     [] Shower on days of dressing change, remove dressing first    [] Keep Wound Dry in Shower (may purchase a cast cover if needed)     Topical Treatments:  Do not apply lotions, creams, or ointments to wound bed unless directed. Dressings:           Wound Location Right Heel    Apply Primary Dressing:  Jaylene to wound base small amount, then cover with Aquacell AG     Cover and Secure with:    [x] Gauze, Heel cup foam   [] Sb   [x] Kerlix  [] Ace Wrap     [] ABD  [] Medipore tape  [x] tape  [] Other:    Avoid contact of tape with skin. Change dressing:   [] Daily      [x] Every Other Day   [] Three times per week  [] Once a week   [] Do Not Change Dressing     [] Other:      Pressure Relief:  [x] When sitting, shift position or do seat lifts every 15 minutes. [x] Wheelchair cushion   [] Specialty Bed/Mattress  [x] Turn every 2 hours when in bed. Avoid position directing pressure on wound site. Limit side lying to 30 degree tilt. Limit HOB elevation to 30 degrees.        Off-Loading: [] Surgical shoe    [] Podus Boot(s)   [x] Foam Boot(s) at rest  [] Knee scooter [] Cast Boot [] CROW Boot  [x] Other: post op shoe when up walking therapy     [] Total non-weight bearing : [] Right Leg     [] Left Leg    [x] Off-loading when : [x] walking may bear weight with PT/OT [x] in bed [x] Sitting      Return

## 2023-11-22 NOTE — FLOWSHEET NOTE
11/22/23 1126   Wound 09/27/23 Heel Right #1   Date First Assessed/Time First Assessed: 09/27/23 1330   Present on Original Admission: Yes  Wound Approximate Age at First Assessment (Weeks): 16 weeks  Primary Wound Type: Pressure Injury  Location: Heel  Wound Location Orientation: Right  Wound Harry. .. Dressing/Treatment Collagen with Ag;Alginate with Ag;Roll gauze;Tape/Soft cloth adhesive tape  (heel cup)     Discharge Condition: Stable    Pain: 0    Ambulatory Status:Wheelchair    Discharge Destination: Home    Transportation:Car    Accompanied by: Self and Family    Discharge instructions reviewed with Self and Family and copy or written instructions have been provided. All questions/concerns have been addressed at this time.

## 2023-12-06 ENCOUNTER — HOSPITAL ENCOUNTER (OUTPATIENT)
Facility: HOSPITAL | Age: 63
Discharge: HOME | End: 2023-12-06
Attending: EMERGENCY MEDICINE
Payer: MEDICARE

## 2023-12-06 VITALS
HEART RATE: 80 BPM | RESPIRATION RATE: 16 BRPM | DIASTOLIC BLOOD PRESSURE: 69 MMHG | TEMPERATURE: 97.7 F | SYSTOLIC BLOOD PRESSURE: 120 MMHG

## 2023-12-06 DIAGNOSIS — L89.614 PRESSURE ULCER OF RIGHT HEEL, STAGE 4 (HCC): Primary | ICD-10-CM

## 2023-12-06 DIAGNOSIS — L98.493 SKIN ULCER OF HAND WITH NECROSIS OF MUSCLE (HCC): ICD-10-CM

## 2023-12-06 PROCEDURE — 11043 DBRDMT MUSC&/FSCA 1ST 20/<: CPT

## 2023-12-06 RX ORDER — LIDOCAINE HYDROCHLORIDE 20 MG/ML
JELLY TOPICAL ONCE
OUTPATIENT
Start: 2023-12-06 | End: 2023-12-06

## 2023-12-06 RX ORDER — BACITRACIN ZINC AND POLYMYXIN B SULFATE 500; 1000 [USP'U]/G; [USP'U]/G
OINTMENT TOPICAL ONCE
OUTPATIENT
Start: 2023-12-06 | End: 2023-12-06

## 2023-12-06 RX ORDER — BETAMETHASONE DIPROPIONATE 0.05 %
OINTMENT (GRAM) TOPICAL ONCE
OUTPATIENT
Start: 2023-12-06 | End: 2023-12-06

## 2023-12-06 RX ORDER — GINSENG 100 MG
CAPSULE ORAL ONCE
OUTPATIENT
Start: 2023-12-06 | End: 2023-12-06

## 2023-12-06 RX ORDER — SODIUM CHLOR/HYPOCHLOROUS ACID 0.033 %
SOLUTION, IRRIGATION IRRIGATION ONCE
OUTPATIENT
Start: 2023-12-06 | End: 2023-12-06

## 2023-12-06 RX ORDER — TRIAMCINOLONE ACETONIDE 1 MG/G
OINTMENT TOPICAL ONCE
OUTPATIENT
Start: 2023-12-06 | End: 2023-12-06

## 2023-12-06 RX ORDER — GENTAMICIN SULFATE 1 MG/G
OINTMENT TOPICAL ONCE
OUTPATIENT
Start: 2023-12-06 | End: 2023-12-06

## 2023-12-06 RX ORDER — IBUPROFEN 200 MG
TABLET ORAL ONCE
OUTPATIENT
Start: 2023-12-06 | End: 2023-12-06

## 2023-12-06 RX ORDER — LIDOCAINE HYDROCHLORIDE 40 MG/ML
SOLUTION TOPICAL ONCE
OUTPATIENT
Start: 2023-12-06 | End: 2023-12-06

## 2023-12-06 RX ORDER — CLOBETASOL PROPIONATE 0.5 MG/G
OINTMENT TOPICAL ONCE
OUTPATIENT
Start: 2023-12-06 | End: 2023-12-06

## 2023-12-06 RX ORDER — LIDOCAINE 50 MG/G
OINTMENT TOPICAL ONCE
OUTPATIENT
Start: 2023-12-06 | End: 2023-12-06

## 2023-12-06 RX ORDER — LIDOCAINE 40 MG/G
CREAM TOPICAL ONCE
OUTPATIENT
Start: 2023-12-06 | End: 2023-12-06

## 2023-12-06 NOTE — FLOWSHEET NOTE
12/06/23 1141   Wound 09/27/23 Heel Right #1   Date First Assessed/Time First Assessed: 09/27/23 1330   Present on Original Admission: Yes  Wound Approximate Age at First Assessment (Weeks): 16 weeks  Primary Wound Type: Pressure Injury  Location: Heel  Wound Location Orientation: Right  Wound Harry. .. Dressing/Treatment Collagen with Ag;Alginate with Ag;Gauze dressing/dressing sponge;Roll gauze;Tape/Soft cloth adhesive tape  (heel cup)     Discharge Condition: Stable    Pain: 3    Ambulatory Status:Wheelchair    Discharge Destination: Home    Transportation:Transport Services    Accompanied by: Self and Family    Discharge instructions reviewed with Self and Family and copy or written instructions have been provided.  All questions/concerns have been addressed at this time. '

## 2023-12-06 NOTE — PATIENT INSTRUCTIONS
Discharge Instructions for  [address]  Telephone: [phone]    NAME:  Aniya Andrew  YOB: 1960  ACCOUNT NUMBER :  [de-identified]  DATE: 12/06/2023       : Ami Reno RN     HOME HEALTH: [address] for skilled nursing for wound care    Wound Cleansing:   Do not scrub or use excessive force. Cleanse wound prior to applying a clean dressing with:      [x] Cleanse wound with: BABY SHAMPOO LATHER  LEAVE on 1-2 MINUTES ON WOUND THEN RINSE WITH WATER OR SALINE    [] May Shower at Discharge     [] Shower on days of dressing change, remove dressing first    [] Keep Wound Dry in Shower (may purchase a cast cover if needed)     Topical Treatments:  Do not apply lotions, creams, or ointments to wound bed unless directed. Dressings:           Wound Location Right Heel    Apply Primary Dressing:  Jaylene (small pieces do not use entire thing) to wound base small amount, then cover with Aquacell AG     Cover and Secure with:    [x] Gauze, Heel cup foam   [] Sb   [x] Kerlix  [] Ace Wrap     [] ABD  [] Medipore tape  [x] tape  [] Other:    Avoid contact of tape with skin. Change dressing:   [] Daily      [x] Every Other Day   [] Three times per week  [] Once a week   [] Do Not Change Dressing     [] Other:      Pressure Relief:  [x] When sitting, shift position or do seat lifts every 15 minutes. [x] Wheelchair cushion   [] Specialty Bed/Mattress  [x] Turn every 2 hours when in bed. Avoid position directing pressure on wound site. Limit side lying to 30 degree tilt. Limit HOB elevation to 30 degrees.        Off-Loading: [] Surgical shoe    [] Podus Boot(s)   [x] Foam Boot(s) at rest  [] Knee scooter [] Cast Boot [] CROW Boot  [x] Other: post op shoe when up walking therapy     [] Total non-weight bearing : [] Right Leg     [] Left Leg    [x] Off-loading when : [x] walking may bear weight with

## 2023-12-06 NOTE — PROGRESS NOTES
[address] [place] [address]   Progress Note and Procedure Note   NO Procedure    Patient Name: Bety Zayas  : 1960  MRN: [id number]    Past Medical History:   Diagnosis Date    Diabetes ([address])     Dysphagia due to old cerebrovascular accident 2019    ESRD (end stage renal disease) on dialysis [facility])     MWF    Carlson catheter in place     PEG (percutaneous endoscopic gastrostomy) status ([address]) 3/2019 to present    NPO- comfort foods only    Right sided weakness 3/2019 to present    Seizure ([address]) 2019    Stroke ([address]) 2019    Urinary retention with incomplete bladder emptying      Past Surgical History:   Procedure Laterality Date    AV FISTULA CREATION Right 2019    CYST INCISION AND DRAINAGE Right     2nd toe- Cleaned out infection    CYSTOSCOPY N/A 06/15/2023    TRANSURETHRAL RESECTION OF THE PROSTATE, UNROOFING OF PROSTATE ABSCESS performed by Adebayo Fofana MD at [facility] MAIN OR    OTHER SURGICAL HISTORY  2019    PEG tube insertion    PROSTRATE ULTRASOUND GUIDANCE      abscess romoved     Family History   Problem Relation Age of Onset    Stroke Sister 48    Stroke Father     Deep Vein Thrombosis Neg Hx      Social History     Tobacco Use    Smoking status: Never     Passive exposure: Never    Smokeless tobacco: Never   Substance Use Topics    Alcohol use: Never    Drug use: Never     Allergies   Allergen Reactions    Bee Venom Anaphylaxis    Fentanyl Other (See Comments)     Hallucinations    Oxycodone Other (See Comments)     Hallucinations    Hydralazine Rash    Velphoro [Sucroferric Oxyhydroxide] Diarrhea and Nausea And Vomiting     Current Outpatient Medications on File Prior to Encounter   Medication Sig Dispense Refill    doxycycline hyclate (VIBRAMYCIN) 100 MG capsule Take 1 capsule by mouth 2 times daily (Patient not taking: Reported on 2023)      clobetasol (TEMOVATE) 0.05 % ointment Apply 0.05 each topically 2 times daily Apply topically 2 times daily.  Rash/sore (Patient

## 2023-12-07 ENCOUNTER — FOLLOWUP TELEPHONE ENCOUNTER (OUTPATIENT)
Facility: HOSPITAL | Age: 63
End: 2023-12-07

## 2023-12-07 NOTE — TELEPHONE ENCOUNTER
Called [facility] and spoke with Aleksandra Matthews in the office about ordering a freestanding trapeze for patient at home. Aleksandra Matthews stated she will look into ordering it.

## 2024-01-10 ENCOUNTER — HOSPITAL ENCOUNTER (OUTPATIENT)
Facility: HOSPITAL | Age: 64
Discharge: HOME | End: 2024-01-10
Attending: EMERGENCY MEDICINE
Payer: MEDICARE

## 2024-01-10 VITALS
DIASTOLIC BLOOD PRESSURE: 59 MMHG | HEART RATE: 86 BPM | RESPIRATION RATE: 16 BRPM | SYSTOLIC BLOOD PRESSURE: 99 MMHG | TEMPERATURE: 98.1 F

## 2024-01-10 DIAGNOSIS — L89.614 PRESSURE ULCER OF RIGHT HEEL, STAGE 4 (HCC): Primary | ICD-10-CM

## 2024-01-10 DIAGNOSIS — L97.512 RIGHT FOOT ULCER, WITH FAT LAYER EXPOSED (HCC): ICD-10-CM

## 2024-01-10 DIAGNOSIS — L98.493 SKIN ULCER OF HAND WITH NECROSIS OF MUSCLE (HCC): ICD-10-CM

## 2024-01-10 PROCEDURE — 11043 DBRDMT MUSC&/FSCA 1ST 20/<: CPT

## 2024-01-10 RX ORDER — LIDOCAINE HYDROCHLORIDE 20 MG/ML
JELLY TOPICAL ONCE
OUTPATIENT
Start: 2024-01-10 | End: 2024-01-10

## 2024-01-10 RX ORDER — BETAMETHASONE DIPROPIONATE 0.05 %
OINTMENT (GRAM) TOPICAL ONCE
OUTPATIENT
Start: 2024-01-10 | End: 2024-01-10

## 2024-01-10 RX ORDER — IBUPROFEN 200 MG
TABLET ORAL ONCE
OUTPATIENT
Start: 2024-01-10 | End: 2024-01-10

## 2024-01-10 RX ORDER — SODIUM CHLOR/HYPOCHLOROUS ACID 0.033 %
SOLUTION, IRRIGATION IRRIGATION ONCE
OUTPATIENT
Start: 2024-01-10 | End: 2024-01-10

## 2024-01-10 RX ORDER — LIDOCAINE 40 MG/G
CREAM TOPICAL ONCE
OUTPATIENT
Start: 2024-01-10 | End: 2024-01-10

## 2024-01-10 RX ORDER — BACITRACIN ZINC AND POLYMYXIN B SULFATE 500; 1000 [USP'U]/G; [USP'U]/G
OINTMENT TOPICAL ONCE
OUTPATIENT
Start: 2024-01-10 | End: 2024-01-10

## 2024-01-10 RX ORDER — TRIAMCINOLONE ACETONIDE 1 MG/G
OINTMENT TOPICAL ONCE
OUTPATIENT
Start: 2024-01-10 | End: 2024-01-10

## 2024-01-10 RX ORDER — GENTAMICIN SULFATE 1 MG/G
OINTMENT TOPICAL ONCE
OUTPATIENT
Start: 2024-01-10 | End: 2024-01-10

## 2024-01-10 RX ORDER — LIDOCAINE HYDROCHLORIDE 40 MG/ML
SOLUTION TOPICAL ONCE
OUTPATIENT
Start: 2024-01-10 | End: 2024-01-10

## 2024-01-10 RX ORDER — LIDOCAINE 50 MG/G
OINTMENT TOPICAL ONCE
OUTPATIENT
Start: 2024-01-10 | End: 2024-01-10

## 2024-01-10 RX ORDER — CLOBETASOL PROPIONATE 0.5 MG/G
OINTMENT TOPICAL ONCE
OUTPATIENT
Start: 2024-01-10 | End: 2024-01-10

## 2024-01-10 RX ORDER — GINSENG 100 MG
CAPSULE ORAL ONCE
OUTPATIENT
Start: 2024-01-10 | End: 2024-01-10

## 2024-01-10 NOTE — PROGRESS NOTES
not taking: Reported on 11/22/2023)      Nutritional Supplements (NEPRO/CARBSTEADY PO) Take 230 mLs by mouth 4 times daily      acetaminophen (TYLENOL) 160 MG/5ML solution Take 320 mg by mouth every 6 hours as needed      albuterol (ACCUNEB) 1.25 MG/3ML nebulizer solution Inhale 3 mLs into the lungs every 6 hours as needed      bisacodyl (DULCOLAX) 10 MG suppository Place 1 suppository rectally daily as needed      guaiFENesin-codeine (TUSSI-ORGANIDIN NR) 100-10 MG/5ML syrup 5 mLs 3 times daily as needed. (Patient not taking: Reported on 11/8/2023)      insulin aspart (NOVOLOG) 100 UNIT/ML injection pen Inject into the skin 2 times daily (with meals)      isosorbide mononitrate (IMDUR) 30 MG extended release tablet 1 tablet 2 times daily      lacosamide (VIMPAT) 10 MG/ML SOLN oral solution Take 30 mLs by mouth 2 times daily.      ondansetron (ZOFRAN-ODT) 4 MG disintegrating tablet 1 tablet every 8 hours as needed      scopolamine (TRANSDERM-SCOP) transdermal patch Place 1 patch onto the skin every 72 hours       No current facility-administered medications on file prior to encounter.     Lab Results   Component Value Date/Time    LABA1C 5.6 02/20/2023 04:04 AM         Vitals:    01/10/24 1026   BP: (!) 99/59   Pulse: 86   Resp: 16   Temp: 98.1 °F (36.7 °C)      Wound 06/13/23 Buttocks redness and exocoriation to buttocks. wound care consulted (Active)   Number of days: 210       Wound 09/27/23 Heel Right #1 (Active)   Wound Image   01/10/24 1036   Dressing Status New dressing applied 01/10/24 1114   Wound Cleansed Cleansed with saline 01/10/24 1036   Dressing/Treatment Alginate with Ag;Gauze dressing/dressing sponge;Other (comment);Roll gauze;Tape/Soft cloth adhesive tape 01/10/24 1114   Offloading for Diabetic Foot Ulcers Post op shoe 01/10/24 1114   Wound Length (cm) 2.5 cm 01/10/24 1036   Wound Width (cm) 1.4 cm 01/10/24 1036   Wound Depth (cm) 0.4 cm 01/10/24 1036   Wound Surface Area (cm^2) 3.5 cm^2 01/10/24

## 2024-01-10 NOTE — FLOWSHEET NOTE
01/10/24 1114   Right Leg Edema Point of Measurement   Compression Therapy Compression not appropriate   Left Leg Edema Point of Measurement   Compression Therapy Compression not appropriate   Wound 01/10/24 Toe (Comment  which one) Right;Lateral 4th toe, #3   Date First Assessed/Time First Assessed: 01/10/24 1035   Present on Original Admission: No  Location: Toe (Comment  which one)  Wound Location Orientation: Right;Lateral  Wound Description (Comments): 4th toe, #3   Dressing Status New dressing applied   Dressing/Treatment Alginate with Ag;Gauze dressing/dressing sponge  (weave gauze between toes)   Offloading for Diabetic Foot Ulcers Post op shoe   Wound 01/10/24 Toe (Comment  which one) Right;Lateral 3rd toe, #4   Date First Assessed/Time First Assessed: 01/10/24 1035   Present on Original Admission: No  Location: Toe (Comment  which one)  Wound Location Orientation: Right;Lateral  Wound Description (Comments): 3rd toe, #4   Dressing Status New dressing applied   Dressing/Treatment Alginate with Ag;Gauze dressing/dressing sponge  (weave gauze between toes)   Offloading for Diabetic Foot Ulcers Post op shoe   Wound 12/20/23 Foot Plantar;Right #2   Date First Assessed/Time First Assessed: 12/20/23 1100   Present on Original Admission: No  Wound Approximate Age at First Assessment (Weeks): 1 weeks  Location: Foot  Wound Location Orientation: Plantar;Right  Wound Description (Comments): #2   Dressing Status New dressing applied   Dressing/Treatment Xeroform;Gauze dressing/dressing sponge;Roll gauze;Tape/Soft cloth adhesive tape   Offloading for Diabetic Foot Ulcers Post op shoe   Wound 09/27/23 Heel Right #1   Date First Assessed/Time First Assessed: 09/27/23 1330   Present on Original Admission: Yes  Wound Approximate Age at First Assessment (Weeks): 16 weeks  Primary Wound Type: Pressure Injury  Location: Heel  Wound Location Orientation: Right  Wound Harry...   Dressing Status New dressing applied 
   Wound Cleansed Cleansed with saline   Wound Length (cm) 1.2 cm   Wound Width (cm) 2.2 cm   Wound Depth (cm) 0.1 cm   Wound Surface Area (cm^2) 2.64 cm^2   Change in Wound Size % (l*w) 88.77   Wound Volume (cm^3) 0.264 cm^3   Wound Assessment [place]/red;Slough   Drainage Amount Small (< 25%)   Drainage Description Serosanguinous   Odor None   Marielena-wound Assessment Dry/flaky   Margins Undefined edges;Flat/open edges   Wound 09/27/23 Heel Right #1   Date First Assessed/Time First Assessed: 09/27/23 1330   Present on Original Admission: Yes  Wound Approximate Age at First Assessment (Weeks): 16 weeks  Primary Wound Type: Pressure Injury  Location: Heel  Wound Location Orientation: Right  Wound Harry...   Wound Image    Wound Cleansed Cleansed with saline   Wound Length (cm) 2.5 cm   Wound Width (cm) 1.4 cm   Wound Depth (cm) 0.4 cm   Wound Surface Area (cm^2) 3.5 cm^2   Change in Wound Size % (l*w) 90   Wound Volume (cm^3) 1.4 cm^3   Wound Healing % 60   Distance Tunneling (cm) 1.1 cm   Tunneling Position ___ O'Clock 12   Wound Assessment [place]/red;Slough   Drainage Amount Moderate (25-50%)   Drainage Description Serosanguinous   Odor None   Marielena-wound Assessment Hyperkeratosis (callous);Dry/flaky   Margins Epibole (rolled edges);Flat/open edges   Wound Thickness Description not for Pressure Injury Full thickness   BP (!) 99/59   Pulse 86   Temp 98.1 °F (36.7 °C) (Temporal)   Resp 16

## 2024-01-10 NOTE — PATIENT INSTRUCTIONS
Discharge Instructions for  [facility]  [address] [place] [address]  Telephone: [phone]   FAX [phone]    NAME:  James Wray  YOB: 1960  ACCOUNT NUMBER :  [id number]  DATE: 01/10/2024       : Jeannie MARTIN RN    HOME HEALTH: [facility] please come out once a week to see patient and evaluate, wife has concerns about the trapeze and bed/mattress-please re-evaluate    Wound Cleansing:   Do not scrub or use excessive force.  Cleanse wound prior to applying a clean dressing with:      [x] Cleanse wound with: BABY SHAMPOO LATHER  LEAVE on 1-2 MINUTES ON WOUND THEN RINSE WITH WATER OR SALINE    [] May Shower at Discharge     [] Shower on days of dressing change, remove dressing first    [] Keep Wound Dry in Shower (may purchase a cast cover if needed)     Topical Treatments:  Do not apply lotions, creams, or ointments to wound bed unless directed.      Dressings:           Wound Location Right Heel      Apply Primary Dressing:  Aquacell AG     Cover and Secure with:    [x] Gauze, Heel cup foam   [] Sb   [x] Kerlix  [] Ace Wrap     [] ABD  [] Medipore tape  [x] tape  [] Other:    Avoid contact of tape with skin.    Change dressing:   [] Daily      [x] Every Other Day   [] Three times per week  [] Once a week   [] Do Not Change Dressing         Dressings:           Wound Location Right plantar foot    Apply Primary Dressing:  xeroform     Cover and Secure with:    [x] Gauze  [] Sb   [x] Kerlix  [] Ace Wrap     [] ABD  [] Medipore tape  [x] tape  [] Other:    Avoid contact of tape with skin.    Change dressing:   [] Daily      [x] Every Other Day   [] Three times per week  [] Once a week   [] Do Not Change Dressing       Dressings:           Wound Location Right in between 4th toe and 5th toe    Apply Primary Dressing:  aquacel, weave thin layer of gauze in between toes    Change dressing:   [] Daily      [x] Every Other Day   [] Three times

## 2024-01-17 ENCOUNTER — HOSPITAL ENCOUNTER (OUTPATIENT)
Facility: HOSPITAL | Age: 64
Discharge: HOME | End: 2024-01-17
Attending: EMERGENCY MEDICINE
Payer: MEDICARE

## 2024-01-17 VITALS
HEART RATE: 82 BPM | DIASTOLIC BLOOD PRESSURE: 60 MMHG | TEMPERATURE: 97.8 F | RESPIRATION RATE: 15 BRPM | SYSTOLIC BLOOD PRESSURE: 118 MMHG

## 2024-01-17 DIAGNOSIS — L97.512 RIGHT FOOT ULCER, WITH FAT LAYER EXPOSED (HCC): ICD-10-CM

## 2024-01-17 DIAGNOSIS — L98.493 SKIN ULCER OF HAND WITH NECROSIS OF MUSCLE (HCC): ICD-10-CM

## 2024-01-17 DIAGNOSIS — L89.614 PRESSURE ULCER OF RIGHT HEEL, STAGE 4 (HCC): Primary | ICD-10-CM

## 2024-01-17 PROCEDURE — 87070 CULTURE OTHR SPECIMN AEROBIC: CPT

## 2024-01-17 PROCEDURE — 87186 SC STD MICRODIL/AGAR DIL: CPT

## 2024-01-17 PROCEDURE — 87205 SMEAR GRAM STAIN: CPT

## 2024-01-17 PROCEDURE — 87147 CULTURE TYPE IMMUNOLOGIC: CPT

## 2024-01-17 PROCEDURE — 11043 DBRDMT MUSC&/FSCA 1ST 20/<: CPT

## 2024-01-17 PROCEDURE — 87077 CULTURE AEROBIC IDENTIFY: CPT

## 2024-01-17 RX ORDER — IBUPROFEN 200 MG
TABLET ORAL ONCE
OUTPATIENT
Start: 2024-01-17 | End: 2024-01-17

## 2024-01-17 RX ORDER — GENTAMICIN SULFATE 1 MG/G
OINTMENT TOPICAL ONCE
OUTPATIENT
Start: 2024-01-17 | End: 2024-01-17

## 2024-01-17 RX ORDER — LIDOCAINE HYDROCHLORIDE 20 MG/ML
JELLY TOPICAL ONCE
OUTPATIENT
Start: 2024-01-17 | End: 2024-01-17

## 2024-01-17 RX ORDER — GINSENG 100 MG
CAPSULE ORAL ONCE
OUTPATIENT
Start: 2024-01-17 | End: 2024-01-17

## 2024-01-17 RX ORDER — CLOBETASOL PROPIONATE 0.5 MG/G
OINTMENT TOPICAL ONCE
OUTPATIENT
Start: 2024-01-17 | End: 2024-01-17

## 2024-01-17 RX ORDER — LIDOCAINE 40 MG/G
CREAM TOPICAL ONCE
OUTPATIENT
Start: 2024-01-17 | End: 2024-01-17

## 2024-01-17 RX ORDER — BETAMETHASONE DIPROPIONATE 0.05 %
OINTMENT (GRAM) TOPICAL ONCE
OUTPATIENT
Start: 2024-01-17 | End: 2024-01-17

## 2024-01-17 RX ORDER — TRIAMCINOLONE ACETONIDE 1 MG/G
OINTMENT TOPICAL ONCE
OUTPATIENT
Start: 2024-01-17 | End: 2024-01-17

## 2024-01-17 RX ORDER — BACITRACIN ZINC AND POLYMYXIN B SULFATE 500; 1000 [USP'U]/G; [USP'U]/G
OINTMENT TOPICAL ONCE
OUTPATIENT
Start: 2024-01-17 | End: 2024-01-17

## 2024-01-17 RX ORDER — SODIUM CHLOR/HYPOCHLOROUS ACID 0.033 %
SOLUTION, IRRIGATION IRRIGATION ONCE
OUTPATIENT
Start: 2024-01-17 | End: 2024-01-17

## 2024-01-17 RX ORDER — LIDOCAINE 50 MG/G
OINTMENT TOPICAL ONCE
OUTPATIENT
Start: 2024-01-17 | End: 2024-01-17

## 2024-01-17 RX ORDER — LIDOCAINE HYDROCHLORIDE 40 MG/ML
SOLUTION TOPICAL ONCE
OUTPATIENT
Start: 2024-01-17 | End: 2024-01-17

## 2024-01-17 NOTE — FLOWSHEET NOTE
01/17/24 1338   Anesthetic   Anesthetic 4% Lidocaine Liquid Topical   Right Leg Edema Point of Measurement   Leg circumference 30 cm   Ankle circumference 22 cm   RLE Neurovascular Assessment   Capillary Refill Less than/Equal to 3 seconds   Color Appropriate for Ethnicity   Temperature Warm   R Pedal Pulse +1   Wound 01/17/24 Toe (Comment  which one) Right;Medial #5 4th toe   Date First Assessed/Time First Assessed: 01/17/24 1338   Present on Original Admission: No  Location: Toe (Comment  which one)  Wound Location Orientation: Right;Medial  Wound Description (Comments): #5 4th toe   Wound Image    Dressing Status Old drainage noted   Wound Cleansed Cleansed with saline   Wound Length (cm) 0.6 cm   Wound Width (cm) 1.3 cm   Wound Depth (cm) 0.1 cm   Wound Surface Area (cm^2) 0.78 cm^2   Wound Volume (cm^3) 0.078 cm^3   Wound Assessment Sl[place];Pink/red   Drainage Amount Moderate (25-50%)   Drainage Description Serosanguinous   Odor None   Marielena-wound Assessment Maceration;Edematous   Margins Flat/open edges   Wound 01/10/24 Toe (Comment  which one) Right;Lateral 4th toe, #3   Date First Assessed/Time First Assessed: 01/10/24 1035   Present on Original Admission: No  Location: Toe (Comment  which one)  Wound Location Orientation: Right;Lateral  Wound Description (Comments): 4th toe, #3   Wound Image    Dressing Status Old drainage noted   Wound Cleansed Cleansed with saline   Wound Length (cm) 1 cm   Wound Width (cm) 1.7 cm   Wound Depth (cm) 0.1 cm   Wound Surface Area (cm^2) 1.7 cm^2   Change in Wound Size % (l*w) -45.3   Wound Volume (cm^3) 0.17 cm^3   Wound Healing % 52   Wound Assessment [place]/red;Slough   Drainage Amount Moderate (25-50%)   Drainage Description Serosanguinous   Odor None   Marielena-wound Assessment Edematous;Maceration;Blanchable erythema   Margins Flat/open edges     /60   Pulse 82   Temp 97.8 °F (36.6 °C) (Temporal)   Resp 15

## 2024-01-17 NOTE — PROGRESS NOTES
debridement dimensions changed as noted:    Depth, add 3.0 mm;    Width, add 0.0 mm   Length, add 0.0 mm.   Blood Loss: 6 CCs. Bleeding abated post treatment using pressure with Calc-alg dressing material to heel.   Post Procedure Condition/ Diagnosis: new pressure ulcers to toes secondary to dressing pressure.  Follow up and Future plans today: will rehearse dressing pattern needed. Aquacel post cleans to toes, RTC 1 week .    Specimens: 0.  Patient Counseled regarding/Discussed: as above, heel is improved further.  Clinical Considerations: alert to infection, avoid trauma.    Dx Codes: L89.614; .L97.512    Altaf Chang MD  1/17/2024

## 2024-01-17 NOTE — PATIENT INSTRUCTIONS
dressing:   [] Daily      [x] Every Other Day   [] Three times per week  [] Once a week   [] Do Not Change Dressing      Pressure Relief:  [x] When sitting, shift position or do seat lifts every 15 minutes.  [x] Wheelchair cushion   [] Specialty Bed/Mattress  [x] Turn every 2 hours when in bed.  Avoid position directing pressure on wound site.  Limit side lying to 30 degree tilt.  Limit HOB elevation to 30 degrees.       Off-Loading: [] Surgical shoe    [] Podus Boot(s)   [x] Foam Boot(s) at rest  [] Knee scooter [] Cast Boot [] CROW Boot  [x] Other: post op shoe when up walking therapy     [] Total non-weight bearing : [] Right Leg     [] Left Leg    [x] Off-loading when : [x] walking may bear weight with PT/OT [x] in bed [x] Sitting      Return Appointment:    [x] Return Appointment: With Dr. Altaf Chang  in 1 Week(s)         Wound Care Center Information: Should you experience any significant changes in your wound(s) or have questions about your wound care, please contact the [facility] at MONDAY-THURSDAY 8:00 am - 4:30 AND Friday 8:00 AM- 12:00 PM .  If you need help with your wound outside these hours and cannot wait until we are again available, contact your PCP or go to the hospital emergency room.     PLEASE NOTE: IF YOU ARE UNABLE TO OBTAIN WOUND SUPPLIES, CONTINUE TO USE THE SUPPLIES YOU HAVE AVAILABLE UNTIL YOU ARE ABLE TO REACH US. IT IS MOST IMPORTANT TO KEEP THE WOUND COVERED AT ALL TIMES.     Physician Signature:_______________________ Dr. Altaf Chang

## 2024-01-17 NOTE — FLOWSHEET NOTE
01/17/24 1423   Wound 01/17/24 Toe (Comment  which one) Right;Medial #5 4th toe   Date First Assessed/Time First Assessed: 01/17/24 1338   Present on Original Admission: No  Location: Toe (Comment  which one)  Wound Location Orientation: Right;Medial  Wound Description (Comments): #5 4th toe   Dressing/Treatment Alginate with Ag  (aquacel ag weaved between toes gauze abd roll gauze tape)   Wound 01/10/24 Toe (Comment  which one) Right;Lateral 4th toe, #3   Date First Assessed/Time First Assessed: 01/10/24 1035   Present on Original Admission: No  Location: Toe (Comment  which one)  Wound Location Orientation: Right;Lateral  Wound Description (Comments): 4th toe, #3   Dressing/Treatment   (aquacel ag gauze abd roll gauze tape netting)   Wound 01/10/24 Toe (Comment  which one) Right;Lateral 3rd toe, #4   Date First Assessed/Time First Assessed: 01/10/24 1035   Present on Original Admission: No  Location: Toe (Comment  which one)  Wound Location Orientation: Right;Lateral  Wound Description (Comments): 3rd toe, #4   Dressing/Treatment   (aquacel ag gauze abd roll gauze tape)   Wound 09/27/23 Heel Right #1   Date First Assessed/Time First Assessed: 09/27/23 1330   Present on Original Admission: Yes  Wound Approximate Age at First Assessment (Weeks): 16 weeks  Primary Wound Type: Pressure Injury  Location: Heel  Wound Location Orientation: Right  Wound Harry...   Dressing/Treatment   (aquacel ag gauze heel cup abd roll gauze tape netting)   Wound 12/20/23 Foot Plantar;Right #2   Date First Assessed/Time First Assessed: 12/20/23 1100   Present on Original Admission: No  Wound Approximate Age at First Assessment (Weeks): 1 weeks  Location: Foot  Wound Location Orientation: Plantar;Right  Wound Description (Comments): #2   Dressing/Treatment Xeroform;Gauze dressing/dressing sponge;Roll gauze;Tape change  (netting)     Discharge Condition: Stable    Pain: 0    Ambulatory Status: Wheelchair    Discharge Destination:

## 2024-01-20 LAB
BACTERIA SPEC CULT: ABNORMAL
GRAM STN SPEC: ABNORMAL
GRAM STN SPEC: ABNORMAL
SERVICE CMNT-IMP: ABNORMAL

## 2024-01-22 SDOH — HEALTH STABILITY: PHYSICAL HEALTH: ON AVERAGE, HOW MANY DAYS PER WEEK DO YOU ENGAGE IN MODERATE TO STRENUOUS EXERCISE (LIKE A BRISK WALK)?: 0 DAYS

## 2024-01-23 ENCOUNTER — OFFICE VISIT (OUTPATIENT)
Facility: CLINIC | Age: 64
End: 2024-01-23
Payer: MEDICARE

## 2024-01-23 ENCOUNTER — FOLLOWUP TELEPHONE ENCOUNTER (OUTPATIENT)
Facility: HOSPITAL | Age: 64
End: 2024-01-23

## 2024-01-23 VITALS
RESPIRATION RATE: 20 BRPM | TEMPERATURE: 97.3 F | BODY MASS INDEX: 25.06 KG/M2 | DIASTOLIC BLOOD PRESSURE: 67 MMHG | WEIGHT: 160 LBS | HEART RATE: 90 BPM | SYSTOLIC BLOOD PRESSURE: 107 MMHG

## 2024-01-23 DIAGNOSIS — N18.6 TYPE 2 DIABETES MELLITUS WITH CHRONIC KIDNEY DISEASE ON CHRONIC DIALYSIS, WITHOUT LONG-TERM CURRENT USE OF INSULIN (HCC): ICD-10-CM

## 2024-01-23 DIAGNOSIS — E11.22 TYPE 2 DIABETES MELLITUS WITH CHRONIC KIDNEY DISEASE ON CHRONIC DIALYSIS, WITHOUT LONG-TERM CURRENT USE OF INSULIN (HCC): ICD-10-CM

## 2024-01-23 DIAGNOSIS — R53.81 PHYSICAL DEBILITY: ICD-10-CM

## 2024-01-23 DIAGNOSIS — L89.614 PRESSURE ULCER OF RIGHT HEEL, STAGE 4 (HCC): Primary | ICD-10-CM

## 2024-01-23 DIAGNOSIS — R31.9 HEMATURIA, UNSPECIFIED TYPE: ICD-10-CM

## 2024-01-23 DIAGNOSIS — I10 PRIMARY HYPERTENSION: ICD-10-CM

## 2024-01-23 DIAGNOSIS — Z99.2 TYPE 2 DIABETES MELLITUS WITH CHRONIC KIDNEY DISEASE ON CHRONIC DIALYSIS, WITHOUT LONG-TERM CURRENT USE OF INSULIN (HCC): ICD-10-CM

## 2024-01-23 DIAGNOSIS — I69.351 HEMIPARESIS AFFECTING RIGHT SIDE AS LATE EFFECT OF CEREBROVASCULAR ACCIDENT (CVA) (HCC): ICD-10-CM

## 2024-01-23 DIAGNOSIS — R33.9 URINARY RETENTION: ICD-10-CM

## 2024-01-23 PROBLEM — A41.9 SEPSIS (HCC): Status: RESOLVED | Noted: 2023-06-13 | Resolved: 2024-01-23

## 2024-01-23 PROBLEM — D62 ACUTE BLOOD LOSS ANEMIA: Status: RESOLVED | Noted: 2020-06-30 | Resolved: 2024-01-23

## 2024-01-23 PROBLEM — L97.512 RIGHT FOOT ULCER, WITH FAT LAYER EXPOSED (HCC): Status: RESOLVED | Noted: 2024-01-10 | Resolved: 2024-01-23

## 2024-01-23 PROCEDURE — 1036F TOBACCO NON-USER: CPT | Performed by: FAMILY MEDICINE

## 2024-01-23 PROCEDURE — G8484 FLU IMMUNIZE NO ADMIN: HCPCS | Performed by: FAMILY MEDICINE

## 2024-01-23 PROCEDURE — 3046F HEMOGLOBIN A1C LEVEL >9.0%: CPT | Performed by: FAMILY MEDICINE

## 2024-01-23 PROCEDURE — 2022F DILAT RTA XM EVC RTNOPTHY: CPT | Performed by: FAMILY MEDICINE

## 2024-01-23 PROCEDURE — G8427 DOCREV CUR MEDS BY ELIG CLIN: HCPCS | Performed by: FAMILY MEDICINE

## 2024-01-23 PROCEDURE — G8419 CALC BMI OUT NRM PARAM NOF/U: HCPCS | Performed by: FAMILY MEDICINE

## 2024-01-23 PROCEDURE — 3074F SYST BP LT 130 MM HG: CPT | Performed by: FAMILY MEDICINE

## 2024-01-23 PROCEDURE — 99204 OFFICE O/P NEW MOD 45 MIN: CPT | Performed by: FAMILY MEDICINE

## 2024-01-23 PROCEDURE — 3017F COLORECTAL CA SCREEN DOC REV: CPT | Performed by: FAMILY MEDICINE

## 2024-01-23 PROCEDURE — 3078F DIAST BP <80 MM HG: CPT | Performed by: FAMILY MEDICINE

## 2024-01-23 ASSESSMENT — PATIENT HEALTH QUESTIONNAIRE - PHQ9
1. LITTLE INTEREST OR PLEASURE IN DOING THINGS: 0
SUM OF ALL RESPONSES TO PHQ9 QUESTIONS 1 & 2: 0
SUM OF ALL RESPONSES TO PHQ QUESTIONS 1-9: 0
2. FEELING DOWN, DEPRESSED OR HOPELESS: 0
SUM OF ALL RESPONSES TO PHQ QUESTIONS 1-9: 0

## 2024-01-23 NOTE — ASSESSMENT & PLAN NOTE
Significant physical disability would benefit from the highest amount of accessibility devices to aid the patient and his spouse/caregiver with transfer maneuvers, gait aids and transport and positioning.  I treat both him and his spouse and him aware of his own spouses physical limitations.  They would like to remain in the home and thus would need access to more accessibility devices.  These include the following:  - Electric hospital bed with inflatable mattress to prevent pressure ulcers.  - A folding and reclineable power wheelchair with leg lifting that can be placed in a personal vehicle (found on [employer] and FDA approved), current power chair is very limited, require specialized transport that is not going to be available in the near future, necessitating personal occupancy vehicle transport which cannot support current power chair.  - A heavier duty trapezius lift assist device for getting out of bed; his current 1 is too light and flimsy and leads to unsafe transfers.  - An elongated shower chair that allows for the patient to safely transfer into his tub.  Current device is a simple stationary bench that patient is unable to transfer onto due to significant physical limitations.  -

## 2024-01-23 NOTE — TELEPHONE ENCOUNTER
The patient, James Silva, identity was verified by name, address, and  via wife. Called to inform that Dr. Chang has ordered patient to receive 1500mg dose IV Dalvance stat for MRSA infection via wound culture. Informed wife that order was faxed to [facility] and that they will be in contact with her to schedule infusion. Patient to see MD in office tomorrow for wound care.

## 2024-01-23 NOTE — PROGRESS NOTES
Chief Complaint   Patient presents with    New Patient     Np to practice.  Pt is being followed by all specialty as needed.  Adomik is pretty good           
PSA.         OBJECTIVE  /67   Pulse 90   Temp 97.3 °F (36.3 °C)   Resp 20   Wt 72.6 kg (160 lb)   BMI 25.06 kg/m²     Physical Exam  Constitutional: well-appearing, no acute distress  Eyes: EOM intact. PERRL bilateral. Not icteric.  Cardiac: normal rate, regular rhythm.  Pulm: normal rate, normal effort.  Skin: normal color and turgor.  Lower extrem: no edema.  MSK/Neuro: Wheelchair-bound today.  Unable to mobilize lower extremities limited upper extremity movement difficulties with speech and swallowing present on exam.  Right heel lesion pressure wound and fourth toe as described in HPI.    No results found for any visits on 01/23/24.    HISTORICAL  Reviewed and updated today, and as noted below:    Past Medical History:   Diagnosis Date    Acute blood loss anemia 06/30/2020    Diabetes ([facility])     Dysphagia due to old cerebrovascular accident 03/2019    ESRD (end stage renal disease) on dialysis ([facility])     MWF    Carlson catheter in place     PEG (percutaneous endoscopic gastrostomy) status ([facility]) 3/2019 to present    NPO- comfort foods only    Right sided weakness 3/2019 to present    Seizure ([facility]) 03/2019    Sepsis ([facility]) 06/13/2023    Stroke ([facility]) 03/2019    Urinary retention with incomplete bladder emptying      Past Surgical History:   Procedure Laterality Date    AV FISTULA CREATION Right 03/2019    CYST INCISION AND DRAINAGE Right     2nd toe- Cleaned out infection    CYSTOSCOPY N/A 06/15/2023    TRANSURETHRAL RESECTION OF THE PROSTATE, UNROOFING OF PROSTATE ABSCESS performed by Samir Castillo MD at [facility] MAIN OR    OTHER SURGICAL HISTORY  03/2019    PEG tube insertion    PROSTRATE ULTRASOUND GUIDANCE      abscess romoved     Family History   Problem Relation Age of Onset    Stroke Sister 50    Stroke Father     Deep Vein Thrombosis Neg Hx      Social History     Socioeconomic History    Marital status:      Spouse name: None    Number of children: None    Years of education: None    Highest

## 2024-01-24 ENCOUNTER — HOSPITAL ENCOUNTER (OUTPATIENT)
Facility: HOSPITAL | Age: 64
Discharge: HOME | End: 2024-01-24
Attending: EMERGENCY MEDICINE
Payer: MEDICARE

## 2024-01-24 ENCOUNTER — HOSPITAL ENCOUNTER (OUTPATIENT)
Facility: HOSPITAL | Age: 64
Setting detail: INFUSION SERIES
Discharge: HOME | End: 2024-01-24
Payer: MEDICARE

## 2024-01-24 VITALS
SYSTOLIC BLOOD PRESSURE: 118 MMHG | HEART RATE: 87 BPM | RESPIRATION RATE: 18 BRPM | TEMPERATURE: 98.4 F | DIASTOLIC BLOOD PRESSURE: 67 MMHG

## 2024-01-24 VITALS
TEMPERATURE: 97.6 F | RESPIRATION RATE: 18 BRPM | SYSTOLIC BLOOD PRESSURE: 103 MMHG | HEART RATE: 83 BPM | DIASTOLIC BLOOD PRESSURE: 56 MMHG

## 2024-01-24 DIAGNOSIS — A49.02 MRSA INFECTION: Primary | ICD-10-CM

## 2024-01-24 DIAGNOSIS — L98.493 SKIN ULCER OF HAND WITH NECROSIS OF MUSCLE (HCC): ICD-10-CM

## 2024-01-24 DIAGNOSIS — A49.02 MRSA INFECTION: ICD-10-CM

## 2024-01-24 DIAGNOSIS — E11.621 TYPE 2 DIABETES MELLITUS WITH RIGHT DIABETIC FOOT ULCER (HCC): ICD-10-CM

## 2024-01-24 DIAGNOSIS — L97.519 TYPE 2 DIABETES MELLITUS WITH RIGHT DIABETIC FOOT ULCER (HCC): ICD-10-CM

## 2024-01-24 DIAGNOSIS — L89.614 PRESSURE ULCER OF RIGHT HEEL, STAGE 4 (HCC): Primary | ICD-10-CM

## 2024-01-24 LAB
ALBUMIN SERPL-MCNC: 3.4 G/DL (ref 3.5–5)
ALBUMIN/GLOB SERPL: 0.6 (ref 1.1–2.2)
ALP SERPL-CCNC: 193 U/L (ref 45–117)
ALT SERPL-CCNC: 41 U/L (ref 12–78)
ANION GAP BLD CALC-SCNC: 10.4 MMOL/L (ref 10–20)
ANION GAP SERPL CALC-SCNC: 4 MMOL/L (ref 5–15)
AST SERPL-CCNC: 23 U/L (ref 15–37)
BASOPHILS # BLD: 0.1 K/UL (ref 0–0.1)
BASOPHILS NFR BLD: 2 % (ref 0–1)
BILIRUB SERPL-MCNC: 0.5 MG/DL (ref 0.2–1)
BUN SERPL-MCNC: 24 MG/DL (ref 6–20)
BUN/CREAT SERPL: 8 (ref 12–20)
CA-I BLD-MCNC: 1.11 MMOL/L (ref 1.12–1.32)
CALCIUM SERPL-MCNC: 9.6 MG/DL (ref 8.5–10.1)
CHLORIDE BLD-SCNC: 98 MMOL/L (ref 98–107)
CHLORIDE SERPL-SCNC: 98 MMOL/L (ref 97–108)
CO2 BLD-SCNC: 29.6 MMOL/L (ref 21–32)
CO2 SERPL-SCNC: 32 MMOL/L (ref 21–32)
CREAT BLD-MCNC: 3.02 MG/DL (ref 0.6–1.3)
CREAT SERPL-MCNC: 2.96 MG/DL (ref 0.7–1.3)
DIFFERENTIAL METHOD BLD: ABNORMAL
EOSINOPHIL # BLD: 0.5 K/UL (ref 0–0.4)
EOSINOPHIL NFR BLD: 12 % (ref 0–7)
ERYTHROCYTE [DISTWIDTH] IN BLOOD BY AUTOMATED COUNT: 11.9 % (ref 11.5–14.5)
GLOBULIN SER CALC-MCNC: 5.5 G/DL (ref 2–4)
GLUCOSE BLD-MCNC: 135 MG/DL (ref 65–100)
GLUCOSE SERPL-MCNC: 138 MG/DL (ref 65–100)
HCT VFR BLD AUTO: 31.2 % (ref 36.6–50.3)
HGB BLD-MCNC: 11.1 G/DL (ref 12.1–17)
IMM GRANULOCYTES # BLD AUTO: 0 K/UL (ref 0–0.04)
IMM GRANULOCYTES NFR BLD AUTO: 0 % (ref 0–0.5)
LYMPHOCYTES # BLD: 1.1 K/UL (ref 0.8–3.5)
LYMPHOCYTES NFR BLD: 28 % (ref 12–49)
MCH RBC QN AUTO: 35.2 PG (ref 26–34)
MCHC RBC AUTO-ENTMCNC: 35.6 G/DL (ref 30–36.5)
MCV RBC AUTO: 99 FL (ref 80–99)
MONOCYTES # BLD: 0.3 K/UL (ref 0–1)
MONOCYTES NFR BLD: 8 % (ref 5–13)
NEUTS SEG # BLD: 1.9 K/UL (ref 1.8–8)
NEUTS SEG NFR BLD: 50 % (ref 32–75)
NRBC # BLD: 0 K/UL (ref 0–0.01)
NRBC BLD-RTO: 0 PER 100 WBC
PLATELET # BLD AUTO: 218 K/UL (ref 150–400)
PMV BLD AUTO: 9.5 FL (ref 8.9–12.9)
POTASSIUM BLD-SCNC: 3.7 MMOL/L (ref 3.5–5.1)
POTASSIUM SERPL-SCNC: 3.6 MMOL/L (ref 3.5–5.1)
PROT SERPL-MCNC: 8.9 G/DL (ref 6.4–8.2)
RBC # BLD AUTO: 3.15 M/UL (ref 4.1–5.7)
SERVICE CMNT-IMP: ABNORMAL
SODIUM BLD-SCNC: 138 MMOL/L (ref 136–145)
SODIUM SERPL-SCNC: 134 MMOL/L (ref 136–145)
WBC # BLD AUTO: 3.8 K/UL (ref 4.1–11.1)

## 2024-01-24 PROCEDURE — 96365 THER/PROPH/DIAG IV INF INIT: CPT

## 2024-01-24 PROCEDURE — 20220 BONE BIOPSY TROCAR/NDL SUPFC: CPT

## 2024-01-24 PROCEDURE — 88304 TISSUE EXAM BY PATHOLOGIST: CPT

## 2024-01-24 PROCEDURE — 2580000003 HC RX 258: Performed by: EMERGENCY MEDICINE

## 2024-01-24 PROCEDURE — 11044 DBRDMT BONE 1ST 20 SQ CM/<: CPT

## 2024-01-24 PROCEDURE — 36415 COLL VENOUS BLD VENIPUNCTURE: CPT

## 2024-01-24 PROCEDURE — 88311 DECALCIFY TISSUE: CPT

## 2024-01-24 PROCEDURE — 80053 COMPREHEN METABOLIC PANEL: CPT

## 2024-01-24 PROCEDURE — 88305 TISSUE EXAM BY PATHOLOGIST: CPT

## 2024-01-24 PROCEDURE — 6360000002 HC RX W HCPCS: Performed by: EMERGENCY MEDICINE

## 2024-01-24 PROCEDURE — 80047 BASIC METABLC PNL IONIZED CA: CPT

## 2024-01-24 PROCEDURE — 85025 COMPLETE CBC W/AUTO DIFF WBC: CPT

## 2024-01-24 RX ORDER — ACETAMINOPHEN 325 MG/1
650 TABLET ORAL
Status: CANCELLED | OUTPATIENT
Start: 2024-01-24

## 2024-01-24 RX ORDER — BACITRACIN ZINC AND POLYMYXIN B SULFATE 500; 1000 [USP'U]/G; [USP'U]/G
OINTMENT TOPICAL ONCE
OUTPATIENT
Start: 2024-01-24 | End: 2024-01-24

## 2024-01-24 RX ORDER — SODIUM CHLORIDE 9 MG/ML
5-250 INJECTION, SOLUTION INTRAVENOUS PRN
OUTPATIENT
Start: 2024-01-28

## 2024-01-24 RX ORDER — DEXTROSE MONOHYDRATE 50 MG/ML
INJECTION, SOLUTION INTRAVENOUS CONTINUOUS
Status: DISCONTINUED | OUTPATIENT
Start: 2024-01-24 | End: 2024-01-25 | Stop reason: HOSPADM

## 2024-01-24 RX ORDER — SODIUM CHLORIDE 9 MG/ML
5-250 INJECTION, SOLUTION INTRAVENOUS PRN
Status: DISCONTINUED | OUTPATIENT
Start: 2024-01-24 | End: 2024-01-25 | Stop reason: HOSPADM

## 2024-01-24 RX ORDER — EPINEPHRINE 1 MG/ML
0.3 INJECTION, SOLUTION INTRAMUSCULAR; SUBCUTANEOUS PRN
Status: CANCELLED | OUTPATIENT
Start: 2024-01-24

## 2024-01-24 RX ORDER — EPINEPHRINE 1 MG/ML
0.3 INJECTION, SOLUTION INTRAMUSCULAR; SUBCUTANEOUS PRN
OUTPATIENT
Start: 2024-01-28

## 2024-01-24 RX ORDER — ALBUTEROL SULFATE 90 UG/1
4 AEROSOL, METERED RESPIRATORY (INHALATION) PRN
OUTPATIENT
Start: 2024-01-28

## 2024-01-24 RX ORDER — HEPARIN 100 UNIT/ML
500 SYRINGE INTRAVENOUS PRN
OUTPATIENT
Start: 2024-01-28

## 2024-01-24 RX ORDER — HEPARIN 100 UNIT/ML
500 SYRINGE INTRAVENOUS PRN
Status: DISCONTINUED | OUTPATIENT
Start: 2024-01-24 | End: 2024-01-25 | Stop reason: HOSPADM

## 2024-01-24 RX ORDER — LIDOCAINE 40 MG/G
CREAM TOPICAL ONCE
OUTPATIENT
Start: 2024-01-24 | End: 2024-01-24

## 2024-01-24 RX ORDER — GINSENG 100 MG
CAPSULE ORAL ONCE
OUTPATIENT
Start: 2024-01-24 | End: 2024-01-24

## 2024-01-24 RX ORDER — ALBUTEROL SULFATE 90 UG/1
4 AEROSOL, METERED RESPIRATORY (INHALATION) PRN
Status: CANCELLED | OUTPATIENT
Start: 2024-01-24

## 2024-01-24 RX ORDER — DIPHENHYDRAMINE HYDROCHLORIDE 50 MG/ML
50 INJECTION INTRAMUSCULAR; INTRAVENOUS
OUTPATIENT
Start: 2024-01-28

## 2024-01-24 RX ORDER — CLOBETASOL PROPIONATE 0.5 MG/G
OINTMENT TOPICAL ONCE
OUTPATIENT
Start: 2024-01-24 | End: 2024-01-24

## 2024-01-24 RX ORDER — SODIUM CHLOR/HYPOCHLOROUS ACID 0.033 %
SOLUTION, IRRIGATION IRRIGATION ONCE
OUTPATIENT
Start: 2024-01-24 | End: 2024-01-24

## 2024-01-24 RX ORDER — ONDANSETRON 2 MG/ML
8 INJECTION INTRAMUSCULAR; INTRAVENOUS
OUTPATIENT
Start: 2024-01-28

## 2024-01-24 RX ORDER — SODIUM CHLORIDE 0.9 % (FLUSH) 0.9 %
5-40 SYRINGE (ML) INJECTION PRN
OUTPATIENT
Start: 2024-01-28

## 2024-01-24 RX ORDER — SODIUM CHLORIDE 0.9 % (FLUSH) 0.9 %
5-40 SYRINGE (ML) INJECTION PRN
Status: DISCONTINUED | OUTPATIENT
Start: 2024-01-24 | End: 2024-01-25 | Stop reason: HOSPADM

## 2024-01-24 RX ORDER — LIDOCAINE HYDROCHLORIDE 20 MG/ML
JELLY TOPICAL ONCE
OUTPATIENT
Start: 2024-01-24 | End: 2024-01-24

## 2024-01-24 RX ORDER — BETAMETHASONE DIPROPIONATE 0.05 %
OINTMENT (GRAM) TOPICAL ONCE
OUTPATIENT
Start: 2024-01-24 | End: 2024-01-24

## 2024-01-24 RX ORDER — LIDOCAINE HYDROCHLORIDE 40 MG/ML
SOLUTION TOPICAL ONCE
OUTPATIENT
Start: 2024-01-24 | End: 2024-01-24

## 2024-01-24 RX ORDER — ONDANSETRON 2 MG/ML
8 INJECTION INTRAMUSCULAR; INTRAVENOUS
Status: CANCELLED | OUTPATIENT
Start: 2024-01-24

## 2024-01-24 RX ORDER — LIDOCAINE 50 MG/G
OINTMENT TOPICAL ONCE
OUTPATIENT
Start: 2024-01-24 | End: 2024-01-24

## 2024-01-24 RX ORDER — DIPHENHYDRAMINE HYDROCHLORIDE 50 MG/ML
50 INJECTION INTRAMUSCULAR; INTRAVENOUS
Status: CANCELLED | OUTPATIENT
Start: 2024-01-24

## 2024-01-24 RX ORDER — TRIAMCINOLONE ACETONIDE 1 MG/G
OINTMENT TOPICAL ONCE
OUTPATIENT
Start: 2024-01-24 | End: 2024-01-24

## 2024-01-24 RX ORDER — SODIUM CHLORIDE 9 MG/ML
INJECTION, SOLUTION INTRAVENOUS CONTINUOUS
Status: CANCELLED | OUTPATIENT
Start: 2024-01-24

## 2024-01-24 RX ORDER — SODIUM CHLORIDE 9 MG/ML
INJECTION, SOLUTION INTRAVENOUS CONTINUOUS
OUTPATIENT
Start: 2024-01-28

## 2024-01-24 RX ORDER — GENTAMICIN SULFATE 1 MG/G
OINTMENT TOPICAL ONCE
OUTPATIENT
Start: 2024-01-24 | End: 2024-01-24

## 2024-01-24 RX ORDER — IBUPROFEN 200 MG
TABLET ORAL ONCE
OUTPATIENT
Start: 2024-01-24 | End: 2024-01-24

## 2024-01-24 RX ORDER — ACETAMINOPHEN 325 MG/1
650 TABLET ORAL
OUTPATIENT
Start: 2024-01-28

## 2024-01-24 RX ADMIN — SODIUM CHLORIDE, PRESERVATIVE FREE 10 ML: 5 INJECTION INTRAVENOUS at 13:27

## 2024-01-24 RX ADMIN — DALBAVANCIN 1125 MG: 500 INJECTION, POWDER, FOR SOLUTION INTRAVENOUS at 14:53

## 2024-01-24 RX ADMIN — DEXTROSE MONOHYDRATE: 50 INJECTION, SOLUTION INTRAVENOUS at 13:40

## 2024-01-24 ASSESSMENT — PAIN SCALES - GENERAL: PAINLEVEL_OUTOF10: 0

## 2024-01-24 NOTE — PROGRESS NOTES
op shoe 01/10/24 1114   Wound Length (cm) 0.6 cm 01/24/24 1053   Wound Width (cm) 0.5 cm 01/24/24 1053   Wound Depth (cm) 0.1 cm 01/24/24 1053   Wound Surface Area (cm^2) 0.3 cm^2 01/24/24 1053   Change in Wound Size % (l*w) 16.67 01/24/24 1053   Wound Volume (cm^3) 0.03 cm^3 01/24/24 1053   Post-Procedure Length (cm) 0.6 cm 01/24/24 1125   Post-Procedure Width (cm) 0.5 cm 01/24/24 1125   Post-Procedure Depth (cm) 0.2 cm 01/24/24 1125   Post-Procedure Surface Area (cm^2) 0.3 cm^2 01/24/24 1125   Post-Procedure Volume (cm^3) 0.06 cm^3 01/24/24 1125   Wound Assessment Slough;Eschar moist;Pink/red 01/24/24 1053   Drainage Amount Moderate (25-50%) 01/24/24 1053   Drainage Description Serosanguinous 01/24/24 1053   Odor None 01/24/24 1053   Marielena-wound Assessment Dry/flaky;Blanchable erythema 01/24/24 1053   Margins Flat/open edges 01/24/24 1053   Number of days: 14       Wound 01/17/24 Toe (Comment  which one) Right;Medial #5 4th toe (Active)   Wound Image   01/24/24 1053   Dressing Status Old drainage noted 01/17/24 1338   Wound Cleansed Cleansed with saline 01/24/24 1053   Dressing/Treatment Alginate with Ag;ABD;Gauze dressing/dressing sponge;Tape/Soft cloth adhesive tape;Roll gauze 01/24/24 1158   Wound Length (cm) 0.6 cm 01/24/24 1053   Wound Width (cm) 0.5 cm 01/24/24 1053   Wound Depth (cm) 0.1 cm 01/24/24 1053   Wound Surface Area (cm^2) 0.3 cm^2 01/24/24 1053   Change in Wound Size % (l*w) 61.54 01/24/24 1053   Wound Volume (cm^3) 0.03 cm^3 01/24/24 1053   Wound Healing % 62 01/24/24 1053   Post-Procedure Length (cm) 0.6 cm 01/24/24 1125   Post-Procedure Width (cm) 0.5 cm 01/24/24 1125   Post-Procedure Depth (cm) 0.2 cm 01/24/24 1125   Post-Procedure Surface Area (cm^2) 0.3 cm^2 01/24/24 1125   Post-Procedure Volume (cm^3) 0.06 cm^3 01/24/24 1125   Wound Assessment Pink/red;Slough 01/24/24 1053   Drainage Amount Moderate (25-50%) 01/24/24 1053   Drainage Description Serosanguinous 01/24/24 1053   Odor None

## 2024-01-24 NOTE — FLOWSHEET NOTE
01/24/24 1053   Anesthetic   Anesthetic 4% Lidocaine Liquid Topical   Right Leg Edema Point of Measurement   Leg circumference 28.5 cm   Ankle circumference 21.4 cm   RLE Neurovascular Assessment   Capillary Refill Less than/Equal to 3 seconds   Color Appropriate for Ethnicity   Temperature Cool   R Pedal Pulse +2   Wound 01/17/24 Toe (Comment  which one) Right;Medial #5 4th toe   Date First Assessed/Time First Assessed: 01/17/24 1338   Present on Original Admission: No  Location: Toe (Comment  which one)  Wound Location Orientation: Right;Medial  Wound Description (Comments): #5 4th toe   Wound Image    Wound Cleansed Cleansed with saline   Wound Length (cm) 0.6 cm   Wound Width (cm) 0.5 cm   Wound Depth (cm) 0.1 cm   Wound Surface Area (cm^2) 0.3 cm^2   Change in Wound Size % (l*w) 61.54   Wound Volume (cm^3) 0.03 cm^3   Wound Healing % 62   Wound Assessment [place]/red;Slough   Drainage Amount Moderate (25-50%)   Drainage Description Serosanguinous   Odor None   Marielena-wound Assessment Maceration  (dried exudate)   Margins Flat/open edges   Wound Thickness Description not for Pressure Injury Full thickness   Wound 01/10/24 Toe (Comment  which one) Right;Lateral 4th toe, #3   Date First Assessed/Time First Assessed: 01/10/24 1035   Present on Original Admission: No  Location: Toe (Comment  which one)  Wound Location Orientation: Right;Lateral  Wound Description (Comments): 4th toe, #3   Wound Image    Wound Cleansed Cleansed with saline   Wound Length (cm) 1.4 cm   Wound Width (cm) 1.3 cm   Wound Depth (cm) 0.5 cm   Wound Surface Area (cm^2) 1.82 cm^2   Change in Wound Size % (l*w) -55.56   Wound Volume (cm^3) 0.91 cm^3   Wound Healing % -159   Wound Assessment [place]/red;Slough   Drainage Amount Moderate (25-50%)   Drainage Description Serosanguinous   Odor None   Marielena-wound Assessment Maceration   Margins Epibole (rolled edges)   Wound Thickness Description not for Pressure Injury Full thickness   Wound 01/10/24 Toe

## 2024-01-24 NOTE — PROGRESS NOTES
[facility] Peds/Adult Note                       Date: 2024    Name: James Wray    MRN: [id number]         : 1960    1320 Patient arrives for Dalvance Infusion  without acute problems. Please see Epic for complete assessment and education provided.    Vital signs stable throughout and prior to discharge. Patient tolerated procedure well and was discharged without incident.  Patient/wife is aware of no further [facility] appointments @ this time & to follow up with healthcare provider for next appointment.       Mr. Wray's vitals were reviewed prior to and after treatment.   Patient Vitals for the past 12 hrs:   Temp Pulse Resp BP   24 1526 98.4 °F (36.9 °C) 87 18 118/67   24 1320 97.8 °F (36.6 °C) 84 18 110/72         Lab results were obtained and reviewed.  Recent Results (from the past 12 hour(s))   CBC With Auto Differential    Collection Time: 24  1:32 PM   Result Value Ref Range    WBC 3.8 (L) 4.1 - 11.1 K/uL    RBC 3.15 (L) 4.10 - 5.70 M/uL    Hemoglobin 11.1 (L) 12.1 - 17.0 g/dL    Hematocrit 31.2 (L) 36.6 - 50.3 %    MCV 99.0 80.0 - 99.0 FL    MCH 35.2 (H) 26.0 - 34.0 PG    MCHC 35.6 30.0 - 36.5 g/dL    RDW 11.9 11.5 - 14.5 %    Platelets 218 150 - 400 K/uL    MPV 9.5 8.9 - 12.9 FL    Nucleated RBCs 0.0 0  WBC    nRBC 0.00 0.00 - 0.01 K/uL    Neutrophils % 50 32 - 75 %    Lymphocytes % 28 12 - 49 %    Monocytes % 8 5 - 13 %    Eosinophils % 12 (H) 0 - 7 %    Basophils % 2 (H) 0 - 1 %    Immature Granulocytes 0 0.0 - 0.5 %    Neutrophils Absolute 1.9 1.8 - 8.0 K/UL    Lymphocytes Absolute 1.1 0.8 - 3.5 K/UL    Monocytes Absolute 0.3 0.0 - 1.0 K/UL    Eosinophils Absolute 0.5 (H) 0.0 - 0.4 K/UL    Basophils Absolute 0.1 0.0 - 0.1 K/UL    Absolute Immature Granulocyte 0.0 0.00 - 0.04 K/UL    Differential Type AUTOMATED     Comprehensive metabolic panel    Collection Time: 24  1:32 PM   Result Value Ref Range    Sodium 134 (L) 136 - 145 mmol/L    Potassium 3.6 3.5 - 5.1

## 2024-01-24 NOTE — PATIENT INSTRUCTIONS
Limit HOB elevation to 30 degrees.       Off-Loading: [] Surgical shoe    [] Podus Boot(s)   [x] Foam Boot(s) at rest  [] Knee scooter [] Cast Boot [] CROW Boot  [x] Other: post op shoe when up walking therapy     [] Total non-weight bearing : [] Right Leg     [] Left Leg    [x] Off-loading when : [x] walking may bear weight with PT/OT [x] in bed [x] Sitting      Return Appointment:    [x] Return Appointment: With Dr. Altaf Chang  in 1 Week(s)         Wound Care Center Information: Should you experience any significant changes in your wound(s) or have questions about your wound care, please contact the [facility] at MONDAY-THURSDAY 8:00 am - 4:30 AND Friday 8:00 AM- 12:00 PM .  If you need help with your wound outside these hours and cannot wait until we are again available, contact your PCP or go to the hospital emergency room.     PLEASE NOTE: IF YOU ARE UNABLE TO OBTAIN WOUND SUPPLIES, CONTINUE TO USE THE SUPPLIES YOU HAVE AVAILABLE UNTIL YOU ARE ABLE TO REACH US. IT IS MOST IMPORTANT TO KEEP THE WOUND COVERED AT ALL TIMES.     Physician Signature:_______________________ Dr. Altaf Chang

## 2024-01-24 NOTE — FLOWSHEET NOTE
01/24/24 1158   Wound 01/17/24 Toe (Comment  which one) Right;Medial #5 4th toe   Date First Assessed/Time First Assessed: 01/17/24 1338   Present on Original Admission: No  Location: Toe (Comment  which one)  Wound Location Orientation: Right;Medial  Wound Description (Comments): #5 4th toe   Dressing/Treatment Alginate with Ag;ABD;Gauze dressing/dressing sponge;Tape/Soft cloth adhesive tape;Roll gauze   Wound 01/10/24 Toe (Comment  which one) Right;Lateral 4th toe, #3   Date First Assessed/Time First Assessed: 01/10/24 1035   Present on Original Admission: No  Location: Toe (Comment  which one)  Wound Location Orientation: Right;Lateral  Wound Description (Comments): 4th toe, #3   Dressing/Treatment Alginate with Ag;Gauze dressing/dressing sponge;Tape change;Roll gauze     Discharge Condition: Stable    Pain: 0    Ambulatory Status: Wheelchair    Discharge Destination: home    Transportation:car    Accompanied by: FAMILY    Discharge instructions reviewed with FAMILY and copy or written instructions have been provided. All questions/concerns have been addressed at this time.

## 2024-01-26 NOTE — PLAN OF CARE
Home Health Referral for skilled Nursing wound care       Home Health Company :      [facility] :  Fax: [phone]   Phone: [phone]         Ordering Center:     [facility]  [address] [place] [address] [address]  [facility] [address] [phone]  WOUND CARE [phone]  FAX NUMBER [phone]    Patient Information:      James Wray  [address]  [facility] [address]   [phone]   : 1960  AGE: 63 y.o.     GENDER: male   TODAYS DATE:  2024    Insurance:      PRIMARY INSURANCE:  [id number] - (Medicare)    Payer/Plan Subscr  Sex Relation Sub. Ins. ID Effective Group Num   1. MEDICARE - ME* JAMES WRAY 1960 Male Self [id number] 10/1/19                                    [address] [address]   2. BCBS - ANTHEM* JAMES WRAY 1960 Male Self [id number] 21 113                                   [address] [address]       Patient Wound Information:      Problem List Items Addressed This Visit          Endocrine    Type 2 diabetes mellitus with right diabetic foot ulcer (HCC)       Other    Pressure ulcer of right heel, stage 4 (HCC) - Primary    Relevant Orders    Culture, Wound (Completed)    Culture, Wound (Completed)    Skin ulcer of hand with necrosis of muscle (HCC)    MRSA infection    Relevant Orders    Culture, Wound (Completed)    Culture, Wound (Completed)     Wound Care Documentation:  Wound 23 Buttocks redness and exocoriation to buttocks. wound care consulted (Active)   Number of days: 226       Wound 23 Heel Right #1 (Active)   Wound Image   24 1053   Dressing Status New dressing applied 01/10/24 1114   Wound Cleansed Cleansed with saline 24 1053   Dressing/Treatment Alginate with Ag;Gauze dressing/dressing sponge;Other (comment);Roll gauze;Tape/Soft cloth adhesive tape 01/10/24 1114   Offloading for Diabetic Foot Ulcers Post op shoe 01/10/24 1114   Wound Length (cm) 0.7 cm 24 1053   Wound Width (cm) 2.2 cm 24 1053   Wound Depth (cm)

## 2024-01-29 ENCOUNTER — FOLLOWUP TELEPHONE ENCOUNTER (OUTPATIENT)
Facility: HOSPITAL | Age: 64
End: 2024-01-29

## 2024-01-29 NOTE — TELEPHONE ENCOUNTER
Two patient identifiers used, spoke with patients wife about new home health staring on 2/6/24 and she is fine with that, and also spoke about culture results and at this time will not treat since he had Dalvance infusion on 1/24/24, will re-evaluate on follow up visit 1/31/24.

## 2024-01-29 NOTE — TELEPHONE ENCOUNTER
Called Dr. Chang to inform of wound culture results. Patient received first dose of Dalvance on 1/24/24 same day as culture taken. Will re evaluate wound on follow up appointment.

## 2024-01-31 ENCOUNTER — HOSPITAL ENCOUNTER (OUTPATIENT)
Facility: HOSPITAL | Age: 64
Discharge: HOME | End: 2024-01-31
Attending: EMERGENCY MEDICINE
Payer: MEDICARE

## 2024-01-31 VITALS
SYSTOLIC BLOOD PRESSURE: 116 MMHG | TEMPERATURE: 97.3 F | HEART RATE: 81 BPM | RESPIRATION RATE: 18 BRPM | DIASTOLIC BLOOD PRESSURE: 76 MMHG

## 2024-01-31 DIAGNOSIS — M86.171 OSTEOMYELITIS OF ANKLE OR FOOT, ACUTE, RIGHT (HCC): ICD-10-CM

## 2024-01-31 DIAGNOSIS — L89.614 PRESSURE ULCER OF RIGHT HEEL, STAGE 4 (HCC): Primary | ICD-10-CM

## 2024-01-31 DIAGNOSIS — L98.493 SKIN ULCER OF HAND WITH NECROSIS OF MUSCLE (HCC): ICD-10-CM

## 2024-01-31 DIAGNOSIS — A49.02 MRSA INFECTION: ICD-10-CM

## 2024-01-31 PROCEDURE — 11043 DBRDMT MUSC&/FSCA 1ST 20/<: CPT

## 2024-01-31 RX ORDER — GENTAMICIN SULFATE 1 MG/G
OINTMENT TOPICAL ONCE
OUTPATIENT
Start: 2024-01-31 | End: 2024-01-31

## 2024-01-31 RX ORDER — SODIUM CHLOR/HYPOCHLOROUS ACID 0.033 %
SOLUTION, IRRIGATION IRRIGATION ONCE
OUTPATIENT
Start: 2024-01-31 | End: 2024-01-31

## 2024-01-31 RX ORDER — LIDOCAINE HYDROCHLORIDE 20 MG/ML
JELLY TOPICAL ONCE
OUTPATIENT
Start: 2024-01-31 | End: 2024-01-31

## 2024-01-31 RX ORDER — LIDOCAINE HYDROCHLORIDE 40 MG/ML
SOLUTION TOPICAL ONCE
OUTPATIENT
Start: 2024-01-31 | End: 2024-01-31

## 2024-01-31 RX ORDER — CLOBETASOL PROPIONATE 0.5 MG/G
OINTMENT TOPICAL ONCE
OUTPATIENT
Start: 2024-01-31 | End: 2024-01-31

## 2024-01-31 RX ORDER — LIDOCAINE 50 MG/G
OINTMENT TOPICAL ONCE
OUTPATIENT
Start: 2024-01-31 | End: 2024-01-31

## 2024-01-31 RX ORDER — BETAMETHASONE DIPROPIONATE 0.05 %
OINTMENT (GRAM) TOPICAL ONCE
OUTPATIENT
Start: 2024-01-31 | End: 2024-01-31

## 2024-01-31 RX ORDER — BACITRACIN ZINC AND POLYMYXIN B SULFATE 500; 1000 [USP'U]/G; [USP'U]/G
OINTMENT TOPICAL ONCE
OUTPATIENT
Start: 2024-01-31 | End: 2024-01-31

## 2024-01-31 RX ORDER — GINSENG 100 MG
CAPSULE ORAL ONCE
OUTPATIENT
Start: 2024-01-31 | End: 2024-01-31

## 2024-01-31 RX ORDER — TRIAMCINOLONE ACETONIDE 1 MG/G
OINTMENT TOPICAL ONCE
OUTPATIENT
Start: 2024-01-31 | End: 2024-01-31

## 2024-01-31 RX ORDER — LIDOCAINE 40 MG/G
CREAM TOPICAL ONCE
OUTPATIENT
Start: 2024-01-31 | End: 2024-01-31

## 2024-01-31 RX ORDER — IBUPROFEN 200 MG
TABLET ORAL ONCE
OUTPATIENT
Start: 2024-01-31 | End: 2024-01-31

## 2024-01-31 NOTE — PATIENT INSTRUCTIONS
Discharge Instructions for  [facility]  [address] [place] [address]  Telephone: [phone]   FAX [phone]    NAME:  James Wray  YOB: 1960  ACCOUNT NUMBER :  [id number]  DATE: 01/31/2024       : [facility]    HOME HEALTH: [facility]     Wound Cleansing:   Do not scrub or use excessive force.  Cleanse wound prior to applying a clean dressing with:      [x] Cleanse wound with: BABY SHAMPOO LATHER  LEAVE on 1-2 MINUTES ON WOUND THEN RINSE WITH WATER OR SALINE    [] May Shower at Discharge     [] Shower on days of dressing change, remove dressing first    [] Keep Wound Dry in Shower (may purchase a cast cover if needed)     Topical Treatments:  Do not apply lotions, creams, or ointments to wound bed unless directed.      Dressings:           Wound Location Right Heel      Apply Primary Dressing:  Aquacell AG     Cover and Secure with:    [x] Gauze, Heel cup foam   [] Sb   [x] Kerlix  [] Ace Wrap     [] ABD  [] Medipore tape  [x] tape  [] Other:    Avoid contact of tape with skin.    Change dressing:   [] Daily      [x] Every Other Day   [] Three times per week  [] Once a week   [] Do Not Change Dressing           Dressings:           Wound Location Right in between 4th toe and 5th toe    Apply Primary Dressing:  aquacel, weave thin layer of gauze in between toes, abd vertically, do not roll gauze too tight around toes    Change dressing:   [] Daily      [x] Every Other Day   [] Three times per week  [] Once a week   [] Do Not Change Dressing      Pressure Relief:  [x] When sitting, shift position or do seat lifts every 15 minutes.  [x] Wheelchair cushion   [] Specialty Bed/Mattress  [x] Turn every 2 hours when in bed.  Avoid position directing pressure on wound site.  Limit side lying to 30 degree tilt.  Limit HOB elevation to 30 degrees.       Off-Loading: [] Surgical shoe    [] Podus Boot(s)   [x] Foam Boot(s) at rest  [] Knee scooter []

## 2024-01-31 NOTE — FLOWSHEET NOTE
01/31/24 1022   Anesthetic   Anesthetic 4% Lidocaine Liquid Topical   Right Leg Edema Point of Measurement   Leg circumference 28.5 cm   Ankle circumference 21 cm   RUE Neurovascular Assessment   Capillary Refill Less than/Equal to 3 seconds   Color Appropriate for Ethnicity   Temperature Warm   R Radial Pulse +2 (Moderate)   Wound 12/20/23 Foot Plantar;Right #2   Date First Assessed/Time First Assessed: 12/20/23 1100   Present on Original Admission: No  Wound Approximate Age at First Assessment (Weeks): 1 weeks  Location: Foot  Wound Location Orientation: Plantar;Right  Wound Description (Comments): #2   Wound Image    Wound Length (cm) 0 cm   Wound Width (cm) 0 cm   Wound Depth (cm) 0 cm   Wound Surface Area (cm^2) 0 cm^2   Change in Wound Size % (l*w) 100   Wound Volume (cm^3) 0 cm^3   Wound 01/10/24 Toe (Comment  which one) Right;Lateral 4th toe, #3   Date First Assessed/Time First Assessed: 01/10/24 1035   Present on Original Admission: No  Location: Toe (Comment  which one)  Wound Location Orientation: Right;Lateral  Wound Description (Comments): 4th toe, #3   Wound Image    Wound Length (cm) 1.2 cm   Wound Width (cm) 1.1 cm   Wound Depth (cm) 0.5 cm   Wound Surface Area (cm^2) 1.32 cm^2   Change in Wound Size % (l*w) -12.82   Wound Volume (cm^3) 0.66 cm^3   Wound Healing % -88   Wound Assessment [place]/red;Slough   Drainage Amount Moderate (25-50%)   Drainage Description Serosanguinous   Odor None   Marielena-wound Assessment Maceration   Margins Epibole (rolled edges)   Wound Thickness Description not for Pressure Injury Full thickness   Wound 01/10/24 Toe (Comment  which one) Right;Lateral 3rd toe, #4   Date First Assessed/Time First Assessed: 01/10/24 1035   Present on Original Admission: No  Location: Toe (Comment  which one)  Wound Location Orientation: Right;Lateral  Wound Description (Comments): 3rd toe, #4   Wound Image    Wound Length (cm) 0.4 cm   Wound Width (cm) 0.5 cm   Wound Depth (cm) 0.1 cm

## 2024-01-31 NOTE — PROGRESS NOTES
[facility]   Progress Note and Procedure Note   NO Procedure    Patient Name: James Wray  : 1960  MRN: [id number]    Past Medical History:   Diagnosis Date    Acute blood loss anemia 2020    Diabetes ([facility])     Dysphagia due to old cerebrovascular accident 2019    ESRD (end stage renal disease) on dialysis ([facility]ly [facility])     MWF    Carlson catheter in place     MRSA cellulitis of right foot     PEG (percutaneous endoscopic gastrostomy) status ([facility]) 3/2019 to present    NPO- comfort foods only    Right sided weakness 3/2019 to present    Seizure ([facility]) 2019    Sepsis ([facility]) 2023    Stroke ([facility]) 2019    Urinary retention with incomplete bladder emptying      Past Surgical History:   Procedure Laterality Date    AV FISTULA CREATION Right 2019    CYST INCISION AND DRAINAGE Right     2nd toe- Cleaned out infection    CYSTOSCOPY N/A 06/15/2023    TRANSURETHRAL RESECTION OF THE PROSTATE, UNROOFING OF PROSTATE ABSCESS performed by Samir Castillo MD at [facility] MAIN OR    OTHER SURGICAL HISTORY  2019    PEG tube insertion    PROSTRATE ULTRASOUND GUIDANCE      abscess romoved     Family History   Problem Relation Age of Onset    Stroke Sister 50    Stroke Father     Deep Vein Thrombosis Neg Hx      Social History     Tobacco Use    Smoking status: Never     Passive exposure: Never    Smokeless tobacco: Never   Substance Use Topics    Alcohol use: Never    Drug use: Never     Allergies   Allergen Reactions    Bee Venom Anaphylaxis    Cephalexin Rash    Codeine Other (See Comments)    Fentanyl Other (See Comments)     Hallucinations    Oxycodone Other (See Comments)     Hallucinations    Hydralazine Rash    Velphoro [Sucroferric Oxyhydroxide] Diarrhea and Nausea And Vomiting     Current Outpatient Medications on File Prior to Encounter   Medication Sig Dispense Refill    doxycycline hyclate (VIBRAMYCIN) 100 MG capsule Take 1 capsule by mouth 2 times daily      clobetasol (TEMOVATE)

## 2024-01-31 NOTE — FLOWSHEET NOTE
01/31/24 1045   Wound 09/27/23 Heel Right #1   Date First Assessed/Time First Assessed: 09/27/23 1330   Present on Original Admission: Yes  Wound Approximate Age at First Assessment (Weeks): 16 weeks  Primary Wound Type: Pressure Injury  Location: Heel  Wound Location Orientation: Right  Wound Harry...   Post-Procedure Length (cm) 0.8 cm   Post-Procedure Width (cm) 2.1 cm   Post-Procedure Depth (cm) 1.5 cm   Post-Procedure Surface Area (cm^2) 1.68 cm^2   Post-Procedure Volume (cm^3) 2.52 cm^3   Wound 01/10/24 Toe (Comment  which one) Right;Lateral 4th toe, #3   Date First Assessed/Time First Assessed: 01/10/24 1035   Present on Original Admission: No  Location: Toe (Comment  which one)  Wound Location Orientation: Right;Lateral  Wound Description (Comments): 4th toe, #3   Dressing/Treatment Alginate;Gauze dressing/dressing sponge;Roll gauze;Tape change   Post-Procedure Length (cm) 1.2 cm   Post-Procedure Width (cm) 1.1 cm   Post-Procedure Depth (cm) 0.6 cm   Post-Procedure Surface Area (cm^2) 1.32 cm^2   Post-Procedure Volume (cm^3) 0.792 cm^3   Wound 01/17/24 Toe (Comment  which one) Right;Medial #5 4th toe   Date First Assessed/Time First Assessed: 01/17/24 1338   Present on Original Admission: No  Location: Toe (Comment  which one)  Wound Location Orientation: Right;Medial  Wound Description (Comments): #5 4th toe   Dressing/Treatment Alginate with Ag;Gauze dressing/dressing sponge;Roll gauze;Tape change     Discharge Condition: Stable    Pain: 0    Ambulatory Status: Wheelchair    Discharge Destination: home    Transportation:car    Accompanied by: SELF    Discharge instructions reviewed with FAMILY and copy or written instructions have been provided. All questions/concerns have been addressed at this time.

## 2024-02-01 RX ORDER — IBUPROFEN 100 MG/5ML
SUSPENSION, ORAL (FINAL DOSE FORM) ORAL
Qty: 32 SUPPOSITORY | Refills: 1 | Status: SHIPPED | OUTPATIENT
Start: 2024-02-01

## 2024-02-27 ENCOUNTER — HOSPITAL ENCOUNTER (OUTPATIENT)
Facility: HOSPITAL | Age: 64
Discharge: HOME | End: 2024-02-27
Attending: EMERGENCY MEDICINE
Payer: MEDICARE

## 2024-02-27 VITALS
TEMPERATURE: 97.3 F | HEART RATE: 87 BPM | DIASTOLIC BLOOD PRESSURE: 70 MMHG | SYSTOLIC BLOOD PRESSURE: 123 MMHG | RESPIRATION RATE: 16 BRPM

## 2024-02-27 DIAGNOSIS — L89.614 PRESSURE ULCER OF RIGHT HEEL, STAGE 4 (HCC): Primary | ICD-10-CM

## 2024-02-27 DIAGNOSIS — L98.493 SKIN ULCER OF HAND WITH NECROSIS OF MUSCLE (HCC): ICD-10-CM

## 2024-02-27 DIAGNOSIS — A49.02 MRSA INFECTION: ICD-10-CM

## 2024-02-27 PROCEDURE — 99213 OFFICE O/P EST LOW 20 MIN: CPT | Performed by: INTERNAL MEDICINE

## 2024-02-27 PROCEDURE — 11042 DBRDMT SUBQ TIS 1ST 20SQCM/<: CPT | Performed by: INTERNAL MEDICINE

## 2024-02-27 PROCEDURE — 11042 DBRDMT SUBQ TIS 1ST 20SQCM/<: CPT

## 2024-02-27 RX ORDER — LIDOCAINE 40 MG/G
CREAM TOPICAL ONCE
OUTPATIENT
Start: 2024-02-27 | End: 2024-02-27

## 2024-02-27 RX ORDER — BETAMETHASONE DIPROPIONATE 0.05 %
OINTMENT (GRAM) TOPICAL ONCE
OUTPATIENT
Start: 2024-02-27 | End: 2024-02-27

## 2024-02-27 RX ORDER — TRIAMCINOLONE ACETONIDE 1 MG/G
OINTMENT TOPICAL ONCE
OUTPATIENT
Start: 2024-02-27 | End: 2024-02-27

## 2024-02-27 RX ORDER — LIDOCAINE HYDROCHLORIDE 20 MG/ML
JELLY TOPICAL ONCE
OUTPATIENT
Start: 2024-02-27 | End: 2024-02-27

## 2024-02-27 RX ORDER — CLOBETASOL PROPIONATE 0.5 MG/G
OINTMENT TOPICAL ONCE
OUTPATIENT
Start: 2024-02-27 | End: 2024-02-27

## 2024-02-27 RX ORDER — IBUPROFEN 200 MG
TABLET ORAL ONCE
OUTPATIENT
Start: 2024-02-27 | End: 2024-02-27

## 2024-02-27 RX ORDER — GENTAMICIN SULFATE 1 MG/G
OINTMENT TOPICAL ONCE
OUTPATIENT
Start: 2024-02-27 | End: 2024-02-27

## 2024-02-27 RX ORDER — GINSENG 100 MG
CAPSULE ORAL ONCE
OUTPATIENT
Start: 2024-02-27 | End: 2024-02-27

## 2024-02-27 RX ORDER — LIDOCAINE HYDROCHLORIDE 40 MG/ML
SOLUTION TOPICAL ONCE
OUTPATIENT
Start: 2024-02-27 | End: 2024-02-27

## 2024-02-27 RX ORDER — LIDOCAINE 50 MG/G
OINTMENT TOPICAL ONCE
OUTPATIENT
Start: 2024-02-27 | End: 2024-02-27

## 2024-02-27 RX ORDER — BACITRACIN ZINC AND POLYMYXIN B SULFATE 500; 1000 [USP'U]/G; [USP'U]/G
OINTMENT TOPICAL ONCE
OUTPATIENT
Start: 2024-02-27 | End: 2024-02-27

## 2024-02-27 RX ORDER — SODIUM CHLOR/HYPOCHLOROUS ACID 0.033 %
SOLUTION, IRRIGATION IRRIGATION ONCE
OUTPATIENT
Start: 2024-02-27 | End: 2024-02-27

## 2024-02-27 NOTE — FLOWSHEET NOTE
02/27/24 1309   Right Leg Edema Point of Measurement   Leg circumference 29.6 cm   Ankle circumference 20.9 cm   RUE Neurovascular Assessment   Capillary Refill Less than/Equal to 3 seconds   Color Appropriate for Ethnicity   Temperature Warm   R Radial Pulse +2 (Moderate)   Wound 01/17/24 Toe (Comment  which one) Right;Medial #5 4th toe   Date First Assessed/Time First Assessed: 01/17/24 1338   Present on Original Admission: No  Location: Toe (Comment  which one)  Wound Location Orientation: Right;Medial  Wound Description (Comments): #5 4th toe   Wound Image    Wound Cleansed Cleansed with saline   Wound Length (cm) 0.1 cm   Wound Width (cm) 0.1 cm   Wound Depth (cm) 0.1 cm   Wound Surface Area (cm^2) 0.01 cm^2   Change in Wound Size % (l*w) 98.72   Wound Volume (cm^3) 0.001 cm^3   Wound Healing % 99   Wound Assessment Dry;Epithelialization   Drainage Amount None (dry)   Odor None   Marielena-wound Assessment Dry/flaky   Margins Attached edges   Wound 01/10/24 Toe (Comment  which one) Right;Lateral 4th toe, #3   Date First Assessed/Time First Assessed: 01/10/24 1035   Present on Original Admission: No  Location: Toe (Comment  which one)  Wound Location Orientation: Right;Lateral  Wound Description (Comments): 4th toe, #3   Wound Image    Wound Cleansed Cleansed with saline   Wound Length (cm) 0.5 cm   Wound Width (cm) 0.7 cm   Wound Depth (cm) 0.3 cm   Wound Surface Area (cm^2) 0.35 cm^2   Change in Wound Size % (l*w) 70.09   Wound Volume (cm^3) 0.105 cm^3   Wound Healing % 70   Wound Assessment Hyper granulation tissue;Pink/red   Drainage Amount Moderate (25-50%)   Drainage Description Serosanguinous   Odor None   Marielena-wound Assessment Maceration   Margins Flat/open edges   Wound Thickness Description not for Pressure Injury Full thickness   Wound 01/10/24 Toe (Comment  which one) Right;Lateral 3rd toe, #4   Date First Assessed/Time First Assessed: 01/10/24 1035   Present on Original Admission: No  Location: Toe

## 2024-02-27 NOTE — FLOWSHEET NOTE
02/27/24 1405   Wound 01/17/24 Toe (Comment  which one) Right;Medial #5 4th toe   Date First Assessed/Time First Assessed: 01/17/24 1338   Present on Original Admission: No  Location: Toe (Comment  which one)  Wound Location Orientation: Right;Medial  Wound Description (Comments): #5 4th toe   Dressing/Treatment Alginate with Ag;Gauze dressing/dressing sponge;Roll gauze;Tape/Soft cloth adhesive tape   Wound 01/10/24 Toe (Comment  which one) Right;Lateral 4th toe, #3   Date First Assessed/Time First Assessed: 01/10/24 1035   Present on Original Admission: No  Location: Toe (Comment  which one)  Wound Location Orientation: Right;Lateral  Wound Description (Comments): 4th toe, #3   Dressing/Treatment Alginate with Ag;Gauze dressing/dressing sponge;Roll gauze;Tape/Soft cloth adhesive tape   Wound 09/27/23 Heel Right #1   Date First Assessed/Time First Assessed: 09/27/23 1330   Present on Original Admission: Yes  Wound Approximate Age at First Assessment (Weeks): 16 weeks  Primary Wound Type: Pressure Injury  Location: Heel  Wound Location Orientation: Right  Wound Harry...   Dressing/Treatment Roll gauze;Foam;Tape/Soft cloth adhesive tape     Discharge Condition: Stable    Pain: 0    Ambulatory Status: Wheelchair    Discharge Destination: home    Transportation:handicap accessible transportation    Accompanied by: FAMILY    Discharge instructions reviewed with FAMILY and copy or written instructions have been provided. All questions/concerns have been addressed at this time.

## 2024-02-27 NOTE — PATIENT INSTRUCTIONS
Discharge Instructions for  [facility]  [address] [place] [address]  Telephone: [phone]   FAX [phone]    NAME:  James Wray  YOB: 1960  ACCOUNT NUMBER :  [id number]  DATE: 2/27/24     : JOHN Villela    HOME HEALTH: [facility]     Wound Cleansing:   Do not scrub or use excessive force.  Cleanse wound prior to applying a clean dressing with:      [x] Cleanse wound with: BABY SHAMPOO LATHER  LEAVE on 1-2 MINUTES ON WOUND THEN RINSE WITH WATER OR SALINE    [] May Shower at Discharge     [] Shower on days of dressing change, remove dressing first    [] Keep Wound Dry in Shower (may purchase a cast cover if needed)     Topical Treatments:  Do not apply lotions, creams, or ointments to wound bed unless directed.      Dressings:           Wound Location Right Heel      Apply Primary Dressing:  Aquacell AG     Cover and Secure with:    [x] Gauze, Heel cup foam   [] Sb   [x] Kerlix  [] Ace Wrap     [] ABD  [] Medipore tape  [x] tape  [] Other:    Avoid contact of tape with skin.    Change dressing:   [] Daily      [x] Every Other Day   [] Three times per week  [] Once a week   [] Do Not Change Dressing           Dressings:           Wound Location Right in between 4th toe and 5th toe    Apply Primary Dressing:  aquacel, weave thin layer of gauze in between toes, abd vertically, do not roll gauze too tight around toes    Change dressing:   [] Daily      [x] Every Other Day   [] Three times per week  [] Once a week   [] Do Not Change Dressing      Pressure Relief:  [x] When sitting, shift position or do seat lifts every 15 minutes.  [x] Wheelchair cushion   [] Specialty Bed/Mattress  [x] Turn every 2 hours when in bed.  Avoid position directing pressure on wound site.  Limit side lying to 30 degree tilt.  Limit HOB elevation to 30 degrees.       Off-Loading: [] Surgical shoe    [] Podus Boot(s)   [x] Foam Boot(s) at rest  [] Knee scooter [] Cast

## 2024-02-28 PROBLEM — M86.279 SUBACUTE OSTEOMYELITIS OF FOOT (HCC): Status: ACTIVE | Noted: 2024-02-28

## 2024-02-28 NOTE — PROGRESS NOTES
[facility]   Progress Note and Procedure Note   NO Procedure    Patient Name: James Wray  : 1960  MRN: [id number]    Past Medical History:   Diagnosis Date    Acute blood loss anemia 2020    Diabetes ([facility])     Dysphagia due to old cerebrovascular accident 2019    ESRD (end stage renal disease) on dialysis ([facility])     MWF    Carlson catheter in place     MRSA cellulitis of right foot     PEG (percutaneous endoscopic gastrostomy) status ([facility]) 3/2019 to present    NPO- comfort foods only    Right sided weakness 3/2019 to present    Seizure ([facility]) 2019    Sepsis ([facility]) 2023    Stroke ([facility]) 2019    Urinary retention with incomplete bladder emptying      Past Surgical History:   Procedure Laterality Date    AV FISTULA CREATION Right 2019    CYST INCISION AND DRAINAGE Right     2nd toe- Cleaned out infection    CYSTOSCOPY N/A 06/15/2023    TRANSURETHRAL RESECTION OF THE PROSTATE, UNROOFING OF PROSTATE ABSCESS performed by Samir Castillo MD at [facility] MAIN OR    OTHER SURGICAL HISTORY  2019    PEG tube insertion    PROSTRATE ULTRASOUND GUIDANCE      abscess romoved     Family History   Problem Relation Age of Onset    Stroke Sister 50    Stroke Father     Deep Vein Thrombosis Neg Hx      Social History     Tobacco Use    Smoking status: Never     Passive exposure: Never    Smokeless tobacco: Never   Substance Use Topics    Alcohol use: Never    Drug use: Never     Allergies   Allergen Reactions    Bee Venom Anaphylaxis    Cephalexin Rash    Codeine Other (See Comments)    Fentanyl Other (See Comments)     Hallucinations    Oxycodone Other (See Comments)     Hallucinations    Hydralazine Rash    Velphoro [Sucroferric Oxyhydroxide] Diarrhea and Nausea And Vomiting     Current Outpatient Medications on File Prior to Encounter   Medication Sig Dispense Refill    doxycycline hyclate (VIBRA-TABS) 100 MG tablet Take 1 tablet by mouth 2 times daily 63 tablet 2    bisacodyl ([facility]

## 2024-03-01 ENCOUNTER — TELEPHONE (OUTPATIENT)
Facility: CLINIC | Age: 64
End: 2024-03-01

## 2024-03-01 DIAGNOSIS — Z99.2 TYPE 2 DIABETES MELLITUS WITH CHRONIC KIDNEY DISEASE ON CHRONIC DIALYSIS, WITHOUT LONG-TERM CURRENT USE OF INSULIN (HCC): Primary | ICD-10-CM

## 2024-03-01 DIAGNOSIS — N18.6 TYPE 2 DIABETES MELLITUS WITH CHRONIC KIDNEY DISEASE ON CHRONIC DIALYSIS, WITHOUT LONG-TERM CURRENT USE OF INSULIN (HCC): Primary | ICD-10-CM

## 2024-03-01 DIAGNOSIS — E11.22 TYPE 2 DIABETES MELLITUS WITH CHRONIC KIDNEY DISEASE ON CHRONIC DIALYSIS, WITHOUT LONG-TERM CURRENT USE OF INSULIN (HCC): Primary | ICD-10-CM

## 2024-03-01 NOTE — TELEPHONE ENCOUNTER
[facility] fax for SCRIPT CLARIFICATION      INSULIN ASPART 100 UNIT/ML PEN INJECTOR     QTY 5 EACH    CLARIFICATION: NEED MAXIMUM DAILY DOSE USE FOR DAY SUPPLY

## 2024-03-13 ENCOUNTER — HOSPITAL ENCOUNTER (OUTPATIENT)
Facility: HOSPITAL | Age: 64
Discharge: HOME | End: 2024-03-13
Attending: EMERGENCY MEDICINE
Payer: MEDICARE

## 2024-03-13 VITALS
RESPIRATION RATE: 15 BRPM | SYSTOLIC BLOOD PRESSURE: 116 MMHG | DIASTOLIC BLOOD PRESSURE: 63 MMHG | TEMPERATURE: 97.3 F | HEART RATE: 91 BPM

## 2024-03-13 DIAGNOSIS — L98.493 SKIN ULCER OF HAND WITH NECROSIS OF MUSCLE (HCC): ICD-10-CM

## 2024-03-13 DIAGNOSIS — L89.614 PRESSURE ULCER OF RIGHT HEEL, STAGE 4 (HCC): Primary | ICD-10-CM

## 2024-03-13 DIAGNOSIS — A49.02 MRSA INFECTION: ICD-10-CM

## 2024-03-13 PROCEDURE — 11042 DBRDMT SUBQ TIS 1ST 20SQCM/<: CPT

## 2024-03-13 RX ORDER — BACITRACIN ZINC AND POLYMYXIN B SULFATE 500; 1000 [USP'U]/G; [USP'U]/G
OINTMENT TOPICAL ONCE
OUTPATIENT
Start: 2024-03-13 | End: 2024-03-13

## 2024-03-13 RX ORDER — LIDOCAINE 50 MG/G
OINTMENT TOPICAL ONCE
OUTPATIENT
Start: 2024-03-13 | End: 2024-03-13

## 2024-03-13 RX ORDER — CLOBETASOL PROPIONATE 0.5 MG/G
OINTMENT TOPICAL ONCE
OUTPATIENT
Start: 2024-03-13 | End: 2024-03-13

## 2024-03-13 RX ORDER — IBUPROFEN 200 MG
TABLET ORAL ONCE
OUTPATIENT
Start: 2024-03-13 | End: 2024-03-13

## 2024-03-13 RX ORDER — LIDOCAINE HYDROCHLORIDE 40 MG/ML
SOLUTION TOPICAL ONCE
OUTPATIENT
Start: 2024-03-13 | End: 2024-03-13

## 2024-03-13 RX ORDER — GINSENG 100 MG
CAPSULE ORAL ONCE
OUTPATIENT
Start: 2024-03-13 | End: 2024-03-13

## 2024-03-13 RX ORDER — SODIUM CHLOR/HYPOCHLOROUS ACID 0.033 %
SOLUTION, IRRIGATION IRRIGATION ONCE
OUTPATIENT
Start: 2024-03-13 | End: 2024-03-13

## 2024-03-13 RX ORDER — BETAMETHASONE DIPROPIONATE 0.05 %
OINTMENT (GRAM) TOPICAL ONCE
OUTPATIENT
Start: 2024-03-13 | End: 2024-03-13

## 2024-03-13 RX ORDER — LIDOCAINE 40 MG/G
CREAM TOPICAL ONCE
OUTPATIENT
Start: 2024-03-13 | End: 2024-03-13

## 2024-03-13 RX ORDER — GENTAMICIN SULFATE 1 MG/G
OINTMENT TOPICAL ONCE
OUTPATIENT
Start: 2024-03-13 | End: 2024-03-13

## 2024-03-13 RX ORDER — LIDOCAINE HYDROCHLORIDE 20 MG/ML
JELLY TOPICAL ONCE
OUTPATIENT
Start: 2024-03-13 | End: 2024-03-13

## 2024-03-13 RX ORDER — TRIAMCINOLONE ACETONIDE 1 MG/G
OINTMENT TOPICAL ONCE
OUTPATIENT
Start: 2024-03-13 | End: 2024-03-13

## 2024-03-13 NOTE — PATIENT INSTRUCTIONS
Discharge Instructions for  [facility]  [address] [place] [address]  Telephone: [phone]   FAX [phone]    NAME:  James Wray  YOB: 1960  ACCOUNT NUMBER :  [id number]  DATE: 3/13/24     : JOHN Funes    HOME HEALTH: [facility]     Wound Cleansing:   Do not scrub or use excessive force.  Cleanse wound prior to applying a clean dressing with:      [x] Cleanse wound with: BABY SHAMPOO LATHER  LEAVE on 1-2 MINUTES ON WOUND THEN RINSE WITH WATER OR SALINE    [] May Shower at Discharge     [] Shower on days of dressing change, remove dressing first    [] Keep Wound Dry in Shower (may purchase a cast cover if needed)     Topical Treatments:  Do not apply lotions, creams, or ointments to wound bed unless directed.      Dressings:           Wound Location Right Heel      Apply Primary Dressing:       Cover and Secure with:      [x] Heel cup foam           Dressings:           Wound Location Right medial 3rd toe     Apply Primary Dressing:  aquacel, weave thin layer of gauze in between toes, abd vertically, do not roll gauze too tight around toes    Change dressing:   [] Daily      [x] Every Other Day   [] Three times per week  [] Once a week   [] Do Not Change Dressing      Pressure Relief:  [x] When sitting, shift position or do seat lifts every 15 minutes.  [x] Wheelchair cushion   [] Specialty Bed/Mattress  [x] Turn every 2 hours when in bed.  Avoid position directing pressure on wound site.  Limit side lying to 30 degree tilt.  Limit HOB elevation to 30 degrees.       Off-Loading: [] Surgical shoe    [] Podus Boot(s)   [x] Foam Boot(s) at rest  [] Knee scooter [] Cast Boot [] CROW Boot  [x] Other: post op shoe when up walking therapy     [] Total non-weight bearing : [] Right Leg     [] Left Leg    [x] Off-loading when : [x] walking may bear weight with PT/OT [x] in bed [x] Sitting      Return Appointment:    [x] Return Appointment: With Dr. Solitario

## 2024-03-13 NOTE — PROGRESS NOTES
2 CCs. Bleeding abated post treatment .   Post Procedure Condition/ Diagnosis: good progress, it does look like the osteomyelitis has been debrided and may be resolved with Abx care.  Follow up and Future plans today: RTC 3 weeks, .    Specimens: 0.  Patient Counseled regarding/Discussed: as above, good progress.  Clinical Considerations: alert to infection, avoid trauma/pressure.    Dx Codes: M86.9; E11.621  Altaf Chang MD  3/13/2024

## 2024-03-13 NOTE — FLOWSHEET NOTE
03/13/24 1138   Wound 03/13/24 Toe (Comment  which one) Medial #6 right medial 3rd toe   Date First Assessed/Time First Assessed: 03/13/24 1055   Present on Original Admission: No  Location: Toe (Comment  which one)  Wound Location Orientation: Medial  Wound Description (Comments): #6 right medial 3rd toe   Dressing/Treatment Alginate with Ag;Gauze dressing/dressing sponge;Roll gauze;Tape/Soft cloth adhesive tape   [REMOVED] Wound 09/27/23 Heel Right #1   Final Assessment Date/Final Assessment Time: 03/13/24 1129  Date First Assessed/Time First Assessed: 09/27/23 1330   Present on Original Admission: Yes  Wound Approximate Age at First Assessment (Weeks): 16 weeks  Primary Wound Type: Pressure Injury  ...   Dressing/Treatment Other (comment)  (Heel cup)     Discharge Condition: Stable    Pain: 0    Ambulatory Status:Wheelchair    Discharge Destination: Home    Transportation:Car    Accompanied by: Self and Family    Discharge instructions reviewed with Self and Family and copy or written instructions have been provided. All questions/concerns have been addressed at this time.     
(Temporal)   Resp 15

## 2024-04-03 ENCOUNTER — HOSPITAL ENCOUNTER (OUTPATIENT)
Facility: HOSPITAL | Age: 64
Discharge: HOME | End: 2024-04-03
Attending: EMERGENCY MEDICINE
Payer: MEDICARE

## 2024-04-03 VITALS
HEART RATE: 72 BPM | DIASTOLIC BLOOD PRESSURE: 61 MMHG | RESPIRATION RATE: 18 BRPM | SYSTOLIC BLOOD PRESSURE: 112 MMHG | TEMPERATURE: 98.1 F

## 2024-04-03 DIAGNOSIS — A49.02 MRSA INFECTION: ICD-10-CM

## 2024-04-03 DIAGNOSIS — L98.493 SKIN ULCER OF HAND WITH NECROSIS OF MUSCLE (HCC): ICD-10-CM

## 2024-04-03 DIAGNOSIS — L89.614 PRESSURE ULCER OF RIGHT HEEL, STAGE 4 (HCC): Primary | ICD-10-CM

## 2024-04-03 PROCEDURE — 11043 DBRDMT MUSC&/FSCA 1ST 20/<: CPT

## 2024-04-03 RX ORDER — BETAMETHASONE DIPROPIONATE 0.05 %
OINTMENT (GRAM) TOPICAL ONCE
OUTPATIENT
Start: 2024-04-03 | End: 2024-04-03

## 2024-04-03 RX ORDER — GINSENG 100 MG
CAPSULE ORAL ONCE
OUTPATIENT
Start: 2024-04-03 | End: 2024-04-03

## 2024-04-03 RX ORDER — CLOBETASOL PROPIONATE 0.5 MG/G
OINTMENT TOPICAL ONCE
OUTPATIENT
Start: 2024-04-03 | End: 2024-04-03

## 2024-04-03 RX ORDER — GENTAMICIN SULFATE 1 MG/G
OINTMENT TOPICAL ONCE
OUTPATIENT
Start: 2024-04-03 | End: 2024-04-03

## 2024-04-03 RX ORDER — LIDOCAINE HYDROCHLORIDE 20 MG/ML
JELLY TOPICAL ONCE
OUTPATIENT
Start: 2024-04-03 | End: 2024-04-03

## 2024-04-03 RX ORDER — TRIAMCINOLONE ACETONIDE 1 MG/G
OINTMENT TOPICAL ONCE
OUTPATIENT
Start: 2024-04-03 | End: 2024-04-03

## 2024-04-03 RX ORDER — SODIUM CHLOR/HYPOCHLOROUS ACID 0.033 %
SOLUTION, IRRIGATION IRRIGATION ONCE
OUTPATIENT
Start: 2024-04-03 | End: 2024-04-03

## 2024-04-03 RX ORDER — LIDOCAINE 50 MG/G
OINTMENT TOPICAL ONCE
OUTPATIENT
Start: 2024-04-03 | End: 2024-04-03

## 2024-04-03 RX ORDER — LIDOCAINE 40 MG/G
CREAM TOPICAL ONCE
OUTPATIENT
Start: 2024-04-03 | End: 2024-04-03

## 2024-04-03 RX ORDER — BACITRACIN ZINC AND POLYMYXIN B SULFATE 500; 1000 [USP'U]/G; [USP'U]/G
OINTMENT TOPICAL ONCE
OUTPATIENT
Start: 2024-04-03 | End: 2024-04-03

## 2024-04-03 RX ORDER — IBUPROFEN 200 MG
TABLET ORAL ONCE
OUTPATIENT
Start: 2024-04-03 | End: 2024-04-03

## 2024-04-03 RX ORDER — LIDOCAINE HYDROCHLORIDE 40 MG/ML
SOLUTION TOPICAL ONCE
OUTPATIENT
Start: 2024-04-03 | End: 2024-04-03

## 2024-04-03 NOTE — PATIENT INSTRUCTIONS
Discharge Instructions for  [facility]  [address] [place] [address]  Telephone: [phone]   FAX [phone]    NAME:  James Wray  YOB: 1960  ACCOUNT NUMBER :  [id number]  DATE: 4/3/24     : JOHN Funes    HOME HEALTH: [facility]     Wound Cleansing:   Do not scrub or use excessive force.  Cleanse wound prior to applying a clean dressing with:      [x] Cleanse wound with: BABY SHAMPOO LATHER  LEAVE on 1-2 MINUTES ON WOUND THEN RINSE WITH WATER OR SALINE    [] May Shower at Discharge     [] Shower on days of dressing change, remove dressing first    [] Keep Wound Dry in Shower (may purchase a cast cover if needed)     Topical Treatments:  Do not apply lotions, creams, or ointments to wound bed unless directed.      Dressings:           Wound Location Right Heel      Apply Primary Dressing:       Cover and Secure with:      [x] Heel cup foam           Dressings:           Wound Location Right medial 3rd toe     Apply Primary Dressing:  aquacel, weave thin layer of gauze in between toes, abd vertically, do not roll gauze too tight around toes    Change dressing:   [] Daily      [x] Every Other Day   [] Three times per week  [] Once a week   [] Do Not Change Dressing      Pressure Relief:  [x] When sitting, shift position or do seat lifts every 15 minutes.  [x] Wheelchair cushion   [] Specialty Bed/Mattress  [x] Turn every 2 hours when in bed.  Avoid position directing pressure on wound site.  Limit side lying to 30 degree tilt.  Limit HOB elevation to 30 degrees.       Off-Loading: [] Surgical shoe    [] Podus Boot(s)   [x] Foam Boot(s) at rest  [] Knee scooter [] Cast Boot [] CROW Boot  [x] Other: post op shoe when up walking therapy     [] Total non-weight bearing : [] Right Leg     [] Left Leg    [x] Off-loading when : [x] walking may bear weight with PT/OT [x] in bed [x] Sitting      Return Appointment:    [x] Return Appointment: With Dr. Solitario

## 2024-04-03 NOTE — FLOWSHEET NOTE
04/03/24 1111   Wound 03/13/24 Toe (Comment  which one) Medial #6 right medial 3rd toe   Date First Assessed/Time First Assessed: 03/13/24 1055   Present on Original Admission: No  Location: Toe (Comment  which one)  Wound Location Orientation: Medial  Wound Description (Comments): #6 right medial 3rd toe   Dressing Status New dressing applied   Dressing/Treatment Alginate with Ag;Gauze dressing/dressing sponge;Other (comment);Roll gauze;Tape/Soft cloth adhesive tape  (heel foam)     Discharge Condition: Stable    Pain: 0    Ambulatory Status:Wheelchair    Discharge Destination: Home    Transportation:Car    Accompanied by: Self and Family    Discharge instructions reviewed with Self and Family and copy or written instructions have been provided. All questions/concerns have been addressed at this time.

## 2024-04-03 NOTE — FLOWSHEET NOTE
04/03/24 1038   Anesthetic   Anesthetic 4% Lidocaine Liquid Topical   Right Leg Edema Point of Measurement   Leg circumference 27.3 cm   Ankle circumference 20 cm   RLE Neurovascular Assessment   Capillary Refill Greater than 3 seconds   Temperature Warm   R Pedal Pulse +2   Wound 03/13/24 Toe (Comment  which one) Medial #6 right medial 3rd toe   Date First Assessed/Time First Assessed: 03/13/24 1055   Present on Original Admission: No  Location: Toe (Comment  which one)  Wound Location Orientation: Medial  Wound Description (Comments): #6 right medial 3rd toe   Wound Image    Wound Length (cm) 0.4 cm   Wound Width (cm) 0.3 cm   Wound Depth (cm) 0.1 cm   Wound Surface Area (cm^2) 0.12 cm^2   Change in Wound Size % (l*w) 25   Wound Volume (cm^3) 0.012 cm^3   Wound Healing % 25   Wound Assessment [place]/red;Slough   Drainage Amount Small (< 25%)   Drainage Description Serosanguinous   Odor None   Marielena-wound Assessment Intact   Margins Flat/open edges     /61   Pulse 72   Temp 98.1 °F (36.7 °C) (Tympanic)   Resp 18

## 2024-04-03 NOTE — PROGRESS NOTES
Depth, add 1.0 mm;    Width, add 0.0 mm   Length, add 0.0 mm.   Blood Loss: 2 CCs. Bleeding abated post treatment .   Post Procedure Condition/ Diagnosis: improved, nearly closed.  Follow up and Future plans today: RTC 2 weeks, continue care, avoid tourniquets.    Specimens: 0.  Patient Counseled regarding/Discussed: as above, still progressing.  Clinical Considerations: alert to infection, avoid local pressure.    Dx Codes: E11.621.    Altaf Chang MD  4/3/2024

## 2024-04-24 ENCOUNTER — HOSPITAL ENCOUNTER (OUTPATIENT)
Facility: HOSPITAL | Age: 64
Discharge: HOME | End: 2024-04-24
Attending: EMERGENCY MEDICINE
Payer: MEDICARE

## 2024-04-24 VITALS
DIASTOLIC BLOOD PRESSURE: 57 MMHG | RESPIRATION RATE: 18 BRPM | TEMPERATURE: 97.9 F | HEART RATE: 79 BPM | SYSTOLIC BLOOD PRESSURE: 99 MMHG

## 2024-04-24 DIAGNOSIS — A49.02 MRSA INFECTION: ICD-10-CM

## 2024-04-24 DIAGNOSIS — L89.614 PRESSURE ULCER OF RIGHT HEEL, STAGE 4 (HCC): Primary | ICD-10-CM

## 2024-04-24 DIAGNOSIS — L98.493 SKIN ULCER OF HAND WITH NECROSIS OF MUSCLE (HCC): ICD-10-CM

## 2024-04-24 PROCEDURE — 88305 TISSUE EXAM BY PATHOLOGIST: CPT

## 2024-04-24 PROCEDURE — 11044 DBRDMT BONE 1ST 20 SQ CM/<: CPT

## 2024-04-24 PROCEDURE — 88311 DECALCIFY TISSUE: CPT

## 2024-04-24 RX ORDER — BISACODYL 10 MG
SUPPOSITORY, RECTAL RECTAL
Qty: 32 SUPPOSITORY | Refills: 1 | Status: SHIPPED | OUTPATIENT
Start: 2024-04-24

## 2024-04-24 RX ORDER — LIDOCAINE HYDROCHLORIDE 40 MG/ML
SOLUTION TOPICAL ONCE
OUTPATIENT
Start: 2024-04-24 | End: 2024-04-24

## 2024-04-24 RX ORDER — CLOBETASOL PROPIONATE 0.5 MG/G
OINTMENT TOPICAL ONCE
OUTPATIENT
Start: 2024-04-24 | End: 2024-04-24

## 2024-04-24 RX ORDER — BACITRACIN ZINC AND POLYMYXIN B SULFATE 500; 1000 [USP'U]/G; [USP'U]/G
OINTMENT TOPICAL ONCE
OUTPATIENT
Start: 2024-04-24 | End: 2024-04-24

## 2024-04-24 RX ORDER — TRIAMCINOLONE ACETONIDE 1 MG/G
OINTMENT TOPICAL ONCE
OUTPATIENT
Start: 2024-04-24 | End: 2024-04-24

## 2024-04-24 RX ORDER — IBUPROFEN 200 MG
TABLET ORAL ONCE
OUTPATIENT
Start: 2024-04-24 | End: 2024-04-24

## 2024-04-24 RX ORDER — GINSENG 100 MG
CAPSULE ORAL ONCE
OUTPATIENT
Start: 2024-04-24 | End: 2024-04-24

## 2024-04-24 RX ORDER — LIDOCAINE 40 MG/G
CREAM TOPICAL ONCE
OUTPATIENT
Start: 2024-04-24 | End: 2024-04-24

## 2024-04-24 RX ORDER — SODIUM CHLOR/HYPOCHLOROUS ACID 0.033 %
SOLUTION, IRRIGATION IRRIGATION ONCE
OUTPATIENT
Start: 2024-04-24 | End: 2024-04-24

## 2024-04-24 RX ORDER — BETAMETHASONE DIPROPIONATE 0.05 %
OINTMENT (GRAM) TOPICAL ONCE
OUTPATIENT
Start: 2024-04-24 | End: 2024-04-24

## 2024-04-24 RX ORDER — LIDOCAINE HYDROCHLORIDE 20 MG/ML
JELLY TOPICAL ONCE
OUTPATIENT
Start: 2024-04-24 | End: 2024-04-24

## 2024-04-24 RX ORDER — GENTAMICIN SULFATE 1 MG/G
OINTMENT TOPICAL ONCE
OUTPATIENT
Start: 2024-04-24 | End: 2024-04-24

## 2024-04-24 RX ORDER — LIDOCAINE 50 MG/G
OINTMENT TOPICAL ONCE
OUTPATIENT
Start: 2024-04-24 | End: 2024-04-24

## 2024-04-24 RX ORDER — MIDODRINE HYDROCHLORIDE 5 MG/1
5 TABLET ORAL AS NEEDED
COMMUNITY

## 2024-04-24 NOTE — PATIENT INSTRUCTIONS
Discharge Instructions for  [facility]  [address] [place] [address]  Telephone: [phone]   FAX [phone]    NAME:  James Wray  YOB: 1960  ACCOUNT NUMBER :  [id number]  DATE: 4/24/24     : JOHN Funes    HOME HEALTH: [facility]     Wound Cleansing:   Do not scrub or use excessive force.  Cleanse wound prior to applying a clean dressing with:      [x] Cleanse wound with: BABY SHAMPOO LATHER  LEAVE on 1-2 MINUTES ON WOUND THEN RINSE WITH WATER OR SALINE    [] May Shower at Discharge     [] Shower on days of dressing change, remove dressing first    [] Keep Wound Dry in Shower (may purchase a cast cover if needed)     Topical Treatments:  Do not apply lotions, creams, or ointments to wound bed unless directed.      Dressings:           Wound Location Right Heel      Apply Primary Dressing:       Cover and Secure with:      [x] Heel cup foam           Dressings:           Wound Location Right medial 3rd toe     Apply Primary Dressing: Hydrafera blue ready and use small amount of tape to hold, may use ABD pad and roll gauze. No gauze between toes.   Change dressing:   [x] Daily      [] Every Other Day   [] Three times per week  [] Once a week   [] Do Not Change Dressing      Pressure Relief:  [x] When sitting, shift position or do seat lifts every 15 minutes.  [x] Wheelchair cushion   [] Specialty Bed/Mattress  [x] Turn every 2 hours when in bed.  Avoid position directing pressure on wound site.  Limit side lying to 30 degree tilt.  Limit HOB elevation to 30 degrees.       Off-Loading: [] Surgical shoe    [] Podus Boot(s)   [x] Foam Boot(s) at rest  [] Knee scooter [] Cast Boot [] CROW Boot  [x] Other: post op shoe when up walking therapy     [] Total non-weight bearing : [] Right Leg     [] Left Leg    [x] Off-loading when : [x] walking may bear weight with PT/OT [x] in bed [x] Sitting      Return Appointment:    [x] Return Appointment: With

## 2024-04-24 NOTE — FLOWSHEET NOTE
04/24/24 1131   Wound 03/13/24 Toe (Comment  which one) Medial #6 right medial 3rd toe   Date First Assessed/Time First Assessed: 03/13/24 1055   Present on Original Admission: No  Location: Toe (Comment  which one)  Wound Location Orientation: Medial  Wound Description (Comments): #6 right medial 3rd toe   Dressing/Treatment Hydrofera blue;ABD;Roll gauze;Tape/Soft cloth adhesive tape;Other (comment)  (heel foam cup)   Offloading for Diabetic Foot Ulcers Offloading ordered     Discharge Condition: Stable    Pain: 0    Ambulatory Status: Wheelchair    Discharge Destination: home    Transportation:handicap accessible transportation    Accompanied by: FAMILY    Discharge instructions reviewed with FAMILY and copy or written instructions have been provided. All questions/concerns have been addressed at this time.

## 2024-04-24 NOTE — FLOWSHEET NOTE
04/24/24 1034   Anesthetic   Anesthetic 4% Lidocaine Liquid Topical   Right Leg Edema Point of Measurement   Leg circumference 28.6 cm   Ankle circumference 20.6 cm   RLE Neurovascular Assessment   Capillary Refill Less than/Equal to 3 seconds   Color Appropriate for Ethnicity   Temperature Warm   R Pedal Pulse +2   Wound 03/13/24 Toe (Comment  which one) Medial #6 right medial 3rd toe   Date First Assessed/Time First Assessed: 03/13/24 1055   Present on Original Admission: No  Location: Toe (Comment  which one)  Wound Location Orientation: Medial  Wound Description (Comments): #6 right medial 3rd toe   Wound Image    Wound Cleansed Cleansed with saline   Wound Length (cm) 0.5 cm   Wound Width (cm) 0.6 cm   Wound Depth (cm) 0.2 cm   Wound Surface Area (cm^2) 0.3 cm^2   Change in Wound Size % (l*w) -87.5   Wound Volume (cm^3) 0.06 cm^3   Wound Healing % -275   Wound Assessment [place]/red;Slough   Drainage Amount Small (< 25%)   Drainage Description Serous   Odor None   Marielena-wound Assessment Blanchable erythema   Margins Flat/open edges   Pain Assessment   Pain Assessment None - Denies Pain     BP (!) 99/57   Pulse 79   Temp 97.9 °F (36.6 °C) (Temporal)   Resp 18

## 2024-04-24 NOTE — PROGRESS NOTES
injection pen Sliding scale dosage subcutaneous twice daily. MAX of 14U of insulin per injection. DAILY MAX of 28U. 5 Adjustable Dose Pre-filled Pen Syringe 3    clobetasol (TEMOVATE) 0.05 % ointment Apply 0.05 each topically 2 times daily Apply topically 2 times daily. Rash/sore (Patient not taking: Reported on 11/22/2023)      Nutritional Supplements (NEPRO/CARBSTEADY PO) Take 230 mLs by mouth 4 times daily      acetaminophen (TYLENOL) 160 MG/5ML solution Take 320 mg by mouth every 6 hours as needed      albuterol (ACCUNEB) 1.25 MG/3ML nebulizer solution Inhale 3 mLs into the lungs every 6 hours as needed      isosorbide mononitrate (IMDUR) 30 MG extended release tablet 1 tablet 2 times daily      lacosamide (VIMPAT) 10 MG/ML SOLN oral solution Take 30 mLs by mouth 2 times daily.      ondansetron (ZOFRAN-ODT) 4 MG disintegrating tablet 1 tablet every 8 hours as needed      scopolamine (TRANSDERM-SCOP) transdermal patch Place 1 patch onto the skin every 72 hours       No current facility-administered medications on file prior to encounter.     Lab Results   Component Value Date/Time    LABA1C 5.6 02/20/2023 04:04 AM         Vitals:    04/24/24 1036   BP: (!) 99/57   Pulse: 79   Resp: 18   Temp: 97.9 °F (36.6 °C)      Wound 06/13/23 Buttocks redness and exocoriation to buttocks. wound care consulted (Active)   Number of days: 315       Wound 03/13/24 Toe (Comment  which one) Medial #6 right medial 3rd toe (Active)   Wound Image   04/24/24 1034   Wound Etiology Diabetic 04/03/24 1059   Dressing Status New dressing applied 04/03/24 1111   Wound Cleansed Cleansed with saline 04/24/24 1034   Dressing/Treatment Hydrofera blue;ABD;Roll gauze;Tape/Soft cloth adhesive tape;Other (comment) 04/24/24 1131   Offloading for Diabetic Foot Ulcers Offloading ordered 04/24/24 1131   Wound Length (cm) 0.5 cm 04/24/24 1034   Wound Width (cm) 0.6 cm 04/24/24 1034   Wound Depth (cm) 0.2 cm 04/24/24 1034   Wound Surface Area (cm^2) 0.3

## 2024-05-15 ENCOUNTER — HOSPITAL ENCOUNTER (OUTPATIENT)
Facility: HOSPITAL | Age: 64
Discharge: HOME | End: 2024-05-15
Attending: EMERGENCY MEDICINE
Payer: MEDICARE

## 2024-05-15 VITALS
HEART RATE: 82 BPM | SYSTOLIC BLOOD PRESSURE: 121 MMHG | DIASTOLIC BLOOD PRESSURE: 64 MMHG | TEMPERATURE: 98.4 F | RESPIRATION RATE: 16 BRPM

## 2024-05-15 DIAGNOSIS — L89.614 PRESSURE ULCER OF RIGHT HEEL, STAGE 4 (HCC): Primary | ICD-10-CM

## 2024-05-15 DIAGNOSIS — L98.493 SKIN ULCER OF HAND WITH NECROSIS OF MUSCLE (HCC): ICD-10-CM

## 2024-05-15 DIAGNOSIS — A49.02 MRSA INFECTION: ICD-10-CM

## 2024-05-15 PROCEDURE — 11043 DBRDMT MUSC&/FSCA 1ST 20/<: CPT

## 2024-05-15 RX ORDER — GINSENG 100 MG
CAPSULE ORAL ONCE
OUTPATIENT
Start: 2024-05-15 | End: 2024-05-15

## 2024-05-15 RX ORDER — LIDOCAINE HYDROCHLORIDE 20 MG/ML
JELLY TOPICAL ONCE
OUTPATIENT
Start: 2024-05-15 | End: 2024-05-15

## 2024-05-15 RX ORDER — BACITRACIN ZINC AND POLYMYXIN B SULFATE 500; 1000 [USP'U]/G; [USP'U]/G
OINTMENT TOPICAL ONCE
OUTPATIENT
Start: 2024-05-15 | End: 2024-05-15

## 2024-05-15 RX ORDER — TRIAMCINOLONE ACETONIDE 1 MG/G
OINTMENT TOPICAL ONCE
OUTPATIENT
Start: 2024-05-15 | End: 2024-05-15

## 2024-05-15 RX ORDER — BETAMETHASONE DIPROPIONATE 0.05 %
OINTMENT (GRAM) TOPICAL ONCE
OUTPATIENT
Start: 2024-05-15 | End: 2024-05-15

## 2024-05-15 RX ORDER — LIDOCAINE 40 MG/G
CREAM TOPICAL ONCE
OUTPATIENT
Start: 2024-05-15 | End: 2024-05-15

## 2024-05-15 RX ORDER — CLOBETASOL PROPIONATE 0.5 MG/G
OINTMENT TOPICAL ONCE
OUTPATIENT
Start: 2024-05-15 | End: 2024-05-15

## 2024-05-15 RX ORDER — LIDOCAINE HYDROCHLORIDE 40 MG/ML
SOLUTION TOPICAL ONCE
OUTPATIENT
Start: 2024-05-15 | End: 2024-05-15

## 2024-05-15 RX ORDER — GENTAMICIN SULFATE 1 MG/G
OINTMENT TOPICAL ONCE
OUTPATIENT
Start: 2024-05-15 | End: 2024-05-15

## 2024-05-15 RX ORDER — SODIUM CHLOR/HYPOCHLOROUS ACID 0.033 %
SOLUTION, IRRIGATION IRRIGATION ONCE
OUTPATIENT
Start: 2024-05-15 | End: 2024-05-15

## 2024-05-15 RX ORDER — IBUPROFEN 200 MG
TABLET ORAL ONCE
OUTPATIENT
Start: 2024-05-15 | End: 2024-05-15

## 2024-05-15 RX ORDER — LIDOCAINE 50 MG/G
OINTMENT TOPICAL ONCE
OUTPATIENT
Start: 2024-05-15 | End: 2024-05-15

## 2024-05-15 NOTE — PROGRESS NOTES
[facility]   Progress Note and Procedure Note   NO Procedure    Patient Name: James Wray  : 1960  MRN: [id number]    Past Medical History:   Diagnosis Date    Acute blood loss anemia 2020    Diabetes ([facility])     Dysphagia due to old cerebrovascular accident 2019    ESRD (end stage renal disease) on dialysis ([facility])     MWF    Carlson catheter in place     MRSA cellulitis of right foot     PEG (percutaneous endoscopic gastrostomy) status ([facility]) 3/2019 to present    NPO- comfort foods only    Right sided weakness 3/2019 to present    Seizure ([facility]) 2019    Sepsis ([facility]) 2023    Stroke ([facility]) 2019    Urinary retention with incomplete bladder emptying      Past Surgical History:   Procedure Laterality Date    AV FISTULA CREATION Right 2019    CYST INCISION AND DRAINAGE Right     2nd toe- Cleaned out infection    CYSTOSCOPY N/A 06/15/2023    TRANSURETHRAL RESECTION OF THE PROSTATE, UNROOFING OF PROSTATE ABSCESS performed by Samir Castillo MD at [facility] MAIN OR    OTHER SURGICAL HISTORY  2019    PEG tube insertion    PROSTRATE ULTRASOUND GUIDANCE      abscess romoved     Family History   Problem Relation Age of Onset    Stroke Sister 50    Stroke Father     Deep Vein Thrombosis Neg Hx      Social History     Tobacco Use    Smoking status: Never     Passive exposure: Never    Smokeless tobacco: Never   Substance Use Topics    Alcohol use: Never    Drug use: Never     Allergies   Allergen Reactions    Bee Venom Anaphylaxis    Cephalexin Rash    Codeine Other (See Comments)    Fentanyl Other (See Comments)     Hallucinations    Oxycodone Other (See Comments)     Hallucinations    Hydralazine Rash    Velphoro [Sucroferric Oxyhydroxide] Diarrhea and Nausea And Vomiting     Current Outpatient Medications on File Prior to Encounter   Medication Sig Dispense Refill    bisacodyl ([facility]) 10 MG suppository UNWRAP AND INSERT 1 SUPPOSITORY RECTALLY DAILY AS NEEDED 32

## 2024-05-15 NOTE — FLOWSHEET NOTE
05/15/24 1034   Anesthetic   Anesthetic 4% Lidocaine Liquid Topical   Right Leg Edema Point of Measurement   Leg circumference 29 cm   Ankle circumference 22 cm   RLE Neurovascular Assessment   Capillary Refill Less than/Equal to 3 seconds   Color Appropriate for Ethnicity   Temperature Cool   R Pedal Pulse +1   Wound 03/13/24 Toe (Comment  which one) Medial #6 right medial 3rd toe   Date First Assessed/Time First Assessed: 03/13/24 1055   Present on Original Admission: No  Location: Toe (Comment  which one)  Wound Location Orientation: Medial  Wound Description (Comments): #6 right medial 3rd toe   Wound Image    Wound Cleansed Cleansed with saline   Offloading for Diabetic Foot Ulcers Offloading ordered   Wound Length (cm) 0.1 cm   Wound Width (cm) 0.1 cm   Wound Depth (cm) 0 cm   Wound Surface Area (cm^2) 0.01 cm^2   Change in Wound Size % (l*w) 93.75   Wound Volume (cm^3) 0 cm^3   Wound Healing % 100   Wound Assessment Epithelialization;Dry   Drainage Amount None (dry)   Odor None   Margins Attached edges     /64   Pulse 82   Temp 98.4 °F (36.9 °C) (Temporal)   Resp 16

## 2024-05-15 NOTE — PATIENT INSTRUCTIONS
Discharge Instructions for  [facility]  [address] [place] [address]  Telephone: [phone]   FAX [phone]    NAME:  James Wray  YOB: 1960  ACCOUNT NUMBER :  [id number]  DATE: 5/15/24     : JOHN Funes    HOME HEALTH: [facility]      Wound Cleansing:   Do not scrub or use excessive force.  Cleanse wound prior to applying a clean dressing with:      [x] Cleanse wound with: BABY SHAMPOO LATHER  LEAVE on 1-2 MINUTES ON WOUND THEN RINSE WITH WATER OR SALINE    [] May Shower at Discharge     [] Shower on days of dressing change, remove dressing first    [] Keep Wound Dry in Shower (may purchase a cast cover if needed)     Topical Treatments:  Do not apply lotions, creams, or ointments to wound bed unless directed.      Dressings:           Wound Location Right Heel      Apply Primary Dressing:       Cover and Secure with:      [x] Heel cup foam           Dressings:           Wound Location Right medial 3rd toe     Apply Primary Dressing: Cutimed sorbact and use one 2x2 gauze tape to hold, may use ABD pad and roll gauze to foot to hold heel cup.  Fold 2x2 and place distal area between toes to separate 2nd and 3rd toes.  Change dressing:   [x] Daily      [] Every Other Day   [] Three times per week  [] Once a week   [] Do Not Change Dressing      Pressure Relief:  [x] When sitting, shift position or do seat lifts every 15 minutes.  [x] Wheelchair cushion   [] Specialty Bed/Mattress  [x] Turn every 2 hours when in bed.  Avoid position directing pressure on wound site.  Limit side lying to 30 degree tilt.  Limit HOB elevation to 30 degrees.       Off-Loading: [] Surgical shoe    [] Podus Boot(s)   [x] Foam Boot(s) at rest  [] Knee scooter [] Cast Boot [] CROW Boot  [x] Other: post op shoe when up walking therapy     [] Total non-weight bearing : [] Right Leg     [] Left Leg    [x] Off-loading when : [x] walking may bear weight with PT/OT [x] in

## 2024-05-15 NOTE — FLOWSHEET NOTE
05/15/24 1106   Wound 03/13/24 Toe (Comment  which one) Medial #6 right medial 3rd toe   Date First Assessed/Time First Assessed: 03/13/24 1055   Present on Original Admission: No  Location: Toe (Comment  which one)  Wound Location Orientation: Medial  Wound Description (Comments): #6 right medial 3rd toe   Dressing/Treatment Cutimed/Sorbact;Gauze dressing/dressing sponge;Roll gauze;Tape/Soft cloth adhesive tape   Offloading for Diabetic Foot Ulcers Offloading ordered     Discharge Condition: Stable    Pain: 0    Ambulatory Status:Wheelchair    Discharge Destination: Home    Transportation:Car    Accompanied by: Self and Family    Discharge instructions reviewed with Self and Family and copy or written instructions have been provided. All questions/concerns have been addressed at this time.

## 2024-05-30 NOTE — TELEPHONE ENCOUNTER
[facility] faxed request #[id number]    ONE TOUCH VERIO TEST STRIP    Qty 200    Refills 3     Test twice a day

## 2024-06-05 ENCOUNTER — HOSPITAL ENCOUNTER (OUTPATIENT)
Facility: HOSPITAL | Age: 64
Discharge: HOME | End: 2024-06-05
Attending: EMERGENCY MEDICINE
Payer: MEDICARE

## 2024-06-05 VITALS
RESPIRATION RATE: 18 BRPM | DIASTOLIC BLOOD PRESSURE: 68 MMHG | HEART RATE: 76 BPM | TEMPERATURE: 97.7 F | SYSTOLIC BLOOD PRESSURE: 105 MMHG

## 2024-06-05 DIAGNOSIS — L98.493 SKIN ULCER OF HAND WITH NECROSIS OF MUSCLE (HCC): ICD-10-CM

## 2024-06-05 DIAGNOSIS — A49.02 MRSA INFECTION: ICD-10-CM

## 2024-06-05 DIAGNOSIS — L89.614 PRESSURE ULCER OF RIGHT HEEL, STAGE 4 (HCC): Primary | ICD-10-CM

## 2024-06-05 PROCEDURE — 99212 OFFICE O/P EST SF 10 MIN: CPT

## 2024-06-05 RX ORDER — GINSENG 100 MG
CAPSULE ORAL ONCE
Status: CANCELLED | OUTPATIENT
Start: 2024-06-05 | End: 2024-06-05

## 2024-06-05 RX ORDER — LIDOCAINE HYDROCHLORIDE 20 MG/ML
JELLY TOPICAL ONCE
Status: CANCELLED | OUTPATIENT
Start: 2024-06-05 | End: 2024-06-05

## 2024-06-05 RX ORDER — SODIUM CHLOR/HYPOCHLOROUS ACID 0.033 %
SOLUTION, IRRIGATION IRRIGATION ONCE
Status: CANCELLED | OUTPATIENT
Start: 2024-06-05 | End: 2024-06-05

## 2024-06-05 RX ORDER — IBUPROFEN 200 MG
TABLET ORAL ONCE
Status: CANCELLED | OUTPATIENT
Start: 2024-06-05 | End: 2024-06-05

## 2024-06-05 RX ORDER — TRIAMCINOLONE ACETONIDE 1 MG/G
OINTMENT TOPICAL ONCE
Status: CANCELLED | OUTPATIENT
Start: 2024-06-05 | End: 2024-06-05

## 2024-06-05 RX ORDER — LIDOCAINE 40 MG/G
CREAM TOPICAL ONCE
Status: CANCELLED | OUTPATIENT
Start: 2024-06-05 | End: 2024-06-05

## 2024-06-05 RX ORDER — GENTAMICIN SULFATE 1 MG/G
OINTMENT TOPICAL ONCE
Status: CANCELLED | OUTPATIENT
Start: 2024-06-05 | End: 2024-06-05

## 2024-06-05 RX ORDER — CLOBETASOL PROPIONATE 0.5 MG/G
OINTMENT TOPICAL ONCE
Status: CANCELLED | OUTPATIENT
Start: 2024-06-05 | End: 2024-06-05

## 2024-06-05 RX ORDER — LIDOCAINE 50 MG/G
OINTMENT TOPICAL ONCE
Status: CANCELLED | OUTPATIENT
Start: 2024-06-05 | End: 2024-06-05

## 2024-06-05 RX ORDER — BACITRACIN ZINC AND POLYMYXIN B SULFATE 500; 1000 [USP'U]/G; [USP'U]/G
OINTMENT TOPICAL ONCE
Status: CANCELLED | OUTPATIENT
Start: 2024-06-05 | End: 2024-06-05

## 2024-06-05 RX ORDER — BETAMETHASONE DIPROPIONATE 0.05 %
OINTMENT (GRAM) TOPICAL ONCE
Status: CANCELLED | OUTPATIENT
Start: 2024-06-05 | End: 2024-06-05

## 2024-06-05 RX ORDER — LIDOCAINE HYDROCHLORIDE 40 MG/ML
SOLUTION TOPICAL ONCE
Status: CANCELLED | OUTPATIENT
Start: 2024-06-05 | End: 2024-06-05

## 2024-06-05 NOTE — PATIENT INSTRUCTIONS
[facility] THANK YOU      Your treatment has been completed at [facility] outpatient wound clinic on 6/5/2024  , per Dr. Chang.     We know patients have several options when choosing a health care provider. We would like to express our sincere appreciation for having had the chance to be yours. More importantly, we enjoy having you as part of our family.     We also hope your experience with us has surpassed your expectations and that you have been pleased with our service. We appreciate the confidence you've shown in selecting us as your health care provider and we will continue or commitment to provide the highest quality of service.      Again, thank you for choosing us for your health care needs. If you have any further questions or concerns, please call [phone]. It has been our pleasure serving you and we hope to continue our relationship in the future, if the need occurs.       Sincerely,    [facility] Team

## 2024-06-05 NOTE — FLOWSHEET NOTE
06/05/24 1033   Right Leg Edema Point of Measurement   Leg circumference 28.7 cm   Ankle circumference 21.2 cm   RLE Neurovascular Assessment   Capillary Refill Greater than 3 seconds   Color Appropriate for Ethnicity   Temperature Cold   R Pedal Pulse +1   Wound 03/13/24 Toe (Comment  which one) Medial #6 right medial 3rd toe   Date First Assessed/Time First Assessed: 03/13/24 1055   Present on Original Admission: No  Location: Toe (Comment  which one)  Wound Location Orientation: Medial  Wound Description (Comments): #6 right medial 3rd toe   Wound Image    Wound Cleansed Cleansed with saline   Offloading for Diabetic Foot Ulcers Offloading ordered   Wound Length (cm) 0.1 cm   Wound Width (cm) 0.1 cm   Wound Depth (cm) 0 cm   Wound Surface Area (cm^2) 0.01 cm^2   Change in Wound Size % (l*w) 93.75   Wound Volume (cm^3) 0 cm^3   Wound Healing % 100   Wound Assessment Epithelialization   Drainage Amount None (dry)   Odor None   Marielena-wound Assessment Blanchable erythema   Margins Attached edges     /68   Pulse 76   Temp 97.7 °F (36.5 °C) (Temporal)   Resp 18

## 2024-06-05 NOTE — PROGRESS NOTES
[facility]   Progress Note and Procedure Note   NO Procedure    Patient Name: James Wray  : 1960  MRN: [id number]    Past Medical History:   Diagnosis Date    Acute blood loss anemia 2020    Diabetes ([facility])     Dysphagia due to old cerebrovascular accident 2019    ESRD (end stage renal disease) on dialysis ([facility])     MWF    Carlson catheter in place     MRSA cellulitis of right foot     PEG (percutaneous endoscopic gastrostomy) status ([facility]) 3/2019 to present    NPO- comfort foods only    Right sided weakness 3/2019 to present    Seizure ([facility]) 2019    Sepsis ([facility]) 2023    Stroke ([facility]) 2019    Urinary retention with incomplete bladder emptying      Past Surgical History:   Procedure Laterality Date    AV FISTULA CREATION Right 2019    CYST INCISION AND DRAINAGE Right     2nd toe- Cleaned out infection    CYSTOSCOPY N/A 06/15/2023    TRANSURETHRAL RESECTION OF THE PROSTATE, UNROOFING OF PROSTATE ABSCESS performed by Samir Castillo MD at [facility] MAIN OR    OTHER SURGICAL HISTORY  2019    PEG tube insertion    PROSTRATE ULTRASOUND GUIDANCE      abscess romoved     Family History   Problem Relation Age of Onset    Stroke Sister 50    Stroke Father     Deep Vein Thrombosis Neg Hx      Social History     Tobacco Use    Smoking status: Never     Passive exposure: Never    Smokeless tobacco: Never   Substance Use Topics    Alcohol use: Never    Drug use: Never     Allergies   Allergen Reactions    Bee Venom Anaphylaxis    Cephalexin Rash    Codeine Other (See Comments)    Fentanyl Other (See Comments)     Hallucinations    Oxycodone Other (See Comments)     Hallucinations    Hydralazine Rash    Velphoro [Sucroferric Oxyhydroxide] Diarrhea and Nausea And Vomiting     Current Outpatient Medications on File Prior to Encounter   Medication Sig Dispense Refill    blood glucose test strips (ASCENSIA AUTODISC VI;ONE TOUCH ULTRA TEST VI) strip 1 each by In Vitro route daily As

## 2024-07-04 ENCOUNTER — APPOINTMENT (OUTPATIENT)
Facility: HOSPITAL | Age: 64
DRG: 393 | End: 2024-07-04
Payer: MEDICARE

## 2024-07-04 ENCOUNTER — HOSPITAL ENCOUNTER (INPATIENT)
Facility: HOSPITAL | Age: 64
LOS: 1 days | Discharge: HOME | DRG: 393 | End: 2024-07-07
Attending: EMERGENCY MEDICINE | Admitting: FAMILY MEDICINE
Payer: MEDICARE

## 2024-07-04 DIAGNOSIS — E11.22 TYPE 2 DIABETES MELLITUS WITH CHRONIC KIDNEY DISEASE ON CHRONIC DIALYSIS, WITHOUT LONG-TERM CURRENT USE OF INSULIN (HCC): ICD-10-CM

## 2024-07-04 DIAGNOSIS — N18.6 TYPE 2 DIABETES MELLITUS WITH CHRONIC KIDNEY DISEASE ON CHRONIC DIALYSIS, WITHOUT LONG-TERM CURRENT USE OF INSULIN (HCC): ICD-10-CM

## 2024-07-04 DIAGNOSIS — R11.2 NAUSEA AND VOMITING, UNSPECIFIED VOMITING TYPE: Primary | ICD-10-CM

## 2024-07-04 DIAGNOSIS — Z99.2 TYPE 2 DIABETES MELLITUS WITH CHRONIC KIDNEY DISEASE ON CHRONIC DIALYSIS, WITHOUT LONG-TERM CURRENT USE OF INSULIN (HCC): ICD-10-CM

## 2024-07-04 DIAGNOSIS — R53.1 GENERAL WEAKNESS: ICD-10-CM

## 2024-07-04 LAB
ALBUMIN SERPL-MCNC: 4.1 G/DL (ref 3.5–5)
ALBUMIN/GLOB SERPL: 0.9 (ref 1.1–2.2)
ALP SERPL-CCNC: 226 U/L (ref 45–117)
ALT SERPL-CCNC: 32 U/L (ref 12–78)
ANION GAP SERPL CALC-SCNC: 5 MMOL/L (ref 5–15)
AST SERPL-CCNC: 23 U/L (ref 15–37)
BASOPHILS # BLD: 0.1 K/UL (ref 0–0.1)
BASOPHILS NFR BLD: 1 % (ref 0–1)
BILIRUB SERPL-MCNC: 0.6 MG/DL (ref 0.2–1)
BUN SERPL-MCNC: 40 MG/DL (ref 6–20)
BUN/CREAT SERPL: 8 (ref 12–20)
CALCIUM SERPL-MCNC: 10.2 MG/DL (ref 8.5–10.1)
CHLORIDE SERPL-SCNC: 95 MMOL/L (ref 97–108)
CO2 SERPL-SCNC: 32 MMOL/L (ref 21–32)
CREAT SERPL-MCNC: 5.2 MG/DL (ref 0.7–1.3)
DIFFERENTIAL METHOD BLD: ABNORMAL
EOSINOPHIL # BLD: 0.2 K/UL (ref 0–0.4)
EOSINOPHIL NFR BLD: 3 % (ref 0–7)
ERYTHROCYTE [DISTWIDTH] IN BLOOD BY AUTOMATED COUNT: 15.9 % (ref 11.5–14.5)
GLOBULIN SER CALC-MCNC: 4.5 G/DL (ref 2–4)
GLUCOSE BLD STRIP.AUTO-MCNC: 140 MG/DL (ref 65–117)
GLUCOSE SERPL-MCNC: 150 MG/DL (ref 65–100)
HCT VFR BLD AUTO: 38.5 % (ref 36.6–50.3)
HGB BLD-MCNC: 12.9 G/DL (ref 12.1–17)
IMM GRANULOCYTES # BLD AUTO: 0 K/UL (ref 0–0.04)
IMM GRANULOCYTES NFR BLD AUTO: 0 % (ref 0–0.5)
LYMPHOCYTES # BLD: 0.9 K/UL (ref 0.8–3.5)
LYMPHOCYTES NFR BLD: 15 % (ref 12–49)
MAGNESIUM SERPL-MCNC: 3.2 MG/DL (ref 1.6–2.4)
MCH RBC QN AUTO: 34.3 PG (ref 26–34)
MCHC RBC AUTO-ENTMCNC: 33.5 G/DL (ref 30–36.5)
MCV RBC AUTO: 102.4 FL (ref 80–99)
MONOCYTES # BLD: 0.4 K/UL (ref 0–1)
MONOCYTES NFR BLD: 7 % (ref 5–13)
NEUTS SEG # BLD: 4.5 K/UL (ref 1.8–8)
NEUTS SEG NFR BLD: 74 % (ref 32–75)
NRBC # BLD: 0 K/UL (ref 0–0.01)
NRBC BLD-RTO: 0 PER 100 WBC
PHOSPHATE SERPL-MCNC: 3.3 MG/DL (ref 2.6–4.7)
PLATELET # BLD AUTO: 164 K/UL (ref 150–400)
PMV BLD AUTO: 10.3 FL (ref 8.9–12.9)
POTASSIUM SERPL-SCNC: 3.8 MMOL/L (ref 3.5–5.1)
PROT SERPL-MCNC: 8.6 G/DL (ref 6.4–8.2)
RBC # BLD AUTO: 3.76 M/UL (ref 4.1–5.7)
RBC MORPH BLD: ABNORMAL
SERVICE CMNT-IMP: ABNORMAL
SODIUM SERPL-SCNC: 132 MMOL/L (ref 136–145)
TROPONIN I SERPL HS-MCNC: 33 NG/L (ref 0–76)
TROPONIN I SERPL HS-MCNC: 35 NG/L (ref 0–76)
WBC # BLD AUTO: 6.1 K/UL (ref 4.1–11.1)

## 2024-07-04 PROCEDURE — 96375 TX/PRO/DX INJ NEW DRUG ADDON: CPT

## 2024-07-04 PROCEDURE — C9254 INJECTION, LACOSAMIDE: HCPCS

## 2024-07-04 PROCEDURE — 96372 THER/PROPH/DIAG INJ SC/IM: CPT

## 2024-07-04 PROCEDURE — 85025 COMPLETE CBC W/AUTO DIFF WBC: CPT

## 2024-07-04 PROCEDURE — 96376 TX/PRO/DX INJ SAME DRUG ADON: CPT

## 2024-07-04 PROCEDURE — 94761 N-INVAS EAR/PLS OXIMETRY MLT: CPT

## 2024-07-04 PROCEDURE — 84100 ASSAY OF PHOSPHORUS: CPT

## 2024-07-04 PROCEDURE — 6360000002 HC RX W HCPCS

## 2024-07-04 PROCEDURE — 83036 HEMOGLOBIN GLYCOSYLATED A1C: CPT

## 2024-07-04 PROCEDURE — G0378 HOSPITAL OBSERVATION PER HR: HCPCS

## 2024-07-04 PROCEDURE — 2580000003 HC RX 258: Performed by: EMERGENCY MEDICINE

## 2024-07-04 PROCEDURE — 82962 GLUCOSE BLOOD TEST: CPT

## 2024-07-04 PROCEDURE — 71046 X-RAY EXAM CHEST 2 VIEWS: CPT

## 2024-07-04 PROCEDURE — 74176 CT ABD & PELVIS W/O CONTRAST: CPT

## 2024-07-04 PROCEDURE — 96374 THER/PROPH/DIAG INJ IV PUSH: CPT

## 2024-07-04 PROCEDURE — 36415 COLL VENOUS BLD VENIPUNCTURE: CPT

## 2024-07-04 PROCEDURE — 80061 LIPID PANEL: CPT

## 2024-07-04 PROCEDURE — 2500000003 HC RX 250 WO HCPCS

## 2024-07-04 PROCEDURE — 83735 ASSAY OF MAGNESIUM: CPT

## 2024-07-04 PROCEDURE — 96361 HYDRATE IV INFUSION ADD-ON: CPT

## 2024-07-04 PROCEDURE — 70450 CT HEAD/BRAIN W/O DYE: CPT

## 2024-07-04 PROCEDURE — 93005 ELECTROCARDIOGRAM TRACING: CPT | Performed by: EMERGENCY MEDICINE

## 2024-07-04 PROCEDURE — 84484 ASSAY OF TROPONIN QUANT: CPT

## 2024-07-04 PROCEDURE — 99285 EMERGENCY DEPT VISIT HI MDM: CPT

## 2024-07-04 PROCEDURE — 2580000003 HC RX 258

## 2024-07-04 PROCEDURE — 80053 COMPREHEN METABOLIC PANEL: CPT

## 2024-07-04 RX ORDER — LACOSAMIDE 10 MG/ML
300 SOLUTION ORAL 2 TIMES DAILY
Status: DISCONTINUED | OUTPATIENT
Start: 2024-07-04 | End: 2024-07-04

## 2024-07-04 RX ORDER — LACOSAMIDE 10 MG/ML
75 SOLUTION ORAL 2 TIMES DAILY
Status: DISCONTINUED | OUTPATIENT
Start: 2024-07-05 | End: 2024-07-07 | Stop reason: HOSPADM

## 2024-07-04 RX ORDER — PROCHLORPERAZINE EDISYLATE 5 MG/ML
10 INJECTION INTRAMUSCULAR; INTRAVENOUS ONCE
Status: COMPLETED | OUTPATIENT
Start: 2024-07-04 | End: 2024-07-04

## 2024-07-04 RX ORDER — 0.9 % SODIUM CHLORIDE 0.9 %
500 INTRAVENOUS SOLUTION INTRAVENOUS ONCE
Status: COMPLETED | OUTPATIENT
Start: 2024-07-04 | End: 2024-07-04

## 2024-07-04 RX ORDER — POLYETHYLENE GLYCOL 3350 17 G/17G
17 POWDER, FOR SOLUTION ORAL DAILY PRN
Status: DISCONTINUED | OUTPATIENT
Start: 2024-07-04 | End: 2024-07-05

## 2024-07-04 RX ORDER — LACOSAMIDE 10 MG/ML
75 SOLUTION ORAL 2 TIMES DAILY
Status: DISCONTINUED | OUTPATIENT
Start: 2024-07-04 | End: 2024-07-04

## 2024-07-04 RX ORDER — INSULIN LISPRO 100 [IU]/ML
0-4 INJECTION, SOLUTION INTRAVENOUS; SUBCUTANEOUS EVERY 4 HOURS
Status: DISCONTINUED | OUTPATIENT
Start: 2024-07-04 | End: 2024-07-06

## 2024-07-04 RX ORDER — LACOSAMIDE 10 MG/ML
75 INJECTION, SOLUTION INTRAVENOUS ONCE
Status: COMPLETED | OUTPATIENT
Start: 2024-07-04 | End: 2024-07-04

## 2024-07-04 RX ORDER — LANOLIN ALCOHOL/MO/W.PET/CERES
3 CREAM (GRAM) TOPICAL NIGHTLY PRN
Status: DISCONTINUED | OUTPATIENT
Start: 2024-07-04 | End: 2024-07-07 | Stop reason: HOSPADM

## 2024-07-04 RX ORDER — ACETAMINOPHEN 325 MG/1
650 TABLET ORAL EVERY 6 HOURS PRN
Status: DISCONTINUED | OUTPATIENT
Start: 2024-07-04 | End: 2024-07-07 | Stop reason: HOSPADM

## 2024-07-04 RX ORDER — SODIUM CHLORIDE 9 MG/ML
INJECTION, SOLUTION INTRAVENOUS PRN
Status: DISCONTINUED | OUTPATIENT
Start: 2024-07-04 | End: 2024-07-07 | Stop reason: HOSPADM

## 2024-07-04 RX ORDER — DIPHENHYDRAMINE HYDROCHLORIDE 50 MG/ML
25 INJECTION INTRAMUSCULAR; INTRAVENOUS ONCE
Status: COMPLETED | OUTPATIENT
Start: 2024-07-04 | End: 2024-07-04

## 2024-07-04 RX ORDER — ACETAMINOPHEN 650 MG/1
650 SUPPOSITORY RECTAL EVERY 6 HOURS PRN
Status: DISCONTINUED | OUTPATIENT
Start: 2024-07-04 | End: 2024-07-07 | Stop reason: HOSPADM

## 2024-07-04 RX ORDER — SODIUM CHLORIDE 0.9 % (FLUSH) 0.9 %
5-40 SYRINGE (ML) INJECTION PRN
Status: DISCONTINUED | OUTPATIENT
Start: 2024-07-04 | End: 2024-07-07 | Stop reason: HOSPADM

## 2024-07-04 RX ORDER — HEPARIN SODIUM 5000 [USP'U]/ML
5000 INJECTION, SOLUTION INTRAVENOUS; SUBCUTANEOUS EVERY 8 HOURS SCHEDULED
Status: DISCONTINUED | OUTPATIENT
Start: 2024-07-04 | End: 2024-07-07 | Stop reason: HOSPADM

## 2024-07-04 RX ORDER — DEXTROSE MONOHYDRATE 100 MG/ML
INJECTION, SOLUTION INTRAVENOUS CONTINUOUS PRN
Status: DISCONTINUED | OUTPATIENT
Start: 2024-07-04 | End: 2024-07-07 | Stop reason: HOSPADM

## 2024-07-04 RX ORDER — SODIUM CHLORIDE 0.9 % (FLUSH) 0.9 %
5-40 SYRINGE (ML) INJECTION EVERY 12 HOURS SCHEDULED
Status: DISCONTINUED | OUTPATIENT
Start: 2024-07-04 | End: 2024-07-07 | Stop reason: HOSPADM

## 2024-07-04 RX ORDER — ONDANSETRON 2 MG/ML
4 INJECTION INTRAMUSCULAR; INTRAVENOUS ONCE
Status: COMPLETED | OUTPATIENT
Start: 2024-07-04 | End: 2024-07-04

## 2024-07-04 RX ORDER — PROCHLORPERAZINE EDISYLATE 5 MG/ML
10 INJECTION INTRAMUSCULAR; INTRAVENOUS EVERY 6 HOURS PRN
Status: DISCONTINUED | OUTPATIENT
Start: 2024-07-04 | End: 2024-07-07

## 2024-07-04 RX ADMIN — PROCHLORPERAZINE EDISYLATE 10 MG: 5 INJECTION INTRAMUSCULAR; INTRAVENOUS at 15:54

## 2024-07-04 RX ADMIN — ONDANSETRON 4 MG: 2 INJECTION INTRAMUSCULAR; INTRAVENOUS at 11:33

## 2024-07-04 RX ADMIN — HEPARIN SODIUM 5000 UNITS: 5000 INJECTION INTRAVENOUS; SUBCUTANEOUS at 22:00

## 2024-07-04 RX ADMIN — SODIUM CHLORIDE, PRESERVATIVE FREE 10 ML: 5 INJECTION INTRAVENOUS at 20:54

## 2024-07-04 RX ADMIN — FAMOTIDINE 20 MG: 10 INJECTION, SOLUTION INTRAVENOUS at 20:54

## 2024-07-04 RX ADMIN — DIPHENHYDRAMINE HYDROCHLORIDE 25 MG: 50 INJECTION INTRAMUSCULAR; INTRAVENOUS at 15:49

## 2024-07-04 RX ADMIN — SODIUM CHLORIDE 500 ML: 9 INJECTION, SOLUTION INTRAVENOUS at 17:07

## 2024-07-04 RX ADMIN — LACOSAMIDE 75 MG: 10 INJECTION INTRAVENOUS at 22:41

## 2024-07-04 ASSESSMENT — PAIN SCALES - GENERAL: PAINLEVEL_OUTOF10: 0

## 2024-07-04 ASSESSMENT — PAIN - FUNCTIONAL ASSESSMENT: PAIN_FUNCTIONAL_ASSESSMENT: 0-10

## 2024-07-04 NOTE — H&P
Glom Filt Rate 12 (*)     Calcium 10.2 (*)     Alk Phosphatase 226 (*)     Total Protein 8.6 (*)     Globulin 4.5 (*)     Albumin/Globulin Ratio 0.9 (*)     All other components within normal limits   MAGNESIUM - Abnormal; Notable for the following components:    Magnesium 3.2 (*)     All other components within normal limits   PHOSPHORUS   TROPONIN   TROPONIN   COMPREHENSIVE METABOLIC PANEL W/ REFLEX TO MG FOR LOW K   CBC WITH AUTO DIFFERENTIAL   POCT GLUCOSE   POCT GLUCOSE   POCT GLUCOSE   POCT GLUCOSE   POCT GLUCOSE   POCT GLUCOSE      Vitals:  Patient Vitals for the past 8 hrs:   Temp Pulse Resp BP SpO2   07/04/24 1943 97.9 °F (36.6 °C) -- -- 130/67 --   07/04/24 1930 -- 85 29 -- 100 %   07/04/24 1915 -- 84 21 -- 100 %   07/04/24 1900 -- 88 22 -- 100 %   07/04/24 1845 -- 88 13 -- 100 %   07/04/24 1830 -- 85 26 113/60 100 %   07/04/24 1815 -- 84 15 113/62 100 %   07/04/24 1800 -- 81 23 130/60 95 %   07/04/24 1745 -- 80 22 (!) 105/55 96 %   07/04/24 1730 -- 84 16 (!) 86/52 98 %   07/04/24 1715 -- 85 17 (!) 89/55 99 %   07/04/24 1700 -- 86 18 (!) 83/52 99 %   07/04/24 1645 -- 87 18 (!) 86/51 98 %   07/04/24 1430 -- 93 20 123/66 100 %   07/04/24 1400 -- 90 18 (!) 147/65 100 %   07/04/24 1330 -- 91 13 135/69 100 %   07/04/24 1300 -- 89 17 125/66 99 %       Vitals:    07/04/24 1943   BP: 130/67   Pulse:    Resp:    Temp: 97.9 °F (36.6 °C)   SpO2:        Labs:   Recent Labs     07/04/24  1129   WBC 6.1   HGB 12.9   HCT 38.5        Recent Labs     07/04/24  1129   *   K 3.8   CL 95*   CO2 32   BUN 40*   MG 3.2*   PHOS 3.3     Recent Labs     07/04/24  1129   GLOB 4.5*     No results for input(s): \"INR\", \"APTT\" in the last 72 hours.    Invalid input(s): \"PTP\"   Invalid input(s): \"PHI\", \"PCO2I\", \"PO2I\", \"FIO2I\"  No results for input(s): \"CPK\", \"CKMB\" in the last 72 hours.    Invalid input(s): \"TROIQ\", \"BNPP\"    Imaging:   CT HEAD WO CONTRAST    Result Date: 7/4/2024  EXAM: CT HEAD WO CONTRAST ACC#:

## 2024-07-04 NOTE — ED PROVIDER NOTES
2 times daily    LACOSAMIDE (VIMPAT) 10 MG/ML SOLN ORAL SOLUTION    Take 30 mLs by mouth 2 times daily.    MIDODRINE (PROAMATINE) 5 MG TABLET    Take 1 tablet by mouth as needed    NUTRITIONAL SUPPLEMENTS (NEPRO/CARBSTEADY PO)    Take 230 mLs by mouth 4 times daily    ONDANSETRON (ZOFRAN-ODT) 4 MG DISINTEGRATING TABLET    1 tablet every 8 hours as needed    SCOPOLAMINE (TRANSDERM-SCOP) TRANSDERMAL PATCH    Place 1 patch onto the skin every 72 hours       ALLERGIES     Bee venom, Cephalexin, Codeine, Fentanyl, Oxycodone, Hydralazine, and Velphoro [sucroferric oxyhydroxide]    FAMILY HISTORY       Family History   Problem Relation Age of Onset    Stroke Sister 50    Stroke Father     Deep Vein Thrombosis Neg Hx           SOCIAL HISTORY       Social History     Socioeconomic History    Marital status:    Tobacco Use    Smoking status: Never     Passive exposure: Never    Smokeless tobacco: Never   Substance and Sexual Activity    Alcohol use: Never    Drug use: Never     Social Determinants of Health     Physical Activity: Unknown (1/22/2024)    Exercise Vital Sign     Days of Exercise per Week: 0 days           PHYSICAL EXAM    (up to 7 for level 4, 8 or more for level 5)     ED Triage Vitals [07/04/24 1017]   BP Temp Temp Source Pulse Respirations SpO2 Height Weight - Scale   (!) 148/81 98.2 °F (36.8 °C) Oral 92 18 99 % 1.702 m (5' 7\") 73.9 kg (163 lb)       Body mass index is 25.53 kg/m².    Physical Exam  Constitutional:       General: He is not in acute distress.  HENT:      Head: Normocephalic and atraumatic.      Mouth/Throat:      Mouth: Mucous membranes are dry.   Eyes:      Extraocular Movements: Extraocular movements intact.      Pupils: Pupils are equal, round, and reactive to light.      Comments: Bilateral conjunctival pallor   Cardiovascular:      Rate and Rhythm: Normal rate and regular rhythm.      Pulses: Normal pulses.   Pulmonary:      Effort: Pulmonary effort is normal. No respiratory

## 2024-07-04 NOTE — ED TRIAGE NOTES
Pt arrives via EMS coming from. Pt states feeling bad since yesterday, N/V. 4 episodes of vomiting. Pt did dialysis yesterday.    PMH- PEG Tube, Dialysis M/W/F

## 2024-07-04 NOTE — ED NOTES
Assumed care of this 64-year-old man with PMHx of CVA with residual right-sided weakness and dysphagia status post PEG tube placement but attempting to reinitiate p.o. feeds, DM, and ESRD on HD presenting to the emergency department with fatigue, presyncopal dizziness, and nausea x 1 day.  Patient's workup thus far has been reassuring, however his nausea has been intractable.  Prior team attempted to feed him a protein shake after which he vomited.  They attempted to admit him to the family medicine service, however that service requested attempting another antiemetic prior to admission.  Compazine and Benadryl are ordered.  Will reassess him after these are done.    Assessment: Reassessed patient, his blood pressures were running 80s over 50s so a 500 cc bolus of IV fluids was ordered with improvement to SBP 130s.  Following Compazine he has not vomited but reports ongoing nausea and presyncopal dizziness.  His wife states that he seems less oriented than usual.  What little improvement we have achieved has been with IV antiemetics.  Patient does not appear safe for discharge.  Will reach back out to the family medicine team for admission.    Perfect [facility] Consult for Admission  6:29 PM    ED Room Number: ER07/07  Patient Name and age:  James Wray 64 y.o.  male  Working Diagnosis:   1. Nausea and vomiting, unspecified vomiting type    2. General weakness        COVID-19 Suspicion: No  Sepsis present:  No  Reassessment needed: Yes  Code Status:  Full Code  Readmission: No  Isolation Requirements: no  Recommended Level of Care: med/surg  Department: [place] [facility] - [phone]    Other:  Reassessed this patient, his blood pressures were running 80s over 50s so a 500 cc bolus of IV fluids was ordered with improvement to SBP 130s.  Following Compazine he has not vomited but reports ongoing nausea and presyncopal dizziness.  His wife states that he seems less oriented than usual.  What little improvement we have

## 2024-07-05 PROBLEM — N18.6 ESRD (END STAGE RENAL DISEASE) (HCC): Status: ACTIVE | Noted: 2024-07-05

## 2024-07-05 PROBLEM — I95.9 HYPOTENSION: Status: ACTIVE | Noted: 2024-07-05

## 2024-07-05 PROBLEM — K62.89 PROCTITIS: Status: ACTIVE | Noted: 2024-07-05

## 2024-07-05 PROBLEM — R53.1 GENERAL WEAKNESS: Status: ACTIVE | Noted: 2024-01-23

## 2024-07-05 LAB
ALBUMIN SERPL-MCNC: 3.8 G/DL (ref 3.5–5)
ALBUMIN/GLOB SERPL: 0.9 (ref 1.1–2.2)
ALP SERPL-CCNC: 200 U/L (ref 45–117)
ALT SERPL-CCNC: 30 U/L (ref 12–78)
ANION GAP SERPL CALC-SCNC: 9 MMOL/L (ref 5–15)
AST SERPL-CCNC: 22 U/L (ref 15–37)
BASOPHILS # BLD: 0 K/UL (ref 0–0.1)
BASOPHILS NFR BLD: 1 % (ref 0–1)
BILIRUB SERPL-MCNC: 0.8 MG/DL (ref 0.2–1)
BUN SERPL-MCNC: 50 MG/DL (ref 6–20)
BUN/CREAT SERPL: 8 (ref 12–20)
CALCIUM SERPL-MCNC: 10.1 MG/DL (ref 8.5–10.1)
CHLORIDE SERPL-SCNC: 97 MMOL/L (ref 97–108)
CHOLEST SERPL-MCNC: 155 MG/DL
CO2 SERPL-SCNC: 27 MMOL/L (ref 21–32)
CREAT SERPL-MCNC: 6.32 MG/DL (ref 0.7–1.3)
DIFFERENTIAL METHOD BLD: ABNORMAL
EKG ATRIAL RATE: 90 BPM
EKG DIAGNOSIS: NORMAL
EKG P AXIS: 22 DEGREES
EKG P-R INTERVAL: 174 MS
EKG Q-T INTERVAL: 382 MS
EKG QRS DURATION: 88 MS
EKG QTC CALCULATION (BAZETT): 467 MS
EKG R AXIS: 7 DEGREES
EKG T AXIS: 64 DEGREES
EKG VENTRICULAR RATE: 90 BPM
EOSINOPHIL # BLD: 0.5 K/UL (ref 0–0.4)
EOSINOPHIL NFR BLD: 12 % (ref 0–7)
ERYTHROCYTE [DISTWIDTH] IN BLOOD BY AUTOMATED COUNT: 15.8 % (ref 11.5–14.5)
EST. AVERAGE GLUCOSE BLD GHB EST-MCNC: 100 MG/DL
GLOBULIN SER CALC-MCNC: 4.2 G/DL (ref 2–4)
GLUCOSE BLD STRIP.AUTO-MCNC: 114 MG/DL (ref 65–117)
GLUCOSE BLD STRIP.AUTO-MCNC: 125 MG/DL (ref 65–117)
GLUCOSE BLD STRIP.AUTO-MCNC: 184 MG/DL (ref 65–117)
GLUCOSE BLD STRIP.AUTO-MCNC: 188 MG/DL (ref 65–117)
GLUCOSE BLD STRIP.AUTO-MCNC: 97 MG/DL (ref 65–117)
GLUCOSE SERPL-MCNC: 138 MG/DL (ref 65–100)
HBA1C MFR BLD: 5.1 % (ref 4–5.6)
HCT VFR BLD AUTO: 36.3 % (ref 36.6–50.3)
HDLC SERPL-MCNC: 65 MG/DL
HDLC SERPL: 2.4 (ref 0–5)
HGB BLD-MCNC: 12.6 G/DL (ref 12.1–17)
IMM GRANULOCYTES # BLD AUTO: 0 K/UL (ref 0–0.04)
IMM GRANULOCYTES NFR BLD AUTO: 0 % (ref 0–0.5)
LDLC SERPL CALC-MCNC: 77.6 MG/DL (ref 0–100)
LYMPHOCYTES # BLD: 1 K/UL (ref 0.8–3.5)
LYMPHOCYTES NFR BLD: 27 % (ref 12–49)
MAGNESIUM SERPL-MCNC: 3.3 MG/DL (ref 1.6–2.4)
MCH RBC QN AUTO: 35.3 PG (ref 26–34)
MCHC RBC AUTO-ENTMCNC: 34.7 G/DL (ref 30–36.5)
MCV RBC AUTO: 101.7 FL (ref 80–99)
MONOCYTES # BLD: 0.4 K/UL (ref 0–1)
MONOCYTES NFR BLD: 11 % (ref 5–13)
NEUTS SEG # BLD: 1.9 K/UL (ref 1.8–8)
NEUTS SEG NFR BLD: 49 % (ref 32–75)
NRBC # BLD: 0 K/UL (ref 0–0.01)
NRBC BLD-RTO: 0 PER 100 WBC
PLATELET # BLD AUTO: 141 K/UL (ref 150–400)
PLATELET COMMENT: ABNORMAL
PMV BLD AUTO: 9.6 FL (ref 8.9–12.9)
POTASSIUM SERPL-SCNC: 3.5 MMOL/L (ref 3.5–5.1)
PROT SERPL-MCNC: 8 G/DL (ref 6.4–8.2)
RBC # BLD AUTO: 3.57 M/UL (ref 4.1–5.7)
RBC MORPH BLD: ABNORMAL
SERVICE CMNT-IMP: ABNORMAL
SERVICE CMNT-IMP: NORMAL
SERVICE CMNT-IMP: NORMAL
SODIUM SERPL-SCNC: 133 MMOL/L (ref 136–145)
TRIGL SERPL-MCNC: 62 MG/DL
VLDLC SERPL CALC-MCNC: 12.4 MG/DL
WBC # BLD AUTO: 3.8 K/UL (ref 4.1–11.1)

## 2024-07-05 PROCEDURE — 6370000000 HC RX 637 (ALT 250 FOR IP)

## 2024-07-05 PROCEDURE — 96372 THER/PROPH/DIAG INJ SC/IM: CPT

## 2024-07-05 PROCEDURE — 80053 COMPREHEN METABOLIC PANEL: CPT

## 2024-07-05 PROCEDURE — 93010 ELECTROCARDIOGRAM REPORT: CPT | Performed by: INTERNAL MEDICINE

## 2024-07-05 PROCEDURE — 87340 HEPATITIS B SURFACE AG IA: CPT

## 2024-07-05 PROCEDURE — 86706 HEP B SURFACE ANTIBODY: CPT

## 2024-07-05 PROCEDURE — 2500000003 HC RX 250 WO HCPCS

## 2024-07-05 PROCEDURE — 82962 GLUCOSE BLOOD TEST: CPT

## 2024-07-05 PROCEDURE — 6360000002 HC RX W HCPCS

## 2024-07-05 PROCEDURE — 99223 1ST HOSP IP/OBS HIGH 75: CPT | Performed by: FAMILY MEDICINE

## 2024-07-05 PROCEDURE — 94761 N-INVAS EAR/PLS OXIMETRY MLT: CPT

## 2024-07-05 PROCEDURE — 36415 COLL VENOUS BLD VENIPUNCTURE: CPT

## 2024-07-05 PROCEDURE — 85025 COMPLETE CBC W/AUTO DIFF WBC: CPT

## 2024-07-05 PROCEDURE — 2580000003 HC RX 258

## 2024-07-05 PROCEDURE — 83735 ASSAY OF MAGNESIUM: CPT

## 2024-07-05 PROCEDURE — G0378 HOSPITAL OBSERVATION PER HR: HCPCS

## 2024-07-05 RX ORDER — MIDODRINE HYDROCHLORIDE 5 MG/1
5 TABLET ORAL AS NEEDED
Status: DISCONTINUED | OUTPATIENT
Start: 2024-07-05 | End: 2024-07-07 | Stop reason: HOSPADM

## 2024-07-05 RX ORDER — POLYETHYLENE GLYCOL 3350 17 G/17G
17 POWDER, FOR SOLUTION ORAL DAILY
Status: DISCONTINUED | OUTPATIENT
Start: 2024-07-05 | End: 2024-07-05

## 2024-07-05 RX ORDER — POLYETHYLENE GLYCOL 3350 17 G/17G
34 POWDER, FOR SOLUTION ORAL 2 TIMES DAILY
Status: DISCONTINUED | OUTPATIENT
Start: 2024-07-05 | End: 2024-07-07 | Stop reason: HOSPADM

## 2024-07-05 RX ORDER — POLYETHYLENE GLYCOL 3350 17 G/17G
17 POWDER, FOR SOLUTION ORAL 2 TIMES DAILY
Status: DISCONTINUED | OUTPATIENT
Start: 2024-07-05 | End: 2024-07-05

## 2024-07-05 RX ORDER — FAMOTIDINE 20 MG/1
20 TABLET, FILM COATED ORAL DAILY
Status: DISCONTINUED | OUTPATIENT
Start: 2024-07-06 | End: 2024-07-07 | Stop reason: HOSPADM

## 2024-07-05 RX ORDER — ENEMA 19; 7 G/133ML; G/133ML
1 ENEMA RECTAL ONCE
Status: DISCONTINUED | OUTPATIENT
Start: 2024-07-05 | End: 2024-07-05

## 2024-07-05 RX ORDER — ENEMA 19; 7 G/133ML; G/133ML
1 ENEMA RECTAL
Status: DISCONTINUED | OUTPATIENT
Start: 2024-07-05 | End: 2024-07-05

## 2024-07-05 RX ORDER — SCOLOPAMINE TRANSDERMAL SYSTEM 1 MG/1
1 PATCH, EXTENDED RELEASE TRANSDERMAL
Status: DISCONTINUED | OUTPATIENT
Start: 2024-07-05 | End: 2024-07-07 | Stop reason: HOSPADM

## 2024-07-05 RX ADMIN — HEPARIN SODIUM 5000 UNITS: 5000 INJECTION INTRAVENOUS; SUBCUTANEOUS at 06:16

## 2024-07-05 RX ADMIN — HEPARIN SODIUM 5000 UNITS: 5000 INJECTION INTRAVENOUS; SUBCUTANEOUS at 16:13

## 2024-07-05 RX ADMIN — SODIUM CHLORIDE, PRESERVATIVE FREE 10 ML: 5 INJECTION INTRAVENOUS at 22:00

## 2024-07-05 RX ADMIN — SODIUM CHLORIDE, PRESERVATIVE FREE 10 ML: 5 INJECTION INTRAVENOUS at 09:38

## 2024-07-05 RX ADMIN — POLYETHYLENE GLYCOL 3350 34 G: 17 POWDER, FOR SOLUTION ORAL at 16:13

## 2024-07-05 RX ADMIN — FAMOTIDINE 20 MG: 10 INJECTION, SOLUTION INTRAVENOUS at 09:37

## 2024-07-05 RX ADMIN — LACOSAMIDE 75 MG: 10 SOLUTION ORAL at 09:38

## 2024-07-05 ASSESSMENT — PAIN SCALES - GENERAL
PAINLEVEL_OUTOF10: 0

## 2024-07-05 NOTE — ED NOTES
Verbal SBAR Report given to JOHN Ingram to the 4th floor for patient's routine progression of care. Patient will be brought up shortly.

## 2024-07-05 NOTE — DISCHARGE INSTRUCTIONS
HOME DISCHARGE INSTRUCTIONS    James Wray / [id number] : 1960    Admission date: 2024 Discharge date: 2024     Please bring this form with you to show your care provider at your follow-up appointment.    Primary care provider:  Damaso Foley    Discharging provider:  Yi Francis MD  - Family Medicine Resident  Dr. Guillermina Stoll - Family Medicine Attending      You have been admitted to the hospital with the following diagnoses:    ACUTE DIAGNOSES:  General weakness [R53.1]  Intractable nausea and vomiting [R11.2]  Nausea and vomiting, unspecified vomiting type [R11.2]    . . . . . . . . . . . . . . . . . . . . . . . . . . . . . . . . . . . . . . . . . . . . . . . . . . . . . . . . . . . . . . . . . . . . . . . .   You were hospitalized for nausea and vomiting. You were treated with IV medications which helped. A CT scan of your abdomen showed stool buildup and proctitis, or inflammation of the rectum. The nutritionist saw you and recommended continued home feeds. Please follow up with your PCP.    . . . . . . . . . . . . . . . . . . . . . . . . . . . . . . . . . . . . . . . . . . . . . . . . . . . . . . . . . . . . . . . . . . . . . . . .     MEDICATION CHANGES:  START  Bisacodyl suppository, 2x 10mg tablets daily (if not covered by insurance, use glycerin suppository instead).  Miralax 17g daily (take after dialysis on days you have dialysis)    STOP      CHANGES      No other changes were made to your medications, please take all your other home medicines as previously prescribed.    FOLLOW-UP CARE RECOMMENDATIONS:   1. Please see your PCP Damaso Foley  to follow up with N/V, discuss resolution of your symptoms, and any changes made to your medications    Appointments  Future Appointments   Date Time Provider Department Center   2024  8:30 AM Damaso Foley MD [facility] BS AMB     Follow-up tests needed: CBC, BMP    Pending test results:   At the time of your discharge the following

## 2024-07-05 NOTE — PLAN OF CARE
Problem: Safety - Adult  Goal: Free from fall injury  Outcome: Progressing  Flowsheets (Taken 7/5/2024 1129)  Free From Fall Injury: Instruct family/caregiver on patient safety

## 2024-07-05 NOTE — DIALYSIS
Primary RN SBAR: BRISA Larsen,RN  Patient Education provided: na  Incapacitated Nurse jorge. provided: y  Preferred Education method and Primary language: English  Hospital associated wait time; reason: no wait time  Hepatitis B Surface Ag   Date/Time Value Ref Range Status   02/20/2023 07:10 PM <0.10  Negative   Index Final     HEP B SURF AB   Date/Time Value Ref Range Status   02/20/2023 07:10 .09 mIU/mL Final     Hep B S Ab   Date/Time Value Ref Range Status   02/20/2023 07:10 PM REACTIVE (A) NR   Final     Hep B S Ab and Hep B Surface Ag drawn 7/6/2024 = results are pending     07/05/24 1900   Vital Signs   Temp 98.1 °F (36.7 °C)   Temp Source Oral   Pulse 93   Heart Rate Source Monitor   Respirations 16   /70   MAP (Calculated) 89   Patient Position Semi fowlers   Pain Assessment   Pain Assessment 0-10   Pain Level 0          07/05/24 1955   Treatment   Time On 1950   Treatment Goal 2 kg off   Observations & Evaluations   Level of Consciousness 0   Heart Rhythm Regular   O2 Device None (Room air)   Skin Condition/Temp Warm;Dry   Vital Signs   /75  (HD started)   Pulse 90   Access   Add Access? Yes   Technical Checks   Dialysis Machine No. 03  ([facility])   RO Machine Number [id number]   Dialyzer Lot No. [id number]   Tubing Lot Number 24 B 14-9 / 2029-01-31   All Connections Secure Yes   NS Bag Yes   Saline Line Double Clamped Yes   Dialyzer Revaclear 300   Prime Volume (mL) 300 mL   ICEBOAT I;C;E;B;O;A;T   RO Machine Log Sheet Completed Yes   Machine Alarm Self Test Completed;Passed   Air Foam Detector Tested;Proper Function   Extracorporeal Circuit Tested for Integrity Yes   Machine Conductivity 14.2   Manual Ph 7.4   Bleach Test (Neg) Yes   Bath Temperature 98.6 °F (37 °C)   Treatment Initiation   Dialyze Hours 3.5   Treatment  Initiation Universal Precautions maintained;Lines secured to patient;Connections secured;Prime given;Venous Parameters set;Arterial Parameters set;Air foam detector

## 2024-07-05 NOTE — DISCHARGE SUMMARY
route 2 times daily As needed.     * blood glucose test strips strip  Commonly known as: ASCENSIA AUTODISC VI;ONE TOUCH ULTRA TEST VI  1 each by In Vitro route daily As needed.     insulin aspart 100 UNIT/ML injection pen  Commonly known as: NovoLOG  Sliding scale dosage subcutaneous twice daily. MAX of 14U of insulin per injection. DAILY MAX of 28U.     isosorbide mononitrate 30 MG extended release tablet  Commonly known as: IMDUR     lacosamide 10 MG/ML Soln oral solution  Commonly known as: VIMPAT     midodrine 5 MG tablet  Commonly known as: PROAMATINE     NEPRO/CARBSTEADY PO     ondansetron 4 MG disintegrating tablet  Commonly known as: ZOFRAN-ODT     scopolamine transdermal patch  Commonly known as: TRANSDERM-SCOP           * This list has 2 medication(s) that are the same as other medications prescribed for you. Read the directions carefully, and ask your doctor or other care provider to review them with you.                STOP taking these medications      clobetasol 0.05 % ointment  Commonly known as: TEMOVATE               Where to Get Your Medications        These medications were sent to [facility]/pharmacy #[id number] - [place], [place] - [address] [address] [facility] - P [phone] - F [phone] [address] [address]      Phone: [phone]   bisacodyl 10 MG suppository  glycerin 2 g suppository  polyethylene glycol 17 GM/SCOOP powder          Diet:  Regular diet, PEG.    Activity:  As tolerated     Disposition: Home    Discharge instructions to patient/family  Please seek medical attention for any new or worsening symptoms particularly chest pain, shortness of breath, fever, chills, nausea, vomiting, diarrhea, change in mentation, falling, weakness, bleeding.    Follow up plans/appointments  Follow-up Information       Follow up With Specialties Details Why Contact Info    Your PCP (Primary Care Provider)  Call   Please contact your primary care provider for follow-up. If you do not have

## 2024-07-06 LAB
ALBUMIN SERPL-MCNC: 3.7 G/DL (ref 3.5–5)
ALBUMIN/GLOB SERPL: 0.8 (ref 1.1–2.2)
ALP SERPL-CCNC: 221 U/L (ref 45–117)
ALT SERPL-CCNC: 31 U/L (ref 12–78)
ANION GAP SERPL CALC-SCNC: 6 MMOL/L (ref 5–15)
AST SERPL-CCNC: 36 U/L (ref 15–37)
BASOPHILS # BLD: 0 K/UL (ref 0–0.1)
BASOPHILS NFR BLD: 1 % (ref 0–1)
BILIRUB SERPL-MCNC: 1 MG/DL (ref 0.2–1)
BUN SERPL-MCNC: 23 MG/DL (ref 6–20)
BUN/CREAT SERPL: 6 (ref 12–20)
CALCIUM SERPL-MCNC: 9.5 MG/DL (ref 8.5–10.1)
CHLORIDE SERPL-SCNC: 97 MMOL/L (ref 97–108)
CO2 SERPL-SCNC: 27 MMOL/L (ref 21–32)
CREAT SERPL-MCNC: 4.08 MG/DL (ref 0.7–1.3)
DIFFERENTIAL METHOD BLD: ABNORMAL
EOSINOPHIL # BLD: 0.3 K/UL (ref 0–0.4)
EOSINOPHIL NFR BLD: 8 % (ref 0–7)
ERYTHROCYTE [DISTWIDTH] IN BLOOD BY AUTOMATED COUNT: 17.1 % (ref 11.5–14.5)
GLOBULIN SER CALC-MCNC: 4.4 G/DL (ref 2–4)
GLUCOSE BLD STRIP.AUTO-MCNC: 117 MG/DL (ref 65–117)
GLUCOSE BLD STRIP.AUTO-MCNC: 124 MG/DL (ref 65–117)
GLUCOSE BLD STRIP.AUTO-MCNC: 124 MG/DL (ref 65–117)
GLUCOSE BLD STRIP.AUTO-MCNC: 137 MG/DL (ref 65–117)
GLUCOSE SERPL-MCNC: 123 MG/DL (ref 65–100)
HBV SURFACE AB SER QL: NONREACTIVE
HBV SURFACE AB SER-ACNC: <3.1 MIU/ML
HBV SURFACE AG SER QL: <0.1 INDEX
HBV SURFACE AG SER QL: NEGATIVE
HCT VFR BLD AUTO: 36.6 % (ref 36.6–50.3)
HGB BLD-MCNC: 13.7 G/DL (ref 12.1–17)
IMM GRANULOCYTES # BLD AUTO: 0 K/UL (ref 0–0.04)
IMM GRANULOCYTES NFR BLD AUTO: 0 % (ref 0–0.5)
LYMPHOCYTES # BLD: 1.2 K/UL (ref 0.8–3.5)
LYMPHOCYTES NFR BLD: 28 % (ref 12–49)
MCH RBC QN AUTO: 39.6 PG (ref 26–34)
MCHC RBC AUTO-ENTMCNC: 37.4 G/DL (ref 30–36.5)
MCV RBC AUTO: 105.8 FL (ref 80–99)
MONOCYTES # BLD: 0.4 K/UL (ref 0–1)
MONOCYTES NFR BLD: 9 % (ref 5–13)
NEUTS SEG # BLD: 2.3 K/UL (ref 1.8–8)
NEUTS SEG NFR BLD: 54 % (ref 32–75)
NRBC # BLD: 0.04 K/UL (ref 0–0.01)
NRBC BLD-RTO: 0.9 PER 100 WBC
PLATELET # BLD AUTO: 135 K/UL (ref 150–400)
POTASSIUM SERPL-SCNC: 3.9 MMOL/L (ref 3.5–5.1)
PROT SERPL-MCNC: 8.1 G/DL (ref 6.4–8.2)
RBC # BLD AUTO: 3.46 M/UL (ref 4.1–5.7)
RBC MORPH BLD: ABNORMAL
RBC MORPH BLD: ABNORMAL
SERVICE CMNT-IMP: ABNORMAL
SERVICE CMNT-IMP: NORMAL
SODIUM SERPL-SCNC: 130 MMOL/L (ref 136–145)
WBC # BLD AUTO: 4.2 K/UL (ref 4.1–11.1)

## 2024-07-06 PROCEDURE — 99233 SBSQ HOSP IP/OBS HIGH 50: CPT | Performed by: FAMILY MEDICINE

## 2024-07-06 PROCEDURE — 82962 GLUCOSE BLOOD TEST: CPT

## 2024-07-06 PROCEDURE — 6370000000 HC RX 637 (ALT 250 FOR IP)

## 2024-07-06 PROCEDURE — 85025 COMPLETE CBC W/AUTO DIFF WBC: CPT

## 2024-07-06 PROCEDURE — 36415 COLL VENOUS BLD VENIPUNCTURE: CPT

## 2024-07-06 PROCEDURE — 6360000002 HC RX W HCPCS

## 2024-07-06 PROCEDURE — 1100000000 HC RM PRIVATE

## 2024-07-06 PROCEDURE — 80053 COMPREHEN METABOLIC PANEL: CPT

## 2024-07-06 PROCEDURE — 94761 N-INVAS EAR/PLS OXIMETRY MLT: CPT

## 2024-07-06 PROCEDURE — 96372 THER/PROPH/DIAG INJ SC/IM: CPT

## 2024-07-06 PROCEDURE — G0378 HOSPITAL OBSERVATION PER HR: HCPCS

## 2024-07-06 PROCEDURE — 2580000003 HC RX 258

## 2024-07-06 RX ORDER — SODIUM CHLORIDE, SODIUM LACTATE, POTASSIUM CHLORIDE, CALCIUM CHLORIDE 600; 310; 30; 20 MG/100ML; MG/100ML; MG/100ML; MG/100ML
INJECTION, SOLUTION INTRAVENOUS CONTINUOUS
Status: DISCONTINUED | OUTPATIENT
Start: 2024-07-06 | End: 2024-07-06

## 2024-07-06 RX ORDER — LACTULOSE 10 G/15ML
20 SOLUTION ORAL 3 TIMES DAILY
Status: DISCONTINUED | OUTPATIENT
Start: 2024-07-06 | End: 2024-07-07 | Stop reason: HOSPADM

## 2024-07-06 RX ADMIN — LACOSAMIDE 75 MG: 10 SOLUTION ORAL at 22:34

## 2024-07-06 RX ADMIN — HEPARIN SODIUM 5000 UNITS: 5000 INJECTION INTRAVENOUS; SUBCUTANEOUS at 09:27

## 2024-07-06 RX ADMIN — LACOSAMIDE 75 MG: 10 SOLUTION ORAL at 09:27

## 2024-07-06 RX ADMIN — POLYETHYLENE GLYCOL 3350 34 G: 17 POWDER, FOR SOLUTION ORAL at 00:52

## 2024-07-06 RX ADMIN — FAMOTIDINE 20 MG: 20 TABLET, FILM COATED ORAL at 09:27

## 2024-07-06 RX ADMIN — LACTULOSE 20 G: 10 SOLUTION ORAL at 11:50

## 2024-07-06 RX ADMIN — SODIUM CHLORIDE, PRESERVATIVE FREE 10 ML: 5 INJECTION INTRAVENOUS at 10:45

## 2024-07-06 RX ADMIN — POLYETHYLENE GLYCOL 3350, SODIUM SULFATE ANHYDROUS, SODIUM BICARBONATE, SODIUM CHLORIDE, POTASSIUM CHLORIDE 2000 ML: 236; 22.74; 6.74; 5.86; 2.97 POWDER, FOR SOLUTION ORAL at 09:27

## 2024-07-06 RX ADMIN — LACOSAMIDE 75 MG: 10 SOLUTION ORAL at 00:46

## 2024-07-06 RX ADMIN — HEPARIN SODIUM 5000 UNITS: 5000 INJECTION INTRAVENOUS; SUBCUTANEOUS at 15:18

## 2024-07-06 RX ADMIN — POLYETHYLENE GLYCOL 3350 34 G: 17 POWDER, FOR SOLUTION ORAL at 22:27

## 2024-07-06 RX ADMIN — POLYETHYLENE GLYCOL 3350, SODIUM SULFATE ANHYDROUS, SODIUM BICARBONATE, SODIUM CHLORIDE, POTASSIUM CHLORIDE 2000 ML: 236; 22.74; 6.74; 5.86; 2.97 POWDER, FOR SOLUTION ORAL at 15:18

## 2024-07-06 RX ADMIN — HEPARIN SODIUM 5000 UNITS: 5000 INJECTION INTRAVENOUS; SUBCUTANEOUS at 00:52

## 2024-07-06 RX ADMIN — LACTULOSE 20 G: 10 SOLUTION ORAL at 22:26

## 2024-07-06 RX ADMIN — SODIUM CHLORIDE, PRESERVATIVE FREE 10 ML: 5 INJECTION INTRAVENOUS at 22:28

## 2024-07-07 VITALS
WEIGHT: 163 LBS | BODY MASS INDEX: 25.58 KG/M2 | HEART RATE: 78 BPM | TEMPERATURE: 97.7 F | SYSTOLIC BLOOD PRESSURE: 117 MMHG | OXYGEN SATURATION: 100 % | DIASTOLIC BLOOD PRESSURE: 65 MMHG | RESPIRATION RATE: 13 BRPM | HEIGHT: 67 IN

## 2024-07-07 LAB
ALBUMIN SERPL-MCNC: 3.4 G/DL (ref 3.5–5)
ALBUMIN/GLOB SERPL: 0.9 (ref 1.1–2.2)
ALP SERPL-CCNC: 146 U/L (ref 45–117)
ALT SERPL-CCNC: 24 U/L (ref 12–78)
ANION GAP SERPL CALC-SCNC: 11 MMOL/L (ref 5–15)
AST SERPL-CCNC: 22 U/L (ref 15–37)
BASOPHILS # BLD: 0 K/UL (ref 0–0.1)
BASOPHILS NFR BLD: 1 % (ref 0–1)
BILIRUB SERPL-MCNC: 0.9 MG/DL (ref 0.2–1)
BUN SERPL-MCNC: 27 MG/DL (ref 6–20)
BUN/CREAT SERPL: 5 (ref 12–20)
CALCIUM SERPL-MCNC: 9.1 MG/DL (ref 8.5–10.1)
CHLORIDE SERPL-SCNC: 93 MMOL/L (ref 97–108)
CO2 SERPL-SCNC: 29 MMOL/L (ref 21–32)
CREAT SERPL-MCNC: 5.43 MG/DL (ref 0.7–1.3)
DIFFERENTIAL METHOD BLD: ABNORMAL
EOSINOPHIL # BLD: 0.5 K/UL (ref 0–0.4)
EOSINOPHIL NFR BLD: 11 % (ref 0–7)
ERYTHROCYTE [DISTWIDTH] IN BLOOD BY AUTOMATED COUNT: 16.6 % (ref 11.5–14.5)
GLOBULIN SER CALC-MCNC: 3.9 G/DL (ref 2–4)
GLUCOSE SERPL-MCNC: 108 MG/DL (ref 65–100)
HCT VFR BLD AUTO: 36.5 % (ref 36.6–50.3)
HGB BLD-MCNC: 12.5 G/DL (ref 12.1–17)
IMM GRANULOCYTES # BLD AUTO: 0 K/UL (ref 0–0.04)
IMM GRANULOCYTES NFR BLD AUTO: 0 % (ref 0–0.5)
LYMPHOCYTES # BLD: 1.5 K/UL (ref 0.8–3.5)
LYMPHOCYTES NFR BLD: 35 % (ref 12–49)
MCH RBC QN AUTO: 34.4 PG (ref 26–34)
MCHC RBC AUTO-ENTMCNC: 34.2 G/DL (ref 30–36.5)
MCV RBC AUTO: 100.6 FL (ref 80–99)
MONOCYTES # BLD: 0.5 K/UL (ref 0–1)
MONOCYTES NFR BLD: 12 % (ref 5–13)
NEUTS SEG # BLD: 1.7 K/UL (ref 1.8–8)
NEUTS SEG NFR BLD: 41 % (ref 32–75)
NRBC # BLD: 0.03 K/UL (ref 0–0.01)
NRBC BLD-RTO: 0.7 PER 100 WBC
PLATELET # BLD AUTO: 142 K/UL (ref 150–400)
PMV BLD AUTO: 9.8 FL (ref 8.9–12.9)
POTASSIUM SERPL-SCNC: 4 MMOL/L (ref 3.5–5.1)
PROT SERPL-MCNC: 7.3 G/DL (ref 6.4–8.2)
RBC # BLD AUTO: 3.63 M/UL (ref 4.1–5.7)
RBC MORPH BLD: ABNORMAL
RBC MORPH BLD: ABNORMAL
SODIUM SERPL-SCNC: 133 MMOL/L (ref 136–145)
WBC # BLD AUTO: 4.2 K/UL (ref 4.1–11.1)

## 2024-07-07 PROCEDURE — 6360000002 HC RX W HCPCS

## 2024-07-07 PROCEDURE — 36415 COLL VENOUS BLD VENIPUNCTURE: CPT

## 2024-07-07 PROCEDURE — 2580000003 HC RX 258

## 2024-07-07 PROCEDURE — 6370000000 HC RX 637 (ALT 250 FOR IP)

## 2024-07-07 PROCEDURE — 94761 N-INVAS EAR/PLS OXIMETRY MLT: CPT

## 2024-07-07 PROCEDURE — 99238 HOSP IP/OBS DSCHRG MGMT 30/<: CPT | Performed by: FAMILY MEDICINE

## 2024-07-07 PROCEDURE — 80053 COMPREHEN METABOLIC PANEL: CPT

## 2024-07-07 PROCEDURE — 85025 COMPLETE CBC W/AUTO DIFF WBC: CPT

## 2024-07-07 RX ORDER — POLYETHYLENE GLYCOL 3350 17 G/17G
17 POWDER, FOR SOLUTION ORAL DAILY
Qty: 510 G | Refills: 0 | Status: SHIPPED | OUTPATIENT
Start: 2024-07-07 | End: 2024-08-06

## 2024-07-07 RX ORDER — PROCHLORPERAZINE MALEATE 5 MG/1
10 TABLET ORAL EVERY 6 HOURS PRN
Status: DISCONTINUED | OUTPATIENT
Start: 2024-07-07 | End: 2024-07-07 | Stop reason: HOSPADM

## 2024-07-07 RX ORDER — MAG HYDROX/ALUMINUM HYD/SIMETH 400-400-40
1 SUSPENSION, ORAL (FINAL DOSE FORM) ORAL DAILY
Qty: 30 SUPPOSITORY | Refills: 0 | Status: SHIPPED | OUTPATIENT
Start: 2024-07-07 | End: 2024-08-06

## 2024-07-07 RX ORDER — BISACODYL 10 MG
20 SUPPOSITORY, RECTAL RECTAL DAILY
Qty: 32 SUPPOSITORY | Refills: 0 | Status: SHIPPED | OUTPATIENT
Start: 2024-07-07

## 2024-07-07 RX ORDER — BISACODYL 10 MG
20 SUPPOSITORY, RECTAL RECTAL DAILY
Qty: 60 SUPPOSITORY | Refills: 0 | Status: CANCELLED | OUTPATIENT
Start: 2024-07-07 | End: 2024-08-06

## 2024-07-07 RX ADMIN — LACOSAMIDE 75 MG: 10 SOLUTION ORAL at 08:43

## 2024-07-07 RX ADMIN — LACTULOSE 20 G: 10 SOLUTION ORAL at 08:44

## 2024-07-07 RX ADMIN — FAMOTIDINE 20 MG: 20 TABLET, FILM COATED ORAL at 08:42

## 2024-07-07 RX ADMIN — HEPARIN SODIUM 5000 UNITS: 5000 INJECTION INTRAVENOUS; SUBCUTANEOUS at 08:42

## 2024-07-07 RX ADMIN — POLYETHYLENE GLYCOL 3350 34 G: 17 POWDER, FOR SOLUTION ORAL at 08:43

## 2024-07-07 RX ADMIN — SODIUM CHLORIDE, PRESERVATIVE FREE 10 ML: 5 INJECTION INTRAVENOUS at 08:44

## 2024-07-07 RX ADMIN — HEPARIN SODIUM 5000 UNITS: 5000 INJECTION INTRAVENOUS; SUBCUTANEOUS at 00:45

## 2024-07-07 NOTE — CONSULTS
Nutrition Note    See full note from Friday 7/5. New consult for Clear liquid diet for proctitis. Pt takes PO \"for comfort\". Wife reports pt does not eat sufficient calories to maintain weight/energy needs by mouth. Pt has PEG to provide nutrition. 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     There is no Clear liquid tube feeding. Recommend using Vital AF 1.2 during current acute GI dx. This is a peptide based formula with no fiber. It's lower kcal density will not provide sufficient calories long term though and pt should resume Nepro when proctitis improves and consider a fiber supplement in the future to promote motility.     Order for Vital AF 1.2: Bolus feed- 250 mL x 4 per day. Flush 50 mL before and after bolus.     This provides: 1200 kcal, 75 g Pro, 1217 mL free water. Pt will meet hydration needs with PO liquid.     Added Gelatein TID for added kcal/pro to help meet needs.     Estimated Daily Nutrient Needs:  Energy Requirements Based On: Kcal/kg  Weight Used for Energy Requirements: Current  Energy (kcal/day): 1800 (25kcal.kg)  Weight Used for Protein Requirements: Current  Protein (g/day): 101 (1.4g/kg)  Method Used for Fluid Requirements: Standard Renal  Fluid (ml/day): 1500      Electronically signed by Allen Arriaga RD on 7/7/24 at 7:21 AM EDT    Contact: Perfect serve    
nausea, vomiting    Nutrition Interventions:   Food and/or Nutrient Delivery: Start Oral Diet, Start Tube Feeding  Nutrition Education/Counseling: No recommendation at this time  Coordination of Nutrition Care: Continue to monitor while inpatient, Interdisciplinary Rounds       Goals:     Goals: PO intake 50% or greater, Initiate nutrition support, by next RD assessment       Nutrition Monitoring and Evaluation:   Behavioral-Environmental Outcomes: None Identified  Food/Nutrient Intake Outcomes: Food and Nutrient Intake, Diet Advancement/Tolerance, Enteral Nutrition Intake/Tolerance  Physical Signs/Symptoms Outcomes: GI Status, Weight, Biochemical Data, Nausea or Vomiting    Discharge Planning:    Continue current diet, Recommend pursue outpatient nutrition counseling, Enteral Nutrition     Allen Arriaga RD  Available via [employer]  Office # [phone]

## 2024-07-07 NOTE — CARE COORDINATION
Call received that patient is discharging, [facility] confirmed they have the information and have him on Will Call, can be here by 2:45pm.  Noted in the CM assessment and Nephrology note that [facility] to [facility] and updates from the chart sent to them this day.   
Case Management Note            CM notified by Dr. Laird, patient is not medically ready for discharge home today.        CM spoke to [facility] at hospital to home. Will call transport has been changed to possible dc on Sunday 7/7. Transport time will need to be secured with [facility] once patient is medically ready for dc.            ___________________________________________   Ginny Cloud RN Case Manager  7/6/2024   12:15 PM '  
following treatment goals: home with spouse; all DME/home needs already arranged; stretcher transport needed   The Patient and/or Patient Representative was provided with a Choice of Provider? Patient;Patient Representative   Name of the Patient Representative who was provided with the Choice of Provider and agrees with the Discharge Plan?  spouse, Steff Wray   The Patient and/Or Patient Representative agree with the Discharge Plan? Yes   Freedom of Choice list was provided with basic dialogue that supports the patient's individualized plan of care/goals, treatment preferences, and shares the quality data associated with the providers?  Yes     Nataly Calix MA, BSW, ACM-SW  [facility]      Available via [employer]

## 2024-07-07 NOTE — PROGRESS NOTES
[address] [place] [address]   Office [phone]  Fax [phone]          Subjective / Objective     Subjective  Overnight Events: Admitted from ED.    Patient seen and examined at bedside. Reports feeling well this AM. Patient and wife express that they would very much like to go home, ask about repeat imaging to see if constipation has cleared. Informed pt, wife that additional imaging may not . Patient continues to have good intake of liquids PO. Patient denies abdominal pain. Denies CP, SOB, N/V, dysuria.    Respiratory:   RA  Vitals:    07/07/24 1158   BP: 117/65   Pulse: 78   Resp: 13   Temp: 97.7 °F (36.5 °C)   SpO2: 100%     Physical Examination:   General appearance - alert, well appearing, and in no distress  Chest - clear to auscultation, no wheezes, rales or rhonchi, symmetric air entry. Quiet bilateral breath sounds.  Heart - normal rate, regular rhythm, normal S1 and S2, 3/6 systolic murmur over RUSB, rubs, clicks or gallops  Abdomen - Palpable hard stool in colon. Abdomen is otherwise soft and not distended. Abdomen is tender to deep palpation in region of transverse colon, otherwise NT.   Skin - warm, dry. No notable rashes  Extremities - peripheral pulses normal, no pedal edema, no clubbing or cyanosis  Psychiatric - aphasic speech, normal thought processes  Neurological - Alert. No focal deficits. Expressive aphasia.    I/O:  No intake/output data recorded.  Inpatient Medications  Current Facility-Administered Medications   Medication Dose Route Frequency    prochlorperazine (COMPAZINE) tablet 10 mg  10 mg Oral Q6H PRN    lactulose (CHRONULAC) 10 GM/15ML solution 20 g  20 g Oral TID    scopolamine (TRANSDERM-SCOP) transdermal patch 1 patch  1 patch TransDERmal Q72H    midodrine (PROAMATINE) tablet 5 mg  5 mg Oral PRN    polyethylene glycol (GLYCOLAX) packet 34 g  34 g Per G Tube BID    famotidine (PEPCID) tablet 20 mg  20 mg Oral Daily    sodium chloride 
  [address] [place] [address]   Office [phone]  Fax [phone]          Subjective / Objective     Subjective  Overnight Events: Admitted from ED.    Patient seen and examined at bedside. Reports feeling well this AM. Wife expressed concerns about patient becoming fluid overloaded with large intake of golytely. Per wife, patient has tried miralax for constipation before but was difficult to time to avoid BMs during HD. Patient was taking 1.5 packet on days w/o dialysis, did not consistently have daily Bms. Patient reportedly taking considerable amount of liquids PO. Patient denies pain. Denies CP, SOB, N/V, dysuria.    Respiratory:   RA  Vitals:    07/06/24 0829   BP: 124/74   Pulse: 77   Resp: 18   Temp: 98.2 °F (36.8 °C)   SpO2: 100%     Physical Examination:   General appearance - alert, well appearing, and in no distress  Chest - clear to auscultation, no wheezes, rales or rhonchi, symmetric air entry. Quiet bilateral breath sounds.  Heart - normal rate, regular rhythm, normal S1 and S2, 3/6 systolic murmur over RUSB, rubs, clicks or gallops  Abdomen - soft, nontender, nondistended, no masses or organomegaly  Skin - warm, dry. No notable rashes  Extremities - peripheral pulses normal, no pedal edema, no clubbing or cyanosis  Psychiatric - aphasic speech, normal thought processes  Neurological - Alert. Asymmetric cranial nerve exam, intact pupillary reflex, deficits to tracking but EOMI intact. Asymmetric palatal deviation, tongue deviates to patient R on protrusion but ROM intact. Otherwise no deficit to cranial nerves. 3/5 strength in RLE, otherwise strength intact. Sensation decreased to RLE.     I/O:  07/05 0701 - 07/06 0700  In: 500   Out: 2000   Inpatient Medications  Current Facility-Administered Medications   Medication Dose Route Frequency    scopolamine (TRANSDERM-SCOP) transdermal patch 1 patch  1 patch TransDERmal Q72H    midodrine (PROAMATINE) tablet 5 mg  5 mg Oral PRN    
  Affiliated with [facility] and [facility]       Resident Progress Note in Brief    S: Patient seen and examined at bedside. No concerns.       O:  /72   Pulse 80   Temp 97.7 °F (36.5 °C) (Oral)   Resp 17   Ht 1.702 m (5' 7\")   Wt 73.9 kg (163 lb)   SpO2 100%   BMI 25.53 kg/m²   Physical Examination:   General appearance - alert, well appearing, and in no distress  Chest - clear to auscultation, no wheezes, rales or rhonchi, symmetric air entry  Heart - normal rate, regular rhythm, normal S1, S2, no murmurs, rubs, clicks or gallops,   Abdomen - soft, nontender, nondistended, no masses or organomegaly  Neurological - alert, oriented, normal speech, no focal findings  Extremities - peripheral pulses normal, no pedal edema, no clubbing or cyanosis    A/P:     James Wray is a 64 y.o. male with a PMHx of R sided weakness and visual deficits 2/2 stroke, DM, HTN, ESRD MWF dialysis admitted for intractable nausea/vomiting.     Intractable nausea/vomiting: Occurring for over 24 hrs since returning from dialysis 7/3/24. Patient has hx N/V controlled with scopolamine. Only has breakthrough N/V if patched changed later than q3 days. PEG tube due to dysphagia 2/2 stroke, goal of removal soon as po tolerance increasing, all meds tolerated po with some food. Possible etiology includes esophagitis, gastritis, PUD, gastroparesis, infectious. However no apparent source GI or urinary source. Low concern for CVA/TIA, CT Head unremarkable. EKG NSR, Qtc 467 ms >450 ms for men therefore avoid further Qtc prolonging meds.   - Admit to Observation, telemetry  - Daily CBC, CMP  - Pending CT A/P  - S/p Zofran 4mg x 1, compazine 10 mg x1, benadryl 25 mg, famotidine 20 mg IV, home scopolamine patch changed 7/3/24  - avoid further benadryl due to patient disoriented  - Compazine 10 mg IV q6 prn if N/V persists  - NPO with water sips for meds  - If N/V improves, can advance diet as tolerated  - Dietary consulted for PEG tube feeds, po 
[facility] RESIDENCY PROGRAM   Senior Resident Admission Note    Chart reviewed. Patient seen, examined, and discussed with Dr. Heaton (PGY-1). See H&P note for more details and physical exam.    CC: Emesis and Fatigue      HPI:  James Wray is a 64 y.o. male w/ a known history of CVA w/ residual right sided deficits, dysphagia s/p PEG, urinary retention, T2DM, and ESRD on HD (MWF) who presents for nausea and vomiting. Unable to tolerate oral intake or his PEG tube feedings. His symptoms began about 24 hours prior to ER arrival, after his dialysis session on Wednesday. He also endorses dizziness, fatigue, and generalized weakness. No chest pain, fevers, dyspnea, new focal deficits, abdominal pain, or other upper respiratory symptoms.     He changed his scopolamine patch yesterday morning. His wife is at bedside and assists with history. She reports he has been less like himself since he was given IV Benadryl in ER, but was at his baseline prior to that. He does not make urine. He does PEG tube feedings and eats PO for comfort.     Pertinent ED Findings:  VS: /60   Pulse 85   Temp 98.2 °F (36.8 °C) (Oral)   Resp 26   Ht 1.702 m (5' 7\")   Wt 73.9 kg (163 lb)   SpO2 100%   BMI 25.53 kg/m²   Labs: WBC 3.1, Hgb 12.9, Na 132, K 3.8, Gluc 150, Mg 3.2, P 3.3, Trop 33>35  Imaging: CXR: Normal, CTH: No acute intracranial abnormalities.   EKG: NSR, rate 90, Qtc 467  Treatment: Zofran 4 mg IV, Benadryl 25 mg IV, Compazine 10 mg IV,  mL bolus      A/P: 64 y.o. male admitted for intractable nausea and vomiting.    Intractable Nausea & Vomiting  Hypotension: His blood pressures were low in ER, but improved with IVF bolus of 500 cc. Likely secondary to volume depletion from vomiting. Suspect nausea and vomiting may be due to acute gastroenteritis. His abdominal exam is benign, so low suspicion for other acute intra-abdominal process.   - Admit to medical  - Follow CT A/P result  - Monitor intake 
Nurse handed patient a copy of discharge instructions which have been read and explained to patient. New medications were read and explained to patient, patient verbalized understanding. Patient aware that prescriptions have been electronically sent to their pharmacy. Opportunity for questions and clarification offered. Removed patient's IV access with no complications. Vital signs stable. Patient sent with all belongings.      
Pharmacy Dosing Services: 07/05/24    The pharmacist has determined that this patient meets P & T approved criteria for conversion from IV to oral therapy for the following medication:Pepcid 20 mg IV daily      The pharmacist has written the following order for the patient: Pepcid 20 mg po daily    The pharmacist will continue to monitor the patient's status and advise the physician if conversion back to IV therapy is recommended.    Signed NUSRAT ARAMBULA, [facility]   [phone]    
Soap suds enema administered per order @7655.  No bowel movement produced at time of this note.  
hours          Objective:     Vitals:  Blood pressure 123/66, pulse 93, temperature 98.2 °F (36.8 °C), temperature source Oral, resp. rate 20, height 1.702 m (5' 7\"), weight 73.9 kg (163 lb), SpO2 100 %.  Temp (24hrs), Av.2 °F (36.8 °C), Min:98.2 °F (36.8 °C), Max:98.2 °F (36.8 °C)      Intake and Output:  No intake/output data recorded.  No intake/output data recorded.    Physical Exam:                   Physical Examination:   GENERAL ASSESSMENT: NAD  HEENT:Nontraumatic   CHEST: CTA  HEART: S1S2  ABDOMEN: Soft,NT,  :Carlson:   EXTREMITY: No EDEMA  NEURO:Grossly non focal          ECG/rhythm:    Data Review      No results for input(s): \"TNIPOC\" in the last 72 hours.    Invalid input(s): \"ITNL\"   No results found for: \"LABPROT\", \"LABALBU\"   Recent Labs     24  1129   *   K 3.8   CL 95*   CO2 32   BUN 40*   PHOS 3.3   MG 3.2*   WBC 6.1   HGB 12.9   HCT 38.5         Lab Results   Component Value Date    WBC 6.1 2024    HGB 12.9 2024    HCT 38.5 2024    .4 (H) 2024     2024    LYMPHOPCT 15 2024    RBC 3.76 (L) 2024    MCH 34.3 (H) 2024    MCHC 33.5 2024    RDW 15.9 (H) 2024     Lab Results   Component Value Date    CREATININE 5.20 (H) 2024    CREATININE 2.96 (H) 2024    CREATININE 3.02 (H) 2024      Lab Results   Component Value Date    BUN 40 (H) 2024      Lab Results   Component Value Date    BUN 40 (H) 2024    BUN 24 (H) 2024    BUN 47 (H) 2023    BUN 35 (H) 2023    BUN 51 (H) 2023    BUN 33 (H) 06/15/2023    BUN 61 (H) 2023    BUN 57 (H) 2023    BUN 56 (H) 2023    BUN 44 (H) 2023    BUN 25 (H) 2023    BUN 74 (H) 2022    BUN 47 (H) 2020    BUN 49 (H) 2020    BUN 35 (H) 2020    BUN 54 (H) 2020    BUN 40 (H) 2020    BUN 36 (H) 2020    BUN 35 (H) 2020    CREATININE 5.20 (H) 2024    
pressure (!) 141/79, pulse 82, temperature 98.6 °F (37 °C), temperature source Oral, resp. rate 16, height 1.702 m (5' 7\"), weight 73.9 kg (163 lb), SpO2 100 %.  Temp (24hrs), Av.8 °F (36.6 °C), Min:97.2 °F (36.2 °C), Max:98.6 °F (37 °C)      Intake and Output:  No intake/output data recorded.  No intake/output data recorded.    Physical Exam:                   Physical Examination:   GENERAL ASSESSMENT: NAD  HEENT:Nontraumatic   CHEST: CTA  HEART: S1S2  ABDOMEN: Soft,NT,  :Carlson:   EXTREMITY: No EDEMA  NEURO:Grossly non focal          ECG/rhythm:    Data Review      No results for input(s): \"TNIPOC\" in the last 72 hours.    Invalid input(s): \"ITNL\"   No results found for: \"LABPROT\", \"LABALBU\"   Recent Labs     24  1129 24  0546   * 133*   K 3.8 3.5   CL 95* 97   CO2 32 27   BUN 40* 50*   PHOS 3.3  --    MG 3.2* 3.3*   WBC 6.1 3.8*   HGB 12.9 12.6   HCT 38.5 36.3*    141*      Lab Results   Component Value Date    WBC 3.8 (L) 2024    HGB 12.6 2024    HCT 36.3 (L) 2024    .7 (H) 2024     (L) 2024    LYMPHOPCT 27 2024    RBC 3.57 (L) 2024    MCH 35.3 (H) 2024    MCHC 34.7 2024    RDW 15.8 (H) 2024     Lab Results   Component Value Date    CREATININE 6.32 (H) 2024    CREATININE 5.20 (H) 2024    CREATININE 2.96 (H) 2024    CREATININE 3.02 (H) 2024      Lab Results   Component Value Date    BUN 50 (H) 2024      Lab Results   Component Value Date    BUN 50 (H) 2024    BUN 40 (H) 2024    BUN 24 (H) 2024    BUN 47 (H) 2023    BUN 35 (H) 2023    BUN 51 (H) 2023    BUN 33 (H) 06/15/2023    BUN 61 (H) 2023    BUN 57 (H) 2023    BUN 56 (H) 2023    BUN 44 (H) 2023    BUN 25 (H) 2023    BUN 74 (H) 2022    BUN 47 (H) 2020    BUN 49 (H) 2020    BUN 35 (H) 2020    BUN 54 (H) 2020    BUN 40 (H) 2020 
stroke, DM, HTN, ESRD MWF dialysis who is admitted for intractable nausea/vomiting, found to have stercoral proctitis on CTAP.     Intractable nausea/vomiting I/s/o stercoral proctitis: Patient has hx N/V controlled with scopolamine. PEG tube due to dysphagia 2/2 stroke, all meds tolerated po with some food. EKG NSR, Qtc 467 ms >450 ms for men therefore avoid further Qtc prolonging meds.    - S/p Zofran 4mg x 1, compazine 10 mg x1, benadryl 25 mg, famotidine 20 mg IV, home scopolamine patch changed 7/3/24, re-ordered 7/5  - avoid further benadryl due to patient disoriented  - Compazine 10 mg IV q6 prn if N/V persists  - Dietary consulted for PEG tube feeds, appreciate recs:   - Begin tube feeds, nepro 230cc 4x daily, preceded and followed by FWF 75mL     Hypotension: /81 at 1017, 86/51 at 1645. BP normotensive now 113/60 s/p 500 mL NS bolus x 2.   -  Midodrine 5mg ordered PRN for HD     ESRD: Receives HD MWF. Midodrine 5 mg during dialysis. Minimally urine producing, wife straight catheter for patient on off HD days for ~10mL. Denies urinary sx or change in urine production. Mild hyponatremia , possibly due to GI losses.   - Daily CMP, replete electrolytes prn  - Nephrology consulted for HD     Pressure ulcer of right heel, stage 4: Controlled, followed by specialist.   - Elevate heels off bed     Physical debility: 2/2 stroke 3/2019, chronic aphagia and right sided hemiparesis. Wife primary care giver, lives at home. Ambulates with wheelchair.  - At baseline, no PT/OT indicated at this time.  - Obtain lipid panel, follow up with PCP     Type 2 diabetes mellitus: well controlled per PCP, sliding scale insulin at home. Hb A1c <6%.  - Sliding scale insulin ordered    Heart murmur  3/6 over RUSB  - OP f/u    Patient discussed with DO Pacheco Henderson MD  Family Medicine Resident       For Billing    Chief Complaint   Patient presents with    Emesis    Fatigue

## 2024-07-10 ENCOUNTER — TELEPHONE (OUTPATIENT)
Facility: CLINIC | Age: 64
End: 2024-07-10

## 2024-07-10 NOTE — TELEPHONE ENCOUNTER
Care Transitions Initial Follow Up Call    Outreach made within 2 business days of discharge: Yes    Patient: James Wray Patient : 1960   MRN: [id number]  Reason for Admission: There are no discharge diagnoses documented for the most recent discharge.  Discharge Date: 24       Spoke with: MYNOR      Scheduled appointment with PCP within 7-14 days    Follow Up  Future Appointments   Date Time Provider Department Center   2024  8:30 AM Damaso Foley MD [facility] AMB       Geraldine Browning MA

## 2024-07-23 ENCOUNTER — OFFICE VISIT (OUTPATIENT)
Facility: CLINIC | Age: 64
End: 2024-07-23

## 2024-07-23 VITALS
BODY MASS INDEX: 25.58 KG/M2 | HEART RATE: 85 BPM | RESPIRATION RATE: 16 BRPM | OXYGEN SATURATION: 98 % | WEIGHT: 163 LBS | TEMPERATURE: 97.2 F | DIASTOLIC BLOOD PRESSURE: 65 MMHG | SYSTOLIC BLOOD PRESSURE: 100 MMHG | HEIGHT: 67 IN

## 2024-07-23 DIAGNOSIS — Z86.73 HISTORY OF STROKE: ICD-10-CM

## 2024-07-23 DIAGNOSIS — K59.09 CHRONIC CONSTIPATION: ICD-10-CM

## 2024-07-23 DIAGNOSIS — Z99.2 TYPE 2 DIABETES MELLITUS WITH CHRONIC KIDNEY DISEASE ON CHRONIC DIALYSIS, WITHOUT LONG-TERM CURRENT USE OF INSULIN (HCC): ICD-10-CM

## 2024-07-23 DIAGNOSIS — N18.6 TYPE 2 DIABETES MELLITUS WITH CHRONIC KIDNEY DISEASE ON CHRONIC DIALYSIS, WITHOUT LONG-TERM CURRENT USE OF INSULIN (HCC): ICD-10-CM

## 2024-07-23 DIAGNOSIS — I63.9 CEREBROVASCULAR ACCIDENT (CVA), UNSPECIFIED MECHANISM (HCC): ICD-10-CM

## 2024-07-23 DIAGNOSIS — E11.22 TYPE 2 DIABETES MELLITUS WITH CHRONIC KIDNEY DISEASE ON CHRONIC DIALYSIS, WITHOUT LONG-TERM CURRENT USE OF INSULIN (HCC): ICD-10-CM

## 2024-07-23 DIAGNOSIS — R42 CHRONIC VERTIGO: ICD-10-CM

## 2024-07-23 DIAGNOSIS — Z09 HOSPITAL DISCHARGE FOLLOW-UP: Primary | ICD-10-CM

## 2024-07-23 DIAGNOSIS — R01.1 HEART MURMUR: ICD-10-CM

## 2024-07-23 DIAGNOSIS — K80.20 CALCULUS OF GALLBLADDER WITHOUT CHOLECYSTITIS WITHOUT OBSTRUCTION: ICD-10-CM

## 2024-07-23 PROBLEM — Z86.14 HISTORY OF MRSA INFECTION: Status: ACTIVE | Noted: 2024-01-24

## 2024-07-23 RX ORDER — ENEMA 19; 7 G/133ML; G/133ML
1 ENEMA RECTAL
Qty: 133 ML | Refills: 3 | COMMUNITY
Start: 2024-07-23 | End: 2024-07-23

## 2024-07-23 RX ORDER — MECLIZINE HYDROCHLORIDE 25 MG/1
25 TABLET ORAL 3 TIMES DAILY PRN
Qty: 30 TABLET | Refills: 0 | Status: SHIPPED | OUTPATIENT
Start: 2024-07-23 | End: 2024-08-02

## 2024-07-23 RX ORDER — BLOOD SUGAR DIAGNOSTIC
STRIP MISCELLANEOUS
Qty: 200 EACH | Refills: 3 | Status: SHIPPED | OUTPATIENT
Start: 2024-07-23

## 2024-07-23 RX ORDER — SENNOSIDES A AND B 8.6 MG/1
1 TABLET, FILM COATED ORAL 2 TIMES DAILY
Qty: 60 TABLET | Refills: 11 | Status: SHIPPED | OUTPATIENT
Start: 2024-07-23 | End: 2025-07-23

## 2024-07-23 NOTE — ASSESSMENT & PLAN NOTE
Add stimulant oral laxative to take alternative with suppositories. Take on dialysis days to allow time for effect the following day. Also provided backup enema FLEET to avoid hospitalizations. ER precautions given.     See gastroenterology in next month as planned to discuss issues.    Also discussed that scopolamine patches may be contributing as well, will trial alternative chronic stroke-related vertigo treatment with meclizine INSTEAD of scopolamine.

## 2024-07-23 NOTE — PROGRESS NOTES
Chief Complaint   Patient presents with    Follow-up     Had labs and ct scans done while in hosp .     Follow-Up from Hospital     Was in hosp [facility] for fecal impaction.  Would like to discuss with pcp [facility] for [facility] movements.        
as: TYLENOL     albuterol 1.25 MG/3ML nebulizer solution  Commonly known as: ACCUNEB     bisacodyl 10 MG suppository  Commonly known as: [facility] Gentle Laxative  Place 2 suppositories rectally daily UNWRAP AND INSERT 1 SUPPOSITORY RECTALLY DAILY AS NEEDED     insulin aspart 100 UNIT/ML injection pen  Commonly known as: NovoLOG  Sliding scale dosage subcutaneous twice daily. MAX of 14U of insulin per injection. DAILY MAX of 28U.     isosorbide mononitrate 30 MG extended release tablet  Commonly known as: IMDUR     lacosamide 10 MG/ML Soln oral solution  Commonly known as: VIMPAT     midodrine 5 MG tablet  Commonly known as: PROAMATINE     NEPRO/CARBSTEADY PO     ondansetron 4 MG disintegrating tablet  Commonly known as: ZOFRAN-ODT     polyethylene glycol 17 GM/SCOOP powder  Commonly known as: GLYCOLAX  Take 17 g by mouth daily     scopolamine transdermal patch  Commonly known as: TRANSDERM-SCOP               Where to Get Your Medications        These medications were sent to [facility]/pharmacy #[id number] - [place], [place] - [address] [address] [place] [phone] - F [phone] [address] [address] [address]      Phone: [phone]   meclizine 25 MG tablet  OneTouch Verio strip  senna 8.6 MG tablet       You can get these medications from any pharmacy    You don't need a prescription for these medications  sodium phosphate 7-19 GM/118ML           Medications marked \"taking\" at this time  Outpatient Medications Marked as Taking for the 7/23/24 encounter (Office Visit) with Damaso Foley MD   Medication Sig Dispense Refill    senna (SENOKOT) 8.6 MG tablet Take 1 tablet by mouth 2 times daily 60 tablet 11    sodium phosphate (FLEET) 7-19 GM/118ML Place 1 enema rectally once as needed (constipation that doesn't respond to suppository) If not responsive to enema, needs to go to ER for disimpaction. 133 mL 3    meclizine (ANTIVERT) 25 MG tablet Take 1 tablet by mouth 3 times daily as needed for Dizziness or Nausea

## 2024-07-23 NOTE — ASSESSMENT & PLAN NOTE
Monitored by specialist- no acute findings meriting change in the plan to PEG tube. Doing well will continue current efforts and plan otherwise.

## 2024-07-24 ENCOUNTER — TELEPHONE (OUTPATIENT)
Facility: CLINIC | Age: 64
End: 2024-07-24

## 2024-07-24 DIAGNOSIS — I69.391 DYSPHAGIA AS LATE EFFECT OF CEREBROVASCULAR ACCIDENT (CVA): ICD-10-CM

## 2024-07-24 DIAGNOSIS — I69.320 APHASIA AS LATE EFFECT OF CEREBROVASCULAR ACCIDENT: ICD-10-CM

## 2024-07-24 DIAGNOSIS — R53.1 GENERAL WEAKNESS: ICD-10-CM

## 2024-07-24 DIAGNOSIS — Z86.73 HISTORY OF STROKE: Primary | ICD-10-CM

## 2024-07-24 DIAGNOSIS — G40.909 NONINTRACTABLE EPILEPSY WITHOUT STATUS EPILEPTICUS, UNSPECIFIED EPILEPSY TYPE (HCC): ICD-10-CM

## 2024-07-24 DIAGNOSIS — I69.351 HEMIPARESIS AFFECTING RIGHT SIDE AS LATE EFFECT OF CEREBROVASCULAR ACCIDENT (CVA) (HCC): ICD-10-CM

## 2024-07-25 ENCOUNTER — TELEPHONE (OUTPATIENT)
Facility: CLINIC | Age: 64
End: 2024-07-25

## 2024-07-25 NOTE — TELEPHONE ENCOUNTER
Spoke with patient's wife and she reported patient has been able to keep his last 2 feedings w/o vomiting. She said patient is currently sleeping. She will keep an eye on patient and if he presents with more episodes of vomiting or if new symptoms present, she will take patient to the ER.

## 2024-07-25 NOTE — TELEPHONE ENCOUNTER
Pt seen this week for hospital follow up for fecal impaction, started vomiting again today, can't keep anything down, has PEG tube, and has thrown up with both feedings although he did have a small bowel movement this morning after taking a laxative last night and using suppository today.    Pt's wife concerned and requesting to speak with nurse or Dr. Foley to advise if pt needs to return to ER due to concern about dehydration.     Please advise at [phone]. Ldm

## 2024-08-20 ENCOUNTER — HOSPITAL ENCOUNTER (OUTPATIENT)
Facility: HOSPITAL | Age: 64
Discharge: HOME | End: 2024-08-22
Attending: FAMILY MEDICINE
Payer: MEDICARE

## 2024-08-20 VITALS
DIASTOLIC BLOOD PRESSURE: 59 MMHG | HEIGHT: 67 IN | WEIGHT: 163 LBS | BODY MASS INDEX: 25.58 KG/M2 | SYSTOLIC BLOOD PRESSURE: 82 MMHG

## 2024-08-20 DIAGNOSIS — I63.9 CEREBROVASCULAR ACCIDENT (CVA), UNSPECIFIED MECHANISM (HCC): ICD-10-CM

## 2024-08-20 DIAGNOSIS — R01.1 HEART MURMUR: ICD-10-CM

## 2024-08-20 LAB
ECHO AO ARCH DIAM: 2.5 CM
ECHO AO ROOT DIAM: 2.5 CM
ECHO AO ROOT INDEX: 1.35 CM/M2
ECHO AV AREA PEAK VELOCITY: 5.4 CM2
ECHO AV AREA VTI: 3.5 CM2
ECHO AV AREA/BSA PEAK VELOCITY: 2.9 CM2/M2
ECHO AV AREA/BSA VTI: 1.9 CM2/M2
ECHO AV MEAN GRADIENT: 6 MMHG
ECHO AV MEAN VELOCITY: 1.2 M/S
ECHO AV PEAK GRADIENT: 10 MMHG
ECHO AV PEAK VELOCITY: 1.6 M/S
ECHO AV VELOCITY RATIO: 1.94
ECHO AV VTI: 31.3 CM
ECHO BSA: 1.87 M2
ECHO LA DIAMETER INDEX: 1.19 CM/M2
ECHO LA DIAMETER: 2.2 CM
ECHO LA TO AORTIC ROOT RATIO: 0.88
ECHO LA VOL A-L A2C: 9 ML (ref 18–58)
ECHO LA VOL A-L A4C: 14 ML (ref 18–58)
ECHO LA VOL BP: 12 ML (ref 18–58)
ECHO LA VOL MOD A2C: 8 ML (ref 18–58)
ECHO LA VOL MOD A4C: 12 ML (ref 18–58)
ECHO LA VOL/BSA BIPLANE: 6 ML/M2 (ref 16–34)
ECHO LA VOLUME AREA LENGTH: 14 ML
ECHO LA VOLUME INDEX A-L A2C: 5 ML/M2 (ref 16–34)
ECHO LA VOLUME INDEX A-L A4C: 8 ML/M2 (ref 16–34)
ECHO LA VOLUME INDEX AREA LENGTH: 8 ML/M2 (ref 16–34)
ECHO LA VOLUME INDEX MOD A2C: 4 ML/M2 (ref 16–34)
ECHO LA VOLUME INDEX MOD A4C: 6 ML/M2 (ref 16–34)
ECHO LV E' LATERAL VELOCITY: 6 CM/S
ECHO LV E' SEPTAL VELOCITY: 3 CM/S
ECHO LV EDV A2C: 30 ML
ECHO LV EDV A4C: 51 ML
ECHO LV EDV BP: 40 ML (ref 67–155)
ECHO LV EDV INDEX A4C: 28 ML/M2
ECHO LV EDV INDEX BP: 22 ML/M2
ECHO LV EDV NDEX A2C: 16 ML/M2
ECHO LV EJECTION FRACTION A2C: 54 %
ECHO LV EJECTION FRACTION A4C: 60 %
ECHO LV EJECTION FRACTION BIPLANE: 58 % (ref 55–100)
ECHO LV ESV A2C: 14 ML
ECHO LV ESV A4C: 20 ML
ECHO LV ESV BP: 17 ML (ref 22–58)
ECHO LV ESV INDEX A2C: 8 ML/M2
ECHO LV ESV INDEX A4C: 11 ML/M2
ECHO LV ESV INDEX BP: 9 ML/M2
ECHO LV FRACTIONAL SHORTENING: 28 % (ref 28–44)
ECHO LV INTERNAL DIMENSION DIASTOLE INDEX: 1.73 CM/M2
ECHO LV INTERNAL DIMENSION DIASTOLIC: 3.2 CM (ref 4.2–5.9)
ECHO LV INTERNAL DIMENSION SYSTOLIC INDEX: 1.24 CM/M2
ECHO LV INTERNAL DIMENSION SYSTOLIC: 2.3 CM
ECHO LV IVSD: 0.7 CM (ref 0.6–1)
ECHO LV MASS 2D: 54.3 G (ref 88–224)
ECHO LV MASS INDEX 2D: 29.4 G/M2 (ref 49–115)
ECHO LV POSTERIOR WALL DIASTOLIC: 0.7 CM (ref 0.6–1)
ECHO LV RELATIVE WALL THICKNESS RATIO: 0.44
ECHO LVOT AREA: 2.8 CM2
ECHO LVOT AV VTI INDEX: 1.27
ECHO LVOT DIAM: 1.9 CM
ECHO LVOT MEAN GRADIENT: 15 MMHG
ECHO LVOT PEAK GRADIENT: 40 MMHG
ECHO LVOT PEAK VELOCITY: 3.1 M/S
ECHO LVOT STROKE VOLUME INDEX: 60.8 ML/M2
ECHO LVOT SV: 112.5 ML
ECHO LVOT VTI: 39.7 CM
ECHO MV A VELOCITY: 0.92 M/S
ECHO MV E DECELERATION TIME (DT): 195.6 MS
ECHO MV E VELOCITY: 0.53 M/S
ECHO MV E/A RATIO: 0.58
ECHO MV E/E' LATERAL: 8.83
ECHO MV E/E' RATIO (AVERAGED): 13.25
ECHO MV E/E' SEPTAL: 17.67
ECHO MV REGURGITANT PEAK GRADIENT: 77 MMHG
ECHO MV REGURGITANT PEAK VELOCITY: 4.4 M/S
ECHO RV FREE WALL PEAK S': 11 CM/S
ECHO RV INTERNAL DIMENSION: 2.5 CM
ECHO RV TAPSE: 1.8 CM (ref 1.7–?)

## 2024-08-20 PROCEDURE — 93306 TTE W/DOPPLER COMPLETE: CPT | Performed by: INTERNAL MEDICINE

## 2024-08-20 PROCEDURE — 93306 TTE W/DOPPLER COMPLETE: CPT

## 2024-08-22 PROBLEM — I51.89 DIASTOLIC DYSFUNCTION: Status: ACTIVE | Noted: 2024-08-22

## 2024-09-09 ENCOUNTER — HOSPITAL ENCOUNTER (OUTPATIENT)
Facility: HOSPITAL | Age: 64
Discharge: HOME | End: 2024-09-12
Payer: MEDICARE

## 2024-09-09 ENCOUNTER — HOSPITAL ENCOUNTER (EMERGENCY)
Facility: HOSPITAL | Age: 64
Discharge: HOME | End: 2024-09-09
Attending: EMERGENCY MEDICINE
Payer: MEDICARE

## 2024-09-09 ENCOUNTER — APPOINTMENT (OUTPATIENT)
Facility: HOSPITAL | Age: 64
End: 2024-09-09
Payer: MEDICARE

## 2024-09-09 ENCOUNTER — TRANSCRIBE ORDERS (OUTPATIENT)
Facility: HOSPITAL | Age: 64
End: 2024-09-09

## 2024-09-09 VITALS
HEART RATE: 83 BPM | BODY MASS INDEX: 27.07 KG/M2 | SYSTOLIC BLOOD PRESSURE: 122 MMHG | OXYGEN SATURATION: 100 % | RESPIRATION RATE: 18 BRPM | DIASTOLIC BLOOD PRESSURE: 63 MMHG | WEIGHT: 172.84 LBS | TEMPERATURE: 98 F

## 2024-09-09 DIAGNOSIS — K59.09 CHRONIC CONSTIPATION: Primary | ICD-10-CM

## 2024-09-09 DIAGNOSIS — K59.09 CHRONIC CONSTIPATION: ICD-10-CM

## 2024-09-09 DIAGNOSIS — K59.00 CONSTIPATION, UNSPECIFIED CONSTIPATION TYPE: Primary | ICD-10-CM

## 2024-09-09 PROCEDURE — 74176 CT ABD & PELVIS W/O CONTRAST: CPT

## 2024-09-09 PROCEDURE — 74018 RADEX ABDOMEN 1 VIEW: CPT

## 2024-09-09 PROCEDURE — 99284 EMERGENCY DEPT VISIT MOD MDM: CPT

## 2024-09-09 ASSESSMENT — ENCOUNTER SYMPTOMS
SHORTNESS OF BREATH: 0
SORE THROAT: 0
CONSTIPATION: 0

## 2024-09-09 ASSESSMENT — PAIN - FUNCTIONAL ASSESSMENT
PAIN_FUNCTIONAL_ASSESSMENT: NONE - DENIES PAIN
PAIN_FUNCTIONAL_ASSESSMENT: NONE - DENIES PAIN

## 2024-10-29 DIAGNOSIS — Z99.2 TYPE 2 DIABETES MELLITUS WITH CHRONIC KIDNEY DISEASE ON CHRONIC DIALYSIS, WITHOUT LONG-TERM CURRENT USE OF INSULIN (HCC): ICD-10-CM

## 2024-10-29 DIAGNOSIS — N18.6 TYPE 2 DIABETES MELLITUS WITH CHRONIC KIDNEY DISEASE ON CHRONIC DIALYSIS, WITHOUT LONG-TERM CURRENT USE OF INSULIN (HCC): ICD-10-CM

## 2024-10-29 DIAGNOSIS — E11.22 TYPE 2 DIABETES MELLITUS WITH CHRONIC KIDNEY DISEASE ON CHRONIC DIALYSIS, WITHOUT LONG-TERM CURRENT USE OF INSULIN (HCC): ICD-10-CM

## 2024-10-29 NOTE — TELEPHONE ENCOUNTER
Spoke to patients wife who is requesting a refill be sent to [facility] on [address]    NOVOLOG 100 UNIT/ML injection pens    She is stating there needs to be some code added for insurance to cover.???  That states he needs insulin??

## 2024-12-05 DIAGNOSIS — R42 CHRONIC VERTIGO: ICD-10-CM

## 2024-12-05 RX ORDER — MECLIZINE HYDROCHLORIDE 25 MG/1
TABLET ORAL
Qty: 30 TABLET | Refills: 0 | Status: SHIPPED | OUTPATIENT
Start: 2024-12-05

## 2024-12-19 ENCOUNTER — OFFICE VISIT (OUTPATIENT)
Facility: CLINIC | Age: 64
End: 2024-12-19

## 2024-12-19 VITALS
OXYGEN SATURATION: 100 % | DIASTOLIC BLOOD PRESSURE: 73 MMHG | BODY MASS INDEX: 25.9 KG/M2 | WEIGHT: 165 LBS | TEMPERATURE: 97.7 F | RESPIRATION RATE: 18 BRPM | HEIGHT: 67 IN | SYSTOLIC BLOOD PRESSURE: 115 MMHG

## 2024-12-19 DIAGNOSIS — Z99.2 TYPE 2 DIABETES MELLITUS WITH CHRONIC KIDNEY DISEASE ON CHRONIC DIALYSIS, WITHOUT LONG-TERM CURRENT USE OF INSULIN (HCC): ICD-10-CM

## 2024-12-19 DIAGNOSIS — Z79.4 TYPE 2 DIABETES MELLITUS WITH CHRONIC KIDNEY DISEASE ON CHRONIC DIALYSIS, WITH LONG-TERM CURRENT USE OF INSULIN (HCC): ICD-10-CM

## 2024-12-19 DIAGNOSIS — Z86.73 HISTORY OF STROKE: ICD-10-CM

## 2024-12-19 DIAGNOSIS — N18.6 TYPE 2 DIABETES MELLITUS WITH CHRONIC KIDNEY DISEASE ON CHRONIC DIALYSIS, WITH LONG-TERM CURRENT USE OF INSULIN (HCC): ICD-10-CM

## 2024-12-19 DIAGNOSIS — Z99.2 TYPE 2 DIABETES MELLITUS WITH CHRONIC KIDNEY DISEASE ON CHRONIC DIALYSIS, WITH LONG-TERM CURRENT USE OF INSULIN (HCC): ICD-10-CM

## 2024-12-19 DIAGNOSIS — R33.9 URINARY RETENTION: ICD-10-CM

## 2024-12-19 DIAGNOSIS — E11.22 TYPE 2 DIABETES MELLITUS WITH CHRONIC KIDNEY DISEASE ON CHRONIC DIALYSIS, WITHOUT LONG-TERM CURRENT USE OF INSULIN (HCC): ICD-10-CM

## 2024-12-19 DIAGNOSIS — Z86.69 HISTORY OF EPILEPSY: Primary | ICD-10-CM

## 2024-12-19 DIAGNOSIS — L84 TYPE 2 DIABETES MELLITUS WITH PRESSURE CALLUS (HCC): ICD-10-CM

## 2024-12-19 DIAGNOSIS — E11.22 TYPE 2 DIABETES MELLITUS WITH CHRONIC KIDNEY DISEASE ON CHRONIC DIALYSIS, WITH LONG-TERM CURRENT USE OF INSULIN (HCC): ICD-10-CM

## 2024-12-19 DIAGNOSIS — E11.628 TYPE 2 DIABETES MELLITUS WITH PRESSURE CALLUS (HCC): ICD-10-CM

## 2024-12-19 DIAGNOSIS — R42 CHRONIC VERTIGO: ICD-10-CM

## 2024-12-19 DIAGNOSIS — N18.6 TYPE 2 DIABETES MELLITUS WITH CHRONIC KIDNEY DISEASE ON CHRONIC DIALYSIS, WITHOUT LONG-TERM CURRENT USE OF INSULIN (HCC): ICD-10-CM

## 2024-12-19 PROBLEM — E11.621 TYPE 2 DIABETES MELLITUS WITH RIGHT DIABETIC FOOT ULCER (HCC): Status: RESOLVED | Noted: 2024-01-24 | Resolved: 2024-12-19

## 2024-12-19 PROBLEM — I82.C19 INTERNAL JUGULAR (IJ) VEIN THROMBOEMBOLISM, ACUTE (HCC): Status: RESOLVED | Noted: 2023-02-21 | Resolved: 2024-12-19

## 2024-12-19 PROBLEM — M86.279 SUBACUTE OSTEOMYELITIS OF FOOT: Status: RESOLVED | Noted: 2024-02-28 | Resolved: 2024-12-19

## 2024-12-19 PROBLEM — L98.493: Status: RESOLVED | Noted: 2023-09-27 | Resolved: 2024-12-19

## 2024-12-19 PROBLEM — R31.9 HEMATURIA: Status: RESOLVED | Noted: 2020-06-30 | Resolved: 2024-12-19

## 2024-12-19 PROBLEM — L97.519 TYPE 2 DIABETES MELLITUS WITH RIGHT DIABETIC FOOT ULCER (HCC): Status: RESOLVED | Noted: 2024-01-24 | Resolved: 2024-12-19

## 2024-12-19 PROBLEM — M86.171 OSTEOMYELITIS OF ANKLE OR FOOT, ACUTE, RIGHT: Status: RESOLVED | Noted: 2024-01-31 | Resolved: 2024-12-19

## 2024-12-19 RX ORDER — SCOLOPAMINE TRANSDERMAL SYSTEM 1 MG/1
1 PATCH, EXTENDED RELEASE TRANSDERMAL
Qty: 32 PATCH | Refills: 3 | Status: SHIPPED | OUTPATIENT
Start: 2024-12-19

## 2024-12-19 ASSESSMENT — PATIENT HEALTH QUESTIONNAIRE - PHQ9
SUM OF ALL RESPONSES TO PHQ QUESTIONS 1-9: 0
SUM OF ALL RESPONSES TO PHQ9 QUESTIONS 1 & 2: 0
SUM OF ALL RESPONSES TO PHQ QUESTIONS 1-9: 0
1. LITTLE INTEREST OR PLEASURE IN DOING THINGS: NOT AT ALL
SUM OF ALL RESPONSES TO PHQ QUESTIONS 1-9: 0
SUM OF ALL RESPONSES TO PHQ QUESTIONS 1-9: 0
2. FEELING DOWN, DEPRESSED OR HOPELESS: NOT AT ALL

## 2024-12-19 NOTE — PROGRESS NOTES
Chief Complaint   Patient presents with    Follow-up     1. Have you been to the ER, urgent care clinic since your last visit?  Hospitalized since your last visit?No    2. Have you seen or consulted any other health care providers outside of the [facility] since your last visit?  Include any pap smears or colon screening. No    
errors present.     Pt was counseled on risks, benefits and alternatives of treatment options. All questions were asked and answered and the patient was agreeable with the treatment plan as outlined.    Damaso Foley MD  [facility]  [phone]

## 2024-12-26 ENCOUNTER — PATIENT MESSAGE (OUTPATIENT)
Facility: CLINIC | Age: 64
End: 2024-12-26

## 2024-12-27 ENCOUNTER — OFFICE VISIT (OUTPATIENT)
Age: 64
End: 2024-12-27

## 2024-12-27 VITALS
HEART RATE: 86 BPM | TEMPERATURE: 98.1 F | RESPIRATION RATE: 16 BRPM | DIASTOLIC BLOOD PRESSURE: 72 MMHG | SYSTOLIC BLOOD PRESSURE: 115 MMHG

## 2024-12-27 DIAGNOSIS — L08.9 INFECTION OF SKIN OF TOES: Primary | ICD-10-CM

## 2024-12-27 PROBLEM — G40.209 COMPLEX PARTIAL EPILEPTIC SEIZURE (HCC): Status: ACTIVE | Noted: 2024-12-27

## 2024-12-27 PROBLEM — E88.09 HYPOALBUMINEMIA: Status: ACTIVE | Noted: 2019-04-28

## 2024-12-27 PROBLEM — T82.9XXA DIALYSIS COMPLICATION: Status: ACTIVE | Noted: 2020-05-25

## 2024-12-27 PROBLEM — D70.9 FEBRILE NEUTROPENIA (HCC): Status: ACTIVE | Noted: 2019-04-19

## 2024-12-27 PROBLEM — I69.959 HEMIPLEGIA OF DOMINANT SIDE AS LATE EFFECT FOLLOWING CEREBROVASCULAR DISEASE (HCC): Status: ACTIVE | Noted: 2024-12-27

## 2024-12-27 PROBLEM — E11.9 DIABETES MELLITUS (HCC): Status: ACTIVE | Noted: 2019-04-28

## 2024-12-27 PROBLEM — I12.0 HYPERTENSIVE CHRONIC KIDNEY DISEASE WITH STAGE 5 CHRONIC KIDNEY DISEASE OR END STAGE RENAL DISEASE (HCC): Status: ACTIVE | Noted: 2023-07-07

## 2024-12-27 PROBLEM — N41.2 ABSCESS OF PROSTATE: Status: ACTIVE | Noted: 2023-06-22

## 2024-12-27 PROBLEM — R50.81 FEBRILE NEUTROPENIA (HCC): Status: ACTIVE | Noted: 2019-04-19

## 2024-12-27 PROBLEM — E87.5 HYPERKALEMIA: Status: ACTIVE | Noted: 2019-03-08

## 2024-12-27 PROBLEM — R79.89 ABNORMAL LIVER FUNCTION TESTS: Status: ACTIVE | Noted: 2019-04-28

## 2024-12-27 PROBLEM — D84.9 IMMUNOCOMPROMISED STATE (HCC): Status: ACTIVE | Noted: 2023-07-07

## 2024-12-27 PROBLEM — I69.151 HEMIPARESIS OF RIGHT DOMINANT SIDE AS LATE EFFECT OF NONTRAUMATIC INTRACEREBRAL HEMORRHAGE (HCC): Status: ACTIVE | Noted: 2024-12-27

## 2024-12-27 PROBLEM — F44.89 CONFUSIONAL STATE: Status: ACTIVE | Noted: 2021-08-24

## 2024-12-27 PROBLEM — E11.65 HYPERGLYCEMIA DUE TO TYPE 2 DIABETES MELLITUS (HCC): Status: ACTIVE | Noted: 2024-12-27

## 2024-12-27 PROBLEM — I24.9 ACUTE CORONARY SYNDROME (HCC): Status: ACTIVE | Noted: 2021-05-21

## 2024-12-27 PROBLEM — I61.9 CEREBRAL HEMORRHAGE (HCC): Status: ACTIVE | Noted: 2019-04-19

## 2024-12-27 PROBLEM — D64.9 ANEMIA: Status: ACTIVE | Noted: 2019-04-23

## 2024-12-27 PROBLEM — E87.1 HYPONATREMIA: Status: ACTIVE | Noted: 2019-07-15

## 2024-12-27 PROBLEM — R47.01 APHASIA: Status: ACTIVE | Noted: 2019-04-28

## 2024-12-27 PROBLEM — R50.9 FEVER: Status: ACTIVE | Noted: 2023-06-06

## 2024-12-27 PROBLEM — D84.822: Status: ACTIVE | Noted: 2024-04-02

## 2024-12-27 PROBLEM — I63.9 CEREBROVASCULAR ACCIDENT (HCC): Status: ACTIVE | Noted: 2019-04-19

## 2024-12-27 PROBLEM — I77.0 ACQUIRED ARTERIOVENOUS FISTULA (HCC): Status: ACTIVE | Noted: 2023-07-07

## 2024-12-27 PROBLEM — R41.82 ALTERED MENTAL STATUS: Status: ACTIVE | Noted: 2022-10-12

## 2024-12-27 PROBLEM — I61.9 HEMORRHAGIC CEREBROVASCULAR ACCIDENT (CVA) (HCC): Status: ACTIVE | Noted: 2024-12-27

## 2024-12-27 PROBLEM — Z98.890 HISTORY OF GASTROSTOMY: Status: ACTIVE | Noted: 2019-03-19

## 2024-12-27 RX ORDER — DOXYCYCLINE HYCLATE 100 MG
100 TABLET ORAL 2 TIMES DAILY
Qty: 14 TABLET | Refills: 0 | Status: SHIPPED | OUTPATIENT
Start: 2024-12-27 | End: 2025-01-03

## 2024-12-27 NOTE — PROGRESS NOTES
Subjective     Chief Complaint   Patient presents with    Other     Pt c/o possible toe infection right 3rd big toe and also left big toe.       Patient is 64 year old male presenting with skin discoloration to first toe on left foot and third toe on right foot.  Discoloration noted one week ago on right foot and left foot today.  He is wheelchair bound.  He has history of MRSA and recently underwent debridement and surgery of toe.          Past Medical History:   Diagnosis Date    Acute blood loss anemia 06/30/2020    Diabetes ([facility])     Dysphagia due to old cerebrovascular accident 03/2019    ESRD (end stage renal disease) on dialysis ([facility])     MWF    Carlson catheter in place     Hematuria 06/30/2020    Followed by Dr. Grace of Urology.      MRSA cellulitis of right foot     Osteomyelitis of ankle or foot, acute, right 01/31/2024    PEG (percutaneous endoscopic gastrostomy) status ([facility]) 3/2019 to present    NPO- comfort foods only    Right sided weakness 3/2019 to present    Seizure ([facility]) 03/2019    Sepsis ([facility]) 06/13/2023    Skin ulcer of hand with necrosis of muscle ([facility]) 09/27/2023    Stroke ([facility]) 03/2019    Subacute osteomyelitis of foot 02/28/2024    Type 2 diabetes mellitus with right diabetic foot ulcer ([facility]) 01/24/2024    Urinary retention with incomplete bladder emptying        Past Surgical History:   Procedure Laterality Date    AV FISTULA CREATION Right 03/2019    CYST INCISION AND DRAINAGE Right     2nd toe- Cleaned out infection    CYSTOSCOPY N/A 06/15/2023    TRANSURETHRAL RESECTION OF THE PROSTATE, UNROOFING OF PROSTATE ABSCESS performed by Samir Castillo MD at [facility] MAIN OR    OTHER SURGICAL HISTORY  03/2019    PEG tube insertion    PROSTRATE ULTRASOUND GUIDANCE      abscess romoved       Family History   Problem Relation Age of Onset    Stroke Sister 50    Stroke Father     Deep Vein Thrombosis Neg Hx        Allergies   Allergen Reactions    Bee Venom Anaphylaxis    Cephalexin Rash    Benadryl

## 2024-12-27 NOTE — PATIENT INSTRUCTIONS
Thank you for visiting [facility] today.     Follow up with wound care  Antibiotics as prescribed      If you begin to have shortness of breath, chest pain or uncontrollable fever of 100.4 or greater, please go to the Emergency Room.

## 2024-12-28 ASSESSMENT — ENCOUNTER SYMPTOMS: COLOR CHANGE: 1

## 2025-01-02 ENCOUNTER — TELEPHONE (OUTPATIENT)
Facility: CLINIC | Age: 65
End: 2025-01-02

## 2025-01-02 NOTE — TELEPHONE ENCOUNTER
Pt needs an order for power wheelchair, electric hospital bed and inflatable mattress.     The lift must be included for the wheelchair verbiage.       Please fax order to :     [employer]  Fax:[phone]

## 2025-01-03 RX ORDER — ALBUTEROL SULFATE 1.25 MG/3ML
SOLUTION RESPIRATORY (INHALATION)
Qty: 225 ML | Refills: 1 | Status: SHIPPED | OUTPATIENT
Start: 2025-01-03

## 2025-01-15 ENCOUNTER — HOSPITAL ENCOUNTER (OUTPATIENT)
Facility: HOSPITAL | Age: 65
Discharge: HOME | End: 2025-01-15
Payer: MEDICARE

## 2025-01-15 VITALS
TEMPERATURE: 98.2 F | DIASTOLIC BLOOD PRESSURE: 90 MMHG | RESPIRATION RATE: 16 BRPM | SYSTOLIC BLOOD PRESSURE: 133 MMHG | HEART RATE: 103 BPM

## 2025-01-15 DIAGNOSIS — L89.153 STAGE III PRESSURE ULCER OF SACRAL REGION (HCC): ICD-10-CM

## 2025-01-15 DIAGNOSIS — L89.152 PRESSURE ULCER OF SACRAL REGION, STAGE 2 (HCC): Primary | ICD-10-CM

## 2025-01-15 PROBLEM — N41.2 ABSCESS OF PROSTATE: Status: RESOLVED | Noted: 2023-06-22 | Resolved: 2025-01-15

## 2025-01-15 PROBLEM — T14.8XXA BLISTER: Status: RESOLVED | Noted: 2023-02-21 | Resolved: 2025-01-15

## 2025-01-15 PROCEDURE — 11042 DBRDMT SUBQ TIS 1ST 20SQCM/<: CPT

## 2025-01-15 PROCEDURE — 99215 OFFICE O/P EST HI 40 MIN: CPT

## 2025-01-15 PROCEDURE — 11045 DBRDMT SUBQ TISS EACH ADDL: CPT

## 2025-01-15 RX ORDER — GENTAMICIN SULFATE 1 MG/G
OINTMENT TOPICAL ONCE
OUTPATIENT
Start: 2025-01-15 | End: 2025-01-15

## 2025-01-15 RX ORDER — BACITRACIN ZINC AND POLYMYXIN B SULFATE 500; 1000 [USP'U]/G; [USP'U]/G
OINTMENT TOPICAL ONCE
OUTPATIENT
Start: 2025-01-15 | End: 2025-01-15

## 2025-01-15 RX ORDER — BETAMETHASONE DIPROPIONATE 0.5 MG/G
CREAM TOPICAL ONCE
OUTPATIENT
Start: 2025-01-15 | End: 2025-01-15

## 2025-01-15 RX ORDER — MUPIROCIN 20 MG/G
OINTMENT TOPICAL ONCE
OUTPATIENT
Start: 2025-01-15 | End: 2025-01-15

## 2025-01-15 RX ORDER — LIDOCAINE HYDROCHLORIDE 20 MG/ML
JELLY TOPICAL ONCE
OUTPATIENT
Start: 2025-01-15 | End: 2025-01-15

## 2025-01-15 RX ORDER — SODIUM CHLOR/HYPOCHLOROUS ACID 0.033 %
SOLUTION, IRRIGATION IRRIGATION ONCE
OUTPATIENT
Start: 2025-01-15 | End: 2025-01-15

## 2025-01-15 RX ORDER — LIDOCAINE 50 MG/G
OINTMENT TOPICAL ONCE
OUTPATIENT
Start: 2025-01-15 | End: 2025-01-15

## 2025-01-15 RX ORDER — SILVER SULFADIAZINE 10 MG/G
CREAM TOPICAL ONCE
OUTPATIENT
Start: 2025-01-15 | End: 2025-01-15

## 2025-01-15 RX ORDER — CLOBETASOL PROPIONATE 0.5 MG/G
OINTMENT TOPICAL ONCE
OUTPATIENT
Start: 2025-01-15 | End: 2025-01-15

## 2025-01-15 RX ORDER — LIDOCAINE 40 MG/G
CREAM TOPICAL ONCE
OUTPATIENT
Start: 2025-01-15 | End: 2025-01-15

## 2025-01-15 RX ORDER — LIDOCAINE HYDROCHLORIDE 40 MG/ML
SOLUTION TOPICAL ONCE
OUTPATIENT
Start: 2025-01-15 | End: 2025-01-15

## 2025-01-15 RX ORDER — NEOMYCIN/BACITRACIN/POLYMYXINB 3.5-400-5K
OINTMENT (GRAM) TOPICAL ONCE
OUTPATIENT
Start: 2025-01-15 | End: 2025-01-15

## 2025-01-15 RX ORDER — GINSENG 100 MG
CAPSULE ORAL ONCE
OUTPATIENT
Start: 2025-01-15 | End: 2025-01-15

## 2025-01-15 RX ORDER — TRIAMCINOLONE ACETONIDE 1 MG/G
OINTMENT TOPICAL ONCE
OUTPATIENT
Start: 2025-01-15 | End: 2025-01-15

## 2025-01-15 NOTE — WOUND CARE
Home Health Referral for skilled Nursing wound care       Ordering Center:     [facility] WOUND [facility]  [address]  [facility] [address] [phone]  WOUND CARE [phone]  FAX NUMBER [phone]    Patient Information:      James Wray  [address]  [facility] [address]   [phone]   : 1960  AGE: 64 y.o.     GENDER: male   TODAYS DATE:  1/15/2025    Insurance:      PRIMARY INSURANCE:  [id number] - (Medicare)    Payer/Plan Subscr  Sex Relation Sub. Ins. ID Effective Group Num   1. MEDICARE - MEJAMES JAIME 1960 Male Self [id number] 10/1/19                                    [address]    2. [place] JAIME 1960 Male Self [id number] 21 [id number]                                   [address] [address]       Patient Wound Information:      Problem List Items Addressed This Visit          Other    Pressure ulcer of sacral region, stage 2 (HCC) - Primary    Stage III pressure ulcer of sacral region ([facility])     Wound Care Documentation:  Wound 23 Buttocks redness and exocoriation to buttocks. wound care consulted (Active)   Number of days: 581       Wound 01/15/25 Toe (Comment  which one) Right 3rd toe, #1 (Active)   Wound Image   01/15/25 1325   Wound Etiology Diabetic 01/15/25 1325   Wound Cleansed Cleansed with saline 01/15/25 1325   Dressing/Treatment Gauze dressing/dressing sponge;Tape/Soft cloth adhesive tape 01/15/25 1410   Wound Length (cm) 0.4 cm 01/15/25 1325   Wound Width (cm) 0.5 cm 01/15/25 1325   Wound Depth (cm) 0 cm 01/15/25 1325   Wound Surface Area (cm^2) 0.2 cm^2 01/15/25 1325   Wound Volume (cm^3) 0 cm^3 01/15/25 1325   Wound Assessment Blood filled blister 01/15/25 1325   Drainage Amount None (dry) 01/15/25 1325   Odor None 01/15/25 1325   Marielena-wound Assessment Intact 01/15/25 1325   Margins Attached edges 01/15/25 1325   Number of days: 0       Wound 01/15/25 Toe (Comment  which one) Left Great Toe, #2 (Active)   Wound Image   01/15/25

## 2025-01-15 NOTE — PLAN OF CARE
Referral for Electric bed and low air loss mattress       Ordering Center:     [facility] WOUND [facility]ENTER  [address]  [facility] [address] [phone]  WOUND CARE [phone]  FAX NUMBER [phone]    Patient Information:      James Wray  [address]  [facility] [address]   [phone]   : 1960  AGE: 64 y.o.     GENDER: male   TODAYS DATE:  1/15/2025    Insurance:      PRIMARY INSURANCE:  [id number] - (Medicare)    Payer/Plan Subscr  Sex Relation Sub. Ins. ID Effective Group Num   1. MEDICARE - ME* JAMES WRAY 1960 Male Self [id number] 10/1/19                                    [address]    2. [place]* JAMES WRAY 1960 Male Self [id number] 21 113                                   [address] [address]       Patient Wound Information:      ICD 10- L89.153     Wound Care Documentation:  Wound 23 Buttocks redness and exocoriation to buttocks. wound care consulted (Active)   Number of days: 581       Wound 01/15/25 Toe (Comment  which one) Right 3rd toe, #1 (Active)   Wound Image   01/15/25 1325   Wound Etiology Diabetic 01/15/25 1325   Wound Cleansed Cleansed with saline 01/15/25 1325   Dressing/Treatment Gauze dressing/dressing sponge;Tape/Soft cloth adhesive tape 01/15/25 1410   Wound Length (cm) 0.4 cm 01/15/25 1325   Wound Width (cm) 0.5 cm 01/15/25 1325   Wound Depth (cm) 0 cm 01/15/25 1325   Wound Surface Area (cm^2) 0.2 cm^2 01/15/25 1325   Wound Volume (cm^3) 0 cm^3 01/15/25 1325   Wound Assessment Blood filled blister 01/15/25 1325   Drainage Amount None (dry) 01/15/25 1325   Odor None 01/15/25 1325   Marielena-wound Assessment Intact 01/15/25 1325   Margins Attached edges 01/15/25 1325   Number of days: 0       Wound 01/15/25 Toe (Comment  which one) Left Great Toe, #2 (Active)   Wound Image   01/15/25 1325   Wound Etiology Diabetic 01/15/25 1325   Wound Cleansed Cleansed with saline 01/15/25 1325   Dressing/Treatment Gauze dressing/dressing 
  Wound Etiology Diabetic 01/15/25 1325   Wound Cleansed Cleansed with saline 01/15/25 1325   Dressing/Treatment Gauze dressing/dressing sponge;Tape/Soft cloth adhesive tape 01/15/25 1410   Wound Length (cm) 0.1 cm 01/15/25 1325   Wound Width (cm) 0.1 cm 01/15/25 1325   Wound Depth (cm) 0 cm 01/15/25 1325   Wound Surface Area (cm^2) 0.01 cm^2 01/15/25 1325   Wound Volume (cm^3) 0 cm^3 01/15/25 1325   Wound Assessment Epithelialization;Pink/red 01/15/25 1325   Drainage Amount None (dry) 01/15/25 1325   Odor None 01/15/25 1325   Marielena-wound Assessment Intact 01/15/25 1325   Number of days: 0       Wound 01/15/25 Sacrum Cluster, #3 (Active)   Wound Image   01/15/25 1325   Wound Etiology Pressure Stage 3 01/15/25 1325   Wound Cleansed Cleansed with saline 01/15/25 1325   Dressing/Treatment Alginate with Ag;Gauze dressing/dressing sponge;Silicone border 01/15/25 1410   Wound Length (cm) 7 cm 01/15/25 1325   Wound Width (cm) 10 cm 01/15/25 1325   Wound Depth (cm) 0.2 cm 01/15/25 1325   Wound Surface Area (cm^2) 70 cm^2 01/15/25 1325   Wound Volume (cm^3) 14 cm^3 01/15/25 1325   Post-Procedure Length (cm) 7 cm 01/15/25 1345   Post-Procedure Width (cm) 10 cm 01/15/25 1345   Post-Procedure Depth (cm) 0.4 cm 01/15/25 1345   Post-Procedure Surface Area (cm^2) 70 cm^2 01/15/25 1345   Post-Procedure Volume (cm^3) 28 cm^3 01/15/25 1345   Wound Assessment Pink/red;Slough 01/15/25 1325   Drainage Amount Large (50-75% saturated) 01/15/25 1325   Drainage Description Serous;Serosanguinous 01/15/25 1325   Odor None 01/15/25 1325   Marielena-wound Assessment Excoriated;Maceration 01/15/25 1325   Margins Flat/open edges;Epibole (rolled edges) 01/15/25 1325   Wound Thickness Description not for Pressure Injury Full thickness 01/15/25 1325   Number of days: 0          Supplies Requested :      WOUND #: 3   PRIMARY DRESSING:  Alginate with silver pad   Eclypse Adherant sacral super absorbent dressing      FREQUENCY OF DRESSING CHANGES:  Daily

## 2025-01-15 NOTE — PROGRESS NOTES
Hydralazine Rash    Velphoro [Sucroferric Oxyhydroxide] Diarrhea and Nausea And Vomiting       Current Outpatient Medications on File Prior to Encounter   Medication Sig Dispense Refill    albuterol (ACCUNEB) 1.25 MG/3ML nebulizer solution INHALE 1 VIAL VIA NEBULIZER EVERY 6 HOURS AS NEEDED FOR WHEEZE 225 mL 1    linaCLOtide (LINZESS) 72 MCG CAPS capsule Take 1 capsule by mouth every morning (before breakfast)      scopolamine (TRANSDERM-SCOP) transdermal patch Place 1 patch onto the skin every 72 hours 32 patch 3    insulin aspart (NOVOLOG) 100 UNIT/ML injection pen Inject 1-14 Units into the skin 2 times daily as needed for High Blood Sugar Sliding scale dosage subcutaneous twice daily. MAX of 14U of insulin per injection. DAILY MAX of 28U. 5 Adjustable Dose Pre-filled Pen Syringe 1    meclizine (ANTIVERT) 25 MG tablet TAKE 1 TABLET BY MOUTH 3 TIMES A DAY AS NEEDED DIZZINESS AND NAUSEA 30 tablet 0    senna (SENOKOT) 8.6 MG tablet Take 1 tablet by mouth 2 times daily (Patient not taking: Reported on 12/19/2024) 60 tablet 11    blood glucose test strips (ONETOUCH VERIO) strip Use to check BG 2 times daily. 200 each 3    bisacodyl ([employer] GENTLE LAXATIVE) 10 MG suppository Place 2 suppositories rectally daily UNWRAP AND INSERT 1 SUPPOSITORY RECTALLY DAILY AS NEEDED (Patient not taking: Reported on 12/19/2024) 32 suppository 0    midodrine (PROAMATINE) 5 MG tablet Take 2 tablets by mouth as needed      Nutritional Supplements (NEPRO/CARBSTEADY PO) Take 230 mLs by mouth 4 times daily      acetaminophen (TYLENOL) 160 MG/5ML solution Take 320 mg by mouth every 6 hours as needed      isosorbide mononitrate (IMDUR) 30 MG extended release tablet 1 tablet 2 times daily      ondansetron (ZOFRAN-ODT) 4 MG disintegrating tablet 1 tablet every 8 hours as needed       No current facility-administered medications on file prior to encounter.       Lab Results   Component Value Date/Time    LABA1C 5.1 07/04/2024 11:28 AM       Wound

## 2025-01-15 NOTE — FLOWSHEET NOTE
01/15/25 1410   Wound 01/15/25 Toe (Comment  which one) Right 3rd toe, #1   Date First Assessed/Time First Assessed: 01/15/25 1325   Location: Toe (Comment  which one)  Wound Location Orientation: Right  Wound Description (Comments): 3rd toe, #1   Dressing/Treatment Gauze dressing/dressing sponge;Tape/Soft cloth adhesive tape   Wound 01/15/25 Toe (Comment  which one) Left Great Toe, #2   Date First Assessed/Time First Assessed: 01/15/25 1325   Present on Original Admission: Yes  Location: Toe (Comment  which one)  Wound Location Orientation: Left  Wound Description (Comments): Great Toe, #2   Dressing/Treatment Gauze dressing/dressing sponge;Tape/Soft cloth adhesive tape   Wound 01/15/25 Sacrum Cluster, #3   Date First Assessed/Time First Assessed: 01/15/25 1332   Present on Original Admission: Yes  Location: Sacrum  Wound Description (Comments): Cluster, #3   Dressing/Treatment Alginate with Ag;Gauze dressing/dressing sponge;Silicone border     Discharge Condition: Stable    Pain: 0    Ambulatory Status:Wheelchair    Discharge Destination: Home    Transportation:Car    Accompanied by: Self and Family    Discharge instructions reviewed with Self and Family and copy or written instructions have been provided. All questions/concerns have been addressed at this time.     
with saline   Wound Length (cm) 7 cm   Wound Width (cm) 10 cm   Wound Depth (cm) 0.2 cm   Wound Surface Area (cm^2) 70 cm^2   Wound Volume (cm^3) 14 cm^3   Wound Assessment [place]/red;Slough   Drainage Amount Large (50-75% saturated)   Drainage Description Serous;Serosanguinous   Odor None   Marielena-wound Assessment Excoriated;Maceration   Margins Flat/open edges;Epibole (rolled edges)   Wound Thickness Description not for Pressure Injury Full thickness     BP (!) 133/90   Pulse (!) 103   Temp 98.2 °F (36.8 °C) (Temporal)   Resp 16

## 2025-01-22 ENCOUNTER — HOSPITAL ENCOUNTER (OUTPATIENT)
Facility: HOSPITAL | Age: 65
Discharge: HOME | End: 2025-01-22
Attending: EMERGENCY MEDICINE
Payer: MEDICARE

## 2025-01-22 VITALS
RESPIRATION RATE: 15 BRPM | DIASTOLIC BLOOD PRESSURE: 62 MMHG | TEMPERATURE: 98.2 F | SYSTOLIC BLOOD PRESSURE: 128 MMHG | HEART RATE: 94 BPM

## 2025-01-22 DIAGNOSIS — L89.153 STAGE III PRESSURE ULCER OF SACRAL REGION (HCC): Primary | ICD-10-CM

## 2025-01-22 PROCEDURE — 11042 DBRDMT SUBQ TIS 1ST 20SQCM/<: CPT

## 2025-01-22 PROCEDURE — 11045 DBRDMT SUBQ TISS EACH ADDL: CPT

## 2025-01-22 RX ORDER — CLOBETASOL PROPIONATE 0.5 MG/G
OINTMENT TOPICAL ONCE
OUTPATIENT
Start: 2025-01-22 | End: 2025-01-22

## 2025-01-22 RX ORDER — LIDOCAINE HYDROCHLORIDE 40 MG/ML
SOLUTION TOPICAL ONCE
OUTPATIENT
Start: 2025-01-22 | End: 2025-01-22

## 2025-01-22 RX ORDER — SODIUM CHLOR/HYPOCHLOROUS ACID 0.033 %
SOLUTION, IRRIGATION IRRIGATION ONCE
OUTPATIENT
Start: 2025-01-22 | End: 2025-01-22

## 2025-01-22 RX ORDER — LIDOCAINE HYDROCHLORIDE 20 MG/ML
JELLY TOPICAL ONCE
OUTPATIENT
Start: 2025-01-22 | End: 2025-01-22

## 2025-01-22 RX ORDER — GENTAMICIN SULFATE 1 MG/G
OINTMENT TOPICAL ONCE
OUTPATIENT
Start: 2025-01-22 | End: 2025-01-22

## 2025-01-22 RX ORDER — NEOMYCIN/BACITRACIN/POLYMYXINB 3.5-400-5K
OINTMENT (GRAM) TOPICAL ONCE
OUTPATIENT
Start: 2025-01-22 | End: 2025-01-22

## 2025-01-22 RX ORDER — GINSENG 100 MG
CAPSULE ORAL ONCE
OUTPATIENT
Start: 2025-01-22 | End: 2025-01-22

## 2025-01-22 RX ORDER — LIDOCAINE 40 MG/G
CREAM TOPICAL ONCE
OUTPATIENT
Start: 2025-01-22 | End: 2025-01-22

## 2025-01-22 RX ORDER — BETAMETHASONE DIPROPIONATE 0.5 MG/G
CREAM TOPICAL ONCE
OUTPATIENT
Start: 2025-01-22 | End: 2025-01-22

## 2025-01-22 RX ORDER — MUPIROCIN 20 MG/G
OINTMENT TOPICAL ONCE
OUTPATIENT
Start: 2025-01-22 | End: 2025-01-22

## 2025-01-22 RX ORDER — TRIAMCINOLONE ACETONIDE 1 MG/G
OINTMENT TOPICAL ONCE
OUTPATIENT
Start: 2025-01-22 | End: 2025-01-22

## 2025-01-22 RX ORDER — LIDOCAINE 50 MG/G
OINTMENT TOPICAL ONCE
OUTPATIENT
Start: 2025-01-22 | End: 2025-01-22

## 2025-01-22 RX ORDER — BACITRACIN ZINC AND POLYMYXIN B SULFATE 500; 1000 [USP'U]/G; [USP'U]/G
OINTMENT TOPICAL ONCE
OUTPATIENT
Start: 2025-01-22 | End: 2025-01-22

## 2025-01-22 RX ORDER — SILVER SULFADIAZINE 10 MG/G
CREAM TOPICAL ONCE
OUTPATIENT
Start: 2025-01-22 | End: 2025-01-22

## 2025-01-22 NOTE — FLOWSHEET NOTE
01/22/25 1034   Anesthetic   Anesthetic 4% Lidocaine Liquid Topical   Right Leg Edema Point of Measurement   Leg circumference 28 cm   Ankle circumference 20 cm   Left Leg Edema Point of Measurement   Leg circumference 27.9 cm   Ankle circumference 19.9 cm   RLE Neurovascular Assessment   Capillary Refill Less than/Equal to 3 seconds   Color Appropriate for Ethnicity   Temperature Warm   R Pedal Pulse +2   LLE Neurovascular Assessment   Capillary Refill Less than/Equal to 3 seconds   Color Appropriate for Ethnicity   Temperature Warm   L Pedal Pulse +2   Wound 01/15/25 Toe (Comment  which one) Right 3rd toe, #1   Date First Assessed/Time First Assessed: 01/15/25 1325   Location: Toe (Comment  which one)  Wound Location Orientation: Right  Wound Description (Comments): 3rd toe, #1   Wound Image    Wound Etiology Diabetic   Wound Cleansed Cleansed with saline   Wound Length (cm) 0.5 cm   Wound Width (cm) 0.5 cm   Wound Depth (cm) 0.1 cm   Wound Surface Area (cm^2) 0.25 cm^2   Change in Wound Size % (l*w) -25   Wound Volume (cm^3) 0.025 cm^3   Wound Assessment Ruptured blister  (purple)   Drainage Amount None (dry)   Drainage Description Serosanguinous   Odor None   Marielena-wound Assessment Intact   Margins Attached edges   Wound 01/15/25 Toe (Comment  which one) Left Great Toe, #2   Date First Assessed/Time First Assessed: 01/15/25 1325   Present on Original Admission: Yes  Location: Toe (Comment  which one)  Wound Location Orientation: Left  Wound Description (Comments): Great Toe, #2   Wound Image    Wound Etiology Diabetic   Wound Cleansed Cleansed with saline   Wound Length (cm) 0 cm   Wound Width (cm) 0 cm   Wound Depth (cm) 0 cm   Wound Surface Area (cm^2) 0 cm^2   Change in Wound Size % (l*w) 100   Wound Volume (cm^3) 0 cm^3   Wound Assessment Epithelialization;Pink/red   Drainage Amount None (dry)   Odor None   Marielena-wound Assessment Intact   Margins Attached edges   Wound 01/15/25 Sacrum Cluster, #3   Date

## 2025-01-22 NOTE — FLOWSHEET NOTE
01/22/25 1058   Wound 01/15/25 Toe (Comment  which one) Right 3rd toe, #1   Date First Assessed/Time First Assessed: 01/15/25 1325   Location: Toe (Comment  which one)  Wound Location Orientation: Right  Wound Description (Comments): 3rd toe, #1   Dressing/Treatment Gauze dressing/dressing sponge;Tape/Soft cloth adhesive tape   Wound 01/15/25 Sacrum Cluster, #3   Date First Assessed/Time First Assessed: 01/15/25 1332   Present on Original Admission: Yes  Location: Sacrum  Wound Description (Comments): Cluster, #3   Dressing/Treatment Alginate with Ag;Gauze dressing/dressing sponge;Silicone border     Discharge Condition: Stable    Pain: 0    Ambulatory Status:Wheelchair    Discharge Destination: Home    Transportation:Car    Accompanied by: Self and Family    Discharge instructions reviewed with Self and Family and copy or written instructions have been provided. All questions/concerns have been addressed at this time.

## 2025-01-22 NOTE — PATIENT INSTRUCTIONS
Discharge Instructions for  [facility]  [address] [place] [address]  Telephone: [phone]   FAX [phone]    NAME:  James Wray  YOB: 1960  ACCOUNT NUMBER :  [id number]  DATE:  1/22/2025    Referral to [facility] [phone]  for Low Air loss bed electrical, and Erinn [phone] for Electric wheel chair evaluation and pressure mapping.      :MONSTER CASTANON RN     HOME HEALTH: [facility]     Wound Cleansing:   Do not scrub or use excessive force.  Cleanse wound prior to applying a clean dressing with:      [x] Cleanse wound with: Vashe and then rinse off with Normal Saline, may use baby shampoo until Vashe is available      [] May Shower at Discharge     [] Shower on days of dressing change, remove dressing first    [] Keep Wound Dry in Shower (may purchase a cast cover if needed)        Dressings:           Wound Location : Sacral Wound   Apply Primary Dressing: Skin Prep        [] MediHoney Gel    [] MediHoney Alginate  [] Alginate     [x] Alginate with Silver       [] Collagen   [] Collagen with Silver     [] Hydrocolloid  [] Hydrofera Blue Classic (moisten with saline)  [] Hydrofera Blue Ready  [] Other:    [] Pack wound loosely with      Cover and Secure with:    [x] Gauze   [] Sb   [] Kerlix  [] Ace Wrap     [] ABD  [] Medipore tape  [] tape  [x] Other: Foam with silicone boarder, please use separate foam for left and right    Avoid contact of tape with skin.    Change dressing:   [x] Daily x 1 week (wife is available to help)     [] Every Other Day   [] Three times per week  [] Once a week   [] Do Not Change Dressing     [] Other:    Dressings:           Wound Location : Left toe wound    Apply Primary Dressing:        Cover and Secure with:    [x] Gauze   [] Sb   [] Kerlix  [] Ace Wrap     [] ABD  [] Medipore tape  [x] tape  [] Other:    Avoid contact of tape with skin.    Change dressing:   [] Daily      [] Every Other

## 2025-01-22 NOTE — PROGRESS NOTES
[facility]   Progress Note and Procedure Note   NO Procedure    Patient Name: James Wray  : 1960  MRN: [id number]    Past Medical History:   Diagnosis Date    Acute blood loss anemia 2020    Diabetes ([facility])     Dysphagia due to old cerebrovascular accident 2019    ESRD (end stage renal disease) on dialysis ([facility])     MWF    Carlson catheter in place     Hematuria 2020    Followed by Dr. Grace of Urology.      MRSA cellulitis of right foot     Osteomyelitis of ankle or foot, acute, right 2024    PEG (percutaneous endoscopic gastrostomy) status ([facility]) 3/2019 to present    NPO- comfort foods only    Right sided weakness 3/2019 to present    Seizure ([facility]) 2019    Sepsis ([facility]) 2023    Skin ulcer of hand with necrosis of muscle ([facility]) 2023    Stroke ([facility]) 2019    Subacute osteomyelitis of foot 2024    Type 2 diabetes mellitus with right diabetic foot ulcer ([facility]) 2024    Urinary retention with incomplete bladder emptying      Past Surgical History:   Procedure Laterality Date    AV FISTULA CREATION Right 2019    CYST INCISION AND DRAINAGE Right     2nd toe- Cleaned out infection    CYSTOSCOPY N/A 06/15/2023    TRANSURETHRAL RESECTION OF THE PROSTATE, UNROOFING OF PROSTATE ABSCESS performed by Samir Castillo MD at [facility] MAIN OR    OTHER SURGICAL HISTORY  2019    PEG tube insertion    PROSTRATE ULTRASOUND GUIDANCE      abscess romoved     Family History   Problem Relation Age of Onset    Stroke Sister 50    Stroke Father     Deep Vein Thrombosis Neg Hx      Social History     Tobacco Use    Smoking status: Never     Passive exposure: Never    Smokeless tobacco: Never   Substance Use Topics    Alcohol use: Never    Drug use: Never     Allergies   Allergen Reactions    Bee Venom Anaphylaxis    Cephalexin Rash    Benadryl [Diphenhydramine] Other (See Comments)     AMS per wife    Codeine Other (See Comments)    Fentanyl Other (See Comments)

## 2025-01-24 RX ORDER — ACETAMINOPHEN 325 MG/1
650 TABLET ORAL
Status: CANCELLED | OUTPATIENT
Start: 2025-01-24

## 2025-01-24 RX ORDER — EPINEPHRINE 1 MG/ML
0.3 INJECTION, SOLUTION INTRAMUSCULAR; SUBCUTANEOUS PRN
Status: CANCELLED | OUTPATIENT
Start: 2025-01-24

## 2025-01-24 RX ORDER — ALBUTEROL SULFATE 90 UG/1
4 INHALANT RESPIRATORY (INHALATION) PRN
Status: CANCELLED | OUTPATIENT
Start: 2025-01-24

## 2025-01-24 RX ORDER — ONDANSETRON 2 MG/ML
8 INJECTION INTRAMUSCULAR; INTRAVENOUS
Status: CANCELLED | OUTPATIENT
Start: 2025-01-24

## 2025-01-24 RX ORDER — SODIUM CHLORIDE 9 MG/ML
INJECTION, SOLUTION INTRAVENOUS CONTINUOUS
Status: CANCELLED | OUTPATIENT
Start: 2025-01-24

## 2025-01-24 RX ORDER — DIPHENHYDRAMINE HYDROCHLORIDE 50 MG/ML
50 INJECTION INTRAMUSCULAR; INTRAVENOUS
Status: CANCELLED | OUTPATIENT
Start: 2025-01-24

## 2025-01-24 RX ORDER — HYDROCORTISONE SODIUM SUCCINATE 100 MG/2ML
100 INJECTION INTRAMUSCULAR; INTRAVENOUS
Status: CANCELLED | OUTPATIENT
Start: 2025-01-24

## 2025-01-24 RX ORDER — FAMOTIDINE 10 MG/ML
20 INJECTION, SOLUTION INTRAVENOUS
Status: CANCELLED | OUTPATIENT
Start: 2025-01-24

## 2025-01-31 ENCOUNTER — HOSPITAL ENCOUNTER (OUTPATIENT)
Facility: HOSPITAL | Age: 65
Setting detail: INFUSION SERIES
Discharge: HOME | End: 2025-01-31
Payer: MEDICARE

## 2025-01-31 VITALS
WEIGHT: 163 LBS | RESPIRATION RATE: 17 BRPM | DIASTOLIC BLOOD PRESSURE: 58 MMHG | BODY MASS INDEX: 25.58 KG/M2 | SYSTOLIC BLOOD PRESSURE: 111 MMHG | OXYGEN SATURATION: 98 % | TEMPERATURE: 98.1 F | HEIGHT: 67 IN | HEART RATE: 82 BPM

## 2025-01-31 DIAGNOSIS — Z86.14 HISTORY OF MRSA INFECTION: Primary | ICD-10-CM

## 2025-01-31 PROCEDURE — 2580000003 HC RX 258: Performed by: EMERGENCY MEDICINE

## 2025-01-31 PROCEDURE — 6360000002 HC RX W HCPCS: Performed by: EMERGENCY MEDICINE

## 2025-01-31 PROCEDURE — 96365 THER/PROPH/DIAG IV INF INIT: CPT

## 2025-01-31 RX ORDER — EPINEPHRINE 1 MG/ML
0.3 INJECTION, SOLUTION INTRAMUSCULAR; SUBCUTANEOUS PRN
OUTPATIENT
Start: 2025-01-31

## 2025-01-31 RX ORDER — ALBUTEROL SULFATE 90 UG/1
4 INHALANT RESPIRATORY (INHALATION) PRN
OUTPATIENT
Start: 2025-01-31

## 2025-01-31 RX ORDER — FAMOTIDINE 10 MG/ML
20 INJECTION, SOLUTION INTRAVENOUS
OUTPATIENT
Start: 2025-01-31

## 2025-01-31 RX ORDER — ACETAMINOPHEN 325 MG/1
650 TABLET ORAL
OUTPATIENT
Start: 2025-01-31

## 2025-01-31 RX ORDER — SODIUM CHLORIDE 9 MG/ML
INJECTION, SOLUTION INTRAVENOUS CONTINUOUS
OUTPATIENT
Start: 2025-01-31

## 2025-01-31 RX ORDER — ONDANSETRON 2 MG/ML
8 INJECTION INTRAMUSCULAR; INTRAVENOUS
OUTPATIENT
Start: 2025-01-31

## 2025-01-31 RX ORDER — DIPHENHYDRAMINE HYDROCHLORIDE 50 MG/ML
50 INJECTION INTRAMUSCULAR; INTRAVENOUS
OUTPATIENT
Start: 2025-01-31

## 2025-01-31 RX ORDER — HYDROCORTISONE SODIUM SUCCINATE 100 MG/2ML
100 INJECTION INTRAMUSCULAR; INTRAVENOUS
OUTPATIENT
Start: 2025-01-31

## 2025-01-31 RX ADMIN — DALBAVANCIN 1125 MG: 500 INJECTION, POWDER, FOR SOLUTION INTRAVENOUS at 11:21

## 2025-01-31 ASSESSMENT — PAIN SCALES - GENERAL: PAINLEVEL_OUTOF10: 0

## 2025-01-31 NOTE — PROGRESS NOTES
[facility] Progress Note    Date: January 31, 2025        Pt arrived in a wheelchair to \[facility]\"" for Dalvance in stable condition.  Assessment completed. PIV placed to left arm with positive blood return.       Patient Vitals for the past 12 hrs:   Temp Pulse Resp BP SpO2   01/31/25 1015 98.1 °F (36.7 °C) 90 17 110/65 98 %       Medications Administered         dalbavancin (DALVANCE) 1,125 mg in dextrose 5 % 250 mL IVPB Admin Date  01/31/2025 Action  New Bag Dose  1,125 mg Rate  662.5 mL/hr Route  IntraVENous Documented By  Nataly Cortes, JOHN                  1200:  Tolerated treatment well, no adverse reactions noted. PIV flushed and removed. 2x2 and coban placed. D/Cd from \[facility]\"" ambulatory and in no distress.  Patient not scheduled for future \[facility]\"" appointments.    Future Appointments   Date Time Provider Department Center   2/5/2025 10:30 AM Altaf Chang Jr., MD [facility]   6/24/2025  8:30 AM Damaso Foley MD [facility] DEP           Nataly Cortes RN  January 31, 2025

## 2025-02-05 ENCOUNTER — HOSPITAL ENCOUNTER (OUTPATIENT)
Facility: HOSPITAL | Age: 65
Discharge: HOME | End: 2025-02-05
Payer: MEDICARE

## 2025-02-05 VITALS
HEART RATE: 88 BPM | DIASTOLIC BLOOD PRESSURE: 68 MMHG | RESPIRATION RATE: 16 BRPM | TEMPERATURE: 98.4 F | SYSTOLIC BLOOD PRESSURE: 127 MMHG

## 2025-02-05 DIAGNOSIS — L89.153 STAGE III PRESSURE ULCER OF SACRAL REGION (HCC): Primary | ICD-10-CM

## 2025-02-05 PROCEDURE — 11042 DBRDMT SUBQ TIS 1ST 20SQCM/<: CPT

## 2025-02-05 RX ORDER — LIDOCAINE 40 MG/G
CREAM TOPICAL ONCE
OUTPATIENT
Start: 2025-02-05 | End: 2025-02-05

## 2025-02-05 RX ORDER — GINSENG 100 MG
CAPSULE ORAL ONCE
OUTPATIENT
Start: 2025-02-05 | End: 2025-02-05

## 2025-02-05 RX ORDER — LIDOCAINE 50 MG/G
OINTMENT TOPICAL ONCE
OUTPATIENT
Start: 2025-02-05 | End: 2025-02-05

## 2025-02-05 RX ORDER — NEOMYCIN/BACITRACIN/POLYMYXINB 3.5-400-5K
OINTMENT (GRAM) TOPICAL ONCE
OUTPATIENT
Start: 2025-02-05 | End: 2025-02-05

## 2025-02-05 RX ORDER — LIDOCAINE HYDROCHLORIDE 40 MG/ML
SOLUTION TOPICAL ONCE
OUTPATIENT
Start: 2025-02-05 | End: 2025-02-05

## 2025-02-05 RX ORDER — LIDOCAINE HYDROCHLORIDE 20 MG/ML
JELLY TOPICAL ONCE
OUTPATIENT
Start: 2025-02-05 | End: 2025-02-05

## 2025-02-05 RX ORDER — GENTAMICIN SULFATE 1 MG/G
OINTMENT TOPICAL ONCE
OUTPATIENT
Start: 2025-02-05 | End: 2025-02-05

## 2025-02-05 RX ORDER — BACITRACIN ZINC AND POLYMYXIN B SULFATE 500; 1000 [USP'U]/G; [USP'U]/G
OINTMENT TOPICAL ONCE
OUTPATIENT
Start: 2025-02-05 | End: 2025-02-05

## 2025-02-05 RX ORDER — SODIUM CHLOR/HYPOCHLOROUS ACID 0.033 %
SOLUTION, IRRIGATION IRRIGATION ONCE
OUTPATIENT
Start: 2025-02-05 | End: 2025-02-05

## 2025-02-05 RX ORDER — SILVER SULFADIAZINE 10 MG/G
CREAM TOPICAL ONCE
OUTPATIENT
Start: 2025-02-05 | End: 2025-02-05

## 2025-02-05 RX ORDER — TRIAMCINOLONE ACETONIDE 1 MG/G
OINTMENT TOPICAL ONCE
OUTPATIENT
Start: 2025-02-05 | End: 2025-02-05

## 2025-02-05 RX ORDER — MUPIROCIN 20 MG/G
OINTMENT TOPICAL ONCE
OUTPATIENT
Start: 2025-02-05 | End: 2025-02-05

## 2025-02-05 RX ORDER — BETAMETHASONE DIPROPIONATE 0.5 MG/G
CREAM TOPICAL ONCE
OUTPATIENT
Start: 2025-02-05 | End: 2025-02-05

## 2025-02-05 RX ORDER — CLOBETASOL PROPIONATE 0.5 MG/G
OINTMENT TOPICAL ONCE
OUTPATIENT
Start: 2025-02-05 | End: 2025-02-05

## 2025-02-05 NOTE — FLOWSHEET NOTE
02/05/25 1029   Anesthetic   Anesthetic 4% Lidocaine Liquid Topical   Left Leg Edema Point of Measurement   Leg circumference 27 cm   Ankle circumference 19.8 cm   LLE Neurovascular Assessment   Capillary Refill Less than/Equal to 3 seconds   Color Yellow-Brown/Hemosiderin Staining   Temperature Warm   L Pedal Pulse +1   Wound 01/15/25 Toe (Comment  which one) Right 3rd toe, #1   Date First Assessed/Time First Assessed: 01/15/25 1325   Location: Toe (Comment  which one)  Wound Location Orientation: Right  Wound Description (Comments): 3rd toe, #1   Wound Image    Wound Etiology Diabetic   Wound Cleansed Cleansed with saline   Wound Length (cm) 0.4 cm   Wound Width (cm) 0.4 cm   Wound Depth (cm) 0.2 cm   Wound Surface Area (cm^2) 0.16 cm^2   Change in Wound Size % (l*w) 20   Wound Volume (cm^3) 0.032 cm^3   Wound Assessment [place]/red;Slough   Drainage Amount Small (< 25%)   Drainage Description Serosanguinous   Odor None   Marielena-wound Assessment Maceration;Blanchable erythema   Margins Flat/open edges   Wound Thickness Description not for Pressure Injury Full thickness   Wound 01/15/25 Sacrum Cluster, #3   Date First Assessed/Time First Assessed: 01/15/25 1332   Present on Original Admission: Yes  Location: Sacrum  Wound Description (Comments): Cluster, #3   Wound Image    Wound Cleansed Cleansed with saline   Wound Length (cm) 3.1 cm   Wound Width (cm) 4.3 cm   Wound Depth (cm) 0.3 cm   Wound Surface Area (cm^2) 13.33 cm^2   Change in Wound Size % (l*w) 80.96   Wound Volume (cm^3) 3.999 cm^3   Wound Healing % 71   Wound Assessment Pink/red;Slough;Epithelialization   Drainage Amount Moderate (25-50%)   Drainage Description Serosanguinous   Odor None   Marielena-wound Assessment Maceration;Excoriated;Blanchable erythema   Margins Epibole (rolled edges);Flat/open edges   Wound Thickness Description not for Pressure Injury Full thickness     /68   Pulse 88   Temp 98.4 °F (36.9 °C) (Temporal)   Resp 16

## 2025-02-05 NOTE — PROGRESS NOTES
[facility]   Progress Note and Procedure Note   NO Procedure    Patient Name: James Wray  : 1960  MRN: [id number]    Past Medical History:   Diagnosis Date    Acute blood loss anemia 2020    Diabetes ([facility])     Dysphagia due to old cerebrovascular accident 2019    ESRD (end stage renal disease) on dialysis ([facility])     MWF    Carlson catheter in place     Hematuria 2020    Followed by Dr. Grace of Urology.      MRSA cellulitis of right foot     Osteomyelitis of ankle or foot, acute, right 2024    PEG (percutaneous endoscopic gastrostomy) status ([facility]) 3/2019 to present    NPO- comfort foods only    Right sided weakness 3/2019 to present    Seizure ([facility]) 2019    Sepsis ([facility]) 2023    Skin ulcer of hand with necrosis of muscle ([facility]) 2023    Stroke ([facility]) 2019    Subacute osteomyelitis of foot 2024    Type 2 diabetes mellitus with right diabetic foot ulcer ([facility]) 2024    Urinary retention with incomplete bladder emptying      Past Surgical History:   Procedure Laterality Date    AV FISTULA CREATION Right 2019    CYST INCISION AND DRAINAGE Right     2nd toe- Cleaned out infection    CYSTOSCOPY N/A 06/15/2023    TRANSURETHRAL RESECTION OF THE PROSTATE, UNROOFING OF PROSTATE ABSCESS performed by Samir Castillo MD at [facility] MAIN OR    OTHER SURGICAL HISTORY  2019    PEG tube insertion    PROSTRATE ULTRASOUND GUIDANCE      abscess romoved     Family History   Problem Relation Age of Onset    Stroke Sister 50    Stroke Father     Deep Vein Thrombosis Neg Hx      Social History     Tobacco Use    Smoking status: Never     Passive exposure: Never    Smokeless tobacco: Never   Substance Use Topics    Alcohol use: Never    Drug use: Never     Allergies   Allergen Reactions    Bee Venom Anaphylaxis    Cephalexin Rash    Benadryl [Diphenhydramine] Other (See Comments)     AMS per wife    Codeine Other (See Comments)    Fentanyl Other (See Comments)

## 2025-02-05 NOTE — FLOWSHEET NOTE
02/05/25 1119   Wound 01/15/25 Toe (Comment  which one) Right 3rd toe, #1   Date First Assessed/Time First Assessed: 01/15/25 1325   Location: Toe (Comment  which one)  Wound Location Orientation: Right  Wound Description (Comments): 3rd toe, #1   Wound Cleansed Vashe   Dressing/Treatment Alginate with Ag;Gauze dressing/dressing sponge;Tape/Soft cloth adhesive tape   Offloading for Diabetic Foot Ulcers Offloading ordered   Wound 01/15/25 Sacrum Cluster, #3   Date First Assessed/Time First Assessed: 01/15/25 1332   Present on Original Admission: Yes  Location: Sacrum  Wound Description (Comments): Cluster, #3   Wound Cleansed Vashe   Dressing/Treatment Alginate with Ag;Silicone border;Gauze dressing/dressing sponge     Discharge Condition: Stable    Pain: 0    Ambulatory Status:Wheelchair    Discharge Destination: Home    Transportation:Car    Accompanied by: Self and Family    Discharge instructions reviewed with Self and Family and copy or written instructions have been provided. All questions/concerns have been addressed at this time.

## 2025-02-05 NOTE — PATIENT INSTRUCTIONS
Discharge Instructions for  [facility]  [address] [place] [address]  Telephone: [phone]   FAX [phone]    NAME:  James Wray  YOB: 1960  ACCOUNT NUMBER :  [id number]  DATE:  2/5/2025    Referral to ROSEMARY [phone]  for Low Air loss bed electrical, and Erinn [phone] for Electric wheel chair evaluation and pressure mapping.      :MONSTER CASTANON RN     HOME HEALTH: [facility]     Wound Cleansing:   Do not scrub or use excessive force.  Cleanse wound prior to applying a clean dressing with:      [x] Cleanse wound with: Vashe and then rinse off with Normal Saline (No wound cleanser)    [] May Shower at Discharge     [] Shower on days of dressing change, remove dressing first    [] Keep Wound Dry in Shower (may purchase a cast cover if needed)        Dressings:           Wound Location : Sacral Wound   Apply Primary Dressing: Skin Prep        [] MediHoney Gel    [] MediHoney Alginate  [] Alginate     [x] Alginate with Silver       [] Collagen   [] Collagen with Silver     [] Hydrocolloid  [] Hydrofera Blue Classic (moisten with saline)  [] Hydrofera Blue Ready  [] Other:    [] Pack wound loosely with      Cover and Secure with:    [x] Gauze   [] Sb   [] Kerlix  [] Ace Wrap     [] ABD  [] Medipore tape  [] tape  [x] Other: Sacral Foam no boarder   Avoid contact of tape with skin.    Change dressing:   [] Daily    [] Every Other Day   [x] Three times per week or as needed if wet or soiled   [] Once a week   [] Do Not Change Dressing     [] Other:    Dressings:           Wound Location : Right 3rd toe wound    Apply Primary Dressing: Alginate with Silver     Cover and Secure with:    [x] Gauze   [] Sb   [] Kerlix  [] Ace Wrap     [] ABD  [] Medipore tape  [x] tape  [] Other:    Avoid contact of tape with skin.    Change dressing:   [] Daily      [] Every Other Day   [x] Three times per week or as needed   [] Once a

## 2025-02-06 ENCOUNTER — TELEPHONE (OUTPATIENT)
Facility: CLINIC | Age: 65
End: 2025-02-06

## 2025-02-06 NOTE — TELEPHONE ENCOUNTER
----- Message from Jhonatan GARZA sent at 2/6/2025  4:41 PM EST -----  Regarding: ECC Message to Provider  ECC Message to Provider    Relationship to Patient: Covered Entity Cami     Additional Information : Caller would like to know the most recent cart note from the last time he saw Dr. Foley. This regarding for getting an electric Wheel chair for the patient. FAX [phone]      --------------------------------------------------------------------------------------------------------------------------    Call Back Information: OK to leave message on voicemail  Preferred Call Back Number: Phone [phone]

## 2025-02-07 DIAGNOSIS — R06.2 WHEEZING: Primary | ICD-10-CM

## 2025-02-07 DIAGNOSIS — R06.2 WHEEZING: ICD-10-CM

## 2025-02-07 RX ORDER — LEVALBUTEROL INHALATION SOLUTION 0.63 MG/3ML
SOLUTION RESPIRATORY (INHALATION)
Refills: 0 | OUTPATIENT
Start: 2025-02-07

## 2025-02-07 RX ORDER — ALBUTEROL SULFATE 1.25 MG/3ML
3 SOLUTION RESPIRATORY (INHALATION) EVERY 6 HOURS PRN
Qty: 225 ML | Refills: 3 | Status: SHIPPED | OUTPATIENT
Start: 2025-02-07

## 2025-02-07 NOTE — TELEPHONE ENCOUNTER
Pt's wife came in saying that the [facility] pharmacy says they are still waiting on clarification from us about the albuterol nebulizer medication refill they sent back in January.    In the chart it looks like they received the refill and no other information about needing clarification.   Pt is almost out of his med and needs a refill asap.   [facility] #[id number]

## 2025-02-12 SDOH — ECONOMIC STABILITY: FOOD INSECURITY: WITHIN THE PAST 12 MONTHS, YOU WORRIED THAT YOUR FOOD WOULD RUN OUT BEFORE YOU GOT MONEY TO BUY MORE.: NEVER TRUE

## 2025-02-12 SDOH — ECONOMIC STABILITY: INCOME INSECURITY: IN THE LAST 12 MONTHS, WAS THERE A TIME WHEN YOU WERE NOT ABLE TO PAY THE MORTGAGE OR RENT ON TIME?: YES

## 2025-02-12 SDOH — ECONOMIC STABILITY: FOOD INSECURITY: WITHIN THE PAST 12 MONTHS, THE FOOD YOU BOUGHT JUST DIDN'T LAST AND YOU DIDN'T HAVE MONEY TO GET MORE.: SOMETIMES TRUE

## 2025-02-12 SDOH — ECONOMIC STABILITY: TRANSPORTATION INSECURITY
IN THE PAST 12 MONTHS, HAS LACK OF TRANSPORTATION KEPT YOU FROM MEETINGS, WORK, OR FROM GETTING THINGS NEEDED FOR DAILY LIVING?: NO

## 2025-02-12 SDOH — ECONOMIC STABILITY: TRANSPORTATION INSECURITY
IN THE PAST 12 MONTHS, HAS THE LACK OF TRANSPORTATION KEPT YOU FROM MEDICAL APPOINTMENTS OR FROM GETTING MEDICATIONS?: NO

## 2025-02-13 ENCOUNTER — TELEPHONE (OUTPATIENT)
Facility: HOSPITAL | Age: 65
End: 2025-02-13

## 2025-02-13 ENCOUNTER — TELEMEDICINE (OUTPATIENT)
Facility: CLINIC | Age: 65
End: 2025-02-13

## 2025-02-13 DIAGNOSIS — L89.614 PRESSURE ULCER OF RIGHT HEEL, STAGE 4 (HCC): ICD-10-CM

## 2025-02-13 DIAGNOSIS — Z99.2 ESRD ON HEMODIALYSIS (HCC): ICD-10-CM

## 2025-02-13 DIAGNOSIS — J45.20 MILD INTERMITTENT REACTIVE AIRWAY DISEASE WITHOUT COMPLICATION: ICD-10-CM

## 2025-02-13 DIAGNOSIS — L89.152 PRESSURE ULCER OF SACRAL REGION, STAGE 2 (HCC): ICD-10-CM

## 2025-02-13 DIAGNOSIS — N18.6 ESRD ON HEMODIALYSIS (HCC): ICD-10-CM

## 2025-02-13 DIAGNOSIS — R05.3 CHRONIC COUGH: ICD-10-CM

## 2025-02-13 DIAGNOSIS — I95.3 HEMODIALYSIS-ASSOCIATED HYPOTENSION: Primary | ICD-10-CM

## 2025-02-13 DIAGNOSIS — I69.151 HEMIPARESIS OF RIGHT DOMINANT SIDE AS LATE EFFECT OF NONTRAUMATIC INTRACEREBRAL HEMORRHAGE (HCC): ICD-10-CM

## 2025-02-13 DIAGNOSIS — I69.959 HEMIPLEGIA OF DOMINANT SIDE AS LATE EFFECT FOLLOWING CEREBROVASCULAR DISEASE (HCC): ICD-10-CM

## 2025-02-13 DIAGNOSIS — I25.2 HISTORY OF MI (MYOCARDIAL INFARCTION): ICD-10-CM

## 2025-02-13 DIAGNOSIS — J45.909 MILD REACTIVE AIRWAYS DISEASE, UNSPECIFIED WHETHER PERSISTENT: ICD-10-CM

## 2025-02-13 DIAGNOSIS — L89.153 STAGE III PRESSURE ULCER OF SACRAL REGION (HCC): ICD-10-CM

## 2025-02-13 PROBLEM — I61.9 CEREBRAL HEMORRHAGE (HCC): Status: RESOLVED | Noted: 2019-04-19 | Resolved: 2025-02-13

## 2025-02-13 PROBLEM — I63.9 CEREBROVASCULAR ACCIDENT (HCC): Status: RESOLVED | Noted: 2019-04-19 | Resolved: 2025-02-13

## 2025-02-13 PROBLEM — D84.9 IMMUNOCOMPROMISED STATE: Status: RESOLVED | Noted: 2023-07-07 | Resolved: 2025-02-13

## 2025-02-13 PROBLEM — I69.30 HISTORY OF HEMORRHAGIC CEREBROVASCULAR ACCIDENT (CVA) WITH RESIDUAL DEFICIT: Status: ACTIVE | Noted: 2024-12-27

## 2025-02-13 PROBLEM — R50.81 FEBRILE NEUTROPENIA (HCC): Status: RESOLVED | Noted: 2019-04-19 | Resolved: 2025-02-13

## 2025-02-13 PROBLEM — E11.65 HYPERGLYCEMIA DUE TO TYPE 2 DIABETES MELLITUS (HCC): Status: RESOLVED | Noted: 2024-12-27 | Resolved: 2025-02-13

## 2025-02-13 PROBLEM — D70.9 FEBRILE NEUTROPENIA: Status: RESOLVED | Noted: 2019-04-19 | Resolved: 2025-02-13

## 2025-02-13 PROBLEM — F44.89 CONFUSIONAL STATE: Status: RESOLVED | Noted: 2021-08-24 | Resolved: 2025-02-13

## 2025-02-13 PROBLEM — I24.9 ACUTE CORONARY SYNDROME (HCC): Status: RESOLVED | Noted: 2021-05-21 | Resolved: 2025-02-13

## 2025-02-13 PROBLEM — R50.9 FEVER: Status: RESOLVED | Noted: 2023-06-06 | Resolved: 2025-02-13

## 2025-02-13 PROBLEM — R47.01 APHASIA: Status: RESOLVED | Noted: 2019-04-28 | Resolved: 2025-02-13

## 2025-02-13 PROBLEM — E87.5 HYPERKALEMIA: Status: RESOLVED | Noted: 2019-03-08 | Resolved: 2025-02-13

## 2025-02-13 PROBLEM — I69.351 HEMIPARESIS AFFECTING RIGHT SIDE AS LATE EFFECT OF CEREBROVASCULAR ACCIDENT (CVA) (HCC): Status: RESOLVED | Noted: 2023-09-27 | Resolved: 2025-02-13

## 2025-02-13 RX ORDER — IPRATROPIUM BROMIDE AND ALBUTEROL SULFATE 2.5; .5 MG/3ML; MG/3ML
1 SOLUTION RESPIRATORY (INHALATION) EVERY 6 HOURS PRN
Qty: 270 ML | Refills: 3 | Status: SHIPPED | OUTPATIENT
Start: 2025-02-13

## 2025-02-13 ASSESSMENT — PATIENT HEALTH QUESTIONNAIRE - PHQ9
1. LITTLE INTEREST OR PLEASURE IN DOING THINGS: NOT AT ALL
SUM OF ALL RESPONSES TO PHQ9 QUESTIONS 1 & 2: 0
SUM OF ALL RESPONSES TO PHQ QUESTIONS 1-9: 0
SUM OF ALL RESPONSES TO PHQ QUESTIONS 1-9: 0
2. FEELING DOWN, DEPRESSED OR HOPELESS: NOT AT ALL
SUM OF ALL RESPONSES TO PHQ QUESTIONS 1-9: 0
SUM OF ALL RESPONSES TO PHQ QUESTIONS 1-9: 0

## 2025-02-13 NOTE — TELEPHONE ENCOUNTER
Mare from [facility] called and LVM, this nurse returned call.  Patients wife is asking staff to leave supplies in home for 30 days, discharge instructions say to change dressing 3 times a week, but wife is changing it more often.  Mare stated she just wanted to make sure that we are aware they are doing what they can, they are leaving supplies for up to 4 changes, but don't want to leave a 30 day supply because the Dr. may change the care of the wounds the next time patient is seen.  Also, wanted to make us aware that they are using an off brand of Vashe, but it is the same product, just in a different bottle.  Patient for follow up appt. Wednesday the 19th of Feb.

## 2025-02-13 NOTE — PROGRESS NOTES
James Wray, was evaluated through a synchronous (real-time) audio-video encounter. The patient (or guardian if applicable) is aware that this is a billable service, which includes applicable co-pays. This Virtual Visit was conducted with patient's (and/or legal guardian's) consent. Patient identification was verified, and a caregiver was present when appropriate.   The patient was located at Home: [address]  [facility] [place] [place] [address]  Provider was located at Facility (Appt Dept): [address] [facility]  [address]  [place] [address]  Confirm you are appropriately licensed, registered, or certified to deliver care in the state where the patient is located as indicated above. If you are not or unsure, please re-schedule the visit: Yes, I confirm.     James Wray (:  1960) is a Established patient, presenting virtually for evaluation of the following:      Below is the assessment and plan developed based on review of pertinent history, physical exam, labs, studies, and medications.     Assessment & Plan  1. Chronic cough: Likely due to reactive airway disease. No obvious chronic lung problems on imaging.  - Prescribe DuoNeb (ipratropium bromide and albuterol sulfate) 0.5 mg/3 mL and 2.5 mg once daily as needed, 90-day supply to be filled at [facility]  - Order pulmonary function testing  - Advise not to use nebulizer before the test    2. Right-sided hemiparesis: Post-stroke.  #R side Hemiplegia  #Multiple pressure ulcers sacrum and heel  #Immunodeficiency  - Complete necessary paperwork for chair lift acquisition to manage hemiparesis and assist in treating pressure ulcers on buttocks    3. Hypotension during dialysis.  - Managed with midodrine 10 to 30 mg as needed during sessions    Follow-up  - Pulmonary function testing  - Await paperwork for chair lift    PROCEDURE  Procedure Performed  Tracheostomy tube insertion      No follow-ups on file.       Subjective

## 2025-02-13 NOTE — PROGRESS NOTES
Chief Complaint   Patient presents with    Seizures     James Wray is a 64 y.o. male who presents for a follow up on history of stroke and epilepsy. Patient's wife would like a chair lift for patient's safety. She has started process through [employer].      \"Have you been to the ER, urgent care clinic since your last visit?  Hospitalized since your last visit?\"    NO    “Have you seen or consulted any other health care providers outside of [facility] since your last visit?”    NO    “Have you had a colorectal cancer screening such as a colonoscopy/FIT/Cologuard?    NO    No colonoscopy on file  No cologuard on file  No FIT/FOBT on file   No flexible sigmoidoscopy on file         Click Here for Release of Records Request

## 2025-02-25 ENCOUNTER — HOSPITAL ENCOUNTER (OUTPATIENT)
Facility: HOSPITAL | Age: 65
Discharge: HOME | End: 2025-02-28
Attending: FAMILY MEDICINE
Payer: MEDICARE

## 2025-02-25 VITALS — OXYGEN SATURATION: 96 % | RESPIRATION RATE: 18 BRPM | HEART RATE: 84 BPM

## 2025-02-25 DIAGNOSIS — J45.909 MILD REACTIVE AIRWAYS DISEASE, UNSPECIFIED WHETHER PERSISTENT: ICD-10-CM

## 2025-02-25 DIAGNOSIS — R05.3 CHRONIC COUGH: ICD-10-CM

## 2025-02-25 PROCEDURE — 94060 EVALUATION OF WHEEZING: CPT

## 2025-02-25 PROCEDURE — 94640 AIRWAY INHALATION TREATMENT: CPT

## 2025-02-25 PROCEDURE — 6360000002 HC RX W HCPCS: Performed by: FAMILY MEDICINE

## 2025-02-25 RX ORDER — ALBUTEROL SULFATE 0.83 MG/ML
2.5 SOLUTION RESPIRATORY (INHALATION) ONCE
Status: COMPLETED | OUTPATIENT
Start: 2025-02-25 | End: 2025-02-25

## 2025-02-25 RX ADMIN — ALBUTEROL SULFATE 2.5 MG: 2.5 SOLUTION RESPIRATORY (INHALATION) at 10:05

## 2025-02-26 ENCOUNTER — HOSPITAL ENCOUNTER (OUTPATIENT)
Facility: HOSPITAL | Age: 65
Discharge: HOME | End: 2025-02-26

## 2025-02-26 VITALS
HEART RATE: 89 BPM | TEMPERATURE: 98.2 F | SYSTOLIC BLOOD PRESSURE: 120 MMHG | DIASTOLIC BLOOD PRESSURE: 61 MMHG | RESPIRATION RATE: 18 BRPM

## 2025-02-26 DIAGNOSIS — L89.153 STAGE III PRESSURE ULCER OF SACRAL REGION (HCC): Primary | ICD-10-CM

## 2025-02-26 RX ORDER — LIDOCAINE 40 MG/G
CREAM TOPICAL ONCE
OUTPATIENT
Start: 2025-02-26 | End: 2025-02-26

## 2025-02-26 RX ORDER — LIDOCAINE HYDROCHLORIDE 20 MG/ML
JELLY TOPICAL ONCE
OUTPATIENT
Start: 2025-02-26 | End: 2025-02-26

## 2025-02-26 RX ORDER — BACITRACIN ZINC AND POLYMYXIN B SULFATE 500; 1000 [USP'U]/G; [USP'U]/G
OINTMENT TOPICAL ONCE
OUTPATIENT
Start: 2025-02-26 | End: 2025-02-26

## 2025-02-26 RX ORDER — CLOBETASOL PROPIONATE 0.5 MG/G
OINTMENT TOPICAL ONCE
OUTPATIENT
Start: 2025-02-26 | End: 2025-02-26

## 2025-02-26 RX ORDER — BETAMETHASONE DIPROPIONATE 0.5 MG/G
CREAM TOPICAL ONCE
OUTPATIENT
Start: 2025-02-26 | End: 2025-02-26

## 2025-02-26 RX ORDER — GENTAMICIN SULFATE 1 MG/G
OINTMENT TOPICAL ONCE
OUTPATIENT
Start: 2025-02-26 | End: 2025-02-26

## 2025-02-26 RX ORDER — GINSENG 100 MG
CAPSULE ORAL ONCE
OUTPATIENT
Start: 2025-02-26 | End: 2025-02-26

## 2025-02-26 RX ORDER — MUPIROCIN 20 MG/G
OINTMENT TOPICAL ONCE
OUTPATIENT
Start: 2025-02-26 | End: 2025-02-26

## 2025-02-26 RX ORDER — NEOMYCIN/BACITRACIN/POLYMYXINB 3.5-400-5K
OINTMENT (GRAM) TOPICAL ONCE
OUTPATIENT
Start: 2025-02-26 | End: 2025-02-26

## 2025-02-26 RX ORDER — SILVER SULFADIAZINE 10 MG/G
CREAM TOPICAL ONCE
OUTPATIENT
Start: 2025-02-26 | End: 2025-02-26

## 2025-02-26 RX ORDER — LIDOCAINE HYDROCHLORIDE 40 MG/ML
SOLUTION TOPICAL ONCE
OUTPATIENT
Start: 2025-02-26 | End: 2025-02-26

## 2025-02-26 RX ORDER — TRIAMCINOLONE ACETONIDE 1 MG/G
OINTMENT TOPICAL ONCE
OUTPATIENT
Start: 2025-02-26 | End: 2025-02-26

## 2025-02-26 RX ORDER — LIDOCAINE 50 MG/G
OINTMENT TOPICAL ONCE
OUTPATIENT
Start: 2025-02-26 | End: 2025-02-26

## 2025-02-26 RX ORDER — SODIUM CHLOR/HYPOCHLOROUS ACID 0.033 %
SOLUTION, IRRIGATION IRRIGATION ONCE
OUTPATIENT
Start: 2025-02-26 | End: 2025-02-26

## 2025-02-26 NOTE — FLOWSHEET NOTE
02/26/25 1147   Wound 01/15/25 Sacrum Cluster, #3   Date First Assessed/Time First Assessed: 01/15/25 1332   Present on Original Admission: Yes  Location: Sacrum  Wound Description (Comments): Cluster, #3   Dressing/Treatment Alginate with Ag;Foam;Zinc paste     Discharge Condition: Stable    Pain: 0    Ambulatory Status: Wheelchair    Discharge Destination: home    Transportation:car    Accompanied by: FAMILY    Discharge instructions reviewed with FAMILY and copy or written instructions have been provided. All questions/concerns have been addressed at this time.

## 2025-02-26 NOTE — PROGRESS NOTES
[facility]   Progress Note and Procedure Note   NO Procedure    Patient Name: James Wray  : 1960  MRN: [id number]    Past Medical History:   Diagnosis Date    Acute blood loss anemia 2020    Acute coronary syndrome ([facility]) 2021    Cerebral hemorrhage ([facility]) 2019    Diabetes ([facility])     Dysphagia due to old cerebrovascular accident 2019    ESRD (end stage renal disease) on dialysis ([facility])     MWF    Febrile neutropenia 2019    Carlson catheter in place     Hematuria 2020    Followed by Dr. Grace of Urology.      Hyperglycemia due to type 2 diabetes mellitus ([facility]) 2024    Hyperkalemia 2019    MRSA cellulitis of right foot     Osteomyelitis of ankle or foot, acute, right ([facility]) 2024    PEG (percutaneous endoscopic gastrostomy) status ([facility]) 3/2019 to present    NPO- comfort foods only    Right sided weakness 3/2019 to present    Seizure ([facility]) 2019    Sepsis ([facility]) 2023    Skin ulcer of hand with necrosis of muscle ([facility]) 2023    Stroke ([facility]) 2019    Subacute osteomyelitis of foot ([facility]) 2024    Type 2 diabetes mellitus with right diabetic foot ulcer ([facility]) 2024    Urinary retention with incomplete bladder emptying      Past Surgical History:   Procedure Laterality Date    AV FISTULA CREATION Right 2019    CYST INCISION AND DRAINAGE Right     2nd toe- Cleaned out infection    CYSTOSCOPY N/A 06/15/2023    TRANSURETHRAL RESECTION OF THE PROSTATE, UNROOFING OF PROSTATE ABSCESS performed by Samir Castillo MD at [facility] MAIN OR    OTHER SURGICAL HISTORY  2019    PEG tube insertion    PROSTRATE ULTRASOUND GUIDANCE      abscess romoved     Family History   Problem Relation Age of Onset    Stroke Sister 50    Stroke Father     Deep Vein Thrombosis Neg Hx      Social History     Tobacco Use    Smoking status: Never     Passive exposure: Never    Smokeless tobacco: Never   Substance Use Topics    Alcohol use: Never    Drug

## 2025-02-26 NOTE — FLOWSHEET NOTE
02/26/25 1054   Anesthetic   Anesthetic 4% Lidocaine Liquid Topical   Wound 01/15/25 Toe (Comment  which one) Right 3rd toe, #1   Date First Assessed/Time First Assessed: 01/15/25 1325   Location: Toe (Comment  which one)  Wound Location Orientation: Right  Wound Description (Comments): 3rd toe, #1   Wound Image    Wound Cleansed Soap and water   Wound Length (cm) 0.3 cm   Wound Width (cm) 0.2 cm   Wound Depth (cm) 0 cm   Wound Surface Area (cm^2) 0.06 cm^2   Change in Wound Size % (l*w) 70   Wound Volume (cm^3) 0 cm^3   Wound Assessment Other (Comment)  (dried exduate)   Drainage Amount None (dry)   Odor None   Marielena-wound Assessment Hyperkeratosis (callous);Blanchable erythema   Margins Attached edges   Wound Thickness Description not for Pressure Injury Partial thickness   Wound 01/15/25 Sacrum Cluster, #3   Date First Assessed/Time First Assessed: 01/15/25 1332   Present on Original Admission: Yes  Location: Sacrum  Wound Description (Comments): Cluster, #3   Wound Image    Wound Cleansed Soap and water   Wound Length (cm) 6 cm   Wound Width (cm) 6.5 cm   Wound Depth (cm) 0.1 cm   Wound Surface Area (cm^2) 39 cm^2   Change in Wound Size % (l*w) 44.29   Wound Volume (cm^3) 3.9 cm^3   Wound Healing % 72   Wound Assessment Epithelialization;Pink/red;Slough   Drainage Amount Small (< 25%)   Drainage Description Serosanguinous   Odor None   Marielena-wound Assessment Blanchable erythema;Maceration   Margins Epibole (rolled edges)   Wound Thickness Description not for Pressure Injury Full thickness   Pain Assessment   Pain Assessment None - Denies Pain     /61   Pulse 89   Temp 98.2 °F (36.8 °C) (Temporal)   Resp 18

## 2025-03-10 ENCOUNTER — RESULTS FOLLOW-UP (OUTPATIENT)
Facility: HOSPITAL | Age: 65
End: 2025-03-10

## 2025-03-19 ENCOUNTER — HOSPITAL ENCOUNTER (OUTPATIENT)
Facility: HOSPITAL | Age: 65
Discharge: HOME | End: 2025-03-19
Payer: MEDICARE

## 2025-03-19 VITALS
DIASTOLIC BLOOD PRESSURE: 71 MMHG | HEART RATE: 86 BPM | RESPIRATION RATE: 18 BRPM | TEMPERATURE: 97.7 F | SYSTOLIC BLOOD PRESSURE: 118 MMHG

## 2025-03-19 DIAGNOSIS — L89.153 STAGE III PRESSURE ULCER OF SACRAL REGION (HCC): Primary | ICD-10-CM

## 2025-03-19 PROCEDURE — 99212 OFFICE O/P EST SF 10 MIN: CPT

## 2025-03-19 RX ORDER — LIDOCAINE 40 MG/G
CREAM TOPICAL ONCE
Status: CANCELLED | OUTPATIENT
Start: 2025-03-19 | End: 2025-03-19

## 2025-03-19 RX ORDER — LIDOCAINE HYDROCHLORIDE 40 MG/ML
SOLUTION TOPICAL ONCE
Status: CANCELLED | OUTPATIENT
Start: 2025-03-19 | End: 2025-03-19

## 2025-03-19 RX ORDER — NEOMYCIN/BACITRACIN/POLYMYXINB 3.5-400-5K
OINTMENT (GRAM) TOPICAL ONCE
Status: CANCELLED | OUTPATIENT
Start: 2025-03-19 | End: 2025-03-19

## 2025-03-19 RX ORDER — SILVER SULFADIAZINE 10 MG/G
CREAM TOPICAL ONCE
Status: CANCELLED | OUTPATIENT
Start: 2025-03-19 | End: 2025-03-19

## 2025-03-19 RX ORDER — LIDOCAINE HYDROCHLORIDE 20 MG/ML
JELLY TOPICAL ONCE
Status: CANCELLED | OUTPATIENT
Start: 2025-03-19 | End: 2025-03-19

## 2025-03-19 RX ORDER — MUPIROCIN 20 MG/G
OINTMENT TOPICAL ONCE
Status: CANCELLED | OUTPATIENT
Start: 2025-03-19 | End: 2025-03-19

## 2025-03-19 RX ORDER — BACITRACIN ZINC AND POLYMYXIN B SULFATE 500; 1000 [USP'U]/G; [USP'U]/G
OINTMENT TOPICAL ONCE
Status: CANCELLED | OUTPATIENT
Start: 2025-03-19 | End: 2025-03-19

## 2025-03-19 RX ORDER — TRIAMCINOLONE ACETONIDE 1 MG/G
OINTMENT TOPICAL ONCE
Status: CANCELLED | OUTPATIENT
Start: 2025-03-19 | End: 2025-03-19

## 2025-03-19 RX ORDER — GINSENG 100 MG
CAPSULE ORAL ONCE
Status: CANCELLED | OUTPATIENT
Start: 2025-03-19 | End: 2025-03-19

## 2025-03-19 RX ORDER — CLOBETASOL PROPIONATE 0.5 MG/G
OINTMENT TOPICAL ONCE
Status: CANCELLED | OUTPATIENT
Start: 2025-03-19 | End: 2025-03-19

## 2025-03-19 RX ORDER — SODIUM CHLOR/HYPOCHLOROUS ACID 0.033 %
SOLUTION, IRRIGATION IRRIGATION ONCE
Status: CANCELLED | OUTPATIENT
Start: 2025-03-19 | End: 2025-03-19

## 2025-03-19 RX ORDER — LIDOCAINE 50 MG/G
OINTMENT TOPICAL ONCE
Status: CANCELLED | OUTPATIENT
Start: 2025-03-19 | End: 2025-03-19

## 2025-03-19 RX ORDER — GENTAMICIN SULFATE 1 MG/G
OINTMENT TOPICAL ONCE
Status: CANCELLED | OUTPATIENT
Start: 2025-03-19 | End: 2025-03-19

## 2025-03-19 RX ORDER — BETAMETHASONE DIPROPIONATE 0.5 MG/G
CREAM TOPICAL ONCE
Status: CANCELLED | OUTPATIENT
Start: 2025-03-19 | End: 2025-03-19

## 2025-03-19 NOTE — FLOWSHEET NOTE
03/19/25 1037   Wound 01/15/25 Sacrum Cluster, #3   Date First Assessed/Time First Assessed: 01/15/25 1332   Present on Original Admission: Yes  Location: Sacrum  Wound Description (Comments): Ramakrishna, #3   Wound Image    Dressing Status Intact   Wound Cleansed Cleansed with saline   Wound Length (cm) 0.1 cm   Wound Width (cm) 0.1 cm   Wound Depth (cm) 0.1 cm   Wound Surface Area (cm^2) 0.01 cm^2   Change in Wound Size % (l*w) 99.99   Wound Volume (cm^3) 0.001 cm^3   Wound Healing % 100   Wound Assessment Epithelialization   Drainage Amount Scant (moist but unmeasurable)   Drainage Description Serosanguinous   Odor None   Marielena-wound Assessment Dry/flaky   Margins Attached edges     /71   Pulse 86   Temp 97.7 °F (36.5 °C) (Temporal)   Resp 18

## 2025-03-19 NOTE — PATIENT INSTRUCTIONS
[facility] THANK YOU      Your treatment has been completed at [facility] outpatient wound clinic on 3/19/2025  , per Dr. Chang.     We know patients have several options when choosing a health care provider. We would like to express our sincere appreciation for having had the chance to be yours. More importantly, we enjoy having you as part of our family.     We also hope your experience with us has surpassed your expectations and that you have been pleased with our service. We appreciate the confidence you've shown in selecting us as your health care provider and we will continue or commitment to provide the highest quality of service.      Again, thank you for choosing us for your health care needs. If you have any further questions or concerns, please call [phone]. It has been our pleasure serving you and we hope to continue our relationship in the future, if the need occurs.       Sincerely,    [facility] Team       If buttock has any wet areas use Desitin if dry can use baby oil or A&D

## 2025-03-19 NOTE — WOUND CARE
Discharge Condition: Stable    Pain: 0    Ambulatory Status:wheelchair    Discharge Destination: Home    Transportation:Car    Accompanied by: Self and spouse    Discharge instructions reviewed with Family and copy or written instructions have been provided. All questions/concerns have been addressed at this time.   
yes...

## 2025-03-19 NOTE — PROGRESS NOTES
230 mLs by mouth 4 times daily      acetaminophen (TYLENOL) 160 MG/5ML solution Take 320 mg by mouth every 6 hours as needed      isosorbide mononitrate (IMDUR) 30 MG extended release tablet 1 tablet 2 times daily      ondansetron (ZOFRAN-ODT) 4 MG disintegrating tablet 1 tablet every 8 hours as needed       No current facility-administered medications on file prior to encounter.     Lab Results   Component Value Date/Time    LABA1C 5.1 07/04/2024 11:28 AM         Vitals:    03/19/25 1030   BP: 118/71   Pulse: 86   Resp: 18   Temp: 97.7 °F (36.5 °C)      Wound 06/13/23 Buttocks redness and exocoriation to buttocks. wound care consulted (Active)   Number of days: 644               I have noted, and reviewed today's data for this patient in [facility] and concur with same. The focused physical exam, other physical findings, Medical history, Review of Symptoms and Medications today remains unchanged except as noted below.     Patient notes today: doing better, .  Lesion/Wound, focused exam on Presentation today: Buttocks both sides, now ulcers closed, areas of dry scaling and pink skin , .    Post Procedure Condition/ Diagnosis: buttocks ulcers closed, .  Follow up and Future plans today: alert to skin hygiene and care over buttock, best off loading.    Specimens: 0.  Patient Counseled regarding/Discussed: as above, good progress to closure.  Clinical Considerations: alert to infection , best off loading, good hygiene, baby oil versus desitin.    Dx Codes: L89.153.    Altaf Chang MD  3/19/2025

## 2025-03-21 ENCOUNTER — COMMUNITY OUTREACH (OUTPATIENT)
Facility: CLINIC | Age: 65
End: 2025-03-21

## 2025-04-03 ENCOUNTER — TELEPHONE (OUTPATIENT)
Facility: HOSPITAL | Age: 65
End: 2025-04-03

## 2025-04-03 NOTE — TELEPHONE ENCOUNTER
Rosalva from [facility] called requesting the last 2 visit notes for patient. Sent to Amira @ [phone]

## 2025-05-06 ENCOUNTER — TELEPHONE (OUTPATIENT)
Facility: HOSPITAL | Age: 65
End: 2025-05-06

## 2025-06-15 DIAGNOSIS — J45.909 MILD REACTIVE AIRWAYS DISEASE, UNSPECIFIED WHETHER PERSISTENT: ICD-10-CM

## 2025-06-15 DIAGNOSIS — R05.3 CHRONIC COUGH: ICD-10-CM

## 2025-06-16 ENCOUNTER — PATIENT MESSAGE (OUTPATIENT)
Facility: CLINIC | Age: 65
End: 2025-06-16

## 2025-06-16 DIAGNOSIS — R42 CHRONIC VERTIGO: ICD-10-CM

## 2025-06-16 DIAGNOSIS — J45.909 MILD REACTIVE AIRWAYS DISEASE, UNSPECIFIED WHETHER PERSISTENT: ICD-10-CM

## 2025-06-16 DIAGNOSIS — R05.3 CHRONIC COUGH: ICD-10-CM

## 2025-06-16 DIAGNOSIS — R06.2 WHEEZING: ICD-10-CM

## 2025-06-16 RX ORDER — IPRATROPIUM BROMIDE AND ALBUTEROL SULFATE 2.5; .5 MG/3ML; MG/3ML
SOLUTION RESPIRATORY (INHALATION)
Qty: 270 ML | Refills: 3 | Status: SHIPPED | OUTPATIENT
Start: 2025-06-16

## 2025-06-17 DIAGNOSIS — E11.22 TYPE 2 DIABETES MELLITUS WITH CHRONIC KIDNEY DISEASE ON CHRONIC DIALYSIS, WITHOUT LONG-TERM CURRENT USE OF INSULIN (HCC): ICD-10-CM

## 2025-06-17 DIAGNOSIS — Z86.73 HISTORY OF STROKE: ICD-10-CM

## 2025-06-17 DIAGNOSIS — Z99.2 TYPE 2 DIABETES MELLITUS WITH CHRONIC KIDNEY DISEASE ON CHRONIC DIALYSIS, WITH LONG-TERM CURRENT USE OF INSULIN (HCC): ICD-10-CM

## 2025-06-17 DIAGNOSIS — N18.6 TYPE 2 DIABETES MELLITUS WITH CHRONIC KIDNEY DISEASE ON CHRONIC DIALYSIS, WITHOUT LONG-TERM CURRENT USE OF INSULIN (HCC): ICD-10-CM

## 2025-06-17 DIAGNOSIS — N18.6 TYPE 2 DIABETES MELLITUS WITH CHRONIC KIDNEY DISEASE ON CHRONIC DIALYSIS, WITH LONG-TERM CURRENT USE OF INSULIN (HCC): ICD-10-CM

## 2025-06-17 DIAGNOSIS — Z99.2 TYPE 2 DIABETES MELLITUS WITH CHRONIC KIDNEY DISEASE ON CHRONIC DIALYSIS, WITHOUT LONG-TERM CURRENT USE OF INSULIN (HCC): ICD-10-CM

## 2025-06-17 DIAGNOSIS — Z79.4 TYPE 2 DIABETES MELLITUS WITH CHRONIC KIDNEY DISEASE ON CHRONIC DIALYSIS, WITH LONG-TERM CURRENT USE OF INSULIN (HCC): ICD-10-CM

## 2025-06-17 DIAGNOSIS — E11.22 TYPE 2 DIABETES MELLITUS WITH CHRONIC KIDNEY DISEASE ON CHRONIC DIALYSIS, WITH LONG-TERM CURRENT USE OF INSULIN (HCC): ICD-10-CM

## 2025-06-17 LAB
CHOLEST SERPL-MCNC: 146 MG/DL
EST. AVERAGE GLUCOSE BLD GHB EST-MCNC: 94 MG/DL
HBA1C MFR BLD: 4.9 % (ref 4–5.6)
HDLC SERPL-MCNC: 72 MG/DL
HDLC SERPL: 2 (ref 0–5)
LDLC SERPL CALC-MCNC: 57.8 MG/DL (ref 0–100)
TRIGL SERPL-MCNC: 81 MG/DL
VLDLC SERPL CALC-MCNC: 16.2 MG/DL

## 2025-06-17 RX ORDER — IPRATROPIUM BROMIDE AND ALBUTEROL SULFATE 2.5; .5 MG/3ML; MG/3ML
1 SOLUTION RESPIRATORY (INHALATION) EVERY 6 HOURS PRN
Qty: 270 ML | Refills: 3 | Status: SHIPPED | OUTPATIENT
Start: 2025-06-17

## 2025-06-17 RX ORDER — SCOPOLAMINE 1 MG/3D
1 PATCH, EXTENDED RELEASE TRANSDERMAL
Qty: 32 PATCH | Refills: 3 | Status: SHIPPED | OUTPATIENT
Start: 2025-06-17

## 2025-06-17 RX ORDER — IPRATROPIUM BROMIDE AND ALBUTEROL SULFATE 2.5; .5 MG/3ML; MG/3ML
1 SOLUTION RESPIRATORY (INHALATION) EVERY 6 HOURS PRN
Qty: 270 ML | Refills: 3 | OUTPATIENT
Start: 2025-06-17

## 2025-06-20 ENCOUNTER — PATIENT MESSAGE (OUTPATIENT)
Facility: CLINIC | Age: 65
End: 2025-06-20

## 2025-06-23 ENCOUNTER — TELEPHONE (OUTPATIENT)
Facility: CLINIC | Age: 65
End: 2025-06-23

## 2025-06-23 NOTE — TELEPHONE ENCOUNTER
PA has been submitted on cover my meds.   scopolamine (TRANSDERM-SCOP) transdermal patch         SCOPOLAMINE Patch 72HR Type of coverage approved: Prior Authorization This approval authorizes your coverage from 01/01/2025 - 06/23/2026

## 2025-06-24 ENCOUNTER — OFFICE VISIT (OUTPATIENT)
Facility: CLINIC | Age: 65
End: 2025-06-24
Payer: MEDICARE

## 2025-06-24 VITALS
BODY MASS INDEX: 25.58 KG/M2 | HEIGHT: 67 IN | HEART RATE: 85 BPM | SYSTOLIC BLOOD PRESSURE: 137 MMHG | DIASTOLIC BLOOD PRESSURE: 76 MMHG | RESPIRATION RATE: 18 BRPM | WEIGHT: 163 LBS | TEMPERATURE: 97.7 F | OXYGEN SATURATION: 96 %

## 2025-06-24 DIAGNOSIS — R11.2 NAUSEA AND VOMITING, UNSPECIFIED VOMITING TYPE: ICD-10-CM

## 2025-06-24 DIAGNOSIS — G40.209 COMPLEX PARTIAL EPILEPTIC SEIZURE (HCC): ICD-10-CM

## 2025-06-24 DIAGNOSIS — Z00.00 MEDICARE ANNUAL WELLNESS VISIT, SUBSEQUENT: Primary | ICD-10-CM

## 2025-06-24 DIAGNOSIS — N18.6 TYPE 2 DIABETES MELLITUS WITH CHRONIC KIDNEY DISEASE ON CHRONIC DIALYSIS, WITHOUT LONG-TERM CURRENT USE OF INSULIN (HCC): ICD-10-CM

## 2025-06-24 DIAGNOSIS — Z99.2 TYPE 2 DIABETES MELLITUS WITH CHRONIC KIDNEY DISEASE ON CHRONIC DIALYSIS, WITHOUT LONG-TERM CURRENT USE OF INSULIN (HCC): ICD-10-CM

## 2025-06-24 DIAGNOSIS — E11.22 TYPE 2 DIABETES MELLITUS WITH CHRONIC KIDNEY DISEASE ON CHRONIC DIALYSIS, WITHOUT LONG-TERM CURRENT USE OF INSULIN (HCC): ICD-10-CM

## 2025-06-24 DIAGNOSIS — J31.0 CHRONIC RHINITIS: ICD-10-CM

## 2025-06-24 DIAGNOSIS — J45.20 MILD INTERMITTENT REACTIVE AIRWAY DISEASE WITHOUT COMPLICATION: ICD-10-CM

## 2025-06-24 PROBLEM — E87.1 HYPONATREMIA: Status: RESOLVED | Noted: 2019-07-15 | Resolved: 2025-06-24

## 2025-06-24 PROCEDURE — 3017F COLORECTAL CA SCREEN DOC REV: CPT | Performed by: FAMILY MEDICINE

## 2025-06-24 PROCEDURE — 1036F TOBACCO NON-USER: CPT | Performed by: FAMILY MEDICINE

## 2025-06-24 PROCEDURE — 99214 OFFICE O/P EST MOD 30 MIN: CPT | Performed by: FAMILY MEDICINE

## 2025-06-24 PROCEDURE — G0439 PPPS, SUBSEQ VISIT: HCPCS | Performed by: FAMILY MEDICINE

## 2025-06-24 PROCEDURE — G8419 CALC BMI OUT NRM PARAM NOF/U: HCPCS | Performed by: FAMILY MEDICINE

## 2025-06-24 PROCEDURE — 2022F DILAT RTA XM EVC RTNOPTHY: CPT | Performed by: FAMILY MEDICINE

## 2025-06-24 PROCEDURE — 1123F ACP DISCUSS/DSCN MKR DOCD: CPT | Performed by: FAMILY MEDICINE

## 2025-06-24 PROCEDURE — G8427 DOCREV CUR MEDS BY ELIG CLIN: HCPCS | Performed by: FAMILY MEDICINE

## 2025-06-24 PROCEDURE — 3078F DIAST BP <80 MM HG: CPT | Performed by: FAMILY MEDICINE

## 2025-06-24 PROCEDURE — 3044F HG A1C LEVEL LT 7.0%: CPT | Performed by: FAMILY MEDICINE

## 2025-06-24 PROCEDURE — 3075F SYST BP GE 130 - 139MM HG: CPT | Performed by: FAMILY MEDICINE

## 2025-06-24 RX ORDER — FLUTICASONE PROPIONATE 50 MCG
2 SPRAY, SUSPENSION (ML) NASAL DAILY
Qty: 16 G | Refills: 1 | Status: SHIPPED | OUTPATIENT
Start: 2025-06-24

## 2025-06-24 RX ORDER — AZELASTINE 1 MG/ML
1 SPRAY, METERED NASAL 2 TIMES DAILY
Qty: 60 ML | Refills: 1 | Status: SHIPPED | OUTPATIENT
Start: 2025-06-24

## 2025-06-24 RX ORDER — IPRATROPIUM BROMIDE 21 UG/1
2 SPRAY, METERED NASAL EVERY 12 HOURS
Qty: 30 ML | Refills: 1 | Status: SHIPPED | OUTPATIENT
Start: 2025-06-24

## 2025-06-24 SDOH — HEALTH STABILITY: PHYSICAL HEALTH: ON AVERAGE, HOW MANY MINUTES DO YOU ENGAGE IN EXERCISE AT THIS LEVEL?: 0 MIN

## 2025-06-24 SDOH — HEALTH STABILITY: PHYSICAL HEALTH: ON AVERAGE, HOW MANY DAYS PER WEEK DO YOU ENGAGE IN MODERATE TO STRENUOUS EXERCISE (LIKE A BRISK WALK)?: 0 DAYS

## 2025-06-24 ASSESSMENT — PATIENT HEALTH QUESTIONNAIRE - PHQ9
SUM OF ALL RESPONSES TO PHQ QUESTIONS 1-9: 0
1. LITTLE INTEREST OR PLEASURE IN DOING THINGS: NOT AT ALL
SUM OF ALL RESPONSES TO PHQ QUESTIONS 1-9: 0
SUM OF ALL RESPONSES TO PHQ QUESTIONS 1-9: 0
2. FEELING DOWN, DEPRESSED OR HOPELESS: NOT AT ALL
SUM OF ALL RESPONSES TO PHQ QUESTIONS 1-9: 0
2. FEELING DOWN, DEPRESSED OR HOPELESS: NOT AT ALL
SUM OF ALL RESPONSES TO PHQ QUESTIONS 1-9: 0
1. LITTLE INTEREST OR PLEASURE IN DOING THINGS: NOT AT ALL
SUM OF ALL RESPONSES TO PHQ QUESTIONS 1-9: 0

## 2025-06-24 ASSESSMENT — LIFESTYLE VARIABLES
HOW OFTEN DO YOU HAVE A DRINK CONTAINING ALCOHOL: NEVER
HOW MANY STANDARD DRINKS CONTAINING ALCOHOL DO YOU HAVE ON A TYPICAL DAY: 0
HOW OFTEN DO YOU HAVE SIX OR MORE DRINKS ON ONE OCCASION: 1
HOW OFTEN DO YOU HAVE A DRINK CONTAINING ALCOHOL: 1
HOW MANY STANDARD DRINKS CONTAINING ALCOHOL DO YOU HAVE ON A TYPICAL DAY: PATIENT DOES NOT DRINK
HOW OFTEN DO YOU HAVE A DRINK CONTAINING ALCOHOL: NEVER
HOW MANY STANDARD DRINKS CONTAINING ALCOHOL DO YOU HAVE ON A TYPICAL DAY: PATIENT DOES NOT DRINK

## 2025-06-24 NOTE — PROGRESS NOTES
Medicare Annual Wellness Visit    James Wray is here for Medicare AWV    Assessment & Plan  0. Medicare AWV  - doing much better, working on diet and excise limited.  - declined colonoscopy due to limitations. Will talk to GI about other options for colon cancer screening.    1. Chronic cough: Chronic.  - Likely due to chronic motor rhinitis, vasomotor rhinitis, or allergic rhinitis.  - Boggy blue turbinates indicate chronic allergic sinusitis.  - Gradual reduction of nebulizer use.  - Prescriptions for three nasal sprays: allergy, nonallergic chronic rhinosinusitis, and Flonase.    2. Type 2 diabetes mellitus: Stable.  - A1c levels normal, diabetes well-managed.  - Insulin resistance decreasing, minimal insulin use.  - Discontinue insulin, monitor blood sugar and A1c levels.    3. Seizure disorder: Resolved.  - Off Vimpat for six months, no seizures.  - EEG shows no epilepsy.  - Monitor for seizure activity, no further medication needed.    4. Nausea: Stable.  - Scopolamine patch renewed.  - Continue use as needed, prescription sent to pharmacy.    5. Hyperlipidemia: Stable.  - Cholesterol well controlled without medication.  - Monitor cholesterol levels, maintain healthy diet.    6. Chronic kidney disease: Stable.  - Continues dialysis, stent revision needed for right arm fistula swelling.  - Follow up with vascular surgeon.  - Ultrasound indicates need for stent revision.    7. History of stroke: Improved.  - Improved cognitive function since discontinuing Vimpat.  - No new stroke symptoms.  - Monitor neurological changes, follow up with neurologist.    8. Chronic ulcers: Resolved.  - Discharged from home care for sacrum area, no new ulcers.  - Continue using prescribed pillow and bed.  - Monitor skin integrity and ulcer prevention.    Follow-up  - Follow up with vascular surgeon for stent revision.  - Follow up with neurologist for monitoring neurological changes.    Medicare annual wellness visit,

## 2025-06-24 NOTE — PROGRESS NOTES
Room:  I have reviewed all needed documentation in preparation for visit. Verified patient by name and date of birth  Chief Complaint   Patient presents with    Medicare AWV       Vitals:    06/24/25 0825   BP: 137/76   Pulse: 85   Resp: 18   Temp: 97.7 °F (36.5 °C)   TempSrc: Temporal   SpO2: 96%   Weight: 73.9 kg (163 lb)   Height: 1.702 m (5' 7\")        Health Maintenance Due   Topic Date Due    Diabetic foot exam  Never done    HIV screen  Never done    Diabetic retinal exam  Never done    Hepatitis C screen  Never done    DTaP/Tdap/Td vaccine (1 - Tdap) Never done    Shingles vaccine (1 of 2) Never done    Hepatitis B vaccine (1 of 3 - Risk Dialysis 4-dose series) Never done    Colorectal Cancer Screen  Never done    Respiratory Syncytial Virus (RSV) Pregnant or age 60 yrs+ (1 - Risk 60-74 years 1-dose series) Never done    Pneumococcal 50+ years Vaccine (2 of 2 - PCV) 11/14/2023    Annual Wellness Visit (Medicare)  Never done    COVID-19 Vaccine (5 - 2024-25 season) 09/01/2024        1. \"Have you been to the ER, urgent care clinic since your last visit?  Hospitalized since your last visit?\" No    2. \"Have you seen or consulted any other health care providers outside of the [facility] since your last visit?\" Yes vascular surgeon    3. For patients aged 45-75: Has the patient had a colonoscopy / FIT/ Cologuard? No

## 2025-07-17 DIAGNOSIS — J31.0 CHRONIC RHINITIS: ICD-10-CM

## 2025-07-17 RX ORDER — FLUTICASONE PROPIONATE 50 MCG
SPRAY, SUSPENSION (ML) NASAL
Qty: 144 ML | Refills: 5 | Status: SHIPPED | OUTPATIENT
Start: 2025-07-17

## 2025-07-17 RX ORDER — IPRATROPIUM BROMIDE 21 UG/1
2 SPRAY, METERED NASAL EVERY 12 HOURS
Qty: 90 ML | Refills: 5 | Status: SHIPPED | OUTPATIENT
Start: 2025-07-17

## 2025-09-01 DIAGNOSIS — Z99.2 TYPE 2 DIABETES MELLITUS WITH CHRONIC KIDNEY DISEASE ON CHRONIC DIALYSIS, WITHOUT LONG-TERM CURRENT USE OF INSULIN (HCC): ICD-10-CM

## 2025-09-01 DIAGNOSIS — N18.6 TYPE 2 DIABETES MELLITUS WITH CHRONIC KIDNEY DISEASE ON CHRONIC DIALYSIS, WITHOUT LONG-TERM CURRENT USE OF INSULIN (HCC): ICD-10-CM

## 2025-09-01 DIAGNOSIS — E11.22 TYPE 2 DIABETES MELLITUS WITH CHRONIC KIDNEY DISEASE ON CHRONIC DIALYSIS, WITHOUT LONG-TERM CURRENT USE OF INSULIN (HCC): ICD-10-CM

## 2025-09-02 RX ORDER — BLOOD SUGAR DIAGNOSTIC
STRIP MISCELLANEOUS
Qty: 200 STRIP | Refills: 1 | Status: SHIPPED | OUTPATIENT
Start: 2025-09-02

## (undated) DEVICE — 4-PORT MANIFOLD: Brand: NEPTUNE 2

## (undated) DEVICE — SUTURE PERMAHAND SZ 2-0 L18IN NONABSORBABLE BLK L26MM PS 1588H

## (undated) DEVICE — SOLUTION IRRIG 1000ML STRL H2O USP PLAS POUR BTL

## (undated) DEVICE — GLOVE ORANGE PI 7 1/2   MSG9075

## (undated) DEVICE — SKN PREP SPNG STKS PVP PNT STR: Brand: MEDLINE INDUSTRIES, INC.

## (undated) DEVICE — CATHETER PTCA L80CM BLLN L40MM DIA10MM INTRO SHTH 6FR 7ATM

## (undated) DEVICE — INTRODUCER SHTH 5 FRX30 CM 7 CM W/ NIT WIRE REG MICRO-STICK

## (undated) DEVICE — Y-TYPE TUR IRRIGATION SET

## (undated) DEVICE — TUBING IRRIG L96IN DIA0.241IN L BOR T-U-R W/ NVENT PIERCING

## (undated) DEVICE — SOLUTION IRRIG 3000ML H2O STRL BAG

## (undated) DEVICE — CATH URETH FOL 2W MED 22FRX30 -- CONVERT TO ITEM 363084

## (undated) DEVICE — DRAINBAG,ANTI-REFLUX TOWER,L/F,2000ML,LL: Brand: MEDLINE

## (undated) DEVICE — MARKER,SKIN,WI/RULER AND LABELS: Brand: MEDLINE

## (undated) DEVICE — CATHETER URETH 22FR BLLN 30CC SIL BACT GRD HYDRGEL 3 W

## (undated) DEVICE — BAG COLLECTION FLD OR-TBL NS --

## (undated) DEVICE — COVER LT HNDL PLAS RIG 1 PER PK

## (undated) DEVICE — TUBING, SUCTION, 1/4" X 12', STRAIGHT: Brand: MEDLINE

## (undated) DEVICE — SYSTEM FLD COLL W/ FLX DRP SUPP AND DISP BG

## (undated) DEVICE — CUTTING ELECTRODE MONO 24FR 12/30°  FOR RESECTOSCOPES, TELESCOPE Ø 4 MM, FOR SHEATHS, INTERMITTENT/CONTINUOUS IRRIGATION, 24/26 FR, LOOP: ROUND, WIRE Ø 0.25 MM, FORK COLOR YELLOW, STEM COLOR TRANSPARENT, PACK = 10 PCS, FOR SHARK RESECTOSCOPE, STERILE, FOR SINGLE USE: Brand: SHARK

## (undated) DEVICE — PACK,CYSTOSCOPY,PK III,SIRUS: Brand: MEDLINE

## (undated) DEVICE — PLUG,CATHETER,DRAINAGE PROTECTOR,TUBE: Brand: MEDLINE

## (undated) DEVICE — ELECTRODE PT RET AD L9FT HI MOIST COND ADH HYDRGEL CORDED

## (undated) DEVICE — GUIDEWIRE VASC L180CM DIA0.035IN 3MM PTFE J TIP EXCHG FIX

## (undated) DEVICE — STERILE POLYISOPRENE POWDER-FREE SURGICAL GLOVES: Brand: PROTEXIS

## (undated) DEVICE — SPONGE GZ W4XL4IN COT 12 PLY TYP VII WVN C FLD DSGN STERILE

## (undated) DEVICE — PINNACLE INTRODUCER SHEATH: Brand: PINNACLE

## (undated) DEVICE — JELLY,LUBE,STERILE,FLIP TOP,TUBE,4-OZ: Brand: MEDLINE

## (undated) DEVICE — TOWEL SURG W17XL27IN STD BLU COT NONFENESTRATED PREWASHED

## (undated) DEVICE — TUBING, SUCTION, 1/4" X 10', STRAIGHT: Brand: MEDLINE

## (undated) DEVICE — SNAPSECURE FOLEY DEVICE: Brand: MEDLINE

## (undated) DEVICE — ANGIOGRAPHY DRAPE- RICHMOND: Brand: MEDLINE INDUSTRIES, INC.

## (undated) DEVICE — SYR LR LCK 1ML GRAD NSAF 30ML --

## (undated) DEVICE — GOWN,PLEAT,SPECIALTY,XL,STRL: Brand: MEDLINE

## (undated) DEVICE — SOLUTION IRRIG 3000ML 0.9% SOD CHL FLX CONT 0797208] ICU MEDICAL INC]

## (undated) DEVICE — STRAP,POSITIONING,KNEE/BODY,FOAM,4X60": Brand: MEDLINE

## (undated) DEVICE — BASIC SINGLE BASIN BTC-LF: Brand: MEDLINE INDUSTRIES, INC.

## (undated) DEVICE — RADIFOCUS GLIDEWIRE: Brand: GLIDEWIRE

## (undated) DEVICE — SOLUTION SCRB 2OZ 10% POVIDONE IOD ANTIMIC BTL